# Patient Record
Sex: MALE | NOT HISPANIC OR LATINO | Employment: UNEMPLOYED | URBAN - METROPOLITAN AREA
[De-identification: names, ages, dates, MRNs, and addresses within clinical notes are randomized per-mention and may not be internally consistent; named-entity substitution may affect disease eponyms.]

---

## 2023-06-05 DIAGNOSIS — E11.9 TYPE 2 DIABETES MELLITUS WITHOUT COMPLICATION, WITHOUT LONG-TERM CURRENT USE OF INSULIN (MULTI): Primary | ICD-10-CM

## 2023-11-21 ENCOUNTER — HOSPITAL ENCOUNTER (INPATIENT)
Facility: HOSPITAL | Age: 83
LOS: 37 days | DRG: 025 | End: 2023-12-29
Attending: EMERGENCY MEDICINE | Admitting: NEUROLOGICAL SURGERY
Payer: MEDICARE

## 2023-11-21 DIAGNOSIS — M79.89 LIMB SWELLING: ICD-10-CM

## 2023-11-21 DIAGNOSIS — I10 HYPERTENSION, UNSPECIFIED TYPE: ICD-10-CM

## 2023-11-21 DIAGNOSIS — R20.8 SKIN SORENESS: ICD-10-CM

## 2023-11-21 DIAGNOSIS — S06.5XAA SUBDURAL HEMATOMA (MULTI): Primary | ICD-10-CM

## 2023-11-21 DIAGNOSIS — R60.1 GENERALIZED EDEMA: ICD-10-CM

## 2023-11-21 PROCEDURE — 99285 EMERGENCY DEPT VISIT HI MDM: CPT | Performed by: EMERGENCY MEDICINE

## 2023-11-21 ASSESSMENT — LIFESTYLE VARIABLES
HAVE YOU EVER FELT YOU SHOULD CUT DOWN ON YOUR DRINKING: NO
REASON UNABLE TO ASSESS: NO
HAVE PEOPLE ANNOYED YOU BY CRITICIZING YOUR DRINKING: NO
EVER FELT BAD OR GUILTY ABOUT YOUR DRINKING: NO
EVER HAD A DRINK FIRST THING IN THE MORNING TO STEADY YOUR NERVES TO GET RID OF A HANGOVER: NO

## 2023-11-21 ASSESSMENT — PAIN - FUNCTIONAL ASSESSMENT: PAIN_FUNCTIONAL_ASSESSMENT: 0-10

## 2023-11-21 NOTE — Clinical Note
Procedure complete. See anesthesia note for all meds given and VS. Access via right groin. Closure via 5 fr mynx and manual pressure. Hemostasis achieved. 2x2 and tegaderm dressing applied. Dressing clean, dry, and intact. Small hematoma, Dr. Truong aware. 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 VSS. Pt transferred to [facility], [place], report given to RN.        10-Kenrick-2019

## 2023-11-22 ENCOUNTER — ANESTHESIA EVENT (OUTPATIENT)
Dept: OPERATING ROOM | Facility: HOSPITAL | Age: 83
DRG: 025 | End: 2023-11-22
Payer: MEDICARE

## 2023-11-22 ENCOUNTER — ANESTHESIA (OUTPATIENT)
Dept: OPERATING ROOM | Facility: HOSPITAL | Age: 83
DRG: 025 | End: 2023-11-22
Payer: MEDICARE

## 2023-11-22 ENCOUNTER — APPOINTMENT (OUTPATIENT)
Dept: RADIOLOGY | Facility: HOSPITAL | Age: 83
DRG: 025 | End: 2023-11-22
Payer: MEDICARE

## 2023-11-22 PROBLEM — S06.5XAA SUBDURAL HEMATOMA (MULTI): Status: ACTIVE | Noted: 2023-11-21

## 2023-11-22 LAB
ABO GROUP (TYPE) IN BLOOD: NORMAL
ALBUMIN SERPL BCP-MCNC: 2.8 G/DL (ref 3.4–5)
ALBUMIN SERPL BCP-MCNC: 3.2 G/DL (ref 3.4–5)
ALP SERPL-CCNC: 95 U/L (ref 33–136)
ALT SERPL W P-5'-P-CCNC: 4 U/L (ref 10–52)
ANION GAP SERPL CALC-SCNC: 12 MMOL/L (ref 10–20)
ANION GAP SERPL CALC-SCNC: 13 MMOL/L (ref 10–20)
ANTIBODY SCREEN: NORMAL
AST SERPL W P-5'-P-CCNC: 7 U/L (ref 9–39)
BASOPHILS # BLD AUTO: 0.01 X10*3/UL (ref 0–0.1)
BASOPHILS NFR BLD AUTO: 0.1 %
BILIRUB SERPL-MCNC: 0.3 MG/DL (ref 0–1.2)
BUN SERPL-MCNC: 20 MG/DL (ref 6–23)
BUN SERPL-MCNC: 20 MG/DL (ref 6–23)
CALCIUM SERPL-MCNC: 7.5 MG/DL (ref 8.6–10.6)
CALCIUM SERPL-MCNC: 8.4 MG/DL (ref 8.6–10.6)
CHLORIDE SERPL-SCNC: 106 MMOL/L (ref 98–107)
CHLORIDE SERPL-SCNC: 110 MMOL/L (ref 98–107)
CO2 SERPL-SCNC: 22 MMOL/L (ref 21–32)
CO2 SERPL-SCNC: 25 MMOL/L (ref 21–32)
CREAT SERPL-MCNC: 1.02 MG/DL (ref 0.5–1.3)
CREAT SERPL-MCNC: 1.02 MG/DL (ref 0.5–1.3)
EOSINOPHIL # BLD AUTO: 0 X10*3/UL (ref 0–0.4)
EOSINOPHIL NFR BLD AUTO: 0 %
ERYTHROCYTE [DISTWIDTH] IN BLOOD BY AUTOMATED COUNT: 14.3 % (ref 11.5–14.5)
ERYTHROCYTE [DISTWIDTH] IN BLOOD BY AUTOMATED COUNT: 14.4 % (ref 11.5–14.5)
GFR SERPL CREATININE-BSD FRML MDRD: 72 ML/MIN/1.73M*2
GFR SERPL CREATININE-BSD FRML MDRD: 72 ML/MIN/1.73M*2
GLUCOSE BLD MANUAL STRIP-MCNC: 149 MG/DL (ref 74–99)
GLUCOSE BLD MANUAL STRIP-MCNC: 177 MG/DL (ref 74–99)
GLUCOSE SERPL-MCNC: 139 MG/DL (ref 74–99)
GLUCOSE SERPL-MCNC: 174 MG/DL (ref 74–99)
HCT VFR BLD AUTO: 26.6 % (ref 41–52)
HCT VFR BLD AUTO: 30.7 % (ref 41–52)
HGB BLD-MCNC: 10.3 G/DL (ref 13.5–17.5)
HGB BLD-MCNC: 8.6 G/DL (ref 13.5–17.5)
IMM GRANULOCYTES # BLD AUTO: 0.04 X10*3/UL (ref 0–0.5)
IMM GRANULOCYTES NFR BLD AUTO: 0.4 % (ref 0–0.9)
INR PPP: 1.2 (ref 0.9–1.1)
LYMPHOCYTES # BLD AUTO: 2.1 X10*3/UL (ref 0.8–3)
LYMPHOCYTES NFR BLD AUTO: 23.5 %
MAGNESIUM SERPL-MCNC: 1.48 MG/DL (ref 1.6–2.4)
MCH RBC QN AUTO: 29.6 PG (ref 26–34)
MCH RBC QN AUTO: 30 PG (ref 26–34)
MCHC RBC AUTO-ENTMCNC: 32.3 G/DL (ref 32–36)
MCHC RBC AUTO-ENTMCNC: 33.6 G/DL (ref 32–36)
MCV RBC AUTO: 90 FL (ref 80–100)
MCV RBC AUTO: 91 FL (ref 80–100)
MONOCYTES # BLD AUTO: 0.85 X10*3/UL (ref 0.05–0.8)
MONOCYTES NFR BLD AUTO: 9.5 %
NEUTROPHILS # BLD AUTO: 5.94 X10*3/UL (ref 1.6–5.5)
NEUTROPHILS NFR BLD AUTO: 66.5 %
NRBC BLD-RTO: 0 /100 WBCS (ref 0–0)
NRBC BLD-RTO: 0 /100 WBCS (ref 0–0)
PHOSPHATE SERPL-MCNC: 3.8 MG/DL (ref 2.5–4.9)
PLATELET # BLD AUTO: 142 X10*3/UL (ref 150–450)
PLATELET # BLD AUTO: 173 X10*3/UL (ref 150–450)
POTASSIUM SERPL-SCNC: 3.9 MMOL/L (ref 3.5–5.3)
POTASSIUM SERPL-SCNC: 4.1 MMOL/L (ref 3.5–5.3)
PROT SERPL-MCNC: 6.4 G/DL (ref 6.4–8.2)
PROTHROMBIN TIME: 13 SECONDS (ref 9.8–12.8)
RBC # BLD AUTO: 2.91 X10*6/UL (ref 4.5–5.9)
RBC # BLD AUTO: 3.43 X10*6/UL (ref 4.5–5.9)
RH FACTOR (ANTIGEN D): NORMAL
SODIUM SERPL-SCNC: 139 MMOL/L (ref 136–145)
SODIUM SERPL-SCNC: 141 MMOL/L (ref 136–145)
WBC # BLD AUTO: 6.9 X10*3/UL (ref 4.4–11.3)
WBC # BLD AUTO: 8.9 X10*3/UL (ref 4.4–11.3)

## 2023-11-22 PROCEDURE — 97161 PT EVAL LOW COMPLEX 20 MIN: CPT | Mod: GP

## 2023-11-22 PROCEDURE — 3600000005 HC OR TIME - INITIAL BASE CHARGE - PROCEDURE LEVEL FIVE: Performed by: NEUROLOGICAL SURGERY

## 2023-11-22 PROCEDURE — 2500000004 HC RX 250 GENERAL PHARMACY W/ HCPCS (ALT 636 FOR OP/ED): Performed by: NEUROLOGICAL SURGERY

## 2023-11-22 PROCEDURE — 85610 PROTHROMBIN TIME: CPT | Performed by: STUDENT IN AN ORGANIZED HEALTH CARE EDUCATION/TRAINING PROGRAM

## 2023-11-22 PROCEDURE — 2500000004 HC RX 250 GENERAL PHARMACY W/ HCPCS (ALT 636 FOR OP/ED): Performed by: STUDENT IN AN ORGANIZED HEALTH CARE EDUCATION/TRAINING PROGRAM

## 2023-11-22 PROCEDURE — 94762 N-INVAS EAR/PLS OXIMTRY CONT: CPT

## 2023-11-22 PROCEDURE — C9248 INJ, CLEVIDIPINE BUTYRATE: HCPCS | Mod: JG

## 2023-11-22 PROCEDURE — 85027 COMPLETE CBC AUTOMATED: CPT | Performed by: STUDENT IN AN ORGANIZED HEALTH CARE EDUCATION/TRAINING PROGRAM

## 2023-11-22 PROCEDURE — 92610 EVALUATE SWALLOWING FUNCTION: CPT | Mod: GN | Performed by: SPEECH-LANGUAGE PATHOLOGIST

## 2023-11-22 PROCEDURE — A4217 STERILE WATER/SALINE, 500 ML: HCPCS | Performed by: NEUROLOGICAL SURGERY

## 2023-11-22 PROCEDURE — C1713 ANCHOR/SCREW BN/BN,TIS/BN: HCPCS | Performed by: NEUROLOGICAL SURGERY

## 2023-11-22 PROCEDURE — 2500000005 HC RX 250 GENERAL PHARMACY W/O HCPCS

## 2023-11-22 PROCEDURE — 3700000001 HC GENERAL ANESTHESIA TIME - INITIAL BASE CHARGE: Performed by: NEUROLOGICAL SURGERY

## 2023-11-22 PROCEDURE — 70450 CT HEAD/BRAIN W/O DYE: CPT | Performed by: RADIOLOGY

## 2023-11-22 PROCEDURE — 61312 CRNEC/CRNOT STTL XDRL/SDRL: CPT | Performed by: NEUROLOGICAL SURGERY

## 2023-11-22 PROCEDURE — 80053 COMPREHEN METABOLIC PANEL: CPT | Performed by: STUDENT IN AN ORGANIZED HEALTH CARE EDUCATION/TRAINING PROGRAM

## 2023-11-22 PROCEDURE — 74018 RADEX ABDOMEN 1 VIEW: CPT

## 2023-11-22 PROCEDURE — A61312 PR OPEN SKULL EVAC HEMATOMA, SUPRATENTORIAL, SUB/ EXTRADURAL: Performed by: ANESTHESIOLOGY

## 2023-11-22 PROCEDURE — 82947 ASSAY GLUCOSE BLOOD QUANT: CPT

## 2023-11-22 PROCEDURE — 85025 COMPLETE CBC W/AUTO DIFF WBC: CPT | Performed by: STUDENT IN AN ORGANIZED HEALTH CARE EDUCATION/TRAINING PROGRAM

## 2023-11-22 PROCEDURE — 00C40ZZ EXTIRPATION OF MATTER FROM INTRACRANIAL SUBDURAL SPACE, OPEN APPROACH: ICD-10-PCS | Performed by: NEUROLOGICAL SURGERY

## 2023-11-22 PROCEDURE — 99100 ANES PT EXTEME AGE<1 YR&>70: CPT | Performed by: ANESTHESIOLOGY

## 2023-11-22 PROCEDURE — 37799 UNLISTED PX VASCULAR SURGERY: CPT | Performed by: STUDENT IN AN ORGANIZED HEALTH CARE EDUCATION/TRAINING PROGRAM

## 2023-11-22 PROCEDURE — 99140 ANES COMP EMERGENCY COND: CPT | Performed by: ANESTHESIOLOGY

## 2023-11-22 PROCEDURE — 7100000002 HC RECOVERY ROOM TIME - EACH INCREMENTAL 1 MINUTE: Performed by: NEUROLOGICAL SURGERY

## 2023-11-22 PROCEDURE — 3700000002 HC GENERAL ANESTHESIA TIME - EACH INCREMENTAL 1 MINUTE: Performed by: NEUROLOGICAL SURGERY

## 2023-11-22 PROCEDURE — 7100000001 HC RECOVERY ROOM TIME - INITIAL BASE CHARGE: Performed by: NEUROLOGICAL SURGERY

## 2023-11-22 PROCEDURE — 3600000010 HC OR TIME - EACH INCREMENTAL 1 MINUTE - PROCEDURE LEVEL FIVE: Performed by: NEUROLOGICAL SURGERY

## 2023-11-22 PROCEDURE — 70450 CT HEAD/BRAIN W/O DYE: CPT

## 2023-11-22 PROCEDURE — 97530 THERAPEUTIC ACTIVITIES: CPT | Mod: GP

## 2023-11-22 PROCEDURE — 00U20KZ SUPPLEMENT DURA MATER WITH NONAUTOLOGOUS TISSUE SUBSTITUTE, OPEN APPROACH: ICD-10-PCS | Performed by: NEUROLOGICAL SURGERY

## 2023-11-22 PROCEDURE — 74018 RADEX ABDOMEN 1 VIEW: CPT | Performed by: STUDENT IN AN ORGANIZED HEALTH CARE EDUCATION/TRAINING PROGRAM

## 2023-11-22 PROCEDURE — 2500000002 HC RX 250 W HCPCS SELF ADMINISTERED DRUGS (ALT 637 FOR MEDICARE OP, ALT 636 FOR OP/ED)

## 2023-11-22 PROCEDURE — 2500000004 HC RX 250 GENERAL PHARMACY W/ HCPCS (ALT 636 FOR OP/ED)

## 2023-11-22 PROCEDURE — 83735 ASSAY OF MAGNESIUM: CPT | Performed by: STUDENT IN AN ORGANIZED HEALTH CARE EDUCATION/TRAINING PROGRAM

## 2023-11-22 PROCEDURE — 2500000005 HC RX 250 GENERAL PHARMACY W/O HCPCS: Performed by: NEUROLOGICAL SURGERY

## 2023-11-22 PROCEDURE — C1776 JOINT DEVICE (IMPLANTABLE): HCPCS | Performed by: NEUROLOGICAL SURGERY

## 2023-11-22 PROCEDURE — 70450 CT HEAD/BRAIN W/O DYE: CPT | Performed by: STUDENT IN AN ORGANIZED HEALTH CARE EDUCATION/TRAINING PROGRAM

## 2023-11-22 PROCEDURE — 97530 THERAPEUTIC ACTIVITIES: CPT | Mod: GO

## 2023-11-22 PROCEDURE — 36415 COLL VENOUS BLD VENIPUNCTURE: CPT | Performed by: STUDENT IN AN ORGANIZED HEALTH CARE EDUCATION/TRAINING PROGRAM

## 2023-11-22 PROCEDURE — 86901 BLOOD TYPING SEROLOGIC RH(D): CPT | Performed by: STUDENT IN AN ORGANIZED HEALTH CARE EDUCATION/TRAINING PROGRAM

## 2023-11-22 PROCEDURE — 96372 THER/PROPH/DIAG INJ SC/IM: CPT | Performed by: NEUROLOGICAL SURGERY

## 2023-11-22 PROCEDURE — 80069 RENAL FUNCTION PANEL: CPT | Mod: CCI | Performed by: STUDENT IN AN ORGANIZED HEALTH CARE EDUCATION/TRAINING PROGRAM

## 2023-11-22 PROCEDURE — 2500000001 HC RX 250 WO HCPCS SELF ADMINISTERED DRUGS (ALT 637 FOR MEDICARE OP): Performed by: NEUROLOGICAL SURGERY

## 2023-11-22 PROCEDURE — 97166 OT EVAL MOD COMPLEX 45 MIN: CPT | Mod: GO

## 2023-11-22 PROCEDURE — 2780000003 HC OR 278 NO HCPCS: Performed by: NEUROLOGICAL SURGERY

## 2023-11-22 PROCEDURE — 2060000001 HC INTERMEDIATE ICU ROOM DAILY

## 2023-11-22 PROCEDURE — 36620 INSERTION CATHETER ARTERY: CPT | Performed by: STUDENT IN AN ORGANIZED HEALTH CARE EDUCATION/TRAINING PROGRAM

## 2023-11-22 DEVICE — CROSS PIN, AXS SELF-TAP, 1.5 X 4MM
Type: IMPLANTABLE DEVICE | Site: CRANIAL | Status: FUNCTIONAL
Removed: 2023-11-30

## 2023-11-22 DEVICE — IMPLANTABLE DEVICE: Type: IMPLANTABLE DEVICE | Site: CRANIAL | Status: FUNCTIONAL

## 2023-11-22 DEVICE — GRAFT MATRIX, DURAL, DURAGEN PLUS 2X2 (1/PK): Type: IMPLANTABLE DEVICE | Site: BRAIN | Status: FUNCTIONAL

## 2023-11-22 RX ORDER — ALBUTEROL SULFATE 0.83 MG/ML
2.5 SOLUTION RESPIRATORY (INHALATION) ONCE AS NEEDED
Status: COMPLETED | OUTPATIENT
Start: 2023-11-22 | End: 2023-11-22

## 2023-11-22 RX ORDER — CHOLECALCIFEROL (VITAMIN D3) 50 MCG
50 TABLET ORAL DAILY
COMMUNITY
End: 2023-12-29 | Stop reason: HOSPADM

## 2023-11-22 RX ORDER — OXYCODONE HYDROCHLORIDE 5 MG/1
5 TABLET ORAL EVERY 4 HOURS PRN
Status: DISCONTINUED | OUTPATIENT
Start: 2023-11-22 | End: 2023-11-22 | Stop reason: HOSPADM

## 2023-11-22 RX ORDER — POLYETHYLENE GLYCOL 3350 17 G/17G
17 POWDER, FOR SOLUTION ORAL DAILY
Status: DISCONTINUED | OUTPATIENT
Start: 2023-11-22 | End: 2023-12-29

## 2023-11-22 RX ORDER — LABETALOL HYDROCHLORIDE 5 MG/ML
5 INJECTION, SOLUTION INTRAVENOUS ONCE AS NEEDED
Status: COMPLETED | OUTPATIENT
Start: 2023-11-22 | End: 2023-11-22

## 2023-11-22 RX ORDER — LABETALOL HYDROCHLORIDE 5 MG/ML
10 INJECTION, SOLUTION INTRAVENOUS
Status: DISCONTINUED | OUTPATIENT
Start: 2023-11-22 | End: 2023-12-29

## 2023-11-22 RX ORDER — ACETAMINOPHEN 325 MG/1
650 TABLET ORAL EVERY 4 HOURS PRN
Status: DISCONTINUED | OUTPATIENT
Start: 2023-11-22 | End: 2023-11-22 | Stop reason: HOSPADM

## 2023-11-22 RX ORDER — CHOLECALCIFEROL (VITAMIN D3) 25 MCG
4000 TABLET ORAL DAILY
Status: DISCONTINUED | OUTPATIENT
Start: 2023-11-22 | End: 2023-12-29

## 2023-11-22 RX ORDER — HYDRALAZINE HYDROCHLORIDE 20 MG/ML
5 INJECTION INTRAMUSCULAR; INTRAVENOUS EVERY 30 MIN PRN
Status: DISCONTINUED | OUTPATIENT
Start: 2023-11-22 | End: 2023-11-22

## 2023-11-22 RX ORDER — LIDOCAINE HYDROCHLORIDE 10 MG/ML
0.1 INJECTION INFILTRATION; PERINEURAL ONCE
Status: DISCONTINUED | OUTPATIENT
Start: 2023-11-22 | End: 2023-11-22 | Stop reason: HOSPADM

## 2023-11-22 RX ORDER — PHENYLEPHRINE HCL IN 0.9% NACL 1 MG/10 ML
SYRINGE (ML) INTRAVENOUS AS NEEDED
Status: DISCONTINUED | OUTPATIENT
Start: 2023-11-22 | End: 2023-11-22

## 2023-11-22 RX ORDER — CALCIUM CARB/VITAMIN D3/VIT K1 500-500-40
4000 TABLET,CHEWABLE ORAL DAILY
Status: ON HOLD | COMMUNITY
End: 2023-11-22 | Stop reason: ALTCHOICE

## 2023-11-22 RX ORDER — ONDANSETRON HYDROCHLORIDE 2 MG/ML
4 INJECTION, SOLUTION INTRAVENOUS EVERY 8 HOURS PRN
Status: DISCONTINUED | OUTPATIENT
Start: 2023-11-22 | End: 2023-12-29

## 2023-11-22 RX ORDER — ATORVASTATIN CALCIUM 20 MG/1
20 TABLET, FILM COATED ORAL DAILY
COMMUNITY
End: 2023-12-29 | Stop reason: HOSPADM

## 2023-11-22 RX ORDER — HYDRALAZINE HYDROCHLORIDE 20 MG/ML
10 INJECTION INTRAMUSCULAR; INTRAVENOUS
Status: DISCONTINUED | OUTPATIENT
Start: 2023-11-22 | End: 2023-12-29

## 2023-11-22 RX ORDER — CEFAZOLIN 1 G/1
INJECTION, POWDER, FOR SOLUTION INTRAVENOUS AS NEEDED
Status: DISCONTINUED | OUTPATIENT
Start: 2023-11-22 | End: 2023-11-22

## 2023-11-22 RX ORDER — OXYCODONE HYDROCHLORIDE 5 MG/1
5 TABLET ORAL EVERY 4 HOURS PRN
Status: DISCONTINUED | OUTPATIENT
Start: 2023-11-22 | End: 2023-12-11

## 2023-11-22 RX ORDER — ONDANSETRON 4 MG/1
4 TABLET, FILM COATED ORAL EVERY 8 HOURS PRN
Status: DISCONTINUED | OUTPATIENT
Start: 2023-11-22 | End: 2023-12-29

## 2023-11-22 RX ORDER — OXYCODONE HYDROCHLORIDE 5 MG/1
10 TABLET ORAL EVERY 4 HOURS PRN
Status: DISCONTINUED | OUTPATIENT
Start: 2023-11-22 | End: 2023-12-11

## 2023-11-22 RX ORDER — CLOTRIMAZOLE AND BETAMETHASONE DIPROPIONATE 10; .64 MG/G; MG/G
1 CREAM TOPICAL 2 TIMES DAILY
COMMUNITY
End: 2023-12-29 | Stop reason: HOSPADM

## 2023-11-22 RX ORDER — ONDANSETRON HYDROCHLORIDE 2 MG/ML
INJECTION, SOLUTION INTRAVENOUS AS NEEDED
Status: DISCONTINUED | OUTPATIENT
Start: 2023-11-22 | End: 2023-11-22

## 2023-11-22 RX ORDER — LABETALOL HYDROCHLORIDE 5 MG/ML
INJECTION, SOLUTION INTRAVENOUS AS NEEDED
Status: DISCONTINUED | OUTPATIENT
Start: 2023-11-22 | End: 2023-11-22

## 2023-11-22 RX ORDER — SODIUM CHLORIDE, SODIUM LACTATE, POTASSIUM CHLORIDE, CALCIUM CHLORIDE 600; 310; 30; 20 MG/100ML; MG/100ML; MG/100ML; MG/100ML
100 INJECTION, SOLUTION INTRAVENOUS CONTINUOUS
Status: DISCONTINUED | OUTPATIENT
Start: 2023-11-22 | End: 2023-11-22 | Stop reason: HOSPADM

## 2023-11-22 RX ORDER — LEVETIRACETAM 500 MG/5ML
INJECTION, SOLUTION, CONCENTRATE INTRAVENOUS AS NEEDED
Status: DISCONTINUED | OUTPATIENT
Start: 2023-11-22 | End: 2023-11-22

## 2023-11-22 RX ORDER — PROPOFOL 10 MG/ML
INJECTION, EMULSION INTRAVENOUS AS NEEDED
Status: DISCONTINUED | OUTPATIENT
Start: 2023-11-22 | End: 2023-11-22

## 2023-11-22 RX ORDER — BACITRACIN 500 [USP'U]/G
OINTMENT TOPICAL AS NEEDED
Status: DISCONTINUED | OUTPATIENT
Start: 2023-11-22 | End: 2023-11-22 | Stop reason: HOSPADM

## 2023-11-22 RX ORDER — LIDOCAINE HYDROCHLORIDE 20 MG/ML
INJECTION, SOLUTION INFILTRATION; PERINEURAL AS NEEDED
Status: DISCONTINUED | OUTPATIENT
Start: 2023-11-22 | End: 2023-11-22

## 2023-11-22 RX ORDER — SUCCINYLCHOLINE CHLORIDE 20 MG/ML
INJECTION INTRAMUSCULAR; INTRAVENOUS AS NEEDED
Status: DISCONTINUED | OUTPATIENT
Start: 2023-11-22 | End: 2023-11-22

## 2023-11-22 RX ORDER — LABETALOL HYDROCHLORIDE 5 MG/ML
INJECTION, SOLUTION INTRAVENOUS
Status: COMPLETED
Start: 2023-11-22 | End: 2023-11-22

## 2023-11-22 RX ORDER — ONDANSETRON HYDROCHLORIDE 2 MG/ML
4 INJECTION, SOLUTION INTRAVENOUS ONCE AS NEEDED
Status: DISCONTINUED | OUTPATIENT
Start: 2023-11-22 | End: 2023-11-22 | Stop reason: HOSPADM

## 2023-11-22 RX ORDER — HYDROMORPHONE HYDROCHLORIDE 1 MG/ML
0.2 INJECTION, SOLUTION INTRAMUSCULAR; INTRAVENOUS; SUBCUTANEOUS EVERY 5 MIN PRN
Status: DISCONTINUED | OUTPATIENT
Start: 2023-11-22 | End: 2023-11-22 | Stop reason: HOSPADM

## 2023-11-22 RX ORDER — LABETALOL HYDROCHLORIDE 5 MG/ML
5 INJECTION, SOLUTION INTRAVENOUS ONCE AS NEEDED
Status: DISCONTINUED | OUTPATIENT
Start: 2023-11-22 | End: 2023-11-22

## 2023-11-22 RX ORDER — AMLODIPINE BESYLATE 5 MG/1
5 TABLET ORAL DAILY
COMMUNITY
End: 2023-12-29 | Stop reason: HOSPADM

## 2023-11-22 RX ORDER — SODIUM CHLORIDE 0.9 G/100ML
IRRIGANT IRRIGATION AS NEEDED
Status: DISCONTINUED | OUTPATIENT
Start: 2023-11-22 | End: 2023-11-22 | Stop reason: HOSPADM

## 2023-11-22 RX ORDER — METFORMIN HYDROCHLORIDE 500 MG/1
500 TABLET ORAL
COMMUNITY
End: 2023-12-29 | Stop reason: HOSPADM

## 2023-11-22 RX ORDER — PROPOFOL 10 MG/ML
INJECTION, EMULSION INTRAVENOUS CONTINUOUS PRN
Status: DISCONTINUED | OUTPATIENT
Start: 2023-11-22 | End: 2023-11-22

## 2023-11-22 RX ORDER — DEXAMETHASONE SODIUM PHOSPHATE 4 MG/ML
INJECTION, SOLUTION INTRA-ARTICULAR; INTRALESIONAL; INTRAMUSCULAR; INTRAVENOUS; SOFT TISSUE AS NEEDED
Status: DISCONTINUED | OUTPATIENT
Start: 2023-11-22 | End: 2023-11-22

## 2023-11-22 RX ORDER — ROCURONIUM BROMIDE 10 MG/ML
INJECTION, SOLUTION INTRAVENOUS AS NEEDED
Status: DISCONTINUED | OUTPATIENT
Start: 2023-11-22 | End: 2023-11-22

## 2023-11-22 RX ORDER — LEVETIRACETAM 5 MG/ML
500 INJECTION INTRAVASCULAR EVERY 12 HOURS
Status: DISCONTINUED | OUTPATIENT
Start: 2023-11-22 | End: 2023-12-30 | Stop reason: HOSPADM

## 2023-11-22 RX ORDER — LABETALOL HYDROCHLORIDE 5 MG/ML
INJECTION, SOLUTION INTRAVENOUS
Status: DISPENSED
Start: 2023-11-22 | End: 2023-11-23

## 2023-11-22 RX ORDER — LIDOCAINE HYDROCHLORIDE AND EPINEPHRINE 5; 5 MG/ML; UG/ML
INJECTION, SOLUTION INFILTRATION; PERINEURAL AS NEEDED
Status: DISCONTINUED | OUTPATIENT
Start: 2023-11-22 | End: 2023-11-22 | Stop reason: HOSPADM

## 2023-11-22 RX ORDER — HYDRALAZINE HYDROCHLORIDE 20 MG/ML
5 INJECTION INTRAMUSCULAR; INTRAVENOUS EVERY 30 MIN PRN
Status: COMPLETED | OUTPATIENT
Start: 2023-11-22 | End: 2023-11-22

## 2023-11-22 RX ORDER — ATORVASTATIN CALCIUM 20 MG/1
20 TABLET, FILM COATED ORAL DAILY
Status: DISCONTINUED | OUTPATIENT
Start: 2023-11-22 | End: 2023-12-29

## 2023-11-22 RX ORDER — NORETHINDRONE AND ETHINYL ESTRADIOL 0.5-0.035
KIT ORAL AS NEEDED
Status: DISCONTINUED | OUTPATIENT
Start: 2023-11-22 | End: 2023-11-22

## 2023-11-22 RX ORDER — PHENYLEPHRINE HCL IN 0.9% NACL 0.4MG/10ML
SYRINGE (ML) INTRAVENOUS AS NEEDED
Status: DISCONTINUED | OUTPATIENT
Start: 2023-11-22 | End: 2023-11-22

## 2023-11-22 RX ORDER — OXYCODONE HYDROCHLORIDE 5 MG/1
10 TABLET ORAL EVERY 4 HOURS PRN
Status: DISCONTINUED | OUTPATIENT
Start: 2023-11-22 | End: 2023-11-22 | Stop reason: HOSPADM

## 2023-11-22 RX ORDER — PHENYLEPHRINE 10 MG/250 ML(40 MCG/ML)IN 0.9 % SOD.CHLORIDE INTRAVENOUS
CONTINUOUS PRN
Status: DISCONTINUED | OUTPATIENT
Start: 2023-11-22 | End: 2023-11-22

## 2023-11-22 RX ORDER — LISINOPRIL 10 MG/1
10 TABLET ORAL DAILY
COMMUNITY
End: 2023-12-29 | Stop reason: HOSPADM

## 2023-11-22 RX ORDER — ACYCLOVIR 400 MG/1
400 TABLET ORAL 2 TIMES DAILY
COMMUNITY
End: 2023-12-29 | Stop reason: HOSPADM

## 2023-11-22 RX ORDER — SODIUM CHLORIDE 9 MG/ML
75 INJECTION, SOLUTION INTRAVENOUS CONTINUOUS
Status: DISCONTINUED | OUTPATIENT
Start: 2023-11-22 | End: 2023-12-05

## 2023-11-22 RX ORDER — ACETAMINOPHEN 325 MG/1
650 TABLET ORAL EVERY 6 HOURS PRN
Status: DISCONTINUED | OUTPATIENT
Start: 2023-11-22 | End: 2023-12-07

## 2023-11-22 RX ORDER — NALOXONE HYDROCHLORIDE 0.4 MG/ML
0.2 INJECTION, SOLUTION INTRAMUSCULAR; INTRAVENOUS; SUBCUTANEOUS EVERY 5 MIN PRN
Status: DISCONTINUED | OUTPATIENT
Start: 2023-11-22 | End: 2023-12-29

## 2023-11-22 RX ORDER — FENTANYL CITRATE 50 UG/ML
INJECTION, SOLUTION INTRAMUSCULAR; INTRAVENOUS AS NEEDED
Status: DISCONTINUED | OUTPATIENT
Start: 2023-11-22 | End: 2023-11-22

## 2023-11-22 RX ORDER — HYDROMORPHONE HYDROCHLORIDE 1 MG/ML
0.2 INJECTION, SOLUTION INTRAMUSCULAR; INTRAVENOUS; SUBCUTANEOUS
Status: DISCONTINUED | OUTPATIENT
Start: 2023-11-22 | End: 2023-12-04

## 2023-11-22 RX ORDER — HYDROMORPHONE HYDROCHLORIDE 1 MG/ML
0.5 INJECTION, SOLUTION INTRAMUSCULAR; INTRAVENOUS; SUBCUTANEOUS EVERY 5 MIN PRN
Status: DISCONTINUED | OUTPATIENT
Start: 2023-11-22 | End: 2023-11-22 | Stop reason: HOSPADM

## 2023-11-22 RX ADMIN — SUCCINYLCHOLINE CHLORIDE 120 MG: 20 INJECTION, SOLUTION INTRAMUSCULAR; INTRAVENOUS at 02:04

## 2023-11-22 RX ADMIN — SODIUM CHLORIDE 75 ML/HR: 9 INJECTION, SOLUTION INTRAVENOUS at 06:14

## 2023-11-22 RX ADMIN — SODIUM NITROPRUSSIDE 100 MCG: 25 INJECTION INTRAVENOUS at 03:42

## 2023-11-22 RX ADMIN — LABETALOL HYDROCHLORIDE 5 MG: 5 INJECTION, SOLUTION INTRAVENOUS at 06:04

## 2023-11-22 RX ADMIN — CLEVIPIDINE 0.25 MG: 0.5 EMULSION INTRAVENOUS at 03:53

## 2023-11-22 RX ADMIN — ALBUTEROL SULFATE 2.5 MG: 2.5 SOLUTION RESPIRATORY (INHALATION) at 04:20

## 2023-11-22 RX ADMIN — SODIUM CHLORIDE, SODIUM LACTATE, POTASSIUM CHLORIDE, AND CALCIUM CHLORIDE: 600; 310; 30; 20 INJECTION, SOLUTION INTRAVENOUS at 01:50

## 2023-11-22 RX ADMIN — Medication 160 MCG: at 02:57

## 2023-11-22 RX ADMIN — Medication 0.4 MCG/KG/MIN: at 03:01

## 2023-11-22 RX ADMIN — ONDANSETRON 4 MG: 2 INJECTION, SOLUTION INTRAMUSCULAR; INTRAVENOUS at 02:57

## 2023-11-22 RX ADMIN — LABETALOL HYDROCHLORIDE 5 MG: 5 INJECTION, SOLUTION INTRAVENOUS at 06:15

## 2023-11-22 RX ADMIN — SODIUM CHLORIDE: 0.9 INJECTION, SOLUTION INTRAVENOUS at 02:25

## 2023-11-22 RX ADMIN — LABETALOL HYDROCHLORIDE 10 MG: 5 INJECTION, SOLUTION INTRAVENOUS at 04:08

## 2023-11-22 RX ADMIN — HYDRALAZINE HYDROCHLORIDE 5 MG: 20 INJECTION INTRAMUSCULAR; INTRAVENOUS at 07:47

## 2023-11-22 RX ADMIN — CLEVIPIDINE 0.25 MG: 0.5 EMULSION INTRAVENOUS at 03:42

## 2023-11-22 RX ADMIN — LEVETIRACETAM 1000 MG: 500 INJECTION, SOLUTION, CONCENTRATE INTRAVENOUS at 02:12

## 2023-11-22 RX ADMIN — HYDROMORPHONE HYDROCHLORIDE 0.2 MG: 1 INJECTION, SOLUTION INTRAMUSCULAR; INTRAVENOUS; SUBCUTANEOUS at 09:34

## 2023-11-22 RX ADMIN — SUGAMMADEX 200 MG: 100 INJECTION, SOLUTION INTRAVENOUS at 03:29

## 2023-11-22 RX ADMIN — LABETALOL HYDROCHLORIDE 10 MG: 5 INJECTION INTRAVENOUS at 18:31

## 2023-11-22 RX ADMIN — LEVETIRACETAM 500 MG: 5 INJECTION INTRAVENOUS at 17:29

## 2023-11-22 RX ADMIN — LABETALOL HYDROCHLORIDE 10 MG: 5 INJECTION, SOLUTION INTRAVENOUS at 03:52

## 2023-11-22 RX ADMIN — Medication 120 MCG: at 02:21

## 2023-11-22 RX ADMIN — LIDOCAINE HYDROCHLORIDE 80 MG: 20 INJECTION, SOLUTION INFILTRATION; PERINEURAL at 02:04

## 2023-11-22 RX ADMIN — ONDANSETRON 4 MG: 2 INJECTION INTRAMUSCULAR; INTRAVENOUS at 20:04

## 2023-11-22 RX ADMIN — CEFAZOLIN 2 G: 330 INJECTION, POWDER, FOR SOLUTION INTRAMUSCULAR; INTRAVENOUS at 02:06

## 2023-11-22 RX ADMIN — SUGAMMADEX 200 MG: 100 INJECTION, SOLUTION INTRAVENOUS at 03:48

## 2023-11-22 RX ADMIN — SODIUM NITROPRUSSIDE 100 MCG: 25 INJECTION INTRAVENOUS at 03:37

## 2023-11-22 RX ADMIN — EPHEDRINE SULFATE 5 MG: 50 INJECTION, SOLUTION INTRAVENOUS at 03:05

## 2023-11-22 RX ADMIN — Medication 80 MCG: at 02:10

## 2023-11-22 RX ADMIN — Medication 80 MCG: at 02:45

## 2023-11-22 RX ADMIN — ROCURONIUM BROMIDE 60 MG: 10 INJECTION, SOLUTION INTRAVENOUS at 02:20

## 2023-11-22 RX ADMIN — SODIUM NITROPRUSSIDE 100 MCG: 25 INJECTION INTRAVENOUS at 03:34

## 2023-11-22 RX ADMIN — SODIUM NITROPRUSSIDE 100 MCG: 25 INJECTION INTRAVENOUS at 03:32

## 2023-11-22 RX ADMIN — FENTANYL CITRATE 50 MCG: 50 INJECTION, SOLUTION INTRAMUSCULAR; INTRAVENOUS at 02:04

## 2023-11-22 RX ADMIN — PROPOFOL 75 MCG/KG/MIN: 10 INJECTION, EMULSION INTRAVENOUS at 02:20

## 2023-11-22 RX ADMIN — ONDANSETRON 4 MG: 2 INJECTION INTRAMUSCULAR; INTRAVENOUS at 06:01

## 2023-11-22 RX ADMIN — HYDRALAZINE HYDROCHLORIDE 5 MG: 20 INJECTION INTRAMUSCULAR; INTRAVENOUS at 10:34

## 2023-11-22 RX ADMIN — PROPOFOL 150 MG: 10 INJECTION, EMULSION INTRAVENOUS at 02:04

## 2023-11-22 RX ADMIN — Medication 80 MCG: at 03:01

## 2023-11-22 SDOH — SOCIAL STABILITY: SOCIAL INSECURITY: WERE YOU ABLE TO COMPLETE ALL THE BEHAVIORAL HEALTH SCREENINGS?: NO

## 2023-11-22 SDOH — SOCIAL STABILITY: SOCIAL INSECURITY: ARE THERE ANY APPARENT SIGNS OF INJURIES/BEHAVIORS THAT COULD BE RELATED TO ABUSE/NEGLECT?: UNABLE TO ASSESS

## 2023-11-22 SDOH — SOCIAL STABILITY: SOCIAL INSECURITY: DO YOU FEEL ANYONE HAS EXPLOITED OR TAKEN ADVANTAGE OF YOU FINANCIALLY OR OF YOUR PERSONAL PROPERTY?: UNABLE TO ASSESS

## 2023-11-22 SDOH — SOCIAL STABILITY: SOCIAL INSECURITY: HAS ANYONE EVER THREATENED TO HURT YOUR FAMILY OR YOUR PETS?: UNABLE TO ASSESS

## 2023-11-22 SDOH — SOCIAL STABILITY: SOCIAL INSECURITY: ARE YOU OR HAVE YOU BEEN THREATENED OR ABUSED PHYSICALLY, EMOTIONALLY, OR SEXUALLY BY ANYONE?: UNABLE TO ASSESS

## 2023-11-22 SDOH — SOCIAL STABILITY: SOCIAL INSECURITY: DO YOU FEEL UNSAFE GOING BACK TO THE PLACE WHERE YOU ARE LIVING?: UNABLE TO ASSESS

## 2023-11-22 SDOH — SOCIAL STABILITY: SOCIAL INSECURITY: DOES ANYONE TRY TO KEEP YOU FROM HAVING/CONTACTING OTHER FRIENDS OR DOING THINGS OUTSIDE YOUR HOME?: UNABLE TO ASSESS

## 2023-11-22 SDOH — SOCIAL STABILITY: SOCIAL INSECURITY: ABUSE: ADULT

## 2023-11-22 SDOH — SOCIAL STABILITY: SOCIAL INSECURITY: HAVE YOU HAD THOUGHTS OF HARMING ANYONE ELSE?: UNABLE TO ASSESS

## 2023-11-22 ASSESSMENT — COGNITIVE AND FUNCTIONAL STATUS - GENERAL
TURNING FROM BACK TO SIDE WHILE IN FLAT BAD: A LOT
STANDING UP FROM CHAIR USING ARMS: TOTAL
DAILY ACTIVITIY SCORE: 10
DRESSING REGULAR UPPER BODY CLOTHING: A LOT
WALKING IN HOSPITAL ROOM: TOTAL
TOILETING: A LOT
STANDING UP FROM CHAIR USING ARMS: A LOT
HELP NEEDED FOR BATHING: A LOT
CLIMB 3 TO 5 STEPS WITH RAILING: TOTAL
DAILY ACTIVITIY SCORE: 12
MOVING TO AND FROM BED TO CHAIR: TOTAL
DRESSING REGULAR LOWER BODY CLOTHING: A LOT
MOVING FROM LYING ON BACK TO SITTING ON SIDE OF FLAT BED WITH BEDRAILS: A LOT
WALKING IN HOSPITAL ROOM: A LOT
MOVING TO AND FROM BED TO CHAIR: A LOT
TURNING FROM BACK TO SIDE WHILE IN FLAT BAD: TOTAL
EATING MEALS: A LOT
MOVING FROM LYING ON BACK TO SITTING ON SIDE OF FLAT BED WITH BEDRAILS: A LOT
HELP NEEDED FOR BATHING: A LOT
MOBILITY SCORE: 11
PERSONAL GROOMING: A LOT
PERSONAL GROOMING: A LOT
CLIMB 3 TO 5 STEPS WITH RAILING: TOTAL
MOBILITY SCORE: 7
EATING MEALS: A LOT
DRESSING REGULAR LOWER BODY CLOTHING: TOTAL
DRESSING REGULAR UPPER BODY CLOTHING: A LOT
TOILETING: TOTAL

## 2023-11-22 ASSESSMENT — ACTIVITIES OF DAILY LIVING (ADL)
FEEDING YOURSELF: UNABLE TO ASSESS
HEARING - LEFT EAR: UNABLE TO ASSESS
LACK_OF_TRANSPORTATION: PATIENT DECLINED
JUDGMENT_ADEQUATE_SAFELY_COMPLETE_DAILY_ACTIVITIES: UNABLE TO ASSESS
GROOMING: UNABLE TO ASSESS
DRESSING YOURSELF: UNABLE TO ASSESS
TOILETING: UNABLE TO ASSESS
PATIENT'S MEMORY ADEQUATE TO SAFELY COMPLETE DAILY ACTIVITIES?: UNABLE TO ASSESS
BATHING_ASSISTANCE: MAXIMAL
ADEQUATE_TO_COMPLETE_ADL: UNABLE TO ASSESS
HEARING - RIGHT EAR: UNABLE TO ASSESS
WALKS IN HOME: UNABLE TO ASSESS
BATHING: UNABLE TO ASSESS

## 2023-11-22 ASSESSMENT — PAIN - FUNCTIONAL ASSESSMENT
PAIN_FUNCTIONAL_ASSESSMENT: 0-10
PAIN_FUNCTIONAL_ASSESSMENT: CPOT (CRITICAL CARE PAIN OBSERVATION TOOL)
PAIN_FUNCTIONAL_ASSESSMENT: CPOT (CRITICAL CARE PAIN OBSERVATION TOOL)
PAIN_FUNCTIONAL_ASSESSMENT: 0-10
PAIN_FUNCTIONAL_ASSESSMENT: CPOT (CRITICAL CARE PAIN OBSERVATION TOOL)

## 2023-11-22 ASSESSMENT — LIFESTYLE VARIABLES
HOW MANY STANDARD DRINKS CONTAINING ALCOHOL DO YOU HAVE ON A TYPICAL DAY: PATIENT DECLINED
HOW OFTEN DO YOU HAVE A DRINK CONTAINING ALCOHOL: PATIENT DECLINED
AUDIT-C TOTAL SCORE: -1
AUDIT-C TOTAL SCORE: -1
SKIP TO QUESTIONS 9-10: 0
HOW OFTEN DO YOU HAVE 6 OR MORE DRINKS ON ONE OCCASION: PATIENT DECLINED

## 2023-11-22 NOTE — ANESTHESIA PROCEDURE NOTES
Peripheral IV  Date/Time: 11/22/2023 2:30 AM      Placement  Needle size: 18 G  Laterality: right  Location: hand  Local anesthetic: none  Site prep: alcohol  Technique: anatomical landmarks  Attempts: 1

## 2023-11-22 NOTE — CONSULTS
"Consults    Reason For Consult  L chronic SDH, AMS    History Of Present Illness  Haile Ayers is a 83 y.o. male h/o CLL, anemia, DM, p/w AMS, found to have UTI, CTH w/ L large chronic SDH,.     Patient is seen at ED, found to be nonverbal.  He is fully awake and tracks but does not speak or follow any commands.  He is unable to provide further history    Past Medical History  He has a past medical history of Anemia, Chronic indwelling Mckee catheter, CLL (chronic lymphocytic leukemia) (CMS/HCC), Dementia (CMS/HCC), DM II (diabetes mellitus, type II), controlled (CMS/HCC), HTN (hypertension), Hyperlipidemia, and Leukemia (CMS/HCC).    Surgical History  He has no past surgical history on file.     Social History  He has no history on file for tobacco use, alcohol use, and drug use.    Family History  Family History   Family history unknown: Yes        Allergies  Januvia [sitagliptin]    Review of Systems  Unable to provide further history due to current medical status  Physical Exam  Constitutional:       Appearance: He is normal weight.   HENT:      Head: Normocephalic and atraumatic.      Nose: Nose normal.      Mouth/Throat:      Mouth: Mucous membranes are moist.   Eyes:      Pupils: Pupils are equal, round, and reactive to light.   Cardiovascular:      Rate and Rhythm: Normal rate and regular rhythm.      Pulses: Normal pulses.   Pulmonary:      Effort: Pulmonary effort is normal.   Abdominal:      Palpations: Abdomen is soft.   Musculoskeletal:      Cervical back: Normal range of motion.   Neurological:      Comments: Awake  Non-verbal  Globally aphasic  Nods ys/no (not reliably)  BUE spont AG (L>R)  BLE min wd            Last Recorded Vitals  Blood pressure 134/88, pulse 93, temperature 37.4 °C (99.3 °F), temperature source Temporal, resp. rate 24, height 1.727 m (5' 8\"), weight 70 kg (154 lb 5.2 oz), SpO2 94 %.    Relevant Results  CTH w/ large L sided chronic SDH w/ brain compression and midline shift   "   Assessment/Plan     Halie Ayers is a 83 y.o. male h/o CLL, anemia, DM, p/w AMS, found to have UTI, CTH w/ L large chronic SDH,.     Plan:  Please obtain repeat CTH non-contrast  Labs: CBC, RFP, coags, T/S, UA, CXR  Appreciate any assistance with obtaining family-POA contact information.

## 2023-11-22 NOTE — ANESTHESIA PROCEDURE NOTES
Peripheral IV  Date/Time: 11/22/2023 1:55 AM      Placement  Needle size: 18 G  Laterality: left  Location: hand  Local anesthetic: none  Site prep: alcohol  Technique: anatomical landmarks  Attempts: 1

## 2023-11-22 NOTE — H&P (VIEW-ONLY)
"Consults    Reason For Consult  L chronic SDH, AMS    History Of Present Illness  Haile Ayers is a 83 y.o. male h/o CLL, anemia, DM, p/w AMS, found to have UTI, CTH w/ L large chronic SDH,.     Patient is seen at ED, found to be nonverbal.  He is fully awake and tracks but does not speak or follow any commands.  He is unable to provide further history    Past Medical History  He has a past medical history of Anemia, Chronic indwelling Mckee catheter, CLL (chronic lymphocytic leukemia) (CMS/HCC), Dementia (CMS/HCC), DM II (diabetes mellitus, type II), controlled (CMS/HCC), HTN (hypertension), Hyperlipidemia, and Leukemia (CMS/HCC).    Surgical History  He has no past surgical history on file.     Social History  He has no history on file for tobacco use, alcohol use, and drug use.    Family History  Family History   Family history unknown: Yes        Allergies  Januvia [sitagliptin]    Review of Systems  Unable to provide further history due to current medical status  Physical Exam  Constitutional:       Appearance: He is normal weight.   HENT:      Head: Normocephalic and atraumatic.      Nose: Nose normal.      Mouth/Throat:      Mouth: Mucous membranes are moist.   Eyes:      Pupils: Pupils are equal, round, and reactive to light.   Cardiovascular:      Rate and Rhythm: Normal rate and regular rhythm.      Pulses: Normal pulses.   Pulmonary:      Effort: Pulmonary effort is normal.   Abdominal:      Palpations: Abdomen is soft.   Musculoskeletal:      Cervical back: Normal range of motion.   Neurological:      Comments: Awake  Non-verbal  Globally aphasic  Nods ys/no (not reliably)  BUE spont AG (L>R)  BLE min wd            Last Recorded Vitals  Blood pressure 134/88, pulse 93, temperature 37.4 °C (99.3 °F), temperature source Temporal, resp. rate 24, height 1.727 m (5' 8\"), weight 70 kg (154 lb 5.2 oz), SpO2 94 %.    Relevant Results  CTH w/ large L sided chronic SDH w/ brain compression and midline shift   "   Assessment/Plan     Haile Ayers is a 83 y.o. male h/o CLL, anemia, DM, p/w AMS, found to have UTI, CTH w/ L large chronic SDH,.     Plan:  Please obtain repeat CTH non-contrast  Labs: CBC, RFP, coags, T/S, UA, CXR  Appreciate any assistance with obtaining family-POA contact information.

## 2023-11-22 NOTE — ANESTHESIA PREPROCEDURE EVALUATION
Patient: Haile Ayers    Procedure Information       Date/Time: 11/22/23 0115    Procedure: Left Craniotomy for subdural hematoma evacuation (Left: Head)    Location: [facility] OR 25 / [facility] OR    Surgeons: Og Tran MD            Relevant Problems   No relevant active problems       Clinical information reviewed:    Allergies                NPO Detail:  No data recorded     Physical Exam    Airway  Mallampati: unable to assess  TM distance: <3 FB     Cardiovascular   Rhythm: regular  Rate: normal     Dental    Pulmonary - normal exam     Abdominal      Other findings: Pt had AMS and aphasic           Anesthesia Plan    ASA 4 - emergent     general     Anesthetic plan and risks discussed with spouse.  Use of blood products discussed with spouse who.    Plan discussed with attending.

## 2023-11-22 NOTE — PROGRESS NOTES
Pharmacy Medication History Review    Haile Ayers is a 83 y.o. male admitted for Subdural hematoma (CMS/[facility]). Pharmacy reviewed the patient's hlagg-yy-vijczbmor medications and allergies for accuracy.    The list below reflects the updated PTA list. Comments regarding how patient may be taking medications differently can be found in the Admit Orders Activity  Prior to Admission Medications   Prescriptions Last Dose Informant Patient Reported? Taking?   acyclovir (Zovirax) 400 mg tablet never started- prescribed on 11/10  Yes No; not started yet   Sig: Take 1 tablet (400 mg) by mouth 2 times a day.   amLODIPine (Norvasc) 5 mg tablet last filled 8/16 for 30 day supply  Yes yes   Sig: Take 1 tablet (5 mg) by mouth once daily.   atorvastatin (Lipitor) 20 mg tablet not taking- watches diet; no fill history  Yes No; pt watches diet   Sig: Take 1 tablet (20 mg) by mouth once daily.   cholecalciferol (Vitamin D3) 50 MCG (2000 UT) tablet   Yes yes   Sig: Take 1 tablet (50 mcg) by mouth once daily.   clotrimazole-betamethasone (Lotrisone) cream never started  Yes No; never started   Sig: Apply 1 Application topically 2 times a day.   ibrutinib (Imbruvica) 140 mg capsule   Yes yes   Sig: Take 3 capsules (420 mg total) by mouth once daily.   lisinopril 10 mg tablet   Yes yes   Sig: Take 1 tablet (10 mg) by mouth once daily.   metFORMIN (Glucophage) 500 mg tablet   Yes yes   Sig: Take 1 tablet (500 mg) by mouth 2 times a day with meals.      Facility-Administered Medications: None        The list below reflects the updated allergy list. Please review each documented allergy for additional clarification and justification.  Allergies  Reviewed by Manuel Banks RN on 11/21/2023        Severity Reactions Comments    Januvia [sitagliptin] Not Specified Unknown             Patient declines M2B at discharge. Pharmacy has been updated to [facility].    Sources used: Pharmacy dispense history, OARRs, patient interview (pt unable to participate  in interview- spoke with spouse), and chart review (care everywhere, [facility]) with little to no history    Below are additional concerns with the patient's PTA list.  -per spouse unable to start acyclovir  -prescriptions for flomax (0.8 mg daily) and finasteride (5 mg daily) were written, but spouse states that she believes that is the cause his falls in the past  -per dispense history, last filled amlodipine in August for 30 days supply. Spouse confirms that pt is still taking this medication  -pt was prescribed Jardiance in the past, but co-pay was roughly $800. Spouse states that they could not afford therefore has not been taking. Per spouse, doctor is aware.    Anay Alfaro, PharmD, [facility]  Transitions of Care Pharmacist  [facility]  Please reach out via Secure Chat for questions, or if no response call [phone] or [facility]era [facility]

## 2023-11-22 NOTE — PROGRESS NOTES
Speech-Language Pathology    Inpatient Speech-Language Pathology Clinical Swallow Evaluation    Patient Name: Haile Ayers  MRN: [id number]  Today's Date: 11/22/2023   Time Calculation  Start Time: 1447  Stop Time: 1505  Time Calculation (min): 18 min         History:   The patient is an 83-year-old male with PMH of CLL, DM2, and HTN presenting to the emergency department from OSH for neurosurgery evaluation.  Reportedly appeared to be altered prompting his wife to take him to OSH for evaluation.  Patient was aphasic when he arrived at OSH prompting stroke alert.  CT head was obtained that demonstrated an acute on chronic subdural with approximately 1.2 cm of midline shift.  No thrombolytics given as a result of imaging.  Patient additionally noted to have UTI for which she received Rocephin.  Transferred for evaluation.       Recommendations:  Solid Diet Recommendations : NPO  Liquid Diet Recommendations: NPO    -Frequent, aggressive oral care is strongly recommended to improve infection control as well as reduce dental plaque and bacteria on oropharyngeal surfaces which may increase the risk nosocomial infections, including pneumonia.    -OK for small amounts of ice chips (one at a time, 10x/hour) for pleasure/swallow stimulation, but only after aggressive oral care to avoid colonization of bacteria within oral cavity.      Assessment:  Clinical swallow evaluation completed. Pt non-verbal, not following directions during evaluation. Accepted small amount of ice chips x3, pt masticating ice chips. Non acceptance of straw, clenched teeth. Opened mouth with adequate labial seal to consume small single sips of water from a cup. No overt coughing, however with this limited amount silent aspiration or dysphagia cannot be r/o. Deferred further PO trials as this would place the pt at risk for potential aspiration. Due to surgery just being completed today, SLP to continue to re-evaluate to ascertain readiness for PO vs  need for an instrumental exam. Discussed results and recommendations with RN and MD        Plan:  SLP Plan: Skilled SLP  SLP Frequency: 2x per week  Duration: 3 weeks  SLP Discharge Recommendations: Continue skilled SLP services at the next level of care  Discussed POC: Patient, Nursing, Physician  Patient/Caregiver Agreeable: Other (Comment) (Pt non-verbal)  SLP - OK to Discharge: Yes    Goal:   Pt. will tolerate least restrictive diet without overt s/s of aspiration with 100% accuracy.      Pain:  Pain Assessment:  (Unable to assess)       Oral/Motor Assessment:  Dentition: Some Missing Teeth  Oral Motor:  (Pt unable to follow directions due to apparent receptive langauge deficits.)        Inpatient:  Education Documentation  Extensive education provided to pt re: current swallow function, recommendations/results and dysphagia POC question comprehension.

## 2023-11-22 NOTE — ED TRIAGE NOTES
Pt is a 82 yo male transferred from [facility] with a chronic subdural and a small acute subdural with shift, no immediate history of falls, pt accepted to the ED by NSGY, pt is aaox 2, chronic ignacio in place, pt was given 975 mg tylenol po and 1 gram of rocephin iv at OSH, 18g iv left AC placed pta

## 2023-11-22 NOTE — PROGRESS NOTES
Occupational Therapy    Evaluation/Treatment    Patient Name: Haile Ayers  MRN: [id number]  : 1940  Today's Date: 23  Time Calculation  Start Time: 956  Stop Time:   Time Calculation (min): 36 min       Assessment:  Prognosis: Fair  Evaluation/Treatment Tolerance: Patient tolerated treatment well  Medical Staff Made Aware: Yes  End of Session Communication: Bedside nurse  End of Session Patient Position: Bed, 3 rail up, Alarm on (B restraints)  OT Assessment Results: Decreased ADL status, Decreased upper extremity range of motion, Decreased upper extremity strength, Decreased safe judgment during ADL, Decreased cognition, Decreased endurance, Visual deficit, Decreased fine motor control, Decreased functional mobility, Decreased gross motor control, Decreased IADLs, Decreased trunk control for functional activities  Prognosis: Fair  Evaluation/Treatment Tolerance: Patient tolerated treatment well  Medical Staff Made Aware: Yes  Plan:  Treatment Interventions: ADL retraining, Visual perceptual retraining, Functional transfer training, UE strengthening/ROM, Endurance training, Cognitive reorientation, Patient/family training, Neuromuscular reeducation, Fine motor coordination activities  OT Frequency: 4 times per week  OT Discharge Recommendations: High intensity level of continued care  OT Recommended Transfer Status: Maximum assist  OT - OK to Discharge: Yes (Recommending High Intensity.)  Treatment Interventions: ADL retraining, Visual perceptual retraining, Functional transfer training, UE strengthening/ROM, Endurance training, Cognitive reorientation, Patient/family training, Neuromuscular reeducation, Fine motor coordination activities    Subjective   Current Problem:  1. Subdural hematoma (CMS/HCC)  Case Request Operating Room: Left Craniotomy for subdural hematoma evacuation    Case Request Operating Room: Left Craniotomy for subdural hematoma evacuation        General:   OT Received On:  11/22/23  General  Reason for Referral: Pt p/w AMS found to have UTI as well. CTH with large L chronic SDH with brain compression and midline shift. S/p L craniotomy for SDH evac (11/22).  Past Medical History Relevant to Rehab: CLL, anemia, DM  Prior to Session Communication: Bedside nurse  Patient Position Received: Bed, 3 rail up, Alarm on  Preferred Learning Style: visual, verbal  General Comment: Pt agreeable and cooperative. Pt demo expressive and receptive languge deficits with following ~10-15% commands with demo required. CGA-Min A for EOB. On 4 L via simple mask. Drain on L. B restraints.  Precautions:  Hearing/Visual Limitations: WFL  Medical Precautions: Fall precautions  Vital Signs:  Heart Rate: 82 (End of session 80.)  Heart Rate Source: Monitor  Resp: 26 (End of session 23.)  SpO2: 99 % (End of session 98. On 4 L.)  BP: 152/82 (During session 183/95 and end of session 166/83.)  MAP (mmHg): 102 (During session 118 and end of session 107.)  Pain:  Pain Assessment  Pain Assessment: 0-10  Pain Score:  (Difficult to assess d/t expressive language deficits but pt did not indicate pain for duration.)    Objective   Cognition:  Overall Cognitive Status: Impaired (Pt followed ~10-15% commands with demo and repetition. Pt attempts to mimic tasks after repetition and demo but with difficulty. Pt nodded yes to all questions with no verbalizations during session.)  Orientation Level:  (Attempted but difficult to assess d/t expressive and receptive language deficits. Pt consistently nodded yes to all orientation questions.)  Attention:  (Pt demo poor attention to task d/t poor awareness, poor command following, expressive and receptive language deficits. Pt distracted with verbal cueing to attend to task. )  Problem Solving:  (Pt demo poor problem solving d/t poor attention to task and limited ability to understand task d/t language deficits )  Safety/Judgement:  (Pt demo poor safety awareness d/t poor attention  and command following with verbal cueing for correction.)  Insight: Moderate  Processing Speed: Delayed      Home Living:  Home Living Comments:  (Attempted but unable. Pt unable to report d/t expressive and receptive language deficits.)  Prior Function:  Prior Function Comments: Attempted but unable. Pt unable to report d/t expressive and receptive language deficits.  IADL History:  IADL Comments: Attempted but unable. Pt unable to report d/t expressive and receptive language deficits.  ADL:  Eating Assistance: Moderate  Grooming Assistance: Moderate  Bathing Assistance: Maximal  UE Dressing Assistance: Moderate  LE Dressing Assistance: Total (Attempted to don socks at bed level but unable d/t poor command following, decreased ROM, weakness and R. Max verbal and tactile cueing with demo in attempt to initiate.)  Toileting Assistance with Device: Total  Activities of Daily Living:      Activity Tolerance:  Endurance:  (Pt was unable to perform ADLs. Pt sat EOB ~10 min with CGA-Min A d/t LOB.)  Functional Standing Tolerance:     Bed Mobility/Transfers: Bed Mobility  Bed Mobility: Yes  Bed Mobility 1  Bed Mobility 1: Supine to sitting  Level of Assistance 1: Maximum assistance (HOB elevated with bed rail up and drawsheet used. Instructed pt with demo and [place] assist to initiate reach with R hand, but difficulty. Attempted to assist with LEs but unable. Assist for trunk control and management of LEs.)  Bed Mobility Comments 1: Max verbal and tactile cueing with demo and repetition for task sequence.  Bed Mobility 2  Bed Mobility  2: Sitting to supine  Level of Assistance 2: Maximum assistance (HOB lowered and drawsheet used. Instructed pt with demo and [place] assist to initiate WB on LUE, pt able to initiate. Assist for trunk control and management of LEs.)  Bed Mobility Comments 2: Max verbal and tactile cueing with demo and repetition for task sequence    Transfers  Transfer: No      Ambulation/Gait Training:     Sitting  Balance:  Static Sitting Balance  Static Sitting-Level of Assistance:  (CGA-Min A with posterolateral lean to the R.)  Standing Balance:       Modalities:     IADL's:   Splinting:     Casting:     Therapy/Activity: Therapeutic Activity  Therapeutic Activity Performed: Yes  Therapeutic Activity 1: Pt sat EOB ~10 min with CGA-Min A d/t LOB with posterolateral lean to the R with verbal cueing to correct posture. Pt able to self-correct with max verbal cueing. Verbal and tactile  cueing with repetition and stim to visually track L and R while EOB.  Sensory:     Cognitive Skill Development:     Community/Work Reintegration Training:     Community Mobility:     Vision:    and Vision - Complex Assessment  Ocular Range of Motion:  (Pt able to attend to all visual fields with cueing, poor visual tracking d/t poor attention and command following.)  Sensation:  Light Touch:  (WD to nox stim on L UE/LE(+) and WD to nox stim on R UE/LE(+) with delayed response on R.)  Strength:     Other Activity:     Home Environment:     Perception:  Inattention/Neglect: Cues to attend right visual field  Coordination:      Hand Function:  Hand Function  Gross Grasp: Impaired (3/5 B by observation)  Extremities: RUE AROM (degrees)  RUE AROM Comment: WFL  RUE Strength  R Shoulder Flexion: 3-/5  R Elbow Flexion: 3/5  R Elbow Extension: 3/5, LUE AROM (degrees)  LUE AROM Comment: WFL  LUE Strength  L Shoulder Flexion: 3-/5  L Elbow Flexion: 3/5  L Elbow Extension: 3/5, Strength RLE  RLE Overall Strength:  (By observation at least 3-/5), and Strength LLE  LLE Overall Strength:  (By observation at least 3/5)    Outcome Measures: [facility] Daily Activity  Putting on and taking off regular lower body clothing: Total  Bathing (including washing, rinsing, drying): A lot  Putting on and taking off regular upper body clothing: A lot  Toileting, which includes using toilet, bedpan or urinal: Total  Taking care of personal grooming such as brushing teeth: A  lot  Eating Meals: A lot  Daily Activity - Total Score: 10    Education Documentation  Body Mechanics, taught by VALERIA Lu at 11/22/2023  1:50 PM.  Learner: Patient  Readiness: Nonacceptance  Method: Demonstration, Explanation  Response: Needs Reinforcement    Precautions, taught by VALERIA Lu at 11/22/2023  1:50 PM.  Learner: Patient  Readiness: Nonacceptance  Method: Demonstration, Explanation  Response: Needs Reinforcement    ADL Training, taught by VALERIA Lu at 11/22/2023  1:50 PM.  Learner: Patient  Readiness: Nonacceptance  Method: Demonstration, Explanation  Response: Needs Reinforcement    Education Comments  No comments found.        OP EDUCATION:       Goals:  Encounter Problems       Encounter Problems (Active)       ADLs       Patient will perform UB and LB bathing with CGA       Start:  11/22/23    Expected End:  12/06/23            Patient will complete upper body dressing with contact guard assist level of assistance donning all UE clothes while edge of bed        Start:  11/22/23    Expected End:  12/06/23            Patient will complete lower body dressing with moderate assist level of assistance donning all LE clothes  with LRD while edge of bed and standing       Start:  11/22/23    Expected End:  12/06/23            Patient will complete daily grooming tasks brushing teeth, washing face/hair, and unsupported sitting with SBA and PRN adaptive equipment while edge of bed with min cueing.       Start:  11/22/23    Expected End:  12/06/23            Patient will complete toileting including hygiene clothing management/hygiene with moderate assist level of assistance        Start:  11/22/23    Expected End:  12/06/23               BALANCE       Pt will maintain dynamic sitting balance during ADL task with modified independent level of assistance in order to demonstrate decreased risk of falling and improved postural control.       Start:  11/22/23    Expected End:   12/06/23               COGNITION/SAFETY       Patient will follow >85% simple commands to allow improved ADL performance with min verbal cueing.        Start:  11/22/23    Expected End:  12/06/23            Patient will demonstrate orientation x 4 with min verbal cueing.       Start:  11/22/23    Expected End:  12/06/23       ORIENTATION         Patient will sequence a functional task including 2-3 steps with >85% accuracy with min verbal cueing.        Start:  11/22/23    Expected End:  12/06/23            Patient will attend to functional task with 90% accuracy for >10 min with min verbal cueing.        Start:  11/22/23    Expected End:  12/06/23               TRANSFERS       Patient will perform bed mobility contact guard assist level of assistance and bed rails in order to improve safety and independence with mobility       Start:  11/22/23    Expected End:  12/06/23            Patient will complete functional transfer with least restrictive device with minimal assist  level of assistance.       Start:  11/22/23    Expected End:  12/06/23               VISION       Patient will visually track to all visual fields 100% of the time while performing a functional task with min verbal cueing.        Start:  11/22/23    Expected End:  12/06/23               Argenis Lewis S/OT under direct supervision of OT

## 2023-11-22 NOTE — PROGRESS NOTES
Physical Therapy    Physical Therapy Evaluation & Treatment    Patient Name: Haile Ayers  MRN: [id number]  Today's Date: 11/22/2023   Time Calculation  Start Time: 1212  Stop Time: 1252  Time Calculation (min): 40 min    Assessment/Plan   PT Assessment  PT Assessment Results: Decreased strength, Decreased endurance, Impaired balance, Decreased mobility, Decreased cognition  Rehab Prognosis: Good  Evaluation/Treatment Tolerance: Patient tolerated treatment well  End of Session Communication: Bedside nurse  Assessment Comment: Patient to benefit from ongoing PT services at this time and at discharge to continue addressing the above limitations and to prepare patient for safe and timely return to prior level of function.  End of Session Patient Position: Bed, 3 rail up, Alarm on   IP OR SWING BED PT PLAN  Inpatient or Swing Bed: Inpatient  PT Plan  Treatment/Interventions: Bed mobility, Transfer training, Gait training, Stair training, Balance training, Neuromuscular re-education, Strengthening, Endurance training, Range of motion, Therapeutic exercise, Therapeutic activity, Home exercise program, Orthotic fitting/training, Positioning, Postural re-education  PT Plan: Skilled PT  PT Frequency: 5 times per week  PT Discharge Recommendations: High intensity level of continued care  Equipment Recommended upon Discharge:  (TBD; Anticipate wheeled walker)  PT Recommended Transfer Status: Assist x2  PT - OK to Discharge: Yes (PT evaluation has been completed and discharge recommendations have been made.)      Subjective     General Visit Information:  General  Reason for Referral: Pt p/w AMS, found to have UTI, CTH w/ L large chronic SDH. Now s/p L craniotomy for SDH evacuation.  Past Medical History Relevant to Rehab: CLL, anemia, DM  Co-Treatment:  (Utilized rehab aide during session for line management and increased safety with mobility activities requiring assist of 2.)  Prior to Session Communication: Bedside  nurse  Patient Position Received: Bed, 3 rail up  Home Living:  Home Living  Home Living Comments: Unable to obtain prior level of function and home setup details; Plan to obtain in subsequent PT sessions.  Prior Level of Function:     Precautions:  Precautions  Hearing/Visual Limitations: RN reports pt is hard of hearing. Vision appears WFL, though assessment limited by decreased command following and communication deficits.  Medical Precautions: Fall precautions  Precautions Comment: SBP <160  Vital Signs:  Vital Signs  Heart Rate: 87 (Max , 85 end of session)  SpO2: 97 % (98-98% throughout)  BP: 138/75 (164/85 (108) max; SBP decreases to 150's sitting at edge of bed. 153/75 (98) end of session)  MAP (mmHg): 92  BP Method: Automatic    Objective   Pain:  Pain Assessment  Pain Assessment:  (Nods to indicate yes to pain, but unable to confirm location. RN aware of this.)  Cognition:  Cognition  Overall Cognitive Status: Impaired (Expressive communication deficits appreciated.)  Orientation Level: Unable to assess (Makes eye contact in response to name, but unable to answer questions this date.)  Following Commands: Follows one step commands with repetition (50% accuracy)  Memory:  (Pt alert and cooperative throughout session.)  Processing Speed: Delayed    General Assessments:  Activity Tolerance  Endurance: Tolerates 30 min exercise with multiple rests  Early Mobility/Exercise Safety Screen: Proceed with mobilization - No exclusion criteria met    Sensation  Light Touch:  (WD to noxious stimuli x4.)  Sensation Comment: Difficult to assess formally 2/2 communication limitations.         Perception  Inattention/Neglect:  (Crosses midline bilaterally, extra cues to attend to RUE/RLE compared to L side.)      Coordination  Coordination Comment: Difficult to assess 2/2 limited command following.    Postural Control  Postural Control: Impaired  Head Control: WFL  Trunk Control: Posterior lean, but able to correct  with cues for proper hand placement and posture. See Balance for additional details.  Righting Reactions: Intact  Protective Responses: Intact  Posture Comment: Rounded shoulders and forward head posture.    Static Sitting Balance  Static Sitting-Balance Support: Bilateral upper extremity supported, Feet supported  Static Sitting-Level of Assistance:  (MOD A x1 progressing to contact guard)  Static Sitting-Comment/Number of Minutes: 15 min  Dynamic Sitting Balance  Dynamic Sitting-Balance Support: Bilateral upper extremity supported, Feet supported  Dynamic Sitting-Balance: Lateral lean, Forward lean  Dynamic Sitting-Comments: MIN A x1    Static Standing Balance  Static Standing-Balance Support: Bilateral upper extremity supported  Static Standing-Level of Assistance: Moderate assistance (x2)  Static Standing-Comment/Number of Minutes: ~1 min  Dynamic Standing Balance  Dynamic Standing-Balance Support:  (Did not progress to dynamic out of bed mobility this date 2/2 limited command following.)  Functional Assessments:  Bed Mobility  Bed Mobility: Yes  Bed Mobility 1  Bed Mobility 1: Supine to sitting, Sitting to supine  Level of Assistance 1: Moderate assistance (x2)  Bed Mobility Comments 1: HOB slightly elevated, verbal/tactile cues for proper hand placement and task sequencing.    Transfers  Transfer: Yes  Transfer 1  Technique 1: Sit to stand, Stand to sit  Transfer Device 1:  (B arm in arm assist)  Transfer Level of Assistance 1: Moderate assistance (x2)  Trials/Comments 1: Performs from bed level. Verbal/tactile and hand over hand cues for proper task sequencing and using trunk momentum to facilitate task.  Extremity/Trunk Assessments:  ISI SNOWDEN :  (ROM WFL. Strength >/= 3/5 based on observation. Formal assessment limited by decreased command following.)  KRISS MONTERROSO:  (ROM WFL. Strength >/= 3/5 based on observation. Formal assessment limited by decreased command following.)  RLE   RLE :  (PROM WFL, limited AROM  observed, formal assessment limited by decreased command following. >/= 3-/5 based on mobility.)  LLE   LLE :  (PROM WFL, limited AROM observed, formal assessment limited by decreased command following. >/= 3-/5 based on mobility.)  Treatments:  Therapeutic Activity  Therapeutic Activity Performed: Yes  Therapeutic Activity 1: Skilled vitals monitoring with progressive chair position 10 min prior to sitting at edge of bed. Positioned pt with pillows/wedges/offloading boots at end of session to prevent decubitus ulcers/contracture and to promote optimal circulation.    Balance/Neuromuscular Re-Education  Balance/Neuromuscular Re-Education Activity Performed: Yes  Balance/Neuromuscular Re-Education Activity 1: 5x L and R + 5x ant/post leaning at bedside with MIN A x1 laterally and MOD A x1 ant/posteriorly. Verbal/tactile cues and demonstration for proper form.  Outcome Measures:  [facility] Basic Mobility  Turning from your back to your side while in a flat bed without using bedrails: A lot  Moving from lying on your back to sitting on the side of a flat bed without using bedrails: Total  Moving to and from bed to chair (including a wheelchair): Total  Standing up from a chair using your arms (e.g. wheelchair or bedside chair): Total  To walk in hospital room: Total  Climbing 3-5 steps with railing: Total  Basic Mobility - Total Score: 7    FSS-ICU  Ambulation: Unable to attempt due to weakness  Rolling: Maximal assistance (performs 25% - 49% of task)  Sitting: Moderate assistance (performs 50 - 74% of task)  Transfer Sit-to-Stand: Total assistance (performs 25% or requires another person)  Transfer Supine-to-Sit: Total assistance (performs 25% or requires another person)  Total Score: 7      ICU Mobility Screen  Early Mobility/Exercise Safety Screen: Proceed with mobilization - No exclusion criteria met  E = Exercise and Early Mobility  Early Mobility/Exercise Safety Screen: Proceed with mobilization - No exclusion  criteria met  Current Activity: Standing    Encounter Problems       Encounter Problems (Active)       Balance       Patient will stand with UE support of LRD and </= MOD A x1 at least 3 min to improve balance required for self-care tasks.        Start:  11/22/23    Expected End:  12/06/23               Mobility       Patient will ambulate at least 20 ft. with </= MOD A x1 and LRD to improve tolerance of household distances.        Start:  11/22/23    Expected End:  12/06/23            Patient will perform bed mobility with </= MOD A x1 to reduce risk of developing decubitus ulcers.        Start:  11/22/23    Expected End:  12/06/23            Patient will perform home exercise program as prescribed with cues as needed.         Start:  11/22/23    Expected End:  12/06/23               Pain - Adult          Transfers       Patient will perform sit to stand and stand to sit transfers with </= MAX A x1 and LRD to increase functional strength.        Start:  11/22/23    Expected End:  12/06/23                   Education Documentation  Precautions, taught by Lamar Alvarez, PT at 11/22/2023  3:23 PM.  Learner: Patient  Readiness: Nonacceptance  Method: Explanation  Response: Needs Reinforcement    Mobility Training, taught by Lamar Alvarez, PT at 11/22/2023  3:23 PM.  Learner: Patient  Readiness: Nonacceptance  Method: Explanation  Response: Needs Reinforcement    Education Comments  No comments found.

## 2023-11-22 NOTE — OP NOTE
Left Craniotomy for subdural hematoma evacuation (L) Operative Note     Date: 2023  OR Location: [facility] OR    Name: Haile Ayers, : 1940, Age: 83 y.o., MRN: [id number], Sex: male    Diagnosis    PREOPERATIVE DIAGNOSIS:   LEFT Sided Chronic Subdural Hematoma     POSTOPERATIVE DIAGNOSIS:   SAME    OPERATION/PROCEDURE:    LEFT  Sided Craniotomy for Evacuation of Subdural Hematoma     SURGEON:    Og Tran MD    RESIDENT:  Ignacio Huber MD    MEDICATIONS:    Antibiotic prophylaxis given within one hour prior to skin incision.    PROPHYLAXIS:    Venous thromboembolism prophylaxis was ordered at the time of surgery in the form of mechanical prophylaxis using sequential compression devices.    INDICATION:  Mr. Ayers is a 83 y.o. male who presented with headache/altered sensorium with right sided weakness and imaging showed presence of a LEFT Sided Chronic Subdural Hematoma . Risk versus benefits of the surgery were discussed and a decision to proceed with the procedure was made only as a life-saving measure.     DESCRIPTION OF PROCEDURE:    The patient was brought into the operating room on a cart and was transferred over to the operative table, was handed to Anesthesia for endotracheal intubation.Hairs were clipped appropriately as necessary for the surgery. A LEFT-sided incision was then marked out.   The patient was then prepped and draped in a sterile fashion, and a 10 blade was used to incise the skin down to bone.  Combination of Deborah clips and monopolar and bipolar electrocautery was utilized to achieve hemostasis.  [place] drill bit was used to drill victoriano holes into the skull and a high-speed B1 drill bit was used do the rest of the craniotomy.  Combination of Penfield elevators was used to elevate the bone flap.  At that point, the dura was noted to be tense.  A 15 blade was used to incise the dura after bipolar cauterization of the dural vessels.  The subdural hematoma was under high  pressure.  The rest of the dura was cut using jeweler forceps and Metzenbaum scissors.   Gentle suction, irrigation, and forceps were used to remove as much as the blood clot as safely possible.  At that point, after hemostasis was achieved, a subdural drain was left in place.   DuraGen was placed on top of the dural leaflets.  The bone was then replaced and held in place with titanium bur holes.   The incision was then closed in a layered fashion with inverted 2-0 and staples for the skin.  The patient was left intubated due to his neurological condition prior to coming to the operating room. There were no complications. The EBL was about 50 ml    Complications:  None; patient tolerated the procedure well.    Disposition: PACU - hemodynamically stable.  Condition: stable         Attending Attestation: I was present for the entire procedure.    Og Tran  Phone Number: [phone]

## 2023-11-22 NOTE — CARE PLAN
The patient's goals for the shift include  Stable neuro status    The clinical goals for the shift include  Stable neuro status. SBP w/in ordered parameters.    Admitted to NSU - RITCHIE status, post op crani for SDH. Aphasic, no follows / +mimics. Post op POC initiated.

## 2023-11-22 NOTE — ANESTHESIA PROCEDURE NOTES
Airway  Date/Time: 11/22/2023 2:05 AM  Urgency: elective    Airway not difficult    Staffing  Performed: resident   Authorized by: Valente Blanchard MD    Performed by: Jose Guadalupe Nicole DO  Patient location during procedure: OR    Indications and Patient Condition  Indications for airway management: anesthesia  Spontaneous Ventilation: absent  Sedation level: deep  Preoxygenated: yes  Patient position: sniffing  Mask difficulty assessment: 0 - not attempted (RSI)  Planned trial extubation    Final Airway Details  Final airway type: endotracheal airway      Successful airway: ETT  Cuffed: yes   Successful intubation technique: video laryngoscopy  Facilitating devices/methods: intubating stylet and cricoid pressure  Endotracheal tube insertion site: oral  Blade size: #3  ETT size (mm): 7.5  Cormack-Lehane Classification: grade I - full view of glottis  Placement verified by: chest auscultation and capnometry   Measured from: lips  ETT to lips (cm): 22  Number of attempts at approach: 1

## 2023-11-22 NOTE — ANESTHESIA PROCEDURE NOTES
Arterial Line:    Date/Time: 11/22/2023 2:20 AM    Staffing  Performed: resident   Authorized by: Valente Blanchard MD    Performed by: Astrid Palacio MD    An arterial line was placed. Procedure performed using surface landmarks.in the OR for the following indication(s): continuous blood pressure monitoring.    A 20 gauge (size), 1 and 3/4 inch (length), Angiocath (type) catheter was placed into the Right radial artery, secured by Tegaderm,   Seldinger technique not used.  Events:  patient tolerated procedure well with no complications.

## 2023-11-22 NOTE — ANESTHESIA POSTPROCEDURE EVALUATION
Patient: Haile Ayers    Procedure Summary       Date: 11/22/23 Room / Location: [facility] OR 25 / [facility] OR    Anesthesia Start: 0150 Anesthesia Stop: 0407    Procedure: Left Craniotomy for subdural hematoma evacuation (Left: Head) Diagnosis:       Subdural hematoma (CMS/HCC)      (Subdural hematoma (CMS/HCC) [S06.5XAA])    Surgeons: Og Tran MD Responsible Provider: Valente Blanchard MD    Anesthesia Type: general ASA Status: 4 - Emergent            Anesthesia Type: general    Vitals Value Taken Time   /59 11/22/23 0407   Temp 35.5 11/22/23 0407   Pulse 74 11/22/23 0405   Resp 18 11/22/23 0405   SpO2 97 % 11/22/23 0405   Vitals shown include unvalidated device data.    Anesthesia Post Evaluation    Patient location during evaluation: PACU  Patient participation: complete - patient cannot participate  Level of consciousness: confused  Pain management: adequate  Airway patency: patent  Cardiovascular status: acceptable  Respiratory status: acceptable and airway suctioned  Hydration status: acceptable  Postoperative Nausea and Vomiting: none        No notable events documented.

## 2023-11-22 NOTE — SIGNIFICANT EVENT
Emergent OR:    Pt with significant left sided SDH with significant brain compression and midline shift. Was attempted to reach family multiple time but was not successful. Given imaging findings and physical exam he is a candidate for emergent SDH evacuation to relieve brain compression. Plan was discussed with ED resident/attending.    Pt will be going to OR emergently with approval of neurosurgery attending Dr. Tran and ED attending Dr. Barraza      11/22/23 at 1:16 AM - Ignacio Huber MD

## 2023-11-23 ENCOUNTER — APPOINTMENT (OUTPATIENT)
Dept: RADIOLOGY | Facility: HOSPITAL | Age: 83
DRG: 025 | End: 2023-11-23
Payer: MEDICARE

## 2023-11-23 LAB
APPEARANCE UR: ABNORMAL
BILIRUB UR STRIP.AUTO-MCNC: NEGATIVE MG/DL
COLOR UR: YELLOW
GLUCOSE UR STRIP.AUTO-MCNC: NEGATIVE MG/DL
GRAN CASTS #/AREA UR COMP ASSIST: ABNORMAL /LPF
KETONES UR STRIP.AUTO-MCNC: ABNORMAL MG/DL
LEUKOCYTE ESTERASE UR QL STRIP.AUTO: ABNORMAL
MIXED CELL CASTS #/AREA UR COMP ASSIST: ABNORMAL /LPF
MUCOUS THREADS #/AREA URNS AUTO: ABNORMAL /LPF
NITRITE UR QL STRIP.AUTO: NEGATIVE
PH UR STRIP.AUTO: 5 [PH]
PROT UR STRIP.AUTO-MCNC: ABNORMAL MG/DL
RBC # UR STRIP.AUTO: ABNORMAL /UL
RBC #/AREA URNS AUTO: >20 /HPF
SP GR UR STRIP.AUTO: 1.02
UROBILINOGEN UR STRIP.AUTO-MCNC: <2 MG/DL
WBC #/AREA URNS AUTO: >50 /HPF
WBC CLUMPS #/AREA URNS AUTO: ABNORMAL /HPF

## 2023-11-23 PROCEDURE — 2500000004 HC RX 250 GENERAL PHARMACY W/ HCPCS (ALT 636 FOR OP/ED): Performed by: STUDENT IN AN ORGANIZED HEALTH CARE EDUCATION/TRAINING PROGRAM

## 2023-11-23 PROCEDURE — 95716 VEEG EA 12-26HR CONT MNTR: CPT

## 2023-11-23 PROCEDURE — 71045 X-RAY EXAM CHEST 1 VIEW: CPT | Performed by: RADIOLOGY

## 2023-11-23 PROCEDURE — 2060000001 HC INTERMEDIATE ICU ROOM DAILY

## 2023-11-23 PROCEDURE — 2500000004 HC RX 250 GENERAL PHARMACY W/ HCPCS (ALT 636 FOR OP/ED)

## 2023-11-23 PROCEDURE — 81001 URINALYSIS AUTO W/SCOPE: CPT

## 2023-11-23 PROCEDURE — 95700 EEG CONT REC W/VID EEG TECH: CPT

## 2023-11-23 PROCEDURE — 2500000001 HC RX 250 WO HCPCS SELF ADMINISTERED DRUGS (ALT 637 FOR MEDICARE OP)

## 2023-11-23 PROCEDURE — 71045 X-RAY EXAM CHEST 1 VIEW: CPT

## 2023-11-23 PROCEDURE — 2500000001 HC RX 250 WO HCPCS SELF ADMINISTERED DRUGS (ALT 637 FOR MEDICARE OP): Performed by: STUDENT IN AN ORGANIZED HEALTH CARE EDUCATION/TRAINING PROGRAM

## 2023-11-23 PROCEDURE — 95720 EEG PHY/QHP EA INCR W/VEEG: CPT | Performed by: STUDENT IN AN ORGANIZED HEALTH CARE EDUCATION/TRAINING PROGRAM

## 2023-11-23 RX ORDER — SULFAMETHOXAZOLE AND TRIMETHOPRIM 800; 160 MG/1; MG/1
160 TABLET ORAL EVERY 12 HOURS
Status: DISCONTINUED | OUTPATIENT
Start: 2023-11-23 | End: 2023-11-24

## 2023-11-23 RX ORDER — ACETAMINOPHEN 650 MG/1
650 SUPPOSITORY RECTAL EVERY 6 HOURS PRN
Status: DISCONTINUED | OUTPATIENT
Start: 2023-11-23 | End: 2023-12-04

## 2023-11-23 RX ORDER — HEPARIN SODIUM 5000 [USP'U]/ML
5000 INJECTION, SOLUTION INTRAVENOUS; SUBCUTANEOUS EVERY 8 HOURS SCHEDULED
Status: DISCONTINUED | OUTPATIENT
Start: 2023-11-24 | End: 2023-12-02

## 2023-11-23 RX ADMIN — LABETALOL HYDROCHLORIDE 10 MG: 5 INJECTION INTRAVENOUS at 04:13

## 2023-11-23 RX ADMIN — ATORVASTATIN CALCIUM 20 MG: 20 TABLET, FILM COATED ORAL at 09:29

## 2023-11-23 RX ADMIN — HYDRALAZINE HYDROCHLORIDE 10 MG: 20 INJECTION INTRAMUSCULAR; INTRAVENOUS at 05:26

## 2023-11-23 RX ADMIN — HYDRALAZINE HYDROCHLORIDE 10 MG: 20 INJECTION INTRAMUSCULAR; INTRAVENOUS at 13:02

## 2023-11-23 RX ADMIN — LABETALOL HYDROCHLORIDE 10 MG: 5 INJECTION INTRAVENOUS at 06:16

## 2023-11-23 RX ADMIN — LABETALOL HYDROCHLORIDE 10 MG: 5 INJECTION INTRAVENOUS at 13:16

## 2023-11-23 RX ADMIN — HYDRALAZINE HYDROCHLORIDE 10 MG: 20 INJECTION INTRAMUSCULAR; INTRAVENOUS at 15:03

## 2023-11-23 RX ADMIN — ACETAMINOPHEN 650 MG: 650 SUPPOSITORY RECTAL at 19:51

## 2023-11-23 RX ADMIN — LABETALOL HYDROCHLORIDE 10 MG: 5 INJECTION INTRAVENOUS at 11:47

## 2023-11-23 RX ADMIN — LEVETIRACETAM 500 MG: 5 INJECTION INTRAVENOUS at 04:12

## 2023-11-23 RX ADMIN — HYDRALAZINE HYDROCHLORIDE 10 MG: 20 INJECTION INTRAMUSCULAR; INTRAVENOUS at 08:07

## 2023-11-23 RX ADMIN — SODIUM CHLORIDE 75 ML/HR: 9 INJECTION, SOLUTION INTRAVENOUS at 06:42

## 2023-11-23 RX ADMIN — ONDANSETRON 4 MG: 2 INJECTION INTRAMUSCULAR; INTRAVENOUS at 05:36

## 2023-11-23 RX ADMIN — SULFAMETHOXAZOLE AND TRIMETHOPRIM 160 MG: 800; 160 TABLET ORAL at 23:18

## 2023-11-23 RX ADMIN — LEVETIRACETAM 500 MG: 5 INJECTION INTRAVENOUS at 17:47

## 2023-11-23 ASSESSMENT — COGNITIVE AND FUNCTIONAL STATUS - GENERAL
STANDING UP FROM CHAIR USING ARMS: TOTAL
DAILY ACTIVITIY SCORE: 12
PERSONAL GROOMING: A LOT
DRESSING REGULAR LOWER BODY CLOTHING: A LOT
TURNING FROM BACK TO SIDE WHILE IN FLAT BAD: A LOT
MOVING TO AND FROM BED TO CHAIR: A LOT
CLIMB 3 TO 5 STEPS WITH RAILING: TOTAL
DRESSING REGULAR UPPER BODY CLOTHING: A LOT
CLIMB 3 TO 5 STEPS WITH RAILING: A LOT
TOILETING: A LOT
MOVING FROM LYING ON BACK TO SITTING ON SIDE OF FLAT BED WITH BEDRAILS: A LOT
WALKING IN HOSPITAL ROOM: A LOT
MOVING FROM LYING ON BACK TO SITTING ON SIDE OF FLAT BED WITH BEDRAILS: A LOT
DRESSING REGULAR UPPER BODY CLOTHING: A LOT
WALKING IN HOSPITAL ROOM: TOTAL
HELP NEEDED FOR BATHING: A LOT
HELP NEEDED FOR BATHING: A LOT
DAILY ACTIVITIY SCORE: 11
MOVING TO AND FROM BED TO CHAIR: TOTAL
MOBILITY SCORE: 12
TURNING FROM BACK TO SIDE WHILE IN FLAT BAD: A LOT
DRESSING REGULAR LOWER BODY CLOTHING: A LOT
PERSONAL GROOMING: A LOT
MOBILITY SCORE: 8
STANDING UP FROM CHAIR USING ARMS: A LOT
TOILETING: A LOT
EATING MEALS: TOTAL
EATING MEALS: A LOT

## 2023-11-23 ASSESSMENT — PAIN SCALES - GENERAL: PAINLEVEL_OUTOF10: 0 - NO PAIN

## 2023-11-23 ASSESSMENT — PAIN - FUNCTIONAL ASSESSMENT: PAIN_FUNCTIONAL_ASSESSMENT: CPOT (CRITICAL CARE PAIN OBSERVATION TOOL)

## 2023-11-23 NOTE — SIGNIFICANT EVENT
"Preliminary EEG Report of baseline (first 25 minutes of EEG)    This vEEG is indicative of severe diffuse encephalopathy and a left hemispheric structural lesion. No epileptiform discharges or seizures seen.     This EEG was read up until 1057 on 11/23/23.  This is only a preliminary read of the first 25 minutes (baseline) of the EEG.   Please see the full report in the EEG database and \"Media\" tab tomorrow.    When ready, to discontinue the vEEG, please place an order to \"discontinue continuous video EEG.\"     Ursula Bravo, DO  Epilepsy Fellow    "

## 2023-11-23 NOTE — NURSING NOTE
1230: notified MD about concern for possible UTI from outside hospital. MD not concerned. No new orders.  Abbie Duncan RN

## 2023-11-23 NOTE — PROGRESS NOTES
"Haile Ayers is a 83 y.o. male on day 1 of admission presenting with Subdural hematoma (CMS/HCC).    Subjective   Patient with wife at bedside    Objective     Physical Exam  Eyes opened spontaneous  aphasic  Spontaneous x4 > antigravity  Subdural drain in place    Last Recorded Vitals  Blood pressure 163/74, pulse 89, temperature 37.3 °C (99.1 °F), temperature source Temporal, resp. rate (!) 31, height 1.727 m (5' 8\"), weight 70 kg (154 lb 5.2 oz), SpO2 96 %.  Intake/Output last 3 Shifts:  I/O last 3 completed shifts:  In: 5515.8 (78.8 mL/kg) [I.V.:3315.8 (47.4 mL/kg); IV Piggyback:2200]  Out: 2005 (28.6 mL/kg) [Urine:1790 (0.7 mL/kg/hr); Drains:165; Blood:50]  Weight: 70 kg     Relevant Results  Results for orders placed or performed during the hospital encounter of 11/21/23 (from the past 24 hour(s))   POCT GLUCOSE   Result Value Ref Range    POCT Glucose 177 (H) 74 - 99 mg/dL         Assessment/Plan   Principal Problem:    Subdural hematoma (CMS/HCC)    83 y.o. male h/o CLL, anemia, DM, p/w AMS, found to have UTI, CTH w/ L large chronic SDH, 11/22 s/p L crani for SDH evac, CTH POC    Patient with persistently poor exam, will need to rule out seizures.    PLAN    RITCHIE  Q2 neurochecks  EEG  Continue subdural drain  SCDs  PTOT      Tracy Phillips MD      "

## 2023-11-23 NOTE — HOSPITAL COURSE
83 y.o. male with a history of CLL, anemia, and DM presented 11/22/23 with altered mental status.  CTH showed a large chronic SDH.  Patient was taken for a Left craniotomy for SDH evacuation.     Post-operative course as follows:  11/24: Surgical drain discontinued  11/26: EEG remained negative and discontinued  11/28: Midline placed for access  11/29: Patient became more lethargic, CTH showed increased Left SDH with 8 mm MLS.  Patient loaded with Keppra.  Repeat CTH stable.    11/30: Patient returned to OR for a right cranitomy for SDH evacuation due to worsening exam.    12/01: Patient extubated  12/03: CTH stable  12/04: rCTH stable.   12/05: Patient began vomiting, exam worsened.  CTH stable.  CT PE showed diffuse sclerotic lesions concerning for mets; bilateral pleural effusions; Negative for PE; U/S x 4 negative for DVT.   Patient diuresed with improvement.  12/06: SD Drain discontinued.  EEG negative and discontinued.  TTE with EF 65-70%;  12/07: Sputum culture: NG; Blood cultures: NG; UA negative.  12/08: Covid Positive.  Surgical endoscopy consulted for PEG and planning for 12/18/23 due to Covid status.  12/13: Patient Hgb 6.8 and was transfused 1 unit PRBC.    12/14: Hgb 6.1 and patient transfused 2 units PRBC.  Hematology consulted.  Repeat CBC 11.2.  B12, LDH, haptoglobin and reticulocytes all unremarkable.  Hgb 6.1 thought to likely be artifact.  12/15 sutures dc'd  12/17 patient transferred to NSU due to respiratory failure, stabilized on BIPAP, CTH improved Left SDH.   12/19 made DNR arrest, Do not intubate  12/20 worsened neuro exam, CTH stable  12/21 Medicine consulted for LIN and decreased pulmonary function.  LIN likely secondary to volume depletion, UTI and intrinsic renal injury  12/23 creatinine continues to increase. Goals of Care discussion started  12/24 tachypneic, suctioned with improvement, CXR stable  12/26 s/p 40 IV lasix, tube feeds changed to Renal, Nepho formula  12/27 Lasix 40mg IV  x1 per Medicine recs  12/29 After Goals of Care Discussion with spouse and daughter was decided on DNR-Comfort Care.  Hospice referral made.

## 2023-11-24 ENCOUNTER — APPOINTMENT (OUTPATIENT)
Dept: RADIOLOGY | Facility: HOSPITAL | Age: 83
DRG: 025 | End: 2023-11-24
Payer: MEDICARE

## 2023-11-24 ENCOUNTER — HOSPITAL ENCOUNTER (INPATIENT)
Dept: NEUROLOGY | Facility: HOSPITAL | Age: 83
Discharge: HOME | DRG: 025 | End: 2023-11-24
Payer: MEDICARE

## 2023-11-24 LAB
ALBUMIN SERPL BCP-MCNC: 3 G/DL (ref 3.4–5)
ANION GAP SERPL CALC-SCNC: 12 MMOL/L (ref 10–20)
BUN SERPL-MCNC: 21 MG/DL (ref 6–23)
CALCIUM SERPL-MCNC: 8.4 MG/DL (ref 8.6–10.6)
CHLORIDE SERPL-SCNC: 111 MMOL/L (ref 98–107)
CO2 SERPL-SCNC: 22 MMOL/L (ref 21–32)
CREAT SERPL-MCNC: 1.03 MG/DL (ref 0.5–1.3)
ERYTHROCYTE [DISTWIDTH] IN BLOOD BY AUTOMATED COUNT: 14.3 % (ref 11.5–14.5)
GFR SERPL CREATININE-BSD FRML MDRD: 72 ML/MIN/1.73M*2
GLUCOSE SERPL-MCNC: 180 MG/DL (ref 74–99)
HCT VFR BLD AUTO: 27.6 % (ref 41–52)
HGB BLD-MCNC: 9 G/DL (ref 13.5–17.5)
MCH RBC QN AUTO: 30.2 PG (ref 26–34)
MCHC RBC AUTO-ENTMCNC: 32.6 G/DL (ref 32–36)
MCV RBC AUTO: 93 FL (ref 80–100)
NRBC BLD-RTO: 0 /100 WBCS (ref 0–0)
PHOSPHATE SERPL-MCNC: 2.8 MG/DL (ref 2.5–4.9)
PLATELET # BLD AUTO: 163 X10*3/UL (ref 150–450)
POTASSIUM SERPL-SCNC: 3.4 MMOL/L (ref 3.5–5.3)
RBC # BLD AUTO: 2.98 X10*6/UL (ref 4.5–5.9)
SODIUM SERPL-SCNC: 142 MMOL/L (ref 136–145)
WBC # BLD AUTO: 7.3 X10*3/UL (ref 4.4–11.3)

## 2023-11-24 PROCEDURE — 2500000001 HC RX 250 WO HCPCS SELF ADMINISTERED DRUGS (ALT 637 FOR MEDICARE OP)

## 2023-11-24 PROCEDURE — 2500000004 HC RX 250 GENERAL PHARMACY W/ HCPCS (ALT 636 FOR OP/ED)

## 2023-11-24 PROCEDURE — 95720 EEG PHY/QHP EA INCR W/VEEG: CPT | Performed by: STUDENT IN AN ORGANIZED HEALTH CARE EDUCATION/TRAINING PROGRAM

## 2023-11-24 PROCEDURE — 3E0G76Z INTRODUCTION OF NUTRITIONAL SUBSTANCE INTO UPPER GI, VIA NATURAL OR ARTIFICIAL OPENING: ICD-10-PCS

## 2023-11-24 PROCEDURE — 87086 URINE CULTURE/COLONY COUNT: CPT | Performed by: STUDENT IN AN ORGANIZED HEALTH CARE EDUCATION/TRAINING PROGRAM

## 2023-11-24 PROCEDURE — 2500000004 HC RX 250 GENERAL PHARMACY W/ HCPCS (ALT 636 FOR OP/ED): Performed by: STUDENT IN AN ORGANIZED HEALTH CARE EDUCATION/TRAINING PROGRAM

## 2023-11-24 PROCEDURE — 92610 EVALUATE SWALLOWING FUNCTION: CPT | Mod: GN | Performed by: SPEECH-LANGUAGE PATHOLOGIST

## 2023-11-24 PROCEDURE — 96372 THER/PROPH/DIAG INJ SC/IM: CPT

## 2023-11-24 PROCEDURE — 85027 COMPLETE CBC AUTOMATED: CPT

## 2023-11-24 PROCEDURE — 95700 EEG CONT REC W/VID EEG TECH: CPT

## 2023-11-24 PROCEDURE — 80069 RENAL FUNCTION PANEL: CPT

## 2023-11-24 PROCEDURE — 36415 COLL VENOUS BLD VENIPUNCTURE: CPT

## 2023-11-24 PROCEDURE — 71045 X-RAY EXAM CHEST 1 VIEW: CPT

## 2023-11-24 PROCEDURE — 92523 SPEECH SOUND LANG COMPREHEN: CPT | Mod: GN | Performed by: SPEECH-LANGUAGE PATHOLOGIST

## 2023-11-24 PROCEDURE — 97530 THERAPEUTIC ACTIVITIES: CPT | Mod: GP,CQ

## 2023-11-24 PROCEDURE — 95819 EEG AWAKE AND ASLEEP: CPT

## 2023-11-24 PROCEDURE — 1100000001 HC PRIVATE ROOM DAILY

## 2023-11-24 PROCEDURE — A4217 STERILE WATER/SALINE, 500 ML: HCPCS

## 2023-11-24 PROCEDURE — 87040 BLOOD CULTURE FOR BACTERIA: CPT

## 2023-11-24 PROCEDURE — 95715 VEEG EA 12-26HR INTMT MNTR: CPT

## 2023-11-24 PROCEDURE — 2500000001 HC RX 250 WO HCPCS SELF ADMINISTERED DRUGS (ALT 637 FOR MEDICARE OP): Performed by: STUDENT IN AN ORGANIZED HEALTH CARE EDUCATION/TRAINING PROGRAM

## 2023-11-24 RX ORDER — VANCOMYCIN HYDROCHLORIDE 1 G/200ML
15 INJECTION, SOLUTION INTRAVENOUS EVERY 12 HOURS
Status: DISCONTINUED | OUTPATIENT
Start: 2023-11-24 | End: 2023-11-24

## 2023-11-24 RX ORDER — CEFEPIME 1 G/50ML
2 INJECTION, SOLUTION INTRAVENOUS EVERY 12 HOURS
Status: DISCONTINUED | OUTPATIENT
Start: 2023-11-24 | End: 2023-12-04

## 2023-11-24 RX ORDER — POTASSIUM CHLORIDE 14.9 MG/ML
20 INJECTION INTRAVENOUS
Status: COMPLETED | OUTPATIENT
Start: 2023-11-24 | End: 2023-11-24

## 2023-11-24 RX ADMIN — HEPARIN SODIUM 5000 UNITS: 5000 INJECTION INTRAVENOUS; SUBCUTANEOUS at 21:18

## 2023-11-24 RX ADMIN — LEVETIRACETAM 500 MG: 5 INJECTION INTRAVENOUS at 17:19

## 2023-11-24 RX ADMIN — Medication 4000 UNITS: at 08:49

## 2023-11-24 RX ADMIN — HYDRALAZINE HYDROCHLORIDE 10 MG: 20 INJECTION INTRAMUSCULAR; INTRAVENOUS at 11:36

## 2023-11-24 RX ADMIN — HYDRALAZINE HYDROCHLORIDE 10 MG: 20 INJECTION INTRAMUSCULAR; INTRAVENOUS at 14:05

## 2023-11-24 RX ADMIN — SODIUM CHLORIDE 75 ML/HR: 9 INJECTION, SOLUTION INTRAVENOUS at 19:30

## 2023-11-24 RX ADMIN — POLYETHYLENE GLYCOL 3350 17 G: 17 POWDER, FOR SOLUTION ORAL at 08:49

## 2023-11-24 RX ADMIN — LABETALOL HYDROCHLORIDE 10 MG: 5 INJECTION INTRAVENOUS at 04:03

## 2023-11-24 RX ADMIN — SODIUM CHLORIDE 75 ML/HR: 9 INJECTION, SOLUTION INTRAVENOUS at 05:21

## 2023-11-24 RX ADMIN — HYDRALAZINE HYDROCHLORIDE 10 MG: 20 INJECTION INTRAMUSCULAR; INTRAVENOUS at 08:29

## 2023-11-24 RX ADMIN — LABETALOL HYDROCHLORIDE 10 MG: 5 INJECTION INTRAVENOUS at 16:10

## 2023-11-24 RX ADMIN — ACETAMINOPHEN 650 MG: 650 SUPPOSITORY RECTAL at 12:57

## 2023-11-24 RX ADMIN — VANCOMYCIN HYDROCHLORIDE 1250 MG: 5 INJECTION, POWDER, LYOPHILIZED, FOR SOLUTION INTRAVENOUS at 18:18

## 2023-11-24 RX ADMIN — CEFEPIME 2 G: 1 INJECTION, SOLUTION INTRAVENOUS at 16:24

## 2023-11-24 RX ADMIN — ACETAMINOPHEN 650 MG: 325 TABLET ORAL at 04:32

## 2023-11-24 RX ADMIN — LABETALOL HYDROCHLORIDE 10 MG: 5 INJECTION INTRAVENOUS at 02:05

## 2023-11-24 RX ADMIN — POTASSIUM CHLORIDE 20 MEQ: 14.9 INJECTION, SOLUTION INTRAVENOUS at 03:05

## 2023-11-24 RX ADMIN — LEVETIRACETAM 500 MG: 5 INJECTION INTRAVENOUS at 05:20

## 2023-11-24 RX ADMIN — ACETAMINOPHEN 650 MG: 650 SUPPOSITORY RECTAL at 19:38

## 2023-11-24 RX ADMIN — Medication: at 13:00

## 2023-11-24 RX ADMIN — HEPARIN SODIUM 5000 UNITS: 5000 INJECTION INTRAVENOUS; SUBCUTANEOUS at 05:21

## 2023-11-24 RX ADMIN — ATORVASTATIN CALCIUM 20 MG: 20 TABLET, FILM COATED ORAL at 08:49

## 2023-11-24 RX ADMIN — POTASSIUM CHLORIDE 20 MEQ: 14.9 INJECTION, SOLUTION INTRAVENOUS at 01:15

## 2023-11-24 RX ADMIN — HEPARIN SODIUM 5000 UNITS: 5000 INJECTION INTRAVENOUS; SUBCUTANEOUS at 14:05

## 2023-11-24 RX ADMIN — LABETALOL HYDROCHLORIDE 10 MG: 5 INJECTION INTRAVENOUS at 10:29

## 2023-11-24 SDOH — ECONOMIC STABILITY: HOUSING INSECURITY
IN THE LAST 12 MONTHS, WAS THERE A TIME WHEN YOU DID NOT HAVE A STEADY PLACE TO SLEEP OR SLEPT IN A SHELTER (INCLUDING NOW)?: NO

## 2023-11-24 SDOH — SOCIAL STABILITY: SOCIAL INSECURITY: WITHIN THE LAST YEAR, HAVE YOU BEEN HUMILIATED OR EMOTIONALLY ABUSED IN OTHER WAYS BY YOUR PARTNER OR EX-PARTNER?: NO

## 2023-11-24 SDOH — ECONOMIC STABILITY: TRANSPORTATION INSECURITY
IN THE PAST 12 MONTHS, HAS LACK OF TRANSPORTATION KEPT YOU FROM MEETINGS, WORK, OR FROM GETTING THINGS NEEDED FOR DAILY LIVING?: NO

## 2023-11-24 SDOH — ECONOMIC STABILITY: FOOD INSECURITY: WITHIN THE PAST 12 MONTHS, THE FOOD YOU BOUGHT JUST DIDN'T LAST AND YOU DIDN'T HAVE MONEY TO GET MORE.: NEVER TRUE

## 2023-11-24 SDOH — SOCIAL STABILITY: SOCIAL INSECURITY
WITHIN THE LAST YEAR, HAVE TO BEEN RAPED OR FORCED TO HAVE ANY KIND OF SEXUAL ACTIVITY BY YOUR PARTNER OR EX-PARTNER?: NO

## 2023-11-24 SDOH — SOCIAL STABILITY: SOCIAL INSECURITY
WITHIN THE LAST YEAR, HAVE YOU BEEN KICKED, HIT, SLAPPED, OR OTHERWISE PHYSICALLY HURT BY YOUR PARTNER OR EX-PARTNER?: NO

## 2023-11-24 SDOH — ECONOMIC STABILITY: FOOD INSECURITY: WITHIN THE PAST 12 MONTHS, YOU WORRIED THAT YOUR FOOD WOULD RUN OUT BEFORE YOU GOT MONEY TO BUY MORE.: NEVER TRUE

## 2023-11-24 SDOH — ECONOMIC STABILITY: TRANSPORTATION INSECURITY
IN THE PAST 12 MONTHS, HAS THE LACK OF TRANSPORTATION KEPT YOU FROM MEDICAL APPOINTMENTS OR FROM GETTING MEDICATIONS?: NO

## 2023-11-24 SDOH — ECONOMIC STABILITY: INCOME INSECURITY: IN THE LAST 12 MONTHS, WAS THERE A TIME WHEN YOU WERE NOT ABLE TO PAY THE MORTGAGE OR RENT ON TIME?: NO

## 2023-11-24 SDOH — SOCIAL STABILITY: SOCIAL INSECURITY: WITHIN THE LAST YEAR, HAVE YOU BEEN AFRAID OF YOUR PARTNER OR EX-PARTNER?: NO

## 2023-11-24 SDOH — ECONOMIC STABILITY: INCOME INSECURITY: IN THE PAST 12 MONTHS, HAS THE ELECTRIC, GAS, OIL, OR WATER COMPANY THREATENED TO SHUT OFF SERVICE IN YOUR HOME?: NO

## 2023-11-24 SDOH — ECONOMIC STABILITY: HOUSING INSECURITY: IN THE LAST 12 MONTHS, HOW MANY PLACES HAVE YOU LIVED?: 1

## 2023-11-24 ASSESSMENT — COGNITIVE AND FUNCTIONAL STATUS - GENERAL
STANDING UP FROM CHAIR USING ARMS: TOTAL
DRESSING REGULAR LOWER BODY CLOTHING: A LITTLE
WALKING IN HOSPITAL ROOM: A LITTLE
STANDING UP FROM CHAIR USING ARMS: TOTAL
MOVING FROM LYING ON BACK TO SITTING ON SIDE OF FLAT BED WITH BEDRAILS: A LOT
MOVING FROM LYING ON BACK TO SITTING ON SIDE OF FLAT BED WITH BEDRAILS: A LITTLE
HELP NEEDED FOR BATHING: A LOT
WALKING IN HOSPITAL ROOM: TOTAL
CLIMB 3 TO 5 STEPS WITH RAILING: TOTAL
MOBILITY SCORE: 18
DAILY ACTIVITIY SCORE: 11
PERSONAL GROOMING: A LITTLE
DAILY ACTIVITIY SCORE: 18
MOBILITY SCORE: 7
TOILETING: A LITTLE
MOVING TO AND FROM BED TO CHAIR: TOTAL
WALKING IN HOSPITAL ROOM: TOTAL
TURNING FROM BACK TO SIDE WHILE IN FLAT BAD: TOTAL
PERSONAL GROOMING: A LOT
DRESSING REGULAR UPPER BODY CLOTHING: A LOT
HELP NEEDED FOR BATHING: A LITTLE
MOBILITY SCORE: 7
MOVING TO AND FROM BED TO CHAIR: A LITTLE
TOILETING: A LOT
MOVING FROM LYING ON BACK TO SITTING ON SIDE OF FLAT BED WITH BEDRAILS: A LOT
DRESSING REGULAR UPPER BODY CLOTHING: A LITTLE
TURNING FROM BACK TO SIDE WHILE IN FLAT BAD: TOTAL
MOVING TO AND FROM BED TO CHAIR: TOTAL
STANDING UP FROM CHAIR USING ARMS: A LITTLE
TURNING FROM BACK TO SIDE WHILE IN FLAT BAD: A LITTLE
DRESSING REGULAR LOWER BODY CLOTHING: A LOT
CLIMB 3 TO 5 STEPS WITH RAILING: TOTAL
EATING MEALS: TOTAL
CLIMB 3 TO 5 STEPS WITH RAILING: A LITTLE
EATING MEALS: A LITTLE

## 2023-11-24 ASSESSMENT — PAIN - FUNCTIONAL ASSESSMENT
PAIN_FUNCTIONAL_ASSESSMENT: UNABLE TO SELF-REPORT
PAIN_FUNCTIONAL_ASSESSMENT: CPOT (CRITICAL CARE PAIN OBSERVATION TOOL)
PAIN_FUNCTIONAL_ASSESSMENT: CPOT (CRITICAL CARE PAIN OBSERVATION TOOL)

## 2023-11-24 NOTE — PROGRESS NOTES
Patient is discussed during interdisciplinary round today.   Team members Present : Interdisciplinary team   Plan per medical team : Pt is not medically ready to discharge currently.   Planned disposition : TBD  Discharge destination : TBD  Payor : [employer]  Status : inpatient  Estimated discharge date : TBD  SW met with pt and pt's wife, Kathy at bedside to complete initial assessment for offering support and obtaining information for pt's discharge plan.  Pt is sleepy and not able to hear, per wife. Wife participated in and completed assessment. Pt lives in a 2-story house with his wife, and their main bedroom and bathroom are set up on first floor, and 2nd floor is used only when children visit them. Wife stated that one of their daughter, Caroline who lives in [place] would be able to help and assist pt as needed. Pt is fully independent including driving and enjoys outdoor work until a month ago. Pt is diabetic and dependent on medication. SW made pt aware that SW is available for issues or questions on social work.  Pt denied any further issues or questions on social work at the moment of assessment.  SW and PCN will continue to follow and assist with discharge plan.      Arianna Richard LCSW

## 2023-11-24 NOTE — PROGRESS NOTES
Speech-Language Pathology    Inpatient Speech-Language Pathology Clinical Swallow Evaluation    Patient Name: Haile Ayers  MRN: [id number]  Today's Date: 11/24/2023   Time Calculation  Start Time: 1010  Stop Time: 1037  Time Calculation (min): 27 min         History:   Patient is a 83 y.o. male  presented with AMS,  found to have UTI, CTH w/ L large chronic SDH, 11/22 s/p L crani for SDH evac, CTH POC . Per MD, pt with persistently poor exam and will need to rule out seizures.     Recommendations:  Solid Diet Recommendations : NPO  Liquid Diet Recommendations: NPO    - Frequent, aggressive oral care is strongly recommended to improve infection control as well as reduce dental plaque and bacteria on oropharyngeal surfaces which may increase the risk nosocomial infections, including pneumonia.    - OK for small amounts of room temp water from tsp (one at a time, 4-6x/hour) for pleasure/swallow stimulation, but only after aggressive oral care to avoid colonization of bacteria within oral cavity.      Assessment:  Clinical swallow evaluation completed. Pt alert, wife at bedside speaking [place] to pt as this is their native language. Wife reported pt does comprehend and speak English.  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 Wife gave pt single sips of room temperature water due to they don't drink cold water. Pt. Accepted single tsp, then small sips of water from a cup since he clenched his teeth with a straw. Post prandial cough noted after 5 single sips of water which symptoms suggestive of pharyngeal dysphagia. Deferred further PO trials as this may place the pt at a higher risk for aspiration.  SLP will continue to follow to ascertain readiness for PO vs need for a MBSS.     Plan:  SLP Plan: Skilled SLP  SLP Frequency: 2x per week  Duration: 3 weeks  SLP Discharge Recommendations: Continue skilled SLP services at the next level of care  Discussed POC: Patient, Caregiver/family,  Nursing, Physician  Patient/Caregiver Agreeable: Yes  SLP - OK to Discharge: Yes      Subjective   Wife at bedside during evaluation.     Pain:  Pain Assessment: Unable to self-report  Unable to Self-Report Pain Reason: Nonverbal       Oral/Motor Assessment:  Dentition: Some Missing Teeth  Oral Motor: Unable to Complete          Inpatient:  Education Documentation  Extensive education provided to pt and wife re: current swallow function, recommendations/results and dysphagia POC.

## 2023-11-24 NOTE — CARE PLAN
Problem: Pain  Goal: My pain/discomfort is manageable  Outcome: Progressing     Problem: Safety  Goal: Patient will be injury free during hospitalization  Outcome: Progressing  Goal: I will remain free of falls  Outcome: Progressing     Problem: Daily Care  Goal: Daily care needs are met  Outcome: Progressing     Problem: Psychosocial Needs  Goal: Demonstrates ability to cope with hospitalization/illness  Outcome: Progressing  Goal: Collaborate with me, my family, and caregiver to identify my specific goals  Outcome: Progressing     Problem: Discharge Barriers  Goal: My discharge needs are met  Outcome: Progressing     Problem: General Stroke  Goal: Demonstrate improvement in neurological exam throughout the shift  Outcome: Progressing  Goal: Maintain BP within ordered limits throughout shift  Outcome: Progressing  Goal: Participate in treatment (ie., meds, therapy) throughout shift  Outcome: Progressing  Goal: No symptoms of aspiration throughout shift  Outcome: Progressing  Goal: No symptoms of hemorrhage throughout shift  Outcome: Progressing  Goal: Tolerate enteral feeding throughout shift  Outcome: Progressing  Goal: Decreased nausea/vomiting throughout shift  Outcome: Progressing  Goal: Controlled blood glucose throughout shift  Outcome: Progressing  Goal: Out of bed three times today  Outcome: Progressing     Problem: ICU Stroke  Goal: Maintain ICP within ordered limits throughout shift  Outcome: Progressing  Goal: Tolerate EVD clamping trial throughout shift  Outcome: Progressing  Goal: Tolerate ventilator weaning trial during shift  Outcome: Progressing  Goal: Maintain patent airway throughout shift  Outcome: Progressing  Goal: Achieve/maintain targeted sodium level throughout shift  Outcome: Progressing     Problem: Skin  Goal: Decreased wound size/increased tissue granulation at next dressing change  Outcome: Progressing  Goal: Participates in plan/prevention/treatment measures  Outcome:  Progressing  Goal: Prevent/manage excess moisture  Outcome: Progressing  Goal: Prevent/minimize sheer/friction injuries  Outcome: Progressing  Goal: Promote/optimize nutrition  Outcome: Progressing  Goal: Promote skin healing  Outcome: Progressing     Problem: Safety - Medical Restraint  Goal: Remains free of injury from restraints (Restraint for Interference with Medical Device)  Outcome: Progressing  Goal: Free from restraint(s) (Restraint for Interference with Medical Device)  Outcome: Progressing     Problem: Nutrition  Goal: Nutrition support goals are met within 48 hrs  Outcome: Progressing  Goal: Lab values WNL  Outcome: Progressing  Goal: Electrolytes WNL  Outcome: Progressing  Goal: Promote healing  Outcome: Progressing  Goal: Maintain stable weight  Outcome: Progressing  Goal: Reduce weight from edema/fluid  Outcome: Progressing     Problem: Pain - Adult  Goal: Verbalizes/displays adequate comfort level or baseline comfort level  Outcome: Progressing     Problem: Safety - Adult  Goal: Free from fall injury  Outcome: Progressing     Problem: Discharge Planning  Goal: Discharge to home or other facility with appropriate resources  Outcome: Progressing     Problem: Chronic Conditions and Co-morbidities  Goal: Patient's chronic conditions and co-morbidity symptoms are monitored and maintained or improved  Outcome: Progressing     Problem: Fall/Injury  Goal: Not fall by end of shift  Outcome: Progressing  Goal: Be free from injury by end of the shift  Outcome: Progressing  Goal: Verbalize understanding of personal risk factors for fall in the hospital  Outcome: Progressing     Problem: Diabetes  Goal: Increase stability of blood glucose readings by end of shift  Outcome: Progressing  Goal: Maintain electrolyte levels within acceptable range throughout shift  Outcome: Progressing  Goal: Maintain glucose levels >70mg/dl to <250mg/dl throughout shift  Outcome: Progressing  Goal: No changes in neurological exam by  end of shift  Outcome: Progressing

## 2023-11-24 NOTE — CONSULTS
"Nutrition Initial Assessment:   Nutrition Assessment    Reason for Assessment: Provider consult order, Tube feeding recommendations    Patient is a 83 y.o. male presenting on day 2 of admission.  Pt presented with AMS,  found to have UTI, CTH w/ L large chronic SDH, 11/22 s/p L crani for SDH evac, CTH POC . Per MD, pt with persistently poor exam and will need to rule out seizures.     PMH: anemia, CLL, T2DM,  HTN, HLD, Leukemia     Nutrition History:  Nutrition history obtain from pt's wife who was at bedside.    Pt is diabetic so she does not give him sugar. He used to only drink wine howevver he stopped drinking alcohol about 5 years ago. He eats a welll balanced diet and \"doesn't like fat.\" He was eating \"eveything\" prior to admission and had a very good appetite.     Food Allergies/Intolerances:  None  GI Symptoms: None  Vitamin/Herbal Supplement Use: Vitamin D3 and iron supplement    He has not had a diet order in place since admission (x 3 days). He was seen by SLP on 11/23 who recommended NPO for solids + liquids to pt's mental status and risk for aspiration. Nurse was at bedside this morning giving meds with applesauce. Pt's wife then insisted she feed it to him.     Anthropometrics:  Height: 172.7 cm (5' 8\")   Weight: 69.8 kg (153 lb 14.1 oz)   BMI (Calculated): 23.4  IBW/kg (Dietitian Calculated): 70 kg  Percent of IBW: 100 %     Weight History:   Wt Readings from Last 10 Encounters:   11/24/23 69.8 kg (153 lb 14.1 oz)   11/21/23 70.1 kg (154 lb 8.7 oz)     Weight         11/21/2023  2338 11/24/2023  0000 11/24/2023  0405       Weight: 70 kg (154 lb 5.2 oz) -- 69.8 kg (153 lb 14.1 oz)           Weight Change %:  Weight History / % Weight Change: wife reports he has not had any weight loss recently    Nutrition Focused Physical Exam Findings:  defer: Nurse at bedside administering meds + pt on continuous video EEG    Edema: +1 trace  Edema Location: RLE; LLE  Physical Findings:  Skin: Negative (incision to " left upper head)    Nutrition Significant Labs:  BMP Trend:   Results from last 7 days   Lab Units 11/24/23  0000 11/22/23  0506 11/22/23  0022   GLUCOSE mg/dL 180* 174* 139*   CALCIUM mg/dL 8.4* 7.5* 8.4*   SODIUM mmol/L 142 141 139   POTASSIUM mmol/L 3.4* 3.9 4.1   CO2 mmol/L 22 22 25   CHLORIDE mmol/L 111* 110* 106   BUN mg/dL 21 20 20   CREATININE mg/dL 1.03 1.02 1.02    , BG POCT trend:   Results from last 7 days   Lab Units 11/22/23  1146 11/22/23  0409   POCT GLUCOSE mg/dL 177* 149*        Nutrition Specific Medications:  Scheduled medications  atorvastatin, 20 mg, oral, Daily  cholecalciferol, 4,000 Units, oral, Daily  heparin (porcine), 5,000 Units, subcutaneous, q8h ENEIDA  levETIRAcetam, 500 mg, intravenous, q12h  polyethylene glycol, 17 g, oral, Daily    Continuous medications  sodium chloride 0.9%, 75 mL/hr, Last Rate: 75 mL/hr (11/24/23 0700)    PRN medications  PRN medications: acetaminophen, acetaminophen, hydrALAZINE, HYDROmorphone, labetaloL, naloxone, ondansetron **OR** ondansetron, oxyCODONE, oxyCODONE    I/O:   Last BM Date: 11/23/23; Stool Appearance: Formed (11/23/23 2000)  I/O last 3 completed shifts:  In: 3950 (56.6 mL/kg) [I.V.:3450 (49.4 mL/kg); IV Piggyback:500]  Out: 2075 (29.7 mL/kg) [Urine:2070 (0.8 mL/kg/hr); Drains:5]  Weight: 69.8 kg   I/O this shift:  In: 225 [I.V.:225]  Out: 85 [Urine:85]    Estimated Needs:   Total Energy Estimated Needs (kCal): 1745 kCal  Method for Estimating Needs: 25kcal/kg  Total Protein Estimated Needs (g): 84 g  Method for Estimating Needs: 1.2g/kg  Total Fluid Estimated Needs (mL):  (per MD)         Nutrition Diagnosis   Nutrition Diagnosis:  Malnutrition Diagnosis  Patient has Malnutrition Diagnosis: No    Nutrition Diagnosis  Patient has Nutrition Diagnosis: Yes  Diagnosis Status (1): New  Nutrition Diagnosis 1: Inadequate oral intake  Related to (1): current mental status  As Evidenced by (1): SLP recommendatin of NPO       Nutrition  Interventions/Recommendations   Nutrition Interventions and Recommendations:        Nutrition Prescription:  If pt continues to remain inappropriate for PO diet, recommend starting TF when medically feasible.   - Vital 1.5 @ 50ml/hr goal rate   - Start Vital 1.5  @10ml/hr and increase by 10ml/hr Q6H until goal rate of 50ml/hr is met   2. FWF per MD recommendations     Vital 1.5 @ 50ml/hr provides 1800kcal, 81g protein, and 917ml free water.        Nutrition Interventions:   Food and/or Nutrient Delivery Interventions  Interventions: Enteral intake  Enteral Intake: Insert enteral feeding tube  Goal: Start TF when medically feasible/ goal rate as tolerated    Nutrition Education:   N/A    Nutrition Monitoring and Evaluation   Monitoring/Evaluation:   Food/Nutrient Related History Monitoring  Monitoring and Evaluation Plan: Energy intake  Energy Intake: Estimated energy intake  Criteria: meet > 75% estiamted energy needs    Body Composition/Growth/Weight History  Monitoring and Evaluation Plan: Weight  Weight: Weight change  Criteria: maintain weight    Biochemical Data, Medical Tests and Procedures  Monitoring and Evaluation Plan: Electrolyte/renal panel, Glucose/endocrine profile  Electrolyte and Renal Panel: Magnesium, Phosphorus, Potassium  Criteria: WNL  Glucose/Endocrine Profile: Glucose, casual  Criteria: WNL (ICU gluc 140-180)    Time Spent/Follow-up Reminder:   Follow Up  Time Spent (min): 60 minutes  Last Date of Nutrition Visit: 11/24/23  Nutrition Follow-Up Needed?: Dietitian to reassess per policy  Follow up Comment: SLP rec NPO - TF recs made

## 2023-11-24 NOTE — PROGRESS NOTES
11/24/23 1506   Discharge Planning   Living Arrangements Spouse/significant other   Support Systems Spouse/significant other;Children   Assistance Needed independent prior to admission   Type of Residence Private residence   Number of Stairs to Enter Residence 2   Do you have animals or pets at home? Yes   Type of Animals or Pets a dog   Who is requesting discharge planning? Provider   Home or Post Acute Services Other (Comment)  (TBD)   Patient expects to be discharged to: Home   Does the patient need discharge transport arranged? Yes   RoundTrip coordination needed? Yes

## 2023-11-24 NOTE — PROGRESS NOTES
"Vancomycin Dosing by Pharmacy- INITIAL    Haile Ayers is a 83 y.o. year old male who Pharmacy has been consulted for vancomycin dosing for other urinary tract infection . Based on the patient's indication and renal status this patient will be dosed based on a goal AUC of 400-600.     Renal function is currently stable.    Visit Vitals  /87   Pulse 96   Temp 38.5 °C (101.3 °F)   Resp (!) 28        Lab Results   Component Value Date    CREATININE 1.03 11/24/2023    CREATININE 1.02 11/22/2023    CREATININE 1.02 11/22/2023        Patient weight is No results found for: \"PTWEIGHT\"    No results found for: \"CULTURE\"     I/O last 3 completed shifts:  In: 3950 (56.6 mL/kg) [I.V.:3450 (49.4 mL/kg); IV Piggyback:500]  Out: 2075 (29.7 mL/kg) [Urine:2070 (0.8 mL/kg/hr); Drains:5]  Weight: 69.8 kg   [unfilled]    No results found for: \"PATIENTTEMP\"       Assessment/Plan     Patient will not be given a loading dose.  Will initiate vancomycin maintenance,  1250 mg every 24 hours.    This dosing regimen is predicted by [employer] to result in the following pharmacokinetic parameters:  Loading dose: N/A  Regimen: 1250 mg IV every 24 hours.  Start time: 16:17 on 11/24/2023  Exposure target: AUC24 (range)400-600 mg/L.hr   AUC24,ss: 506 mg/L.hr  Probability of AUC24 > 400: 76 %  Ctrough,ss: 15.1 mg/L  Probability of Ctrough,ss > 20: 24 %  Probability of nephrotoxicity (Lodise FILIBERTO 2009): 10 %    Follow-up level will be ordered on 11/25 at 0500 unless clinically indicated sooner.  Will continue to monitor renal function daily while on vancomycin and order serum creatinine at least every 48 hours if not already ordered.  Follow for continued vancomycin needs, clinical response, and signs/symptoms of toxicity.       Hood Shanks , PharmD      "

## 2023-11-24 NOTE — NURSING NOTE
1430: Mds notified of pt increase in temperature despite rectal tylenol. Blood cultures ordered along with IV antibiotics and chest xray. MD spoke with wife about clinical indication for NG tube. Wife not receptive, further eduction may be needed.  Abbie Duncan RN

## 2023-11-24 NOTE — CARE PLAN
The patient's goals for the shift include to remain safe and free from injury with pain and comfort managed and controlled     The clinical goals for the shift include improve neurological status    Over the shift, the patient did not make progress toward the following goals. Barriers to progression include impaired cognition, impaired mobility, and aphasia. Recommendations to address these barriers include   Problem: Pain  Goal: My pain/discomfort is manageable  Outcome: Progressing     Problem: Safety  Goal: Patient will be injury free during hospitalization  Outcome: Progressing  Goal: I will remain free of falls  Outcome: Progressing     Problem: Daily Care  Goal: Daily care needs are met  Outcome: Progressing     Problem: Psychosocial Needs  Goal: Demonstrates ability to cope with hospitalization/illness  Outcome: Progressing  Goal: Collaborate with me, my family, and caregiver to identify my specific goals  Outcome: Progressing     Problem: Discharge Barriers  Goal: My discharge needs are met  Outcome: Progressing     Problem: General Stroke  Goal: Demonstrate improvement in neurological exam throughout the shift  Outcome: Progressing  Goal: Maintain BP within ordered limits throughout shift  Outcome: Progressing  Goal: Participate in treatment (ie., meds, therapy) throughout shift  Outcome: Progressing  Goal: No symptoms of aspiration throughout shift  Outcome: Progressing  Goal: No symptoms of hemorrhage throughout shift  Outcome: Progressing  Goal: Tolerate enteral feeding throughout shift  Outcome: Progressing  Goal: Decreased nausea/vomiting throughout shift  Outcome: Progressing  Goal: Controlled blood glucose throughout shift  Outcome: Progressing  Goal: Out of bed three times today  Outcome: Progressing     Problem: ICU Stroke  Goal: Maintain ICP within ordered limits throughout shift  Outcome: Progressing  Goal: Tolerate EVD clamping trial throughout shift  Outcome: Progressing  Goal: Tolerate  ventilator weaning trial during shift  Outcome: Progressing  Goal: Maintain patent airway throughout shift  Outcome: Progressing  Goal: Achieve/maintain targeted sodium level throughout shift  Outcome: Progressing     Problem: Skin  Goal: Decreased wound size/increased tissue granulation at next dressing change  Outcome: Progressing  Goal: Participates in plan/prevention/treatment measures  Outcome: Progressing  Goal: Prevent/manage excess moisture  Outcome: Progressing  Goal: Prevent/minimize sheer/friction injuries  Outcome: Progressing  Goal: Promote/optimize nutrition  Outcome: Progressing  Goal: Promote skin healing  Outcome: Progressing     Problem: Safety - Medical Restraint  Goal: Remains free of injury from restraints (Restraint for Interference with Medical Device)  Outcome: Progressing  Goal: Free from restraint(s) (Restraint for Interference with Medical Device)  Outcome: Progressing     Problem: Nutrition  Goal: Nutrition support goals are met within 48 hrs  Outcome: Progressing  Goal: Lab values WNL  Outcome: Progressing  Goal: Electrolytes WNL  Outcome: Progressing  Goal: Promote healing  Outcome: Progressing  Goal: Maintain stable weight  Outcome: Progressing  Goal: Reduce weight from edema/fluid  Outcome: Progressing     Problem: Pain - Adult  Goal: Verbalizes/displays adequate comfort level or baseline comfort level  Outcome: Progressing     Problem: Safety - Adult  Goal: Free from fall injury  Outcome: Progressing     Problem: Discharge Planning  Goal: Discharge to home or other facility with appropriate resources  Outcome: Progressing     Problem: Chronic Conditions and Co-morbidities  Goal: Patient's chronic conditions and co-morbidity symptoms are monitored and maintained or improved  Outcome: Progressing     Problem: Fall/Injury  Goal: Not fall by end of shift  Outcome: Progressing  Goal: Be free from injury by end of the shift  Outcome: Progressing  Goal: Verbalize understanding of personal  risk factors for fall in the hospital  Outcome: Progressing     Problem: Diabetes  Goal: Increase stability of blood glucose readings by end of shift  Outcome: Progressing  Goal: Maintain electrolyte levels within acceptable range throughout shift  Outcome: Progressing  Goal: Maintain glucose levels >70mg/dl to <250mg/dl throughout shift  Outcome: Progressing  Goal: No changes in neurological exam by end of shift  Outcome: Progressing   .

## 2023-11-24 NOTE — PROGRESS NOTES
Physical Therapy    Physical Therapy Treatment    Patient Name: Haile Ayers  MRN: [id number]  Today's Date: 11/24/2023  Time Calculation  Start Time: 1446  Stop Time: 1456  Time Calculation (min): 10 min       Assessment/Plan   PT Assessment  PT Assessment Results: Decreased strength, Impaired balance, Decreased endurance, Decreased mobility, Decreased coordination, Decreased cognition  Rehab Prognosis: Fair  End of Session Communication: Bedside nurse  End of Session Patient Position: Bed, 3 rail up, Alarm on     PT Plan  Treatment/Interventions: Bed mobility, Transfer training, Gait training, Stair training, Balance training, Neuromuscular re-education, Strengthening, Endurance training, Range of motion, Therapeutic exercise, Therapeutic activity, Home exercise program, Orthotic fitting/training, Positioning, Postural re-education  PT Plan: Skilled PT  PT Frequency: 5 times per week  PT Discharge Recommendations: High intensity level of continued care  Equipment Recommended upon Discharge:  (TBD; Anticipate wheeled walker)  PT Recommended Transfer Status: Assist x2  PT - OK to Discharge: Yes (PT evaluation has been completed and discharge recommendations have been made.)      General Visit Information:   PT  Visit  PT Received On: 11/24/23  Response to Previous Treatment: Other (Comment) (Pt's RN reports that pt has a fever, is minimally responsive to any commands. Ok to attempt therapy)  General  Family/Caregiver Present: Yes  Caregiver Feedback: Pt's wife present, concerned about pt's fever, assisting with hygiene needs  Prior to Session Communication: Bedside nurse  Patient Position Received: Bed, 4 rail up  General Comment: Pt turns head in response to auditory stim ~50% of time, responds to noxious stim. Does not follow commands. After initiating session, pt noted to have been incontinent of bowel, requires maxA from this therapist and RN to address    Subjective     Objective     Treatments:     Bed  Mobility  Bed Mobility: Yes  Bed Mobility 1  Bed Mobility 1: Rolling right, Rolling left  Level of Assistance 1: Maximum assistance (x2)  Bed Mobility 2  Bed Mobility  2: Scooting  Level of Assistance 2: Dependent (x2)    Outcome Measures:     [facility] Basic Mobility  Turning from your back to your side while in a flat bed without using bedrails: A lot  Moving from lying on your back to sitting on the side of a flat bed without using bedrails: Total  Moving to and from bed to chair (including a wheelchair): Total  Standing up from a chair using your arms (e.g. wheelchair or bedside chair): Total  To walk in hospital room: Total  Climbing 3-5 steps with railing: Total  Basic Mobility - Total Score: 7    FSS-ICU  Ambulation: Unable to attempt due to weakness  Rolling: Maximal assistance (performs 25% - 49% of task)  Sitting: Maximal assistance (performs 25% - 49% of task)  Transfer Sit-to-Stand: Total assistance (performs 25% or requires another person)  Transfer Supine-to-Sit: Total assistance (performs 25% or requires another person)  Total Score: 6    Education Documentation  Precautions, taught by Akosua Torres PTA at 11/24/2023  3:12 PM.  Learner: Patient  Readiness: Nonacceptance  Method: Explanation  Response: Needs Reinforcement    Mobility Training, taught by Akosua Torres PTA at 11/24/2023  3:12 PM.  Learner: Patient  Readiness: Nonacceptance  Method: Explanation  Response: Needs Reinforcement    Education Comments  No comments found.        OP EDUCATION:       Encounter Problems       Encounter Problems (Active)       Balance       Patient will stand with UE support of LRD and </= MOD A x1 at least 3 min to improve balance required for self-care tasks.  (Not Progressing)       Start:  11/22/23    Expected End:  12/06/23               Mobility       Patient will ambulate at least 20 ft. with </= MOD A x1 and LRD to improve tolerance of household distances.  (Not Progressing)       Start:   11/22/23    Expected End:  12/06/23            Patient will perform bed mobility with </= MOD A x1 to reduce risk of developing decubitus ulcers.  (Not Progressing)       Start:  11/22/23    Expected End:  12/06/23            Patient will perform home exercise program as prescribed with cues as needed.   (Not Progressing)       Start:  11/22/23    Expected End:  12/06/23               Pain - Adult          Transfers       Patient will perform sit to stand and stand to sit transfers with </= MAX A x1 and LRD to increase functional strength.  (Not Progressing)       Start:  11/22/23    Expected End:  12/06/23                 PRADEEP Amaya

## 2023-11-24 NOTE — PROGRESS NOTES
"Haile Ayers is a 83 y.o. male on day 2 of admission presenting with Subdural hematoma (CMS/HCC).    Subjective   Patient with wife at bedside    Objective     Physical Exam  Eyes opened spontaneous  aphasic  Spontaneous x4 > antigravity  Subdural drain in place    Last Recorded Vitals  Blood pressure 156/78, pulse 86, temperature 37.4 °C (99.3 °F), temperature source Temporal, resp. rate 23, height 1.727 m (5' 8\"), weight 70 kg (154 lb 5.2 oz), SpO2 97 %.  Intake/Output last 3 Shifts:  I/O last 3 completed shifts:  In: 2982.5 (42.6 mL/kg) [I.V.:2682.5 (38.3 mL/kg); IV Piggyback:300]  Out: 2470 (35.3 mL/kg) [Urine:2430 (1 mL/kg/hr); Drains:40]  Weight: 70 kg     Relevant Results  Results for orders placed or performed during the hospital encounter of 11/21/23 (from the past 24 hour(s))   Urinalysis with Reflex Microscopic   Result Value Ref Range    Color, Urine Yellow Straw, Yellow    Appearance, Urine Hazy (N) Clear    Specific Gravity, Urine 1.016 1.005 - 1.035    pH, Urine 5.0 5.0, 5.5, 6.0, 6.5, 7.0, 7.5, 8.0    Protein, Urine 100 (2+) (N) NEGATIVE mg/dL    Glucose, Urine NEGATIVE NEGATIVE mg/dL    Blood, Urine MODERATE (2+) (A) NEGATIVE    Ketones, Urine 20 (1+) (A) NEGATIVE mg/dL    Bilirubin, Urine NEGATIVE NEGATIVE    Urobilinogen, Urine <2.0 <2.0 mg/dL    Nitrite, Urine NEGATIVE NEGATIVE    Leukocyte Esterase, Urine LARGE (3+) (A) NEGATIVE   Microscopic Only, Urine   Result Value Ref Range    WBC, Urine >50 (A) 1-5, NONE /HPF    WBC Clumps, Urine MANY Reference range not established. /HPF    RBC, Urine >20 (A) NONE, 1-2, 3-5 /HPF    Mucus, Urine 1+ Reference range not established. /LPF    Fine Granular Casts, Urine 3+ (A) NONE /LPF    Mixed Cell Casts, Urine 1+ (A) NONE /LPF   CBC   Result Value Ref Range    WBC 7.3 4.4 - 11.3 x10*3/uL    nRBC 0.0 0.0 - 0.0 /100 WBCs    RBC 2.98 (L) 4.50 - 5.90 x10*6/uL    Hemoglobin 9.0 (L) 13.5 - 17.5 g/dL    Hematocrit 27.6 (L) 41.0 - 52.0 %    MCV 93 80 - 100 fL    " MCH 30.2 26.0 - 34.0 pg    MCHC 32.6 32.0 - 36.0 g/dL    RDW 14.3 11.5 - 14.5 %    Platelets 163 150 - 450 x10*3/uL   Renal function panel   Result Value Ref Range    Glucose 180 (H) 74 - 99 mg/dL    Sodium 142 136 - 145 mmol/L    Potassium 3.4 (L) 3.5 - 5.3 mmol/L    Chloride 111 (H) 98 - 107 mmol/L    Bicarbonate 22 21 - 32 mmol/L    Anion Gap 12 10 - 20 mmol/L    Urea Nitrogen 21 6 - 23 mg/dL    Creatinine 1.03 0.50 - 1.30 mg/dL    eGFR 72 >60 mL/min/1.73m*2    Calcium 8.4 (L) 8.6 - 10.6 mg/dL    Phosphorus 2.8 2.5 - 4.9 mg/dL    Albumin 3.0 (L) 3.4 - 5.0 g/dL         Assessment/Plan   Principal Problem:    Subdural hematoma (CMS/HCC)    83 y.o. male h/o CLL, anemia, DM, p/w AMS, found to have UTI, CTH w/ L large chronic SDH, 11/22 s/p L crani for SDH evac, CTH POC    Patient with persistently poor exam, will need to rule out seizures.    PLAN    RITCHIE  Q2 neurochecks  EEG  Fu Ucx  Chronic ignacio  Continue subdural drain  SCDs  PTOT-rehab      Fidel Smith MD

## 2023-11-24 NOTE — PROGRESS NOTES
Speech-Language Pathology    SLP ADULT Inpatient Speech-Language Cognition    Patient Name: Haile Ayers  MRN: [id number]  Today's Date: 11/24/2023   Time Calculation  Start Time: 1037  Stop Time: 1051  Time Calculation (min): 17 min         History:    Patient is a 83 y.o. male  presented with AMS,  found to have UTI, CTH w/ L large chronic SDH, 11/22 s/p L crani for SDH evac, CTH POC . Per MD, pt with persistently poor exam and will need to rule out seizures.        SLP Assessment:  Speech language evaluation completed. Pt demonstrated significant receptive and expressive language deficits. Pt exhibited difficulty following 1 step command, answering Y/N questions or ID of common objects. Expressively, pt non-verbal with no attempts at communication.        SLP Plan:  Plan  SLP Frequency: 2x per week  Duration: 3 weeks  SLP Discharge Recommendations: Continue skilled SLP services at the next level of care  Discussed POC: Patient, Caregiver/family, Nursing, Physician  Patient/Caregiver Agreeable: Yes  SLP - OK to Discharge: Yes    Goals:   Pt will answer Y/N questions to self and environment with 65% accuracy.  2.   Pt will follow 1 step commands for 70% accuracy with mod verbal cueing.   3.   Pt will verbal name common objects 3/5 trials with max verbal cueing      Subjective   Pt's wife present during session.     Pain:  Pain Assessment  Pain Assessment: Unable to self-report  Unable to Self-Report Pain Reason: Nonverbal      Inpatient:  Education Documentation  Reviewed language evaluation result and goals.

## 2023-11-25 LAB
ALBUMIN SERPL BCP-MCNC: 2.7 G/DL (ref 3.4–5)
ANION GAP SERPL CALC-SCNC: 13 MMOL/L (ref 10–20)
BACTERIA UR CULT: NORMAL
BUN SERPL-MCNC: 24 MG/DL (ref 6–23)
CALCIUM SERPL-MCNC: 8 MG/DL (ref 8.6–10.6)
CHLORIDE SERPL-SCNC: 113 MMOL/L (ref 98–107)
CO2 SERPL-SCNC: 21 MMOL/L (ref 21–32)
CREAT SERPL-MCNC: 0.9 MG/DL (ref 0.5–1.3)
ERYTHROCYTE [DISTWIDTH] IN BLOOD BY AUTOMATED COUNT: 14 % (ref 11.5–14.5)
GFR SERPL CREATININE-BSD FRML MDRD: 84 ML/MIN/1.73M*2
GLUCOSE BLD MANUAL STRIP-MCNC: 152 MG/DL (ref 74–99)
GLUCOSE SERPL-MCNC: 163 MG/DL (ref 74–99)
HCT VFR BLD AUTO: 26.4 % (ref 41–52)
HGB BLD-MCNC: 8.9 G/DL (ref 13.5–17.5)
MCH RBC QN AUTO: 30 PG (ref 26–34)
MCHC RBC AUTO-ENTMCNC: 33.7 G/DL (ref 32–36)
MCV RBC AUTO: 89 FL (ref 80–100)
NRBC BLD-RTO: 0 /100 WBCS (ref 0–0)
PHOSPHATE SERPL-MCNC: 2.4 MG/DL (ref 2.5–4.9)
PLATELET # BLD AUTO: 175 X10*3/UL (ref 150–450)
POTASSIUM SERPL-SCNC: 4 MMOL/L (ref 3.5–5.3)
RBC # BLD AUTO: 2.97 X10*6/UL (ref 4.5–5.9)
SODIUM SERPL-SCNC: 143 MMOL/L (ref 136–145)
VANCOMYCIN TROUGH SERPL-MCNC: 8.2 UG/ML (ref 5–20)
WBC # BLD AUTO: 8 X10*3/UL (ref 4.4–11.3)

## 2023-11-25 PROCEDURE — 80069 RENAL FUNCTION PANEL: CPT | Performed by: NURSE PRACTITIONER

## 2023-11-25 PROCEDURE — 80202 ASSAY OF VANCOMYCIN: CPT

## 2023-11-25 PROCEDURE — 82947 ASSAY GLUCOSE BLOOD QUANT: CPT

## 2023-11-25 PROCEDURE — 85027 COMPLETE CBC AUTOMATED: CPT | Performed by: NURSE PRACTITIONER

## 2023-11-25 PROCEDURE — 36415 COLL VENOUS BLD VENIPUNCTURE: CPT | Performed by: NURSE PRACTITIONER

## 2023-11-25 PROCEDURE — 95715 VEEG EA 12-26HR INTMT MNTR: CPT

## 2023-11-25 PROCEDURE — 2500000004 HC RX 250 GENERAL PHARMACY W/ HCPCS (ALT 636 FOR OP/ED)

## 2023-11-25 PROCEDURE — 2500000004 HC RX 250 GENERAL PHARMACY W/ HCPCS (ALT 636 FOR OP/ED): Performed by: STUDENT IN AN ORGANIZED HEALTH CARE EDUCATION/TRAINING PROGRAM

## 2023-11-25 PROCEDURE — 96372 THER/PROPH/DIAG INJ SC/IM: CPT

## 2023-11-25 PROCEDURE — 95720 EEG PHY/QHP EA INCR W/VEEG: CPT | Performed by: STUDENT IN AN ORGANIZED HEALTH CARE EDUCATION/TRAINING PROGRAM

## 2023-11-25 PROCEDURE — A4217 STERILE WATER/SALINE, 500 ML: HCPCS

## 2023-11-25 PROCEDURE — 1100000001 HC PRIVATE ROOM DAILY

## 2023-11-25 RX ADMIN — LEVETIRACETAM 500 MG: 5 INJECTION INTRAVENOUS at 06:20

## 2023-11-25 RX ADMIN — HEPARIN SODIUM 5000 UNITS: 5000 INJECTION INTRAVENOUS; SUBCUTANEOUS at 10:01

## 2023-11-25 RX ADMIN — CEFEPIME 2 G: 1 INJECTION, SOLUTION INTRAVENOUS at 14:59

## 2023-11-25 RX ADMIN — CEFEPIME 2 G: 1 INJECTION, SOLUTION INTRAVENOUS at 04:46

## 2023-11-25 RX ADMIN — HEPARIN SODIUM 5000 UNITS: 5000 INJECTION INTRAVENOUS; SUBCUTANEOUS at 18:19

## 2023-11-25 RX ADMIN — VANCOMYCIN HYDROCHLORIDE 1250 MG: 5 INJECTION, POWDER, LYOPHILIZED, FOR SOLUTION INTRAVENOUS at 16:20

## 2023-11-25 RX ADMIN — HYDRALAZINE HYDROCHLORIDE 10 MG: 20 INJECTION INTRAMUSCULAR; INTRAVENOUS at 10:01

## 2023-11-25 RX ADMIN — LEVETIRACETAM 500 MG: 5 INJECTION INTRAVENOUS at 19:00

## 2023-11-25 RX ADMIN — LABETALOL HYDROCHLORIDE 10 MG: 5 INJECTION INTRAVENOUS at 00:45

## 2023-11-25 RX ADMIN — SODIUM CHLORIDE 75 ML/HR: 9 INJECTION, SOLUTION INTRAVENOUS at 10:25

## 2023-11-25 ASSESSMENT — COGNITIVE AND FUNCTIONAL STATUS - GENERAL
DRESSING REGULAR LOWER BODY CLOTHING: A LOT
MOBILITY SCORE: 8
WALKING IN HOSPITAL ROOM: TOTAL
DAILY ACTIVITIY SCORE: 8
EATING MEALS: TOTAL
PERSONAL GROOMING: TOTAL
MOVING TO AND FROM BED TO CHAIR: TOTAL
HELP NEEDED FOR BATHING: TOTAL
DRESSING REGULAR UPPER BODY CLOTHING: A LOT
MOVING FROM LYING ON BACK TO SITTING ON SIDE OF FLAT BED WITH BEDRAILS: A LOT
TOILETING: TOTAL
STANDING UP FROM CHAIR USING ARMS: TOTAL
TURNING FROM BACK TO SIDE WHILE IN FLAT BAD: A LOT
CLIMB 3 TO 5 STEPS WITH RAILING: TOTAL

## 2023-11-25 ASSESSMENT — PAIN - FUNCTIONAL ASSESSMENT: PAIN_FUNCTIONAL_ASSESSMENT: 0-10

## 2023-11-25 ASSESSMENT — PAIN SCALES - GENERAL: PAINLEVEL_OUTOF10: 0 - NO PAIN

## 2023-11-25 NOTE — PROGRESS NOTES
"Haile Ayers is a 83 y.o. male on day 3 of admission presenting with Subdural hematoma (CMS/HCC).    Subjective   Patient febrile x 3 and has been afebrile since. Smiling this morning    Objective     Physical Exam  Eyes opened spontaneous  Aphasic  Follows commands x 4 > antigravity    Last Recorded Vitals  Blood pressure 169/77, pulse 84, temperature 37.9 °C (100.2 °F), temperature source Temporal, resp. rate 20, height 1.727 m (5' 8\"), weight 69.8 kg (153 lb 14.1 oz), SpO2 98 %.  Intake/Output last 3 Shifts:  I/O last 3 completed shifts:  In: 3588.8 (51.4 mL/kg) [I.V.:2738.8 (39.2 mL/kg); IV Piggyback:850]  Out: 2310 (33.1 mL/kg) [Urine:2305 (0.9 mL/kg/hr); Drains:5]  Weight: 69.8 kg     Relevant Results  Results for orders placed or performed during the hospital encounter of 11/21/23 (from the past 24 hour(s))   Blood Culture    Specimen: Peripheral Venipuncture; Blood culture   Result Value Ref Range    Blood Culture Loaded on Instrument - Culture in progress    Blood Culture    Specimen: Peripheral Venipuncture; Blood culture   Result Value Ref Range    Blood Culture Loaded on Instrument - Culture in progress          Assessment/Plan   Principal Problem:    Subdural hematoma (CMS/HCC)    83 y.o. male h/o CLL, anemia, DM, p/w AMS, found to have UTI, CTH w/ L large chronic SDH, 11/22 s/p L crani for SDH evac, CTH POC, 11/24 drain dc'd,     PLAN    Floor  Q4 neurochecks  EEG - follow up final read  SBP <160  Fu Ucx  Chronic ignacio  SCDs, SQH  SLP- NPO will re-eval Mon   PTOT-rehab    Please page Neurosurgery pager [phone]] with any questions or concerns during the day.  Progress note authored by [facility] resident. Epic messages will have delayed responses.    Mikey Barragan MD      "

## 2023-11-25 NOTE — CARE PLAN
The patient's goals for the shift include  Pt will remain safe by not removing EEG leads or IV's and having needs met by staff.  In addition pt will remain calm to aide in keeping bp within parameters.    The clinical goals for the shift include remain safe and improve neuro status  Pt's neuro exam will remain the same or improve by end of shift.  Pt's bp will be kept within parameters using PRN meds thru IV access as pt remains NPO.    Patient has shown improvement in exam, pt has moved all extremeties earlier in shift, but seemed a bit more confused & less effort/ ability at end of shift.  Pt seems to be understanding some things, English is second language which may be affecting outcome of exam.  Pt's continued inability to swallow also affecting ability to give meds to control bp.

## 2023-11-25 NOTE — PROGRESS NOTES
"Vancomycin Dosing by Pharmacy- FOLLOW UP    Haile Ayers is a 83 y.o. year old male who Pharmacy has been consulted for vancomycin dosing for other UTI . Based on the patient's indication and renal status this patient is being dosed based on a goal AUC of 400-600.     Renal function is currently stable.    Current vancomycin dose: 1250 mg given every 24 hours    Estimated vancomycin AUC on current dose: 452 mg/L.hr     Visit Vitals  /75 (BP Location: Right arm, Patient Position: Lying)   Pulse 83   Temp 37.1 °C (98.8 °F) (Temporal)   Resp 18        Lab Results   Component Value Date    CREATININE 0.90 11/25/2023    CREATININE 1.03 11/24/2023    CREATININE 1.02 11/22/2023    CREATININE 1.02 11/22/2023        Patient weight is No results found for: \"PTWEIGHT\"    No results found for: \"CULTURE\"     I/O last 3 completed shifts:  In: 2775 (39.8 mL/kg) [I.V.:2025 (29 mL/kg); IV Piggyback:750]  Out: 1495 (21.4 mL/kg) [Urine:1490 (0.6 mL/kg/hr); Drains:5]  Weight: 69.8 kg   [unfilled]    No results found for: \"PATIENTTEMP\"     Assessment/Plan    Within goal AUC range. Continue current vancomycin regimen.    This dosing regimen is predicted by [employer] to result in the following pharmacokinetic parameters:    Loading dose: N/A  Regimen: 1250 mg IV every 24 hours.  Start time: 11:28 on 11/25/2023  Exposure target: AUC24 (range)400-600 mg/L.hr   AUC24,ss: 452 mg/L.hr  Probability of AUC24 > 400: 72 %  Ctrough,ss: 13.2 mg/L  Probability of Ctrough,ss > 20: 7 %  Probability of nephrotoxicity (Lodise FILIBERTO 2009): 8 %    The next level will be obtained on 11/27/23 at 0500 with 1st AM labs. May be obtained sooner if clinically indicated.   Will continue to monitor renal function daily while on vancomycin and order serum creatinine at least every 48 hours if not already ordered.  Follow for continued vancomycin needs, clinical response, and signs/symptoms of toxicity.       Kylie Lozano, PharmD           "

## 2023-11-26 ENCOUNTER — APPOINTMENT (OUTPATIENT)
Dept: RADIOLOGY | Facility: HOSPITAL | Age: 83
DRG: 025 | End: 2023-11-26
Payer: MEDICARE

## 2023-11-26 LAB
ALBUMIN SERPL BCP-MCNC: 2.6 G/DL (ref 3.4–5)
ANION GAP SERPL CALC-SCNC: 17 MMOL/L (ref 10–20)
BUN SERPL-MCNC: 23 MG/DL (ref 6–23)
CALCIUM SERPL-MCNC: 8 MG/DL (ref 8.6–10.6)
CHLORIDE SERPL-SCNC: 112 MMOL/L (ref 98–107)
CO2 SERPL-SCNC: 17 MMOL/L (ref 21–32)
CREAT SERPL-MCNC: 0.79 MG/DL (ref 0.5–1.3)
ERYTHROCYTE [DISTWIDTH] IN BLOOD BY AUTOMATED COUNT: 13.8 % (ref 11.5–14.5)
GFR SERPL CREATININE-BSD FRML MDRD: 88 ML/MIN/1.73M*2
GLUCOSE BLD MANUAL STRIP-MCNC: 120 MG/DL (ref 74–99)
GLUCOSE BLD MANUAL STRIP-MCNC: 123 MG/DL (ref 74–99)
GLUCOSE BLD MANUAL STRIP-MCNC: 126 MG/DL (ref 74–99)
GLUCOSE SERPL-MCNC: 127 MG/DL (ref 74–99)
HCT VFR BLD AUTO: 30.1 % (ref 41–52)
HGB BLD-MCNC: 9.4 G/DL (ref 13.5–17.5)
MCH RBC QN AUTO: 29.4 PG (ref 26–34)
MCHC RBC AUTO-ENTMCNC: 31.2 G/DL (ref 32–36)
MCV RBC AUTO: 94 FL (ref 80–100)
NRBC BLD-RTO: 0 /100 WBCS (ref 0–0)
PHOSPHATE SERPL-MCNC: 2.7 MG/DL (ref 2.5–4.9)
PLATELET # BLD AUTO: 193 X10*3/UL (ref 150–450)
POTASSIUM SERPL-SCNC: 3.8 MMOL/L (ref 3.5–5.3)
RBC # BLD AUTO: 3.2 X10*6/UL (ref 4.5–5.9)
SODIUM SERPL-SCNC: 142 MMOL/L (ref 136–145)
WBC # BLD AUTO: 8 X10*3/UL (ref 4.4–11.3)

## 2023-11-26 PROCEDURE — 71045 X-RAY EXAM CHEST 1 VIEW: CPT

## 2023-11-26 PROCEDURE — 71045 X-RAY EXAM CHEST 1 VIEW: CPT | Performed by: RADIOLOGY

## 2023-11-26 PROCEDURE — 96372 THER/PROPH/DIAG INJ SC/IM: CPT

## 2023-11-26 PROCEDURE — 2500000001 HC RX 250 WO HCPCS SELF ADMINISTERED DRUGS (ALT 637 FOR MEDICARE OP)

## 2023-11-26 PROCEDURE — 87040 BLOOD CULTURE FOR BACTERIA: CPT

## 2023-11-26 PROCEDURE — 2500000004 HC RX 250 GENERAL PHARMACY W/ HCPCS (ALT 636 FOR OP/ED): Performed by: STUDENT IN AN ORGANIZED HEALTH CARE EDUCATION/TRAINING PROGRAM

## 2023-11-26 PROCEDURE — 82947 ASSAY GLUCOSE BLOOD QUANT: CPT

## 2023-11-26 PROCEDURE — 1100000001 HC PRIVATE ROOM DAILY

## 2023-11-26 PROCEDURE — 99223 1ST HOSP IP/OBS HIGH 75: CPT | Performed by: STUDENT IN AN ORGANIZED HEALTH CARE EDUCATION/TRAINING PROGRAM

## 2023-11-26 PROCEDURE — 99222 1ST HOSP IP/OBS MODERATE 55: CPT | Performed by: INTERNAL MEDICINE

## 2023-11-26 PROCEDURE — 36415 COLL VENOUS BLD VENIPUNCTURE: CPT | Performed by: NURSE PRACTITIONER

## 2023-11-26 PROCEDURE — A4217 STERILE WATER/SALINE, 500 ML: HCPCS

## 2023-11-26 PROCEDURE — 2500000004 HC RX 250 GENERAL PHARMACY W/ HCPCS (ALT 636 FOR OP/ED)

## 2023-11-26 PROCEDURE — 85027 COMPLETE CBC AUTOMATED: CPT | Performed by: NURSE PRACTITIONER

## 2023-11-26 PROCEDURE — 80069 RENAL FUNCTION PANEL: CPT | Performed by: NURSE PRACTITIONER

## 2023-11-26 PROCEDURE — 36415 COLL VENOUS BLD VENIPUNCTURE: CPT

## 2023-11-26 RX ADMIN — HEPARIN SODIUM 5000 UNITS: 5000 INJECTION INTRAVENOUS; SUBCUTANEOUS at 09:22

## 2023-11-26 RX ADMIN — HEPARIN SODIUM 5000 UNITS: 5000 INJECTION INTRAVENOUS; SUBCUTANEOUS at 01:49

## 2023-11-26 RX ADMIN — ACETAMINOPHEN 650 MG: 650 SUPPOSITORY RECTAL at 03:26

## 2023-11-26 RX ADMIN — Medication: at 03:26

## 2023-11-26 RX ADMIN — SODIUM CHLORIDE 75 ML/HR: 9 INJECTION, SOLUTION INTRAVENOUS at 01:51

## 2023-11-26 RX ADMIN — SODIUM CHLORIDE 75 ML/HR: 9 INJECTION, SOLUTION INTRAVENOUS at 16:06

## 2023-11-26 RX ADMIN — LEVETIRACETAM 500 MG: 5 INJECTION INTRAVENOUS at 04:21

## 2023-11-26 RX ADMIN — HYDRALAZINE HYDROCHLORIDE 10 MG: 20 INJECTION INTRAMUSCULAR; INTRAVENOUS at 01:16

## 2023-11-26 RX ADMIN — CEFEPIME 2 G: 1 INJECTION, SOLUTION INTRAVENOUS at 14:45

## 2023-11-26 RX ADMIN — LEVETIRACETAM 500 MG: 5 INJECTION INTRAVENOUS at 18:03

## 2023-11-26 RX ADMIN — HEPARIN SODIUM 5000 UNITS: 5000 INJECTION INTRAVENOUS; SUBCUTANEOUS at 18:04

## 2023-11-26 RX ADMIN — HYDRALAZINE HYDROCHLORIDE 10 MG: 20 INJECTION INTRAMUSCULAR; INTRAVENOUS at 10:04

## 2023-11-26 RX ADMIN — CEFEPIME 2 G: 1 INJECTION, SOLUTION INTRAVENOUS at 02:19

## 2023-11-26 RX ADMIN — VANCOMYCIN HYDROCHLORIDE 1250 MG: 5 INJECTION, POWDER, LYOPHILIZED, FOR SOLUTION INTRAVENOUS at 16:06

## 2023-11-26 RX ADMIN — LABETALOL HYDROCHLORIDE 10 MG: 5 INJECTION INTRAVENOUS at 03:03

## 2023-11-26 ASSESSMENT — COGNITIVE AND FUNCTIONAL STATUS - GENERAL
WALKING IN HOSPITAL ROOM: TOTAL
MOVING TO AND FROM BED TO CHAIR: TOTAL
EATING MEALS: TOTAL
CLIMB 3 TO 5 STEPS WITH RAILING: TOTAL
DRESSING REGULAR UPPER BODY CLOTHING: TOTAL
TURNING FROM BACK TO SIDE WHILE IN FLAT BAD: A LOT
PERSONAL GROOMING: TOTAL
MOBILITY SCORE: 8
HELP NEEDED FOR BATHING: TOTAL
TOILETING: TOTAL
DAILY ACTIVITIY SCORE: 6
MOVING FROM LYING ON BACK TO SITTING ON SIDE OF FLAT BED WITH BEDRAILS: A LOT
STANDING UP FROM CHAIR USING ARMS: TOTAL
DRESSING REGULAR LOWER BODY CLOTHING: TOTAL

## 2023-11-26 ASSESSMENT — PAIN - FUNCTIONAL ASSESSMENT: PAIN_FUNCTIONAL_ASSESSMENT: 0-10

## 2023-11-26 ASSESSMENT — PAIN SCALES - GENERAL: PAINLEVEL_OUTOF10: 0 - NO PAIN

## 2023-11-26 ASSESSMENT — ACTIVITIES OF DAILY LIVING (ADL): LACK_OF_TRANSPORTATION: NO

## 2023-11-26 ASSESSMENT — PAIN SCALES - WONG BAKER: WONGBAKER_NUMERICALRESPONSE: NO HURT

## 2023-11-26 NOTE — CONSULTS
Reason For Consult  Hx of CLL, recommendations for ibrutinib    History Of Present Illness  Haile Ayers is a 83 y.o. male presenting with PMH of CLL, DM2, and HTN presenting to the ED 11/21 from [facility] for neurosurgery evaluation.     Per notes, pt became altered, on initial presentation was aphasic prompting stroke alert.  CT head was obtained that demonstrated an large (2.6 cm) acute on chronic subdural with approximately 1.2 cm of midline shift. No thrombolytics given as a result of imaging. Patient additionally noted to have UTI for which she received Rocephin. Transferred to  for NSGY evaluation. Given significant brain compression and midline shift 2/2 L SDH patient taken to OR 11/22 for emergent SDH evacuation. He is now POD4 s/p L craniotomy for evacuation of SDH. On 11/24 drain was removed he spiked fever on 11/23 ( 101.7) and 11/24 ( 103.1).  Blood culture from 11/24 showed 2/2 Staphylococcus hominis and cefepime /vancomycin were started. He is on Vancomycin and Cefepime. ID consulted today and possibly contaminant but c/f aspiration PNA as primary source, continuing current atbx. Patient has been off ibrutinib since admission and per NSGY plan to hold for 1 month post-op for optimization of wound healing. Hematology is consulted for hx of CLL and recommendations for ibrutinib.      Past Medical History  He has a past medical history of Anemia, Chronic indwelling Mckee catheter, CLL (chronic lymphocytic leukemia) (CMS/HCC), Dementia (CMS/HCC), DM II (diabetes mellitus, type II), controlled (CMS/HCC), HTN (hypertension), Hyperlipidemia, and Leukemia (CMS/HCC).    Surgical History  He has no past surgical history on file.     Social History  He reports that he has never smoked. He has never used smokeless tobacco. No history on file for alcohol use and drug use.    Family History  Family History   Family history unknown: Yes        Allergies  Januvia [sitagliptin]    Review of Systems  Unable to review due  "pt AMS     Physical Exam  GEN: awake, not communicating or following commands  RESP CTAB anteriorly  CV RRR  AB soft , non tender    Mckee catheter in  SKIN L scalp surgical wound c/d/i    Last Recorded Vitals  Blood pressure 163/80, pulse 87, temperature 36.5 °C (97.7 °F), temperature source Temporal, resp. rate 18, height 1.727 m (5' 8\"), weight 69.8 kg (153 lb 14.1 oz), SpO2 96 %.    Relevant Results    Lab Results   Component Value Date    WBC 8.0 11/26/2023    HGB 9.4 (L) 11/26/2023    HCT 30.1 (L) 11/26/2023    MCV 94 11/26/2023     11/26/2023     Lab Results   Component Value Date    CREATININE 0.90 11/25/2023    BUN 24 (H) 11/25/2023     11/25/2023    K 4.0 11/25/2023     (H) 11/25/2023    CO2 21 11/25/2023       CT head 11/21    1. Large predominantly hypodense left convexity subdural hematoma with multiple septations measuring up to 2.6 cm at its greatest width with significant associated mass effect, sulcal effacement, 10-11 mm of rightward midline shift, and effacement of the left lateral ventricle.  2. No evidence of additional hemorrhage or acute cortical infarct.    CT head 11/22   1. Postsurgical changes consistent with interval left frontoparietal craniotomy for left subdural hematoma evacuation and subdural drain placement with interval reduction in size of the mixed density fluid  collection and improvement in mass effect, midline shift, and sulcal effacement when compared to prior, same day CT of the head as described in detail above.  2. No evidence of new hemorrhage or acute cortical infarction.    Assessment/Plan     82 yo male presenting with PMH of CLL, DM2, and HTN presenting to for large L subdural hematoma with  midline shift now POD4 s/p L craniotomy for evacuation of SDH. Hospital course c/b post-op Staph hominis bacteremia. Hematology is consulted for hx of CLL and recommendations for ibrutinib.    Previous records about CLL history unavailable, pt unable to " provide history 2/2 AMS and we were not able to reach family. In general, ibrutinib held sarita-operatively ~7 days prior to and after procedure given bleeding/infection risk. Would continue to hold for now and attempt to communicate with primary hematologist for further guidance specifically given nature of procedure and higher risk for complications.     Recommendations:   -Please attempt to obtain records/contact information from primary hematologist  -Continue holding ibrutinib for now since risks outweighs benefits  -Daily CBC with diff    Discussed with Dr Saunders.     Sanjuana Milan MD  Hematology Oncology fellow, PGY-5  Pager [phone]

## 2023-11-26 NOTE — CARE PLAN
The patient's goals for the shift include      The clinical goals for the shift include Pt's neuro exam will imporve or remain the same & pt will be free from injury    Over the shift, the patient remained neurologically stable and was free from fall/injury.

## 2023-11-26 NOTE — PROGRESS NOTES
Physical Therapy                 Therapy Communication Note    Patient Name: Haile Ayers  MRN: [id number]  Today's Date: 11/26/2023     Discipline: Physical Therapy    Missed Visit Reason: Missed Visit Reason: Patient sleeping (Pt opens eyes to tactile stim, but does not follow any commands or mirror any movements, unable to maintain open eyes.)    Missed Time: Attempt 1146    Comment:

## 2023-11-26 NOTE — PROGRESS NOTES
"Haile Ayers is a 83 y.o. male on day 4 of admission presenting with Subdural hematoma (CMS/HCC).    Subjective   Patient afebrile, no events overnight    Objective     Physical Exam  Eyes opened spontaneous  Aphasic  Spontaneous x 4 > antigravity, squeezes hands    Last Recorded Vitals  Blood pressure 155/74, pulse 87, temperature 37.3 °C (99.1 °F), temperature source Temporal, resp. rate 24, height 1.727 m (5' 8\"), weight 69.8 kg (153 lb 14.1 oz), SpO2 96 %.  Intake/Output last 3 Shifts:  I/O last 3 completed shifts:  In: 1575 (22.6 mL/kg) [I.V.:1125 (16.1 mL/kg); IV Piggyback:450]  Out: 2170 (31.1 mL/kg) [Urine:2170 (0.9 mL/kg/hr)]  Weight: 69.8 kg     Relevant Results  Results for orders placed or performed during the hospital encounter of 11/21/23 (from the past 24 hour(s))   Vancomycin, Trough   Result Value Ref Range    Vancomycin, Trough 8.2 5.0 - 20.0 ug/mL   Renal Function Panel   Result Value Ref Range    Glucose 163 (H) 74 - 99 mg/dL    Sodium 143 136 - 145 mmol/L    Potassium 4.0 3.5 - 5.3 mmol/L    Chloride 113 (H) 98 - 107 mmol/L    Bicarbonate 21 21 - 32 mmol/L    Anion Gap 13 10 - 20 mmol/L    Urea Nitrogen 24 (H) 6 - 23 mg/dL    Creatinine 0.90 0.50 - 1.30 mg/dL    eGFR 84 >60 mL/min/1.73m*2    Calcium 8.0 (L) 8.6 - 10.6 mg/dL    Phosphorus 2.4 (L) 2.5 - 4.9 mg/dL    Albumin 2.7 (L) 3.4 - 5.0 g/dL   CBC   Result Value Ref Range    WBC 8.0 4.4 - 11.3 x10*3/uL    nRBC 0.0 0.0 - 0.0 /100 WBCs    RBC 2.97 (L) 4.50 - 5.90 x10*6/uL    Hemoglobin 8.9 (L) 13.5 - 17.5 g/dL    Hematocrit 26.4 (L) 41.0 - 52.0 %    MCV 89 80 - 100 fL    MCH 30.0 26.0 - 34.0 pg    MCHC 33.7 32.0 - 36.0 g/dL    RDW 14.0 11.5 - 14.5 %    Platelets 175 150 - 450 x10*3/uL   POCT GLUCOSE   Result Value Ref Range    POCT Glucose 152 (H) 74 - 99 mg/dL         Assessment/Plan   Principal Problem:    Subdural hematoma (CMS/HCC)    83 y.o. male h/o CLL, anemia, DM, p/w AMS, found to have UTI, CTH w/ L large chronic SDH, 11/22 s/p L " crani for SDH evac, CTH POC, 11/24 drain dc'd, Bcx +GPC, 11/25 febrile, remaining vitals stable, exam unchanged    PLAN    Floor  Dc EEG as last 3 have been negative  SBP <160  Fu Ucx  Chronic ignacio  SCDs, SQH  Continue vanc and cefepime  Follow up repeat blood cultures  Please exchange PIVs  ID consult for bacteremia given + bcx  Heme consult for restart ibrutinib  SLP- NPO will re-eval Mon   PTOT-rehab  SCD, SQH    Please page Neurosurgery pager [phone]] with any questions or concerns during the day.  Progress note authored by [facility] resident. Epic messages will have delayed responses.    Mikey Barragan MD

## 2023-11-26 NOTE — CONSULTS
Inpatient consult to Infectious Diseases  Consult performed by: Kartik Maloney MD  Consult ordered by: Og Tran MD        Referred by Michelle Chong/Og Tran    Primary MD: No primary care provider on file.    Reason For Consult  PostOp fever     History Of Present Illness  Haile Ayers is a 83 y.o. male presenting with altered mental status  noticed by his wife   has hx of CLL ( on ibrutinib ) , on chronic TMP-SMX prophylaxis ( not sure whether this was because of CLL or prevention of UTI)  , DM, dementia , anemia, chronic indwelling Mckee cathter with BPH. , And hypertension.      He was afebrile with normal white blood cell count.  CT showed large ( 2.6cm)  left subdural hematoma with multiple septations with mass effect without acute infarct.   He had urgent craniotomy on 11/22 with evacuation of subdural hematoma.    He spiked fever on 11/23 ( 101.7) and 11/24 ( 103.1) .  Blood culture from 11/24 showed Staphylococcus hominis and cefepime /vancomycin were started and ID was called.     Reviewing his chart ( he has separate chart under a different medical record of  [id number] )  he was hospitalized at [facility] on 5/31/23  and was treated for urosepsis / proteus bacteremia and was treated with 14 days amoxicillin-clavulanic acid.  He also had multiple urinalysis which all showed pyuria and urine culture on 9/11 showed Achromobacter xylosoxidans > 100k .  His previous CT scan showed bilateral hydronephrosis and constipation and moderate to large ascites           Past Medical History  He has a past medical history of Anemia, Chronic indwelling Mckee catheter, CLL (chronic lymphocytic leukemia) (CMS/HCC), Dementia (CMS/HCC), DM II (diabetes mellitus, type II), controlled (CMS/[facility]), HTN (hypertension), Hyperlipidemia, and Leukemia (CMS/[facility]).    Surgical History  He has no past surgical history on file.     Social History     Occupational History    Not on file   Tobacco Use    Smoking status: Never     Smokeless tobacco: Never   Substance and Sexual Activity    Alcohol use: Not on file    Drug use: Not on file    Sexual activity: Not on file     Travel History   Travel since 10/26/23    No documented travel since 10/26/23            Pets: unknown  Hobbies: unknown    Family History  Family History   Family history unknown: Yes     Allergies  Januvia [sitagliptin]       There is no immunization history on file for this patient.  Medications  Home medications:  Medications Prior to Admission   Medication Sig Dispense Refill Last Dose    acyclovir (Zovirax) 400 mg tablet Take 1 tablet (400 mg) by mouth 2 times a day.   never started- prescribed on 11/10    amLODIPine (Norvasc) 5 mg tablet Take 1 tablet (5 mg) by mouth once daily.   last filled 8/16 for 30 day supply    atorvastatin (Lipitor) 20 mg tablet Take 1 tablet (20 mg) by mouth once daily.   not taking- watches diet; no fill history    cholecalciferol (Vitamin D3) 50 MCG (2000 UT) tablet Take 1 tablet (50 mcg) by mouth once daily.       clotrimazole-betamethasone (Lotrisone) cream Apply 1 Application topically 2 times a day.   never started    ibrutinib (Imbruvica) 140 mg capsule Take 3 capsules (420 mg total) by mouth once daily.   11/21/2023    lisinopril 10 mg tablet Take 1 tablet (10 mg) by mouth once daily.       metFORMIN (Glucophage) 500 mg tablet Take 1 tablet (500 mg) by mouth 2 times a day with meals.        Current medications:  Scheduled medications  atorvastatin, 20 mg, oral, Daily  cefepime, 2 g, intravenous, q12h  cholecalciferol, 4,000 Units, oral, Daily  heparin (porcine), 5,000 Units, subcutaneous, q8h ENEIDA  levETIRAcetam, 500 mg, intravenous, q12h  polyethylene glycol, 17 g, oral, Daily  vancomycin, 1,250 mg, intravenous, q24h      Continuous medications  sodium chloride 0.9%, 75 mL/hr, Last Rate: 75 mL/hr (11/26/23 0151)      PRN medications  PRN medications: acetaminophen, acetaminophen, hydrALAZINE, HYDROmorphone, labetaloL, naloxone,  "ondansetron **OR** ondansetron, oxyCODONE, oxyCODONE    Review of Systems   Cannot obtain much information,  pt is not able to be awaken,    Objective  Range of Vitals (last 24 hours)  Heart Rate:  [77-99]   Temp:  [36.5 °C (97.7 °F)-37.5 °C (99.5 °F)]   Resp:  [16-24]   BP: (148-173)/(67-82)   SpO2:  [96 %-99 %]   Daily Weight  11/24/23 : 69.8 kg (153 lb 14.1 oz)    Body mass index is 23.4 kg/m².     Physical Exam    Either sedated or deep sleep  regular heart sound without murmur , normal heart rate.   clear bilaterally and limited exam due to patient's position but no grossly abnormal lung sound  soft , non tender   Mckee catheter in  Clean surgical wound on left scalp    Relevant Results  Outside Hospital Results  No  Labs  Results from last 72 hours   Lab Units 11/26/23 0828 11/25/23 0641 11/24/23  0000   WBC AUTO x10*3/uL 8.0 8.0 7.3   HEMOGLOBIN g/dL 9.4* 8.9* 9.0*   HEMATOCRIT % 30.1* 26.4* 27.6*   PLATELETS AUTO x10*3/uL 193 175 163     Results from last 72 hours   Lab Units 11/26/23 0828 11/25/23 0641 11/24/23  0000   SODIUM mmol/L 142 143 142   POTASSIUM mmol/L 3.8 4.0 3.4*   CHLORIDE mmol/L 112* 113* 111*   CO2 mmol/L 17* 21 22   BUN mg/dL 23 24* 21   CREATININE mg/dL 0.79 0.90 1.03   GLUCOSE mg/dL 127* 163* 180*   CALCIUM mg/dL 8.0* 8.0* 8.4*   ANION GAP mmol/L 17 13 12   EGFR mL/min/1.73m*2 88 84 72   PHOSPHORUS mg/dL 2.7 2.4* 2.8     Results from last 72 hours   Lab Units 11/26/23 0828 11/25/23  0641 11/24/23  0000   ALBUMIN g/dL 2.6* 2.7* 3.0*     Estimated Creatinine Clearance: 67.3 mL/min (by C-G formula based on SCr of 0.79 mg/dL).  No results found for: \"CRP\", \"SEDRATE\"  No results found for: \"HIV1X2\", \"HIVCONF\", \"CCLBLL7ZQ\"  No results found for: \"HEPCABINIT\", \"HEPCAB\", \"HCVPCRQUANT\"  Microbiology  Susceptibility data from last 90 days.  Collected Specimen Info Organism   11/24/23 Blood culture from Peripheral Venipuncture Staphylococcus hominis   11/24/23 Blood culture from Peripheral " Venipuncture Staphylococcus hominis        5/30/2023 8/11/2023 9/7/2023   Color, Urine YELLOW  SAL  STRAW    Appearance, Urine HAZY  HAZY  CLEAR    Specific Gravity, Urine 1.009  1.029  1.005    pH, Urine 8.0  9.0 (H)  7.0    Protein, Urine 100 (2+) !  >=500 (3+) !  NEGATIVE    Glucose, Urine >=500 (3+) !  NEGATIVE  NEGATIVE    Blood, Urine MODERATE (2+) !  SMALL (1+) !  NEGATIVE    Ketones, Urine NEGATIVE  5 (TRACE) !  NEGATIVE    Bilirubin, Urine NEGATIVE  NEGATIVE  NEGATIVE    Urobilinogen, Urine <2.0  <2.0  <2.0    Nitrite, Urine NEGATIVE  POSITIVE !  NEGATIVE    Leukocyte Esterase, Urine LARGE (3+) !  LARGE (3+) !  MODERATE (2+) !       9/11/2023 9/12/2023   Color, Urine YELLOW  CANCELED    Appearance, Urine HAZY  CANCELED    Specific Gravity, Urine 1.009  CANCELED    pH, Urine 7.0  CANCELED    Protein, Urine 30 (1+) !  CANCELED    Glucose, Urine NEGATIVE  CANCELED    Blood, Urine SMALL (1+) !  CANCELED    Ketones, Urine NEGATIVE  CANCELED    Bilirubin, Urine NEGATIVE  CANCELED    Urobilinogen, Urine <2.0  CANCELED    Nitrite, Urine POSITIVE !  CANCELED    Leukocyte Esterase, Urine LARGE (3+) !  CANCELED      Collected 5/30/2023 22:23       Status: Final result       Visible to patient: No (inaccessible in [facility])    Specimen Information: Peripheral; Blood   Specimen Comment: PERIPHERAL   0 Result Notes      Component    Blood Culture Proteus mirabilis Abnormal     Comment: AEROBIC VIAL POSITIVE   Resulting Agency [facility]        Susceptibility       Proteus mirabilis     GRAM NEG AMBER SUSC GRAM NEG KB SUSC ENTERICS    $$ Ampicillin Susceptible Susceptible    $ Cefazolin  Intermediate    $$ Cefepime  Susceptible     Cefotaxime  Susceptible    $$ Ceftazidime  Susceptible    $ Ciprofloxacin Susceptible Susceptible    $ Gentamicin Susceptible Susceptible    $$$ Levofloxacin Susceptible     $$ Piperacillin/Tazobactam Susceptible Susceptible    $ Trimethoprim/Sulfamethoxazole Susceptible                     Collected 8/11/2023 19:40       Status: Final result       Visible to patient: No (inaccessible in  MyChart)    Specimen Information: Urine   0 Result Notes      Component    Urine Culture Proteus mirabilis Abnormal     Comment: >100,000 CFU/ML   Resulting Agency [facility]        Susceptibility     Proteus mirabilis     GRAM NEG URINE AMBER SUSC    $$ Ampicillin Susceptible    $ Cefazolin Susceptible     Ceftriaxone Susceptible    $ Ciprofloxacin Susceptible    $ Gentamicin Susceptible    $$$ Levofloxacin Susceptible    $ Nitrofurantoin Resistant    $$ Piperacillin/Tazobactam Susceptible    $ Trimethoprim/Sulfamethoxazole Susceptible                       Collected 9/11/2023 12:09       Status: Final result       [facility] patient: No [facility]    Specimen Information: Urine   Specimen Comment: Clean Catch/Voided   0 Result Notes      Component    Urine Culture Achromobacter xylosoxidans Abnormal     Comment: >100,000 CFU/ML   Resulting Agency [facility]        Susceptibility       Achromobacter xylosoxidans/ruhlandii     GRAM NEG URINE AMBER SUSC    $$ Amikacin Resistant    $$ Cefepime Intermediate    $$ Ceftazidime Susceptible    $ Ciprofloxacin Intermediate    $ Gentamicin Resistant    $$$ Levofloxacin Susceptible    $$ Piperacillin/Tazobactam Susceptible    $$ Tetracycline Resistant    $ Tobramycin Resistant                 9/7/23  CT abdomen   LOWER CHEST: Bibasilar scarring. Trace bilateral pleural effusions.  Cardiomegaly. Small hiatal hernia.  BONES: Innumerable sclerotic densities, measuring less than 1 cm, not  significantly changed. Chronic right rib fractures.  ABDOMINAL WALL: Bilateral fat containing inguinal hernias.     ABDOMEN:  Lack of intravenous contrast limits evaluation of vessels and solid  organs.  LIVER: Stable cyst in the left lobe.  BILE DUCTS: No biliary dilatation.  GALLBLADDER: No calcified gallstones. No pericholecystic inflammatory  changes.  PANCREAS: No peripancreatic  inflammatory stranding or duct dilatation.  SPLEEN: Within normal limits.  ADRENALS: Within normal limits.     KIDNEYS, URETERS, URINARY BLADDER: Mild bilateral hydronephrosis,  decreased from 08/11/2023. 2 mm nonobstructing right renal calculus.  No calculus along the course of the ureters. Diffuse urinary bladder  wall thickening, similar to prior.     VESSELS: No aortic aneurysm.  RETROPERITONEUM: No pathologically enlarged lymph nodes.     PELVIS:     REPRODUCTIVE ORGANS: Stable enlarged prostate with coarse  calcifications.     BOWEL: No dilated bowel. Large colonic stool burden. No transition  point to suggest obstruction. The appendix is not seen with  certainty. No pericecal inflammatory changes to suggest acute  appendicitis.  PERITONEUM: Stable moderate to large volume ascites. Stable  reticulated appearance of the omentum which may be secondary to  edema. Soft tissue infiltration not excluded. No organized fluid  collection. No pneumoperitoneum.     IMPRESSION:  Large colonic stool burden. No visualized source of obstruction.     Moderate to large volume ascites, not significantly changed.     Mild bilateral hydronephrosis, decreased from 08/11/2023.    CT head 11/21  Large predominantly hypodense left convexity subdural hematoma  with multiple septations measuring up to 2.6 cm at its greatest width  with significant associated mass effect, sulcal effacement, 10-11 mm  of rightward midline shift, and effacement of the left lateral  ventricle.  2. No evidence of additional hemorrhage or acute cortical infarct.    Estimated Creatinine Clearance: 67.3 mL/min (by C-G formula based on SCr of 0.79 mg/dL).      Chest x-ray 11/23  Bibasilar atelectasis with or without trace bilateral pleural  effusions, left-greater-than-right. Superimposed  infectious/inflammatory infiltrate is not excluded, for example in  the setting of aspiration pneumonitis.      Antibiotic hx   TMP-SMX  - 11/23  Vancomycin 11/24 -    Cefepime 11/24 -     Estimated Creatinine Clearance: 67.3 mL/min (by C-G formula based on SCr of 0.79 mg/dL).    INFECTIOUS SYNOPSIS AND ASSESSMENT  82yo male with hx of chronic urinary retention on Ignacio ,  CLL presented with altered mental status with subdural hematoma s/p evacution on 11/22 . Now has fever that started postop day #1.    Based on clinical scenario and chest x-ray and urine study , most likely source of fever would be aspiration pneumonia vs UTI.      Postop fever         - DDX atelectasis vs aspiration pneumonia vs UTI         - unlikely from surgical wound given the fever spiked right the next day after the surgery    Subdural hematoma ( unclear onset ) .  S/p evacuation  Bacteremia with CoagNeg staphylococcus - probable contamination  Hx of chronic indwelling ignacio   Hx of  bacteremia with  proteus         RECOMMENDATION   CONTINUE vancomycin  with target  - 600  CONTINUE cefepime 2 gram q 12 hrs   Follow up blood culture repeated today         ID will follow in the morning.  The case was discussed with my attending , Dr. Tawanda Phillips who agreed with my assessment and plan.    RYAN SCOTT.  SALOMOND /  ID consult TEAM B  Infectious disease fellow PGY-4  Reach me through [employer] [facility] instead of paging if possible ( especially during noon conference )  Or page me through Team B  [phone]

## 2023-11-26 NOTE — PROGRESS NOTES
11/26/23        Transitional Care Coordination Progress Note:   Patient discussed during interdisciplinary rounds.   Team members present: RN TCC MD   Plan per Medical/Surgical team: SBP <160,Fu Ucx,Chronic ignacio  SCDs, SQH  Continue vanc and cefepime  Follow up repeat blood cultures  Please exchange PIVs  ID consult for bacteremia given + bcx  Heme consult for restart ibrutinib  SLP- NPO will re-eval Mon   PTOT-rehb  Discharge disposition: rehab   Status-Inpatient   Payer- Payor: AET MEDICARE / Plan: [employer] MEDICARE VALUE PLAN / Product Type: *No Product type* /    Potential Barriers: none   ADOD: 11/28/2023   Carlos Last RN [facility] [address] [phone]

## 2023-11-27 ENCOUNTER — APPOINTMENT (OUTPATIENT)
Dept: RADIOLOGY | Facility: HOSPITAL | Age: 83
DRG: 025 | End: 2023-11-27
Payer: MEDICARE

## 2023-11-27 LAB
ALBUMIN SERPL BCP-MCNC: 2.6 G/DL (ref 3.4–5)
ANION GAP SERPL CALC-SCNC: 12 MMOL/L (ref 10–20)
BUN SERPL-MCNC: 21 MG/DL (ref 6–23)
CALCIUM SERPL-MCNC: 7.9 MG/DL (ref 8.6–10.6)
CHLORIDE SERPL-SCNC: 113 MMOL/L (ref 98–107)
CO2 SERPL-SCNC: 20 MMOL/L (ref 21–32)
CREAT SERPL-MCNC: 0.72 MG/DL (ref 0.5–1.3)
ERYTHROCYTE [DISTWIDTH] IN BLOOD BY AUTOMATED COUNT: 14 % (ref 11.5–14.5)
GFR SERPL CREATININE-BSD FRML MDRD: 90 ML/MIN/1.73M*2
GLUCOSE BLD MANUAL STRIP-MCNC: 116 MG/DL (ref 74–99)
GLUCOSE BLD MANUAL STRIP-MCNC: 131 MG/DL (ref 74–99)
GLUCOSE SERPL-MCNC: 112 MG/DL (ref 74–99)
HCT VFR BLD AUTO: 30.5 % (ref 41–52)
HGB BLD-MCNC: 9.8 G/DL (ref 13.5–17.5)
MCH RBC QN AUTO: 29.4 PG (ref 26–34)
MCHC RBC AUTO-ENTMCNC: 32.1 G/DL (ref 32–36)
MCV RBC AUTO: 92 FL (ref 80–100)
NRBC BLD-RTO: 0.3 /100 WBCS (ref 0–0)
PHOSPHATE SERPL-MCNC: 2.9 MG/DL (ref 2.5–4.9)
PLATELET # BLD AUTO: 203 X10*3/UL (ref 150–450)
POTASSIUM SERPL-SCNC: 3.4 MMOL/L (ref 3.5–5.3)
RBC # BLD AUTO: 3.33 X10*6/UL (ref 4.5–5.9)
SODIUM SERPL-SCNC: 142 MMOL/L (ref 136–145)
VANCOMYCIN SERPL-MCNC: 11.7 UG/ML (ref 5–20)
WBC # BLD AUTO: 6.3 X10*3/UL (ref 4.4–11.3)

## 2023-11-27 PROCEDURE — 99232 SBSQ HOSP IP/OBS MODERATE 35: CPT | Performed by: INTERNAL MEDICINE

## 2023-11-27 PROCEDURE — 36415 COLL VENOUS BLD VENIPUNCTURE: CPT | Performed by: NURSE PRACTITIONER

## 2023-11-27 PROCEDURE — 2500000004 HC RX 250 GENERAL PHARMACY W/ HCPCS (ALT 636 FOR OP/ED)

## 2023-11-27 PROCEDURE — 85027 COMPLETE CBC AUTOMATED: CPT | Performed by: NURSE PRACTITIONER

## 2023-11-27 PROCEDURE — 74230 X-RAY XM SWLNG FUNCJ C+: CPT

## 2023-11-27 PROCEDURE — 96372 THER/PROPH/DIAG INJ SC/IM: CPT

## 2023-11-27 PROCEDURE — 80202 ASSAY OF VANCOMYCIN: CPT

## 2023-11-27 PROCEDURE — 2500000004 HC RX 250 GENERAL PHARMACY W/ HCPCS (ALT 636 FOR OP/ED): Performed by: STUDENT IN AN ORGANIZED HEALTH CARE EDUCATION/TRAINING PROGRAM

## 2023-11-27 PROCEDURE — 1100000001 HC PRIVATE ROOM DAILY

## 2023-11-27 PROCEDURE — 92526 ORAL FUNCTION THERAPY: CPT | Mod: GN | Performed by: SPEECH-LANGUAGE PATHOLOGIST

## 2023-11-27 PROCEDURE — 92611 MOTION FLUOROSCOPY/SWALLOW: CPT | Mod: GN | Performed by: SPEECH-LANGUAGE PATHOLOGIST

## 2023-11-27 PROCEDURE — 80069 RENAL FUNCTION PANEL: CPT | Performed by: NURSE PRACTITIONER

## 2023-11-27 PROCEDURE — 97530 THERAPEUTIC ACTIVITIES: CPT | Mod: GP,CQ

## 2023-11-27 PROCEDURE — 94760 N-INVAS EAR/PLS OXIMETRY 1: CPT

## 2023-11-27 PROCEDURE — 74230 X-RAY XM SWLNG FUNCJ C+: CPT | Performed by: STUDENT IN AN ORGANIZED HEALTH CARE EDUCATION/TRAINING PROGRAM

## 2023-11-27 PROCEDURE — 2500000004 HC RX 250 GENERAL PHARMACY W/ HCPCS (ALT 636 FOR OP/ED): Performed by: NURSE PRACTITIONER

## 2023-11-27 PROCEDURE — 99233 SBSQ HOSP IP/OBS HIGH 50: CPT | Performed by: STUDENT IN AN ORGANIZED HEALTH CARE EDUCATION/TRAINING PROGRAM

## 2023-11-27 PROCEDURE — 82947 ASSAY GLUCOSE BLOOD QUANT: CPT

## 2023-11-27 RX ORDER — POTASSIUM CHLORIDE 14.9 MG/ML
20 INJECTION INTRAVENOUS ONCE
Status: COMPLETED | OUTPATIENT
Start: 2023-11-27 | End: 2023-11-27

## 2023-11-27 RX ORDER — LIDOCAINE HYDROCHLORIDE 10 MG/ML
5 INJECTION INFILTRATION; PERINEURAL ONCE
Status: DISCONTINUED | OUTPATIENT
Start: 2023-11-27 | End: 2023-11-28

## 2023-11-27 RX ADMIN — CEFEPIME 2 G: 1 INJECTION, SOLUTION INTRAVENOUS at 03:02

## 2023-11-27 RX ADMIN — POTASSIUM CHLORIDE 20 MEQ: 14.9 INJECTION, SOLUTION INTRAVENOUS at 16:38

## 2023-11-27 RX ADMIN — LABETALOL HYDROCHLORIDE 10 MG: 5 INJECTION INTRAVENOUS at 06:02

## 2023-11-27 RX ADMIN — HEPARIN SODIUM 5000 UNITS: 5000 INJECTION INTRAVENOUS; SUBCUTANEOUS at 01:30

## 2023-11-27 RX ADMIN — VANCOMYCIN HYDROCHLORIDE 1500 MG: 1.5 INJECTION, POWDER, LYOPHILIZED, FOR SOLUTION INTRAVENOUS at 15:33

## 2023-11-27 RX ADMIN — LEVETIRACETAM 500 MG: 5 INJECTION INTRAVENOUS at 16:36

## 2023-11-27 RX ADMIN — HYDRALAZINE HYDROCHLORIDE 10 MG: 20 INJECTION INTRAMUSCULAR; INTRAVENOUS at 05:33

## 2023-11-27 RX ADMIN — HEPARIN SODIUM 5000 UNITS: 5000 INJECTION INTRAVENOUS; SUBCUTANEOUS at 16:36

## 2023-11-27 RX ADMIN — CEFEPIME 2 G: 1 INJECTION, SOLUTION INTRAVENOUS at 14:39

## 2023-11-27 RX ADMIN — HEPARIN SODIUM 5000 UNITS: 5000 INJECTION INTRAVENOUS; SUBCUTANEOUS at 08:14

## 2023-11-27 RX ADMIN — LEVETIRACETAM 500 MG: 5 INJECTION INTRAVENOUS at 04:29

## 2023-11-27 ASSESSMENT — COGNITIVE AND FUNCTIONAL STATUS - GENERAL
MOBILITY SCORE: 11
WALKING IN HOSPITAL ROOM: A LOT
MOVING FROM LYING ON BACK TO SITTING ON SIDE OF FLAT BED WITH BEDRAILS: A LOT
STANDING UP FROM CHAIR USING ARMS: A LOT
CLIMB 3 TO 5 STEPS WITH RAILING: TOTAL
TURNING FROM BACK TO SIDE WHILE IN FLAT BAD: A LOT
MOVING TO AND FROM BED TO CHAIR: A LOT

## 2023-11-27 ASSESSMENT — PAIN SCALES - GENERAL
PAINLEVEL_OUTOF10: 0 - NO PAIN

## 2023-11-27 ASSESSMENT — PAIN - FUNCTIONAL ASSESSMENT
PAIN_FUNCTIONAL_ASSESSMENT: 0-10

## 2023-11-27 NOTE — PROGRESS NOTES
"Vancomycin Dosing by Pharmacy- FOLLOW UP    Haile Ayers is a 83 y.o. year old male who Pharmacy has been consulted for vancomycin dosing for other UTI . Based on the patient's indication and renal status this patient is being dosed based on a goal AUC of 400-600.     Renal function is currently stable.      Current vancomycin dose: 1250 mg given every 24 hours    Estimated vancomycin AUC on current dose: 364 mg/L.hr     Visit Vitals  /71 (BP Location: Left arm, Patient Position: Lying)   Pulse 79   Temp 36.1 °C (97 °F) (Temporal)   Resp 18        Lab Results   Component Value Date    CREATININE 0.72 11/27/2023    CREATININE 0.79 11/26/2023    CREATININE 0.90 11/25/2023    CREATININE 1.03 11/24/2023        Patient weight is No results found for: \"PTWEIGHT\"    Lab Results   Component Value Date    VANCORANDOM 11.7 11/27/2023        I/O last 3 completed shifts:  In: - (0 mL/kg)   Out: 1900 (27.2 mL/kg) [Urine:1900 (0.8 mL/kg/hr)]  Weight: 69.8 kg       No results found for: \"PATIENTTEMP\"     Urine Culture   Date/Time Value Ref Range Status   11/24/2023 05:31 AM No significant growth  Final     Blood Culture   Date/Time Value Ref Range Status   11/26/2023 08:28 AM No growth at 1 day  Preliminary   11/26/2023 08:28 AM No growth at 1 day  Preliminary     Gram Stain   Date/Time Value Ref Range Status   11/24/2023 03:36 PM Gram positive cocci, clusters (AA)  Preliminary     Comment:     Aerobic Bottle Positive   11/24/2023 03:36 PM Gram positive cocci, clusters (AA)  Preliminary     Comment:     Anaerobic Bottle Positive          Assessment/Plan    Below goal AUC. Orders placed for new vancomcyin regimen of 1500 every 24 hours to begin at 16:30 today.     This dosing regimen is predicted by [employer] to result in the following pharmacokinetic parameters:    Loading dose: N/A  Regimen: 1500 mg IV every 24 hours.  Start time: 16:06 on 11/27/2023  Exposure target: AUC24 (range)400-600 mg/L.hr   AUC24,ss: 433 " mg/L.hr  Probability of AUC24 > 400: 67 %  Ctrough,ss: 11.1 mg/L  Probability of Ctrough,ss > 20: 3 %  Probability of nephrotoxicity (Lodise FILIBERTO 2009): 7 %      The next level will be obtained on 11/29 at 1st AM labs. May be obtained sooner if clinically indicated.   Will continue to monitor renal function daily while on vancomycin and order serum creatinine at least every 48 hours if not already ordered.  Follow for continued vancomycin needs, clinical response, and signs/symptoms of toxicity.       Viral Briseno, PharmD, TORSTEN

## 2023-11-27 NOTE — PROGRESS NOTES
"Haile Ayers is a 83 y.o. male on day 5 of admission presenting with Subdural hematoma (CMS/HCC).    Subjective   Patient afebrile, no events overnight    Objective     Physical Exam  Eyes opened spontaneous  Aphasic  Spontaneous x 4 > antigravity, squeezes hands, wiggles toes    Last Recorded Vitals  Blood pressure 149/72, pulse 80, temperature 37 °C (98.6 °F), resp. rate 18, height 1.727 m (5' 8\"), weight 69.8 kg (153 lb 14.1 oz), SpO2 97 %.  Intake/Output last 3 Shifts:  I/O last 3 completed shifts:  In: - (0 mL/kg)   Out: 2325 (33.3 mL/kg) [Urine:2325 (0.9 mL/kg/hr)]  Weight: 69.8 kg     Relevant Results  Results for orders placed or performed during the hospital encounter of 11/21/23 (from the past 24 hour(s))   Renal Function Panel   Result Value Ref Range    Glucose 127 (H) 74 - 99 mg/dL    Sodium 142 136 - 145 mmol/L    Potassium 3.8 3.5 - 5.3 mmol/L    Chloride 112 (H) 98 - 107 mmol/L    Bicarbonate 17 (L) 21 - 32 mmol/L    Anion Gap 17 10 - 20 mmol/L    Urea Nitrogen 23 6 - 23 mg/dL    Creatinine 0.79 0.50 - 1.30 mg/dL    eGFR 88 >60 mL/min/1.73m*2    Calcium 8.0 (L) 8.6 - 10.6 mg/dL    Phosphorus 2.7 2.5 - 4.9 mg/dL    Albumin 2.6 (L) 3.4 - 5.0 g/dL   CBC   Result Value Ref Range    WBC 8.0 4.4 - 11.3 x10*3/uL    nRBC 0.0 0.0 - 0.0 /100 WBCs    RBC 3.20 (L) 4.50 - 5.90 x10*6/uL    Hemoglobin 9.4 (L) 13.5 - 17.5 g/dL    Hematocrit 30.1 (L) 41.0 - 52.0 %    MCV 94 80 - 100 fL    MCH 29.4 26.0 - 34.0 pg    MCHC 31.2 (L) 32.0 - 36.0 g/dL    RDW 13.8 11.5 - 14.5 %    Platelets 193 150 - 450 x10*3/uL   Blood Culture    Specimen: Peripheral Venipuncture; Blood culture   Result Value Ref Range    Blood Culture Loaded on Instrument - Culture in progress    Blood Culture    Specimen: Peripheral Venipuncture; Blood culture   Result Value Ref Range    Blood Culture Loaded on Instrument - Culture in progress    POCT GLUCOSE   Result Value Ref Range    POCT Glucose 123 (H) 74 - 99 mg/dL   POCT GLUCOSE   Result Value " Ref Range    POCT Glucose 126 (H) 74 - 99 mg/dL   POCT GLUCOSE   Result Value Ref Range    POCT Glucose 120 (H) 74 - 99 mg/dL         Assessment/Plan   Principal Problem:    Subdural hematoma (CMS/HCC)    83 y.o. male h/o CLL, anemia, DM, p/w AMS, found to have UTI, CTH w/ L large chronic SDH, 11/22 s/p L crani for SDH evac, CTH POC, 11/24 drain dc'd, Bcx +GPC, 11/25 febrile, remaining vitals stable, exam unchanged    PLAN    Floor  SBP <160  Fu Ucx  Chronic ignacio  SCDs, SQH  Continue vanc and cefepime  BCx  ID recs-cont vanc, cefe  Heme recs-cont to hold ibrutinib until POD7  SLP- NPO, re-eval this AM  PTOT-rehab  SCD, SQH    Please page Neurosurgery pager [[phone]] with any questions or concerns during the day.  Progress note authored by nightfloat resident. Epic messages will have delayed responses.    Lupillo Vazquez MD

## 2023-11-27 NOTE — PROGRESS NOTES
Speech-Language Pathology    Inpatient  Speech-Language Pathology Treatment     Patient Name: Haile Ayers  MRN: [id number]  Today's Date: 11/27/2023  Time Calculation  Start Time: 0905  Stop Time: 0928  Time Calculation (min): 23 min         Current Problem:   1. Subdural hematoma (CMS/HCC)  Case Request Operating Room: Left Craniotomy for subdural hematoma evacuation    Case Request Operating Room: Left Craniotomy for subdural hematoma evacuation    EEG            SLP Assessment:  Re-evaluation of swallow function. Pt alert and following 1 step commands intermittently with help of wife translating. Manipulation of ice chips evident. Tolerates single sips of room temperature water. Strong post prandial  with multiple sips of water. This is suggestive of pharyngeal dysphagia therefore an instrumental is recommended to fully evaluation swallow function prior to any PO recommendations. Discussed results and recommendations with RN, medical team and pt's wife.       Plan:  SLP Frequency: 2x per week  Duration: 3 weeks  SLP Discharge Recommendations: Continue skilled SLP services at the next level of care  Discussed POC: Patient, Caregiver/family, Nursing, Physician  Patient/Caregiver Agreeable: Yes  SLP - OK to Discharge: Yes    Goal:   Pt. will tolerate least restrictive diet without overt s/s of aspiration with 100% accuracy.      Inpatient:  Education Documentation  Extensive education provided to pt and wife re: current swallow function, recommendations/results and dysphagia POC.

## 2023-11-27 NOTE — PROGRESS NOTES
Haile Ayers is a 83 y.o. male on day 5 of admission presenting with Subdural hematoma (CMS/HCC).    Subjective   Interval History: remains afebrile   Follow up blood culture 11/26  no growth so far   Chest x-ray yesterday possible RML RLL infiltrate suggestive of aspiration pneumonia.   Chest x-ray done on 11/25 looks more like CHF.   Pt doesn't have significant cardiac hx . No previous echo data or BNP available.       His wife was at bedside this morning filling up my gap of history    He was found unresponsive below table when she came back from outside on the day of admission,  She saw some vomitus when he was lying down.  He doesn't have cardiac hx other than high blood pressure .   Has chronic ignacio over last 4 - 5 months   changing it every 2 weeks.   Had UTI before.  Also taking tamsulosin which has been causing dizziness without falls.   She was visiting him at hospital yesterday and he was recognizing her and asking her some questions.       Review of Systems  Unable to obtain,     Objective   Range of Vitals (last 24 hours)  Heart Rate:  [75-87]   Temp:  [36.1 °C (97 °F)-37 °C (98.6 °F)]   Resp:  [16-22]   BP: (135-177)/(65-94)   SpO2:  [93 %-99 %]   Daily Weight  11/24/23 : 69.8 kg (153 lb 14.1 oz)    Body mass index is 23.4 kg/m².    Physical Exam  Not able to be waken up  regular heart sound without murmur , normal heart rate.   limited exam due to patient's position but no grossly abnormal lung sound  soft , non tender   Ignacio catheter in and clear looking surgical wound on left scalp      Relevant Results  Labs  Results from last 72 hours   Lab Units 11/26/23  0828 11/25/23  0641   WBC AUTO x10*3/uL 8.0 8.0   HEMOGLOBIN g/dL 9.4* 8.9*   HEMATOCRIT % 30.1* 26.4*   PLATELETS AUTO x10*3/uL 193 175     Results from last 72 hours   Lab Units 11/26/23  0828 11/25/23  0641   SODIUM mmol/L 142 143   POTASSIUM mmol/L 3.8 4.0   CHLORIDE mmol/L 112* 113*   CO2 mmol/L 17* 21   BUN mg/dL 23 24*   CREATININE  "mg/dL 0.79 0.90   GLUCOSE mg/dL 127* 163*   CALCIUM mg/dL 8.0* 8.0*   ANION GAP mmol/L 17 13   EGFR mL/min/1.73m*2 88 84   PHOSPHORUS mg/dL 2.7 2.4*     Results from last 72 hours   Lab Units 11/26/23  0828 11/25/23  0641   ALBUMIN g/dL 2.6* 2.7*     Estimated Creatinine Clearance: 67.3 mL/min (by C-G formula based on SCr of 0.79 mg/dL).  No results found for: \"CRP\"  Microbiology  Susceptibility data from last 14 days.  Collected Specimen Info Organism   11/24/23 Blood culture from Peripheral Venipuncture Staphylococcus hominis   11/24/23 Blood culture from Peripheral Venipuncture Staphylococcus hominis       ollected Specimen Info Organism   11/24/23 Blood culture from Peripheral Venipuncture Staphylococcus hominis   11/24/23 Blood culture from Peripheral Venipuncture Staphylococcus hominis           5/30/2023 8/11/2023 9/7/2023   Color, Urine YELLOW  SAL  STRAW    Appearance, Urine HAZY  HAZY  CLEAR    Specific Gravity, Urine 1.009  1.029  1.005    pH, Urine 8.0  9.0 (H)  7.0    Protein, Urine 100 (2+) !  >=500 (3+) !  NEGATIVE    Glucose, Urine >=500 (3+) !  NEGATIVE  NEGATIVE    Blood, Urine MODERATE (2+) !  SMALL (1+) !  NEGATIVE    Ketones, Urine NEGATIVE  5 (TRACE) !  NEGATIVE    Bilirubin, Urine NEGATIVE  NEGATIVE  NEGATIVE    Urobilinogen, Urine <2.0  <2.0  <2.0    Nitrite, Urine NEGATIVE  POSITIVE !  NEGATIVE    Leukocyte Esterase, Urine LARGE (3+) !  LARGE (3+) !  MODERATE (2+) !         9/11/2023 9/12/2023   Color, Urine YELLOW  CANCELED    Appearance, Urine HAZY  CANCELED    Specific Gravity, Urine 1.009  CANCELED    pH, Urine 7.0  CANCELED    Protein, Urine 30 (1+) !  CANCELED    Glucose, Urine NEGATIVE  CANCELED    Blood, Urine SMALL (1+) !  CANCELED    Ketones, Urine NEGATIVE  CANCELED    Bilirubin, Urine NEGATIVE  CANCELED    Urobilinogen, Urine <2.0  CANCELED    Nitrite, Urine POSITIVE !  CANCELED    Leukocyte Esterase, Urine LARGE (3+) !  CANCELED       Collected 5/30/2023 22:23       Status: " Final result       Visible to patient: No (inaccessible in [facility])    Specimen Information: Peripheral; Blood   Specimen Comment: PERIPHERAL   0 Result Notes        Component     Blood Culture Proteus mirabilis Abnormal      Comment: AEROBIC VIAL POSITIVE   Resulting Agency [facility]           Susceptibility                  Proteus mirabilis       GRAM NEG AMBER [facility] GRAM NEG KB [facility] ENTERICS     $$ Ampicillin Susceptible Susceptible     $ Cefazolin   Intermediate     $$ Cefepime   Susceptible       Cefotaxime   Susceptible     $$ Ceftazidime   Susceptible     $ Ciprofloxacin Susceptible Susceptible     $ Gentamicin Susceptible Susceptible     $$$ Levofloxacin Susceptible       $$ Piperacillin/Tazobactam Susceptible Susceptible     $ Trimethoprim/Sulfamethoxazole Susceptible                           Collected 8/11/2023 19:40       Status: Final result       Visible to patient: No (inaccessible in [facility])    Specimen Information: Urine   0 Result Notes        Component     Urine Culture Proteus mirabilis Abnormal      Comment: >100,000 CFU/ML   Resulting Agency [facility]         Susceptibility              Proteus mirabilis       GRAM NEG URINE AMBER [facility]     $$ Ampicillin Susceptible     $ Cefazolin Susceptible       Ceftriaxone Susceptible     $ Ciprofloxacin Susceptible     $ Gentamicin Susceptible     $$$ Levofloxacin Susceptible     $ Nitrofurantoin Resistant     $$ Piperacillin/Tazobactam Susceptible     $ Trimethoprim/Sulfamethoxazole Susceptible                              Collected 9/11/2023 12:09       Status: Final result       Visible to patient: No (inaccessible in [facility])    Specimen Information: Urine   Specimen Comment: Clean Catch/Voided   0 Result Notes        Component     Urine Culture Achromobacter xylosoxidans Abnormal      Comment: >100,000 CFU/ML   Resulting Agency [facility]         Susceptibility                Achromobacter xylosoxidans/ruhlandii       GRAM NEG URINE AMBER [facility]     $$ Amikacin  Resistant     $$ Cefepime Intermediate     $$ Ceftazidime Susceptible     $ Ciprofloxacin Intermediate     $ Gentamicin Resistant     $$$ Levofloxacin Susceptible     $$ Piperacillin/Tazobactam Susceptible     $$ Tetracycline Resistant     $ Tobramycin Resistant                    9/7/23  CT abdomen   LOWER CHEST: Bibasilar scarring. Trace bilateral pleural effusions.  Cardiomegaly. Small hiatal hernia.  BONES: Innumerable sclerotic densities, measuring less than 1 cm, not  significantly changed. Chronic right rib fractures.  ABDOMINAL WALL: Bilateral fat containing inguinal hernias.     ABDOMEN:  Lack of intravenous contrast limits evaluation of vessels and solid  organs.  LIVER: Stable cyst in the left lobe.  BILE DUCTS: No biliary dilatation.  GALLBLADDER: No calcified gallstones. No pericholecystic inflammatory  changes.  PANCREAS: No peripancreatic inflammatory stranding or duct dilatation.  SPLEEN: Within normal limits.  ADRENALS: Within normal limits.     KIDNEYS, URETERS, URINARY BLADDER: Mild bilateral hydronephrosis,  decreased from 08/11/2023. 2 mm nonobstructing right renal calculus.  No calculus along the course of the ureters. Diffuse urinary bladder  wall thickening, similar to prior.     VESSELS: No aortic aneurysm.  RETROPERITONEUM: No pathologically enlarged lymph nodes.     PELVIS:     REPRODUCTIVE ORGANS: Stable enlarged prostate with coarse  calcifications.     BOWEL: No dilated bowel. Large colonic stool burden. No transition  point to suggest obstruction. The appendix is not seen with  certainty. No pericecal inflammatory changes to suggest acute  appendicitis.  PERITONEUM: Stable moderate to large volume ascites. Stable  reticulated appearance of the omentum which may be secondary to  edema. Soft tissue infiltration not excluded. No organized fluid  collection. No pneumoperitoneum.     IMPRESSION:  Large colonic stool burden. No visualized source of obstruction.     Moderate to large volume  ascites, not significantly changed.     Mild bilateral hydronephrosis, decreased from 08/11/2023.     CT head 11/21  Large predominantly hypodense left convexity subdural hematoma  with multiple septations measuring up to 2.6 cm at its greatest width  with significant associated mass effect, sulcal effacement, 10-11 mm  of rightward midline shift, and effacement of the left lateral  ventricle.  2. No evidence of additional hemorrhage or acute cortical infarct.     Estimated Creatinine Clearance: 67.3 mL/min (by C-G formula based on SCr of 0.79 mg/dL).        Chest x-ray 11/23  Bibasilar atelectasis with or without trace bilateral pleural  effusions, left-greater-than-right. Superimposed  infectious/inflammatory infiltrate is not excluded, for example in  the setting of aspiration pneumonitis.      chest x-ray 11/26  LUNGS:  Again noted are prominent pulmonary vascular interstitial markings  bilaterally. There are areas of patchy airspace opacities most  prominently in the right mid to lower lung. There is blunting of the  bilateral costophrenic angles. No evidence of pneumothorax bilaterally      ABDOMEN:  No remarkable upper abdominal findings.      BONES:  There is diffuse osteopenia. Degenerative changes of the bilateral  glenohumeral and acromioclavicular joints.      IMPRESSION:  1. Redemonstration of findings suggestive of pulmonary edema with  areas of patchy airspace opacities throughout the right lung which  are nonspecific and may represent multifocal pneumonia. Blunting of  the bilateral costophrenic angles may represent small pleural  effusions versus atelectasis.        Micro  11/24 urine culture no growth  11/24  blood culture   staphylococcus hominis  2/4  11/26  blood culture no growth         Antibiotic hx   TMP-SMX  - 11/23  Vancomycin 11/24 -   Cefepime 11/24 -      Estimated Creatinine Clearance: 67.3 mL/min (by C-G formula based on SCr of 0.79 mg/dL).     INFECTIOUS SYNOPSIS AND ASSESSMENT  84yo  male with hx of chronic urinary retention on chronic Ignacio ,  CLL ( well controlled with ibarutinib)  presented with altered mental status with subdural hematoma s/p evacution on 11/22 . Now has fever that started postop day #1.    Based on clinical scenario and chest x-ray and urine study , most likely source of fever would be aspiration pneumonia       Postop fever         - probable source of fever : aspiration pneumonia although UTI is still possible           Subdural hematoma ( unclear onset ) .  S/p evacuation  Bacteremia with CoagNeg staphylococcus - possible  contamination: but we will still treat with vancomycin   Hx of chronic indwelling ignacio   Hx of  bacteremia with  proteus        RECOMMENDATION  CONTINUE vancomycin with AUC target 400 - 600  CONTINUE cefepime  2 gram q 12 hours  Probable duration of vancomycin and cefepime : for 7 more days ( tentative end date : 12/4/23 )       This case was discussed with my attending, Dr. Tawanda Phillips, who agreed with my assessment and plan.     RYAN SCOTT.  M.D /  ID consult TEAM B  Infectious disease fellow PGY-4  Reach me through [employer] instead of paging if possible ( especially during noon conference )  Or page me through Team B  [phone]

## 2023-11-27 NOTE — PROGRESS NOTES
Physical Therapy    Physical Therapy Treatment    Patient Name: Haile Ayers  MRN: [id number]  Today's Date: 11/27/2023  Time Calculation  Start Time: 1007  Stop Time: 1030  Time Calculation (min): 23 min       Assessment/Plan   PT Assessment  PT Assessment Results: Decreased strength, Decreased endurance, Impaired balance, Decreased mobility  Rehab Prognosis: Good  Evaluation/Treatment Tolerance: Patient tolerated treatment well  Medical Staff Made Aware: Yes  End of Session Communication: Bedside nurse  End of Session Patient Position: Bed, 4 rail up, Alarm on (NV soft wrist restraints)     PT Plan  Treatment/Interventions: Bed mobility, Transfer training, Gait training, Stair training, Balance training, Neuromuscular re-education, Strengthening, Endurance training, Range of motion, Therapeutic exercise, Therapeutic activity, Home exercise program, Orthotic fitting/training, Positioning, Postural re-education  PT Plan: Skilled PT  PT Frequency: 5 times per week  PT Discharge Recommendations: High intensity level of continued care  Equipment Recommended upon Discharge:  (TBD; Anticipate wheeled walker)  PT Recommended Transfer Status: Assist x2  PT - OK to Discharge: Yes (PT evaluation has been completed and discharge recommendations have been made.)      General Visit Information:   PT  Visit  PT Received On: 11/27/23  Response to Previous Treatment: Patient unable to report, no changes reported from family or staff  General  Family/Caregiver Present: Yes  Caregiver Feedback: Pt's wife and daughter present, very pleased with pt participation this date.  Prior to Session Communication: Bedside nurse  Patient Position Received: Bed, 4 rail up (NV soft wrist restraints)  General Comment: Pt is more alert, able to engage with therapist. Reports no pain, wants to eat.    Subjective   Precautions:     Vital Signs:       Objective   Pain:  Pain Assessment  Pain Assessment: 0-10  Pain Score: 0 - No pain    Treatments:        Therapeutic Activity  Therapeutic Activity Performed: Yes  Therapeutic Activity 1: Sitting EOB with B feet on floor, pt able to engage in alt LE LAQs 3x/LE, then frequently repeating LAQs throughout session.    Balance/Neuromuscular Re-Education  Balance/Neuromuscular Re-Education Activity Performed: Yes  Balance/Neuromuscular Re-Education Activity 1: Sitting EOB ~10min with min/modA progressing to CGA and cues for COG over HOMA, pt leaning and reaching to tap therapist's hands 5x/UE with max verbal, visual and tactile cues. Sitting EOB without UE support while donning clean gown with CGA and cues.    Bed Mobility  Bed Mobility: Yes  Bed Mobility 1  Bed Mobility 1: Supine to sitting  Level of Assistance 1: Moderate assistance, Maximum verbal cues, Maximum tactile cues (max visual cues)  Bed Mobility Comments 1: HOB ~30*  Bed Mobility 2  Bed Mobility  2: Sitting to supine  Level of Assistance 2: Minimum assistance, Moderate verbal cues, Moderate tactile cues  Bed Mobility 3  Bed Mobility 3: Scooting  Level of Assistance 3: Dependent (x2)    Ambulation/Gait Training  Ambulation/Gait Training Performed: Yes  Ambulation/Gait Training 1  Surface 1: Level tile  Device 1:  (B HHA)  Assistance 1: Moderate assistance (x2)  Comments/Distance (ft) 1: 2 small side steps with max verbal, visual and tactile cues, modAx2 for balance  Transfers  Transfer: Yes  Transfer 1  Transfer From 1: Sit to, Stand to  Transfer to 1: Sit  Technique 1: Sit to stand, Stand to sit  Transfer Level of Assistance 1:  (min/modAx2)  Trials/Comments 1: from EOB, does not need use of momentum or rocking    Outcome Measures:       [facility] Basic Mobility  Turning from your back to your side while in a flat bed without using bedrails: A lot  Moving from lying on your back to sitting on the side of a flat bed without using bedrails: A lot  Moving to and from bed to chair (including a wheelchair): A lot  Standing up from a chair using your arms (e.g.  wheelchair or bedside chair): A lot  To walk in hospital room: A lot  Climbing 3-5 steps with railing: Total  Basic Mobility - Total Score: 11       FSS-ICU  Ambulation: Walks <50 feet with any assistance x1 or walks any distance with assistance x2 people  Rolling: Maximal assistance (performs 25% - 49% of task)  Sitting: Moderate assistance (performs 50 - 74% of task)  Transfer Sit-to-Stand: Total assistance (performs 25% or requires another person)  Transfer Supine-to-Sit: Moderate assistance (performs 50 - 74% of task)  Total Score: 10      Education Documentation  Precautions, taught by Akosua Torres PTA at 11/27/2023 11:18 AM.  Learner: Family  Readiness: Acceptance  Method: Explanation, Demonstration  Response: Needs Reinforcement    Mobility Training, taught by Akosua Torres PTA at 11/27/2023 11:18 AM.  Learner: Family  Readiness: Acceptance  Method: Explanation, Demonstration  Response: Needs Reinforcement    Education Comments  No comments found.        OP EDUCATION:       Encounter Problems       Encounter Problems (Active)       Balance       Patient will stand with UE support of LRD and </= MOD A x1 at least 3 min to improve balance required for self-care tasks.  (Progressing)       Start:  11/22/23    Expected End:  12/06/23               Mobility       Patient will ambulate at least 20 ft. with </= MOD A x1 and LRD to improve tolerance of household distances.  (Progressing)       Start:  11/22/23    Expected End:  12/06/23            Patient will perform bed mobility with </= MOD A x1 to reduce risk of developing decubitus ulcers.  (Progressing)       Start:  11/22/23    Expected End:  12/06/23            Patient will perform home exercise program as prescribed with cues as needed.   (Progressing)       Start:  11/22/23    Expected End:  12/06/23               Pain - Adult          Transfers       Patient will perform sit to stand and stand to sit transfers with </= MAX A x1 and LRD to  increase functional strength.  (Progressing)       Start:  11/22/23    Expected End:  12/06/23                 PRADEEP Amaya

## 2023-11-27 NOTE — PROCEDURES
"Speech-Language Pathology    SLP Inpatient MBSS Evaluation    Patient Name: Haile Ayers  MRN: [id number]  Today's Date: 11/27/2023   Time Calculation  Start Time: 1150  Stop Time: 1210  Time Calculation (min): 20 min         Current Problem:   1. Subdural hematoma (CMS/HCC)  Case Request Operating Room: Left Craniotomy for subdural hematoma evacuation    Case Request Operating Room: Left Craniotomy for subdural hematoma evacuation    EEG            Recommendations/Treatment:  Level 6 with mildly thick liquids     -Single sips from cup, NO straws     - Alternate liquids and solids     - Remain upright at 90 degrees during meals and 30-45 min after meals.     - Crush meds in puree, then sip of mildly thick liquids.         Assessment/Impression:   MBSS completed. Pt given thin, mildly/moderately thick liquids, puree and solid textures. Mild oral phase dysphagia marked by increase time to masticate solids and lingual pumping with puree and solids to maneuver each bolus posteriorly. Mild to moderate dysphagia marked by decrease base of tongue retraction, posterior pharyngeal wall stripping, reduced laryngeal elevation/anterior shift and decrease epiglottic inversion. This resulted in post swallow residue within the valleculae, increased with heavier viscosities. Min post swallow residue within the piriforms, mostly with liquid consistencies. Mistiming of laryngeal vestibule led to min penetration of mildly thick liquids within the upper laryngeal vestibule. Aspiration to the inferior aspect of the TVF with thin liquids. Alternating mildly thick liquids and solids aids in clearance of approximately 90-95% of valleculae residue. Pt ready for a modified diet utilizing safe swallow strategies.     Full detailed SLP/Radiologist Modified Barium Swallow study report can be found under Chart Review tab, Imaging tab and  titled \"FL Modified Barium Swallow Study\"       Prognosis:   Good      Plan:  SLP Plan: Skilled SLP  SLP " Frequency: 2x per week  Duration: 3 weeks  SLP Discharge Recommendations: Continue skilled SLP services at the next level of care  Discussed POC: Patient, Caregiver/family, Nursing, Physician  Patient/Caregiver Agreeable: Yes  SLP - OK to Discharge: Yes    Goal:   Pt. will tolerate least restrictive diet without overt s/s of aspiration with 100% accuracy.  Pt will utilized safe swallow strategies with min verbal cueing during meals.     Pain:  Pain scale 1-10  Pain level : 0      Inpatient:  Education Documentation  Extensive education provided to pt and wife re: current swallow function, recommendations/results and dysphagia POC.

## 2023-11-27 NOTE — PROGRESS NOTES
"Haile Ayers is a 83 y.o. male on day 5 of admission presenting with Subdural hematoma (CMS/HCC).    Subjective   No acute events, daughter at bedside and reports pt having delusions and hallucinations this AM. She is unaware of his hx of CLL, Hematologist and hospital that he received care.     Objective     GEN: awake, not communicating or following commands  RESP CTAB anteriorly  CV RRR  AB soft , non tender    Mckee catheter in  SKIN L scalp surgical wound c/d/I    Last Recorded Vitals  Blood pressure 154/76, pulse 83, temperature 36 °C (96.8 °F), temperature source Temporal, resp. rate 18, height 1.727 m (5' 8\"), weight 69.8 kg (153 lb 14.1 oz), SpO2 95 %.  Intake/Output last 3 Shifts:  I/O last 3 completed shifts:  In: - (0 mL/kg)   Out: 1900 (27.2 mL/kg) [Urine:1900 (0.8 mL/kg/hr)]  Weight: 69.8 kg     Relevant Results  Lab Results   Component Value Date    WBC 6.3 11/27/2023    HGB 9.8 (L) 11/27/2023    HCT 30.5 (L) 11/27/2023    MCV 92 11/27/2023     11/27/2023     CT head 11/21     1. Large predominantly hypodense left convexity subdural hematoma with multiple septations measuring up to 2.6 cm at its greatest width with significant associated mass effect, sulcal effacement, 10-11 mm of rightward midline shift, and effacement of the left lateral ventricle.  2. No evidence of additional hemorrhage or acute cortical infarct.     CT head 11/22   1. Postsurgical changes consistent with interval left frontoparietal craniotomy for left subdural hematoma evacuation and subdural drain placement with interval reduction in size of the mixed density fluid  collection and improvement in mass effect, midline shift, and sulcal effacement when compared to prior, same day CT of the head as described in detail above.  2. No evidence of new hemorrhage or acute cortical infarction.    Assessment/Plan   Principal Problem:    Subdural hematoma (CMS/HCC)    84 yo male presenting with PMH of CLL, DM2, and HTN presenting " to for large L subdural hematoma with  midline shift now POD4 s/p L craniotomy for evacuation of SDH. Hospital course c/b post-op Staph hominis bacteremia. Hematology is consulted for hx of CLL and recommendations for ibrutinib.     Previous records about CLL history unavailable, pt unable to provide history 2/2 AMS and we were not able to reach family. In general, ibrutinib held sarita-operatively ~7 days prior to and after procedure given bleeding/infection risk. Would continue to hold for now and attempt to communicate with primary hematologist for further guidance given nature of procedure and higher risk for complications. Patient also on atbx for bacteremia, tentative stop date 12/4, so likely would continue holding until infx treated.      Recommendations:   -Please attempt to obtain records/contact information from primary hematologist. We attempted several times to speak to pt's wife with no success   -Continue holding ibrutinib for now since risks outweighs benefits. Will prob hold for total 14 days post op. If a longer period needed from NSGY standpoint, please let us know  -Daily CBC with diff     Discussed and seen with Dr Saunders.      Sanjuana Milan MD  Hematology Oncology fellow, PGY-5  Pager [phone]

## 2023-11-28 LAB
ALBUMIN SERPL BCP-MCNC: 2.7 G/DL (ref 3.4–5)
ANION GAP SERPL CALC-SCNC: 11 MMOL/L (ref 10–20)
BUN SERPL-MCNC: 20 MG/DL (ref 6–23)
CALCIUM SERPL-MCNC: 7.9 MG/DL (ref 8.6–10.6)
CHLORIDE SERPL-SCNC: 113 MMOL/L (ref 98–107)
CO2 SERPL-SCNC: 21 MMOL/L (ref 21–32)
CREAT SERPL-MCNC: 0.78 MG/DL (ref 0.5–1.3)
ERYTHROCYTE [DISTWIDTH] IN BLOOD BY AUTOMATED COUNT: 13.7 % (ref 11.5–14.5)
GFR SERPL CREATININE-BSD FRML MDRD: 88 ML/MIN/1.73M*2
GLUCOSE BLD MANUAL STRIP-MCNC: 243 MG/DL (ref 74–99)
GLUCOSE SERPL-MCNC: 167 MG/DL (ref 74–99)
HCT VFR BLD AUTO: 29.6 % (ref 41–52)
HGB BLD-MCNC: 9.7 G/DL (ref 13.5–17.5)
MCH RBC QN AUTO: 28.8 PG (ref 26–34)
MCHC RBC AUTO-ENTMCNC: 32.8 G/DL (ref 32–36)
MCV RBC AUTO: 88 FL (ref 80–100)
NRBC BLD-RTO: 0 /100 WBCS (ref 0–0)
PHOSPHATE SERPL-MCNC: 2.2 MG/DL (ref 2.5–4.9)
PLATELET # BLD AUTO: 208 X10*3/UL (ref 150–450)
POTASSIUM SERPL-SCNC: 3.3 MMOL/L (ref 3.5–5.3)
RBC # BLD AUTO: 3.37 X10*6/UL (ref 4.5–5.9)
SODIUM SERPL-SCNC: 142 MMOL/L (ref 136–145)
WBC # BLD AUTO: 6.6 X10*3/UL (ref 4.4–11.3)

## 2023-11-28 PROCEDURE — 2500000004 HC RX 250 GENERAL PHARMACY W/ HCPCS (ALT 636 FOR OP/ED): Performed by: STUDENT IN AN ORGANIZED HEALTH CARE EDUCATION/TRAINING PROGRAM

## 2023-11-28 PROCEDURE — 96372 THER/PROPH/DIAG INJ SC/IM: CPT

## 2023-11-28 PROCEDURE — 2500000004 HC RX 250 GENERAL PHARMACY W/ HCPCS (ALT 636 FOR OP/ED)

## 2023-11-28 PROCEDURE — 97530 THERAPEUTIC ACTIVITIES: CPT | Mod: GP,CQ

## 2023-11-28 PROCEDURE — 82947 ASSAY GLUCOSE BLOOD QUANT: CPT

## 2023-11-28 PROCEDURE — 36415 COLL VENOUS BLD VENIPUNCTURE: CPT | Performed by: NURSE PRACTITIONER

## 2023-11-28 PROCEDURE — 2780000003 HC OR 278 NO HCPCS

## 2023-11-28 PROCEDURE — 36415 COLL VENOUS BLD VENIPUNCTURE: CPT

## 2023-11-28 PROCEDURE — 70371 SPEECH EVALUATION COMPLEX: CPT | Performed by: SPEECH-LANGUAGE PATHOLOGIST

## 2023-11-28 PROCEDURE — 1100000001 HC PRIVATE ROOM DAILY

## 2023-11-28 PROCEDURE — C1751 CATH, INF, PER/CENT/MIDLINE: HCPCS

## 2023-11-28 PROCEDURE — 92526 ORAL FUNCTION THERAPY: CPT | Mod: GN | Performed by: SPEECH-LANGUAGE PATHOLOGIST

## 2023-11-28 PROCEDURE — 80069 RENAL FUNCTION PANEL: CPT | Performed by: NURSE PRACTITIONER

## 2023-11-28 PROCEDURE — 2500000001 HC RX 250 WO HCPCS SELF ADMINISTERED DRUGS (ALT 637 FOR MEDICARE OP): Performed by: STUDENT IN AN ORGANIZED HEALTH CARE EDUCATION/TRAINING PROGRAM

## 2023-11-28 PROCEDURE — 85027 COMPLETE CBC AUTOMATED: CPT

## 2023-11-28 PROCEDURE — 99232 SBSQ HOSP IP/OBS MODERATE 35: CPT | Performed by: INTERNAL MEDICINE

## 2023-11-28 RX ORDER — LIDOCAINE HYDROCHLORIDE 10 MG/ML
5 INJECTION INFILTRATION; PERINEURAL ONCE
Status: DISCONTINUED | OUTPATIENT
Start: 2023-11-28 | End: 2023-12-04

## 2023-11-28 RX ORDER — POTASSIUM CHLORIDE 14.9 MG/ML
20 INJECTION INTRAVENOUS
Status: COMPLETED | OUTPATIENT
Start: 2023-11-28 | End: 2023-11-28

## 2023-11-28 RX ADMIN — Medication 4000 UNITS: at 08:26

## 2023-11-28 RX ADMIN — POLYETHYLENE GLYCOL 3350 17 G: 17 POWDER, FOR SOLUTION ORAL at 08:26

## 2023-11-28 RX ADMIN — CEFEPIME 2 G: 1 INJECTION, SOLUTION INTRAVENOUS at 13:35

## 2023-11-28 RX ADMIN — ATORVASTATIN CALCIUM 20 MG: 20 TABLET, FILM COATED ORAL at 08:26

## 2023-11-28 RX ADMIN — CEFEPIME 2 G: 1 INJECTION, SOLUTION INTRAVENOUS at 02:22

## 2023-11-28 RX ADMIN — VANCOMYCIN HYDROCHLORIDE 1500 MG: 1.5 INJECTION, POWDER, LYOPHILIZED, FOR SOLUTION INTRAVENOUS at 20:27

## 2023-11-28 RX ADMIN — HYDRALAZINE HYDROCHLORIDE 10 MG: 20 INJECTION INTRAMUSCULAR; INTRAVENOUS at 16:40

## 2023-11-28 RX ADMIN — POTASSIUM CHLORIDE 20 MEQ: 14.9 INJECTION, SOLUTION INTRAVENOUS at 11:57

## 2023-11-28 RX ADMIN — HEPARIN SODIUM 5000 UNITS: 5000 INJECTION INTRAVENOUS; SUBCUTANEOUS at 08:26

## 2023-11-28 RX ADMIN — HEPARIN SODIUM 5000 UNITS: 5000 INJECTION INTRAVENOUS; SUBCUTANEOUS at 15:00

## 2023-11-28 RX ADMIN — HEPARIN SODIUM 5000 UNITS: 5000 INJECTION INTRAVENOUS; SUBCUTANEOUS at 01:33

## 2023-11-28 RX ADMIN — HYDRALAZINE HYDROCHLORIDE 10 MG: 20 INJECTION INTRAMUSCULAR; INTRAVENOUS at 01:37

## 2023-11-28 RX ADMIN — LEVETIRACETAM 500 MG: 5 INJECTION INTRAVENOUS at 04:21

## 2023-11-28 RX ADMIN — POTASSIUM CHLORIDE 20 MEQ: 14.9 INJECTION, SOLUTION INTRAVENOUS at 14:51

## 2023-11-28 RX ADMIN — LABETALOL HYDROCHLORIDE 10 MG: 5 INJECTION INTRAVENOUS at 04:31

## 2023-11-28 RX ADMIN — LEVETIRACETAM 500 MG: 5 INJECTION INTRAVENOUS at 15:00

## 2023-11-28 ASSESSMENT — COGNITIVE AND FUNCTIONAL STATUS - GENERAL
TURNING FROM BACK TO SIDE WHILE IN FLAT BAD: A LOT
MOVING FROM LYING ON BACK TO SITTING ON SIDE OF FLAT BED WITH BEDRAILS: A LOT
MOVING TO AND FROM BED TO CHAIR: A LOT
MOBILITY SCORE: 11
MOVING TO AND FROM BED TO CHAIR: A LOT
STANDING UP FROM CHAIR USING ARMS: A LOT
MOVING FROM LYING ON BACK TO SITTING ON SIDE OF FLAT BED WITH BEDRAILS: A LOT
MOBILITY SCORE: 11
CLIMB 3 TO 5 STEPS WITH RAILING: TOTAL
STANDING UP FROM CHAIR USING ARMS: A LOT
TURNING FROM BACK TO SIDE WHILE IN FLAT BAD: A LOT
WALKING IN HOSPITAL ROOM: A LOT
CLIMB 3 TO 5 STEPS WITH RAILING: TOTAL
WALKING IN HOSPITAL ROOM: A LOT

## 2023-11-28 ASSESSMENT — PAIN SCALES - GENERAL
PAINLEVEL_OUTOF10: 0 - NO PAIN

## 2023-11-28 ASSESSMENT — PAIN - FUNCTIONAL ASSESSMENT
PAIN_FUNCTIONAL_ASSESSMENT: 0-10

## 2023-11-28 NOTE — PROGRESS NOTES
"Kartik Maloney MD    Consults    SUBJECTIVE   no significant change over last 24 hours and More awake . Follows command and speak to his wife and moves extremities           ROS  No fever or chill or cough      PHYSICAL EXAM  /81 (BP Location: Left arm, Patient Position: Lying)   Pulse 77   Temp 36.1 °C (97 °F) (Temporal)   Resp 18   Ht 1.727 m (5' 8\")   Wt 69.8 kg (153 lb 14.1 oz)   SpO2 96%   BMI 23.40 kg/m²   not in acute distress, alert , well appearing   regular heart sound without murmur , normal heart rate.   clear bilaterally  soft , non tender   Mckee catheter in       Range of Vitals (last 24 hours)  Heart Rate:  [73-83]   Temp:  [36.1 °C (97 °F)-37.2 °C (99 °F)]   Resp:  [18-22]   BP: (150-182)/(70-86)   SpO2:  [95 %-96 %]     Temp Readings from Last 10 Encounters:   11/28/23 36.1 °C (97 °F) (Temporal)   11/21/23 37.7 °C (99.9 °F)     Lab Results   Component Value Date    WBC 6.6 11/28/2023    HGB 9.7 (L) 11/28/2023    HCT 29.6 (L) 11/28/2023    MCV 88 11/28/2023     11/28/2023       Relevant Results  Results from last 72 hours   Lab Units 11/28/23  0848   WBC AUTO x10*3/uL 6.6   HEMOGLOBIN g/dL 9.7*   HEMATOCRIT % 29.6*   PLATELETS AUTO x10*3/uL 208     Results from last 72 hours   Lab Units 11/28/23  0848   SODIUM mmol/L 142   POTASSIUM mmol/L 3.3*   CHLORIDE mmol/L 113*   CO2 mmol/L 21   BUN mg/dL 20   CREATININE mg/dL 0.78   GLUCOSE mg/dL 167*   CALCIUM mg/dL 7.9*   ANION GAP mmol/L 11   EGFR mL/min/1.73m*2 88     Results from last 72 hours   Lab Units 11/28/23  0848   ALBUMIN g/dL 2.7*     No results found for: \"CRP\", \"SEDRATE\"    Cultures  Susceptibility data from last 90 days.  Collected Specimen Info Organism Clindamycin Erythromycin Oxacillin Tetracycline Trimethoprim/Sulfamethoxazole Vancomycin   11/24/23 Blood culture from Peripheral Venipuncture Staphylococcus hominis R R R S S S   11/24/23 Blood culture from Peripheral Venipuncture Staphylococcus hominis              Estimated " Creatinine Clearance: 68.2 mL/min (by C-G formula based on SCr of 0.78 mg/dL).    Temp Readings from Last 10 Encounters:   11/28/23 36.1 °C (97 °F) (Temporal)   11/21/23 37.7 °C (99.9 °F)   9/7/23  CT abdomen   LOWER CHEST: Bibasilar scarring. Trace bilateral pleural effusions.  Cardiomegaly. Small hiatal hernia.  BONES: Innumerable sclerotic densities, measuring less than 1 cm, not  significantly changed. Chronic right rib fractures.  ABDOMINAL WALL: Bilateral fat containing inguinal hernias.     ABDOMEN:  Lack of intravenous contrast limits evaluation of vessels and solid  organs.  LIVER: Stable cyst in the left lobe.  BILE DUCTS: No biliary dilatation.  GALLBLADDER: No calcified gallstones. No pericholecystic inflammatory  changes.  PANCREAS: No peripancreatic inflammatory stranding or duct dilatation.  SPLEEN: Within normal limits.  ADRENALS: Within normal limits.     KIDNEYS, URETERS, URINARY BLADDER: Mild bilateral hydronephrosis,  decreased from 08/11/2023. 2 mm nonobstructing right renal calculus.  No calculus along the course of the ureters. Diffuse urinary bladder  wall thickening, similar to prior.     VESSELS: No aortic aneurysm.  RETROPERITONEUM: No pathologically enlarged lymph nodes.     PELVIS:     REPRODUCTIVE ORGANS: Stable enlarged prostate with coarse  calcifications.     BOWEL: No dilated bowel. Large colonic stool burden. No transition  point to suggest obstruction. The appendix is not seen with  certainty. No pericecal inflammatory changes to suggest acute  appendicitis.  PERITONEUM: Stable moderate to large volume ascites. Stable  reticulated appearance of the omentum which may be secondary to  edema. Soft tissue infiltration not excluded. No organized fluid  collection. No pneumoperitoneum.     IMPRESSION:  Large colonic stool burden. No visualized source of obstruction.     Moderate to large volume ascites, not significantly changed.     Mild bilateral hydronephrosis, decreased from  08/11/2023.     CT head 11/21  Large predominantly hypodense left convexity subdural hematoma  with multiple septations measuring up to 2.6 cm at its greatest width  with significant associated mass effect, sulcal effacement, 10-11 mm  of rightward midline shift, and effacement of the left lateral  ventricle.  2. No evidence of additional hemorrhage or acute cortical infarct.     Estimated Creatinine Clearance: 67.3 mL/min (by C-G formula based on SCr of 0.79 mg/dL).        Chest x-ray 11/23  Bibasilar atelectasis with or without trace bilateral pleural  effusions, left-greater-than-right. Superimposed  infectious/inflammatory infiltrate is not excluded, for example in  the setting of aspiration pneumonitis.      chest x-ray 11/26  LUNGS:  Again noted are prominent pulmonary vascular interstitial markings  bilaterally. There are areas of patchy airspace opacities most  prominently in the right mid to lower lung. There is blunting of the  bilateral costophrenic angles. No evidence of pneumothorax bilaterally      ABDOMEN:  No remarkable upper abdominal findings.      BONES:  There is diffuse osteopenia. Degenerative changes of the bilateral  glenohumeral and acromioclavicular joints.        IMPRESSION:  1. Redemonstration of findings suggestive of pulmonary edema with  areas of patchy airspace opacities throughout the right lung which  are nonspecific and may represent multifocal pneumonia. Blunting of  the bilateral costophrenic angles may represent small pleural  effusions versus atelectasis.          Micro:   11/24 urine culture no growth  11/24  blood culture   staphylococcus hominis  2/4 11/26  blood culture no growth     Antibiotic history:    TMP-SMX  - 11/23  Vancomycin 11/24 -   Cefepime 11/24 -        INFECTIOUS SYNOPSIS AND ASSESSMENT  84yo male with hx of chronic urinary retention on chronic Mckee ,  CLL ( well controlled with ibarutinib)  presented with altered mental status with subdural hematoma s/p  evacution on 11/22 . Now has fever that started postop day #1.    Based on clinical scenario and chest x-ray and urine study , most likely source of fever would be aspiration pneumonia       Postop fever         - probable source of fever : aspiration pneumonia although UTI is still possible           Subdural hematoma ( unclear onset ) .  S/p evacuation  Bacteremia with CoagNeg staphylococcus - possible  contamination: but we will still treat with vancomycin   Hx of chronic indwelling ignacio         RECOMMENDATION  CONTINUE vancomycin with AUC target 400 - 600  CONTINUE cefepime  2 gram q 12 hours  Probable duration of vancomycin and cefepime : for 7 more days ( end date : 12/4/23 )     - he can either finish the course while in the hospital or SNF.  Or can get midline as needed  if he gets discharged home        ID will sign off.   Please contact me if you have any questions  The case was discussed with my attending , Dr. Tawanda Phillips who agreed with my assessment and plan.    RYAN SCOTT.  MTiffanieD /  ID consult TEAM B  Infectious disease fellow PGY-4  Reach me through [employer] instead of paging if possible ( especially during noon conference )  Or page me through Team B  [phone]

## 2023-11-28 NOTE — PROGRESS NOTES
"Haile Ayers is a 83 y.o. male on day 6 of admission presenting with Subdural hematoma (CMS/HCC).    Subjective   Patient doing well no events overnight    Objective     Physical Exam  Eyes opened spontaneous  Alert, Ox2 (self and year)  Spontaneous x 4 > antigravity, squeezes hands, wiggles toes    Last Recorded Vitals  Blood pressure 155/76, pulse 73, temperature 36.4 °C (97.5 °F), temperature source Temporal, resp. rate 22, height 1.727 m (5' 8\"), weight 69.8 kg (153 lb 14.1 oz), SpO2 95 %.  Intake/Output last 3 Shifts:  I/O last 3 completed shifts:  In: - (0 mL/kg)   Out: 2150 (30.8 mL/kg) [Urine:2150 (0.9 mL/kg/hr)]  Weight: 69.8 kg     Relevant Results  Results for orders placed or performed during the hospital encounter of 11/21/23 (from the past 24 hour(s))   POCT GLUCOSE   Result Value Ref Range    POCT Glucose 116 (H) 74 - 99 mg/dL   Renal Function Panel   Result Value Ref Range    Glucose 112 (H) 74 - 99 mg/dL    Sodium 142 136 - 145 mmol/L    Potassium 3.4 (L) 3.5 - 5.3 mmol/L    Chloride 113 (H) 98 - 107 mmol/L    Bicarbonate 20 (L) 21 - 32 mmol/L    Anion Gap 12 10 - 20 mmol/L    Urea Nitrogen 21 6 - 23 mg/dL    Creatinine 0.72 0.50 - 1.30 mg/dL    eGFR 90 >60 mL/min/1.73m*2    Calcium 7.9 (L) 8.6 - 10.6 mg/dL    Phosphorus 2.9 2.5 - 4.9 mg/dL    Albumin 2.6 (L) 3.4 - 5.0 g/dL   CBC   Result Value Ref Range    WBC 6.3 4.4 - 11.3 x10*3/uL    nRBC 0.3 (H) 0.0 - 0.0 /100 WBCs    RBC 3.33 (L) 4.50 - 5.90 x10*6/uL    Hemoglobin 9.8 (L) 13.5 - 17.5 g/dL    Hematocrit 30.5 (L) 41.0 - 52.0 %    MCV 92 80 - 100 fL    MCH 29.4 26.0 - 34.0 pg    MCHC 32.1 32.0 - 36.0 g/dL    RDW 14.0 11.5 - 14.5 %    Platelets 203 150 - 450 x10*3/uL   Vancomycin   Result Value Ref Range    Vancomycin 11.7 5.0 - 20.0 ug/mL   POCT GLUCOSE   Result Value Ref Range    POCT Glucose 131 (H) 74 - 99 mg/dL         Assessment/Plan   Principal Problem:    Subdural hematoma (CMS/HCC)    83 y.o. male h/o CLL, anemia, DM, p/w AMS, found to " have UTI, CTH w/ L large chronic SDH, 11/22 s/p L crani for SDH evac, CTH POC, 11/24 drain dc'd, Bcx +GPC, 11/25 febrile, remaining vitals stable, exam unchanged, repeat bcx negative    PLAN  Floor  SBP <160  Midline ordered  Chronic ignacio  ID recs-cont vanc, cefe tentative end date 12/4/23   Heme recs-cont to hold ibrutinib until POD14, daily CBC  SLP- diet 6   PTOT-rehab  SCD, SQH    Mikey Barragan MD

## 2023-11-28 NOTE — PROCEDURES
Vascular Access Team Procedure Note     Visit Date: 11/28/2023      Patient Name: Haile Ayers         MRN: [id number]             Procedure:Midline    Pre-Procedure Checklist:  Emergent Line Insertion: No  Type of Line to be Placed: Midline  Consent Obtained: verbal from wife  Emergency Medication Necessary: No  Patient Identified with 2 Independent Identifiers: Yes  Review of Allergies, Anticoagulation, Relevant Labs, ECG/Telemetry: Yes  Risks/Benefits/Alternatives Discussed with Patient/POA/Legal Representative: Yes  Stop Sign on Door: Yes  Time Out Performed: Yes  Catheter Exchange: No    Positioning Checklist:  All People, Including Patient, in the Room with Cap and Mask: Yes  Fluoroscopy Used to Identify Vessel and Guide Insertion: No   Sterile Cover Used: Yes  Full Barrier Precautions Followed (Mask, Cap, Gown, Gloves): Yes  Hands Washed: Yes  Monitors Attached with Sound Alarms On: No  Full Body Sterile Drape (Head-to-Toe) Used to Cover Patient: Yes  Trendelenburg Position (For IJ and Subclavian): No  CHG Skin Prep Used and Allowed to Air Dry to Skin Procedure: Yes    Procedure Checklist:  Blood Aspirated From All Lumens, All Ports Subsequently Flushed: Yes  Catheter Caps Placed on All Lumens; Lumens Clamped: Yes  Maintain Guidewire Control Throughout, Ensuring Guidewire Removal: Yes  Maintain Sterile Field Throughout Insertion: Yes  Catheter Secured: Yes  Confirmatory Test of Venous Placement: Non-Pulsatile Blood    Post Procedure Checklist:  Date and Time Written on Dressing: Yes  Sharp and Wire Count and Safe Disposal of all Sharps/Wires: Yes  Sterile Dressing Applied Per Protocol: Yes  X-ray Ordered or ECG Image: n/a    PICC Insertion Details:  Size (Fr): 3  Lumen Type:sl  Catheter to Vein Ratio Less Than 50%: Yes  Total Length (cm): 10  External Length (cm): 0  Orientation: right  Location: brachial  Site Prep: Chlorohexidine; Usual sterile procedure followed  Local Anesthetic:  Injectable/Subcutaneous  Indication:access antibiotics  Insertion Team Members in the Room: Nurse, LPN  Initial Extremity Circumference (cm): 26  Insertion Attempts:1  Patient Tolerance: Tolerated Well, Age Appropriate  Comfort Measures: Subcutaneous anesthetic; Verbal  Procedure Location: Bedside  Safety Measures: Patient specific safety measures addressed with RN  Estimated Blood Loss (mL): 0  Vessel Fully Compressible Proximally and Distally to Insertion Site: Yes  Brisk Blood Return Obtained and Line Draws Easily: Yes  Tip Location:right axilla  Line Confirmation: peripheral  Lot #:[id number]  : Bard  PICC Line Exp Date:10/31/2024  Securement: Stat Lock  Post Procedure Checklist: Handoff with RN; Obtain all new IV tubing prior to use; Bed at lowest level and wheels locked; Line discharge information at bedside.  Additional Details: Line was inserted using Modified Seldinger's Technique.   Placed by: Kristie Castro RN-[facility]           Midline 11/28/23 Right (Active)   11/28/23 1515    Earliest Known Present:    Placed by External Staff?:    Hand Hygiene Completed:    Catheter Time Out Checklist Completed:    Size (Fr):    Description (optional):    Lumen Type:    Total Length (cm):    Orientation: Right   Site Prep:    Site Prep Agent has Completely Dried Before Insertion:    All 5 Sterile Barriers Used (Gloves, Gown, Cap, Mask, Large Sterile Drape):    Local Anesthetic:    Initial Extremity Circumference (cm):    External Length (cm):    Catheter Tip Location:    Securement Method:    Placed by:    Insertion attempts:    Patient Tolerance:    Placement Verification:    Earliest Known Removed:    Removal Reason :    Removal Catheter Length (cm):    Tip Intact:    Catheter Tip Cultured:    Securement Method:    Number of Sutures Placed:    Site Assessment Clean;Dry;Intact 11/28/23 1500   Dressing Type Transparent 11/28/23 1500   Dressing Status Clean;Dry 11/28/23 1500                Kristie Castro  RN  11/28/2023  3:22 PM

## 2023-11-28 NOTE — TREATMENT PLAN
Hematology follow up note:     84 yo male presenting with PMH of CLL, DM2, and HTN presenting to for large L subdural hematoma with  midline shift now POD4 s/p L craniotomy for evacuation of SDH. Hospital course c/b post-op Staph hominis bacteremia. Hematology is consulted for hx of CLL and recommendations for ibrutinib.     Previous records about CLL history unavailable, pt unable to provide history 2/2 AMS and we were not able to reach family. In general, ibrutinib held sarita-operatively ~7 days prior to and after procedure given bleeding/infection risk. Would continue to hold for now and attempt to communicate with primary hematologist for further guidance given nature of procedure and higher risk for complications. Patient also on atbx for bacteremia, tentative stop date 12/4, so likely would continue holding until infx treated.      Recommendations:   -Continue holding ibrutinib since risks outweighs benefits. Would probably hold for total 14 days post op. If a longer period needed from NSGY standpoint, please let us know  -Please communicate with primary hematologist about medication on hold (we were unable to find hematologist information) and ensure close follow up after discharge  -Monitor CBC w diff while admitted    Hematology will sign off. Discussed with Dr Saunders.      Sanjuana Milan MD  Hematology Oncology fellow, PGY-5  Pager [phone]

## 2023-11-28 NOTE — PROGRESS NOTES
Physical Therapy    Physical Therapy Treatment    Patient Name: Haile Ayers  MRN: [id number]  Today's Date: 11/28/2023  Time Calculation  Start Time: 1004  Stop Time: 1147  Time Calculation (min): 103 min- 40min billable, 63min non-billable time with pt up in chair with wife present.       Assessment/Plan   PT Assessment  PT Assessment Results: Decreased strength  Rehab Prognosis: Good  Evaluation/Treatment Tolerance: Patient tolerated treatment well  Medical Staff Made Aware: Yes  End of Session Communication: Bedside nurse  End of Session Patient Position: Bed, 3 rail up, Alarm on (wrist restraints off, wife present, RN reports ok to remain unrestrained with family present.)     PT Plan  Treatment/Interventions: Bed mobility, Transfer training, Gait training, Stair training, Balance training, Neuromuscular re-education, Strengthening, Endurance training, Range of motion, Therapeutic exercise, Therapeutic activity, Home exercise program, Orthotic fitting/training, Positioning, Postural re-education  PT Plan: Skilled PT  PT Frequency: 5 times per week  PT Discharge Recommendations: High intensity level of continued care  Equipment Recommended upon Discharge:  (TBD; Anticipate wheeled walker)  PT Recommended Transfer Status: Assist x2  PT - OK to Discharge: Yes (PT evaluation has been completed and discharge recommendations have been made.)      General Visit Information:   PT  Visit  PT Received On: 11/28/23  Response to Previous Treatment: Patient unable to report, no changes reported from family or staff  General  Family/Caregiver Present: Yes  Caregiver Feedback: Pt's wife present  Co-Treatment:  (Rehab aide utilized [facility] 10: for transfer and gait training)  Prior to Session Communication: Bedside nurse  Patient Position Received: Bed, 4 rail up, Alarm on (soft wrist restraints)  General Comment: Pt is alert and agreeable, improvng in response to therapy.    Subjective   Precautions:     Vital Signs:  Vital  Signs  BP: 159/78 (immediately after sitting in chair)    Objective   Pain:  Pain Assessment  Pain Assessment: 0-10  Pain Score: 0 - No pain    Treatments:     Therapeutic Activity  Therapeutic Activity Performed: Yes  Therapeutic Activity 1: Pt sitting up in chair for 1 hour with wife present, then therapist returned to assist pt back to bed. Pt tired but tolerating sitting up OOB well.    Balance/Neuromuscular Re-Education  Balance/Neuromuscular Re-Education Activity Performed: Yes  Balance/Neuromuscular Re-Education Activity 1: Pt initially demos R sided lean upon sittng EOB, instructed in L side lean to L elbow prop and return x5, completing with CGA then demos improved sitting balance with COG over HOMA  Balance/Neuromuscular Re-Education Activity 2: Sitting EOB with pt reaching R vs L UE to tap therapist's hands, reaching across midline.    Bed Mobility  Bed Mobility: Yes  Bed Mobility 1  Bed Mobility 1: Supine to sitting  Level of Assistance 1: Moderate assistance  Bed Mobility Comments 1: HOB ~30*  Bed Mobility 2  Bed Mobility  2: Sitting to supine  Level of Assistance 2: Maximum assistance  Bed Mobility Comments 2: Pt requires increased assist due to limited following commands to return to supine.  Bed Mobility 3  Bed Mobility 3: Scooting  Level of Assistance 3: Dependent (x2)    Ambulation/Gait Training  Ambulation/Gait Training Performed: Yes  Ambulation/Gait Training 1  Surface 1: Level tile  Device 1: Rolling walker  Assistance 1: Moderate assistance (with CGA of second, for safety)  Comments/Distance (ft) 1: 4'x2  Transfers  Transfer: Yes  Transfer 1  Transfer From 1: Sit to  Transfer to 1: Stand  Technique 1: Sit to stand  Transfer Device 1:  (B HHA)  Transfer Level of Assistance 1:  (modAx2)  Trials/Comments 1: from EOB  Transfers 2  Transfer From 2: Sit to  Transfer to 2: Stand  Technique 2: Sit to stand  Transfer Device 2: Walker  Transfer Level of Assistance 2: Minimum assistance  Trials/Comments  2: pt pulling up on stabilized AD  Transfers 3  Transfer From 3: Stand to  Transfer to 3: Sit  Technique 3: Stand to sit  Transfer Device 3: Walker  Transfer Level of Assistance 3: Minimum assistance, Moderate tactile cues       Outcome Measures:     [facility] Basic Mobility  Turning from your back to your side while in a flat bed without using bedrails: A lot  Moving from lying on your back to sitting on the side of a flat bed without using bedrails: A lot  Moving to and from bed to chair (including a wheelchair): A lot  Standing up from a chair using your arms (e.g. wheelchair or bedside chair): A lot  To walk in hospital room: A lot  Climbing 3-5 steps with railing: Total  Basic Mobility - Total Score: 11       Education Documentation  Precautions, taught by Akosua Torres PTA at 11/28/2023 11:59 AM.  Learner: Family, Patient  Readiness: Acceptance  Method: Explanation, Demonstration  Response: Needs Reinforcement    Mobility Training, taught by Akosua Torres PTA at 11/28/2023 11:59 AM.  Learner: Family, Patient  Readiness: Acceptance  Method: Explanation, Demonstration  Response: Needs Reinforcement    Education Comments  No comments found.        OP EDUCATION:       Encounter Problems       Encounter Problems (Active)       Balance       Patient will stand with UE support of LRD and </= MOD A x1 at least 3 min to improve balance required for self-care tasks.  (Progressing)       Start:  11/22/23    Expected End:  12/06/23               Mobility       Patient will ambulate at least 20 ft. with </= MOD A x1 and LRD to improve tolerance of household distances.  (Progressing)       Start:  11/22/23    Expected End:  12/06/23            Patient will perform bed mobility with </= MOD A x1 to reduce risk of developing decubitus ulcers.  (Progressing)       Start:  11/22/23    Expected End:  12/06/23            Patient will perform home exercise program as prescribed with cues as needed.   (Progressing)        Start:  11/22/23    Expected End:  12/06/23               Pain - Adult          Transfers       Patient will perform sit to stand and stand to sit transfers with </= MAX A x1 and LRD to increase functional strength.  (Progressing)       Start:  11/22/23    Expected End:  12/06/23                 PRADEEP Amaya

## 2023-11-28 NOTE — PROGRESS NOTES
Inpatient SLP Speech-Language Cognition Evaluation     Patient Name: Haile Ayers  MRN: [id number]  Today's Date: 11/28/2023   Time Calculation  Start Time: 0936  Stop Time: 1006  Time Calculation (min): 30 min       Recommendations:  Continue with SLP services to stimulate speech and language function.      Level 6 with mildly thick liquids     -Single sips from cup, NO straws     - Alternate liquids and solids     - Remain upright at 90 degrees during meals and 30-45 min after meals.     - Crush meds in puree, then sip of mildly thick liquids.       SLP Assessment:   Speech language evaluation completed. Wife present to assist with language barrier, pt and wife speak Martiniquais and English. Pt with limited verbalization during session, however increase use of facial expression noted. Y/N question related to self at 60%, to environment 40%. Follows 1 step directions with max verbal and visual cueing. Object ID 30% in field of 2. Expressive language significantly impaired. Difficulty naming objects, or completing high probability phrases with max verbal cueing. Occasionally, spontaneous utterance are produced, however difficulty to have pt repeat. Pt will require continued intensive SLP services to improve communication skills.       SLP Plan:  Plan  SLP Frequency: 2x per week  Duration: 3 weeks  SLP Discharge Recommendations: Continue skilled SLP services at the next level of care  Discussed POC: Patient, Caregiver/family, Nursing, Physician  Patient/Caregiver Agreeable: Yes  SLP - OK to Discharge: Yes      Pain:  Pain Assessment  Pain Assessment: Unable to self-report  Unable to Self-Report Pain Reason: Nonverbal    Dysphagia Therapy:   Reviewed results of MBSS with pt and wife. Instructed pt's wife to follow safe swallow strategies to utilized during meals including alternating liquids and solids, single sips without straw. Further instruction with wife how to thicken liquids to mildly thick consistencies, pt's wife  re-demonstrate process and strategies without further instruction.     Inpatient Education:  Pt and wife educated regarding results, recommendations and POC.     Goals:    Pt. will tolerate least restrictive diet without overt s/s of aspiration with 100% accuracy.  Pt will utilized safe swallow strategies with min verbal cueing during meals.      Speech/language goals:   Pt will follow 1 step directions with 80% accuracy with min verbal cueing.   Pt will verbally label common objects with 70% accuracy with moderate verbal cueing.

## 2023-11-29 ENCOUNTER — APPOINTMENT (OUTPATIENT)
Dept: RADIOLOGY | Facility: HOSPITAL | Age: 83
DRG: 025 | End: 2023-11-29
Payer: MEDICARE

## 2023-11-29 ENCOUNTER — APPOINTMENT (OUTPATIENT)
Dept: NEUROLOGY | Facility: HOSPITAL | Age: 83
DRG: 025 | End: 2023-11-29
Payer: MEDICARE

## 2023-11-29 LAB
ABO GROUP (TYPE) IN BLOOD: NORMAL
ALBUMIN SERPL BCP-MCNC: 2.4 G/DL (ref 3.4–5)
ALBUMIN SERPL BCP-MCNC: 2.7 G/DL (ref 3.4–5)
ANION GAP SERPL CALC-SCNC: 12 MMOL/L (ref 10–20)
ANION GAP SERPL CALC-SCNC: 9 MMOL/L (ref 10–20)
ANTIBODY SCREEN: NORMAL
APTT PPP: 24 SECONDS (ref 27–38)
BACTERIA BLD AEROBE CULT: ABNORMAL
BACTERIA BLD AEROBE CULT: ABNORMAL
BACTERIA BLD CULT: ABNORMAL
BACTERIA BLD CULT: ABNORMAL
BUN SERPL-MCNC: 16 MG/DL (ref 6–23)
BUN SERPL-MCNC: 17 MG/DL (ref 6–23)
CALCIUM SERPL-MCNC: 7.8 MG/DL (ref 8.6–10.6)
CALCIUM SERPL-MCNC: 7.9 MG/DL (ref 8.6–10.6)
CHLORIDE SERPL-SCNC: 110 MMOL/L (ref 98–107)
CHLORIDE SERPL-SCNC: 112 MMOL/L (ref 98–107)
CO2 SERPL-SCNC: 21 MMOL/L (ref 21–32)
CO2 SERPL-SCNC: 22 MMOL/L (ref 21–32)
CREAT SERPL-MCNC: 0.81 MG/DL (ref 0.5–1.3)
CREAT SERPL-MCNC: 0.86 MG/DL (ref 0.5–1.3)
ERYTHROCYTE [DISTWIDTH] IN BLOOD BY AUTOMATED COUNT: 13.8 % (ref 11.5–14.5)
ERYTHROCYTE [DISTWIDTH] IN BLOOD BY AUTOMATED COUNT: 14.1 % (ref 11.5–14.5)
GFR SERPL CREATININE-BSD FRML MDRD: 85 ML/MIN/1.73M*2
GFR SERPL CREATININE-BSD FRML MDRD: 87 ML/MIN/1.73M*2
GLUCOSE SERPL-MCNC: 149 MG/DL (ref 74–99)
GLUCOSE SERPL-MCNC: 173 MG/DL (ref 74–99)
GRAM STN SPEC: ABNORMAL
GRAM STN SPEC: ABNORMAL
HCT VFR BLD AUTO: 26.8 % (ref 41–52)
HCT VFR BLD AUTO: 28.5 % (ref 41–52)
HGB BLD-MCNC: 8.8 G/DL (ref 13.5–17.5)
HGB BLD-MCNC: 9.5 G/DL (ref 13.5–17.5)
INR PPP: 1.1 (ref 0.9–1.1)
MCH RBC QN AUTO: 29.2 PG (ref 26–34)
MCH RBC QN AUTO: 30.1 PG (ref 26–34)
MCHC RBC AUTO-ENTMCNC: 32.8 G/DL (ref 32–36)
MCHC RBC AUTO-ENTMCNC: 33.3 G/DL (ref 32–36)
MCV RBC AUTO: 89 FL (ref 80–100)
MCV RBC AUTO: 90 FL (ref 80–100)
NRBC BLD-RTO: 0 /100 WBCS (ref 0–0)
NRBC BLD-RTO: 0 /100 WBCS (ref 0–0)
PHOSPHATE SERPL-MCNC: 2.3 MG/DL (ref 2.5–4.9)
PHOSPHATE SERPL-MCNC: 2.4 MG/DL (ref 2.5–4.9)
PLATELET # BLD AUTO: 198 X10*3/UL (ref 150–450)
PLATELET # BLD AUTO: 210 X10*3/UL (ref 150–450)
POTASSIUM SERPL-SCNC: 3.4 MMOL/L (ref 3.5–5.3)
POTASSIUM SERPL-SCNC: 3.6 MMOL/L (ref 3.5–5.3)
PROTHROMBIN TIME: 12.2 SECONDS (ref 9.8–12.8)
RBC # BLD AUTO: 3.01 X10*6/UL (ref 4.5–5.9)
RBC # BLD AUTO: 3.16 X10*6/UL (ref 4.5–5.9)
RH FACTOR (ANTIGEN D): NORMAL
SODIUM SERPL-SCNC: 138 MMOL/L (ref 136–145)
SODIUM SERPL-SCNC: 141 MMOL/L (ref 136–145)
VANCOMYCIN SERPL-MCNC: 20.6 UG/ML (ref 5–20)
WBC # BLD AUTO: 7.2 X10*3/UL (ref 4.4–11.3)
WBC # BLD AUTO: 7.8 X10*3/UL (ref 4.4–11.3)

## 2023-11-29 PROCEDURE — 2500000004 HC RX 250 GENERAL PHARMACY W/ HCPCS (ALT 636 FOR OP/ED): Performed by: STUDENT IN AN ORGANIZED HEALTH CARE EDUCATION/TRAINING PROGRAM

## 2023-11-29 PROCEDURE — 70450 CT HEAD/BRAIN W/O DYE: CPT | Performed by: STUDENT IN AN ORGANIZED HEALTH CARE EDUCATION/TRAINING PROGRAM

## 2023-11-29 PROCEDURE — 95700 EEG CONT REC W/VID EEG TECH: CPT

## 2023-11-29 PROCEDURE — 2500000004 HC RX 250 GENERAL PHARMACY W/ HCPCS (ALT 636 FOR OP/ED)

## 2023-11-29 PROCEDURE — 95715 VEEG EA 12-26HR INTMT MNTR: CPT

## 2023-11-29 PROCEDURE — 85027 COMPLETE CBC AUTOMATED: CPT

## 2023-11-29 PROCEDURE — 70450 CT HEAD/BRAIN W/O DYE: CPT | Performed by: RADIOLOGY

## 2023-11-29 PROCEDURE — 70450 CT HEAD/BRAIN W/O DYE: CPT

## 2023-11-29 PROCEDURE — 94760 N-INVAS EAR/PLS OXIMETRY 1: CPT

## 2023-11-29 PROCEDURE — 2060000001 HC INTERMEDIATE ICU ROOM DAILY

## 2023-11-29 PROCEDURE — 95720 EEG PHY/QHP EA INCR W/VEEG: CPT | Performed by: PSYCHIATRY & NEUROLOGY

## 2023-11-29 PROCEDURE — 86901 BLOOD TYPING SEROLOGIC RH(D): CPT | Performed by: STUDENT IN AN ORGANIZED HEALTH CARE EDUCATION/TRAINING PROGRAM

## 2023-11-29 PROCEDURE — 80069 RENAL FUNCTION PANEL: CPT

## 2023-11-29 PROCEDURE — 85027 COMPLETE CBC AUTOMATED: CPT | Performed by: STUDENT IN AN ORGANIZED HEALTH CARE EDUCATION/TRAINING PROGRAM

## 2023-11-29 PROCEDURE — 85610 PROTHROMBIN TIME: CPT | Performed by: STUDENT IN AN ORGANIZED HEALTH CARE EDUCATION/TRAINING PROGRAM

## 2023-11-29 PROCEDURE — 71045 X-RAY EXAM CHEST 1 VIEW: CPT | Performed by: RADIOLOGY

## 2023-11-29 PROCEDURE — 80069 RENAL FUNCTION PANEL: CPT | Performed by: STUDENT IN AN ORGANIZED HEALTH CARE EDUCATION/TRAINING PROGRAM

## 2023-11-29 PROCEDURE — 80202 ASSAY OF VANCOMYCIN: CPT

## 2023-11-29 PROCEDURE — 71045 X-RAY EXAM CHEST 1 VIEW: CPT

## 2023-11-29 RX ORDER — LEVETIRACETAM 15 MG/ML
1500 INJECTION INTRAVASCULAR ONCE
Status: COMPLETED | OUTPATIENT
Start: 2023-11-29 | End: 2023-11-29

## 2023-11-29 RX ORDER — LEVETIRACETAM 10 MG/ML
1000 INJECTION INTRAVASCULAR ONCE
Status: COMPLETED | OUTPATIENT
Start: 2023-11-29 | End: 2023-11-29

## 2023-11-29 RX ADMIN — CEFEPIME 2 G: 1 INJECTION, SOLUTION INTRAVENOUS at 02:17

## 2023-11-29 RX ADMIN — CEFEPIME 2 G: 1 INJECTION, SOLUTION INTRAVENOUS at 15:42

## 2023-11-29 RX ADMIN — HYDRALAZINE HYDROCHLORIDE 10 MG: 20 INJECTION INTRAMUSCULAR; INTRAVENOUS at 00:30

## 2023-11-29 RX ADMIN — VANCOMYCIN HYDROCHLORIDE 1500 MG: 1.5 INJECTION, POWDER, LYOPHILIZED, FOR SOLUTION INTRAVENOUS at 16:57

## 2023-11-29 RX ADMIN — LEVETIRACETAM 500 MG: 5 INJECTION INTRAVENOUS at 05:04

## 2023-11-29 RX ADMIN — LEVETIRACETAM 500 MG: 5 INJECTION INTRAVENOUS at 19:14

## 2023-11-29 RX ADMIN — LABETALOL HYDROCHLORIDE 10 MG: 5 INJECTION INTRAVENOUS at 01:43

## 2023-11-29 RX ADMIN — HYDRALAZINE HYDROCHLORIDE 10 MG: 20 INJECTION INTRAMUSCULAR; INTRAVENOUS at 20:11

## 2023-11-29 RX ADMIN — LEVETIRACETAM 1000 MG: 10 INJECTION INTRAVENOUS at 01:48

## 2023-11-29 RX ADMIN — LEVETIRACETAM 1500 MG: 15 INJECTION INTRAVENOUS at 20:10

## 2023-11-29 ASSESSMENT — PAIN SCALES - PAIN ASSESSMENT IN ADVANCED DEMENTIA (PAINAD)
CONSOLABILITY: NO NEED TO CONSOLE
TOTALSCORE: 0
CONSOLABILITY: NO NEED TO CONSOLE
CONSOLABILITY: NO NEED TO CONSOLE
BREATHING: NORMAL
TOTALSCORE: 0
BODYLANGUAGE: RELAXED
BODYLANGUAGE: RELAXED
FACIALEXPRESSION: SMILING OR INEXPRESSIVE
BODYLANGUAGE: RELAXED
TOTALSCORE: 0
BREATHING: NORMAL
BREATHING: NORMAL
FACIALEXPRESSION: SMILING OR INEXPRESSIVE
FACIALEXPRESSION: SMILING OR INEXPRESSIVE

## 2023-11-29 NOTE — PROGRESS NOTES
Physical Therapy                 Therapy Communication Note    Patient Name: Haile Ayers  MRN: [id number]  Today's Date: 11/29/2023     Discipline: Physical Therapy    Missed Visit Reason: Missed Visit Reason: Other (Comment) (Pt currently asleep, RN reports had a rough morning and recently returned from CT- SDH may be increasing. RN reports pt not following commands this am, requesting to allow pt to rest at this time.)    Missed Time: Attempt 1037    Comment:

## 2023-11-29 NOTE — PROGRESS NOTES
"Haile Ayers is a 83 y.o. male on day 7 of admission presenting with Subdural hematoma (CMS/HCC).    Subjective   Lethargic, not FC, s/p CTH w/ increased L SDH w/ 8mm MLS s/p 1g Keppra load, CXR stable opacities         Objective     Physical Exam  Eyes opened spontaneous  Alert, Ox2 (self and year)  Spontaneous x 4 > antigravity, squeezes hands, wiggles toes    Last Recorded Vitals  Blood pressure 137/84, pulse 79, temperature 36.3 °C (97.3 °F), resp. rate (!) 32, height 1.727 m (5' 8\"), weight 69.8 kg (153 lb 14.1 oz), SpO2 95 %.  Intake/Output last 3 Shifts:  I/O last 3 completed shifts:  In: - (0 mL/kg)   Out: 1850 (26.5 mL/kg) [Urine:1850 (0.7 mL/kg/hr)]  Weight: 69.8 kg     Relevant Results                This patient has a urinary catheter   Reason for the urinary catheter remaining today? Urine catheter unnecessary, will be removed today               Assessment/Plan   Principal Problem:    Subdural hematoma (CMS/HCC)    83 y.o. male h/o CLL, anemia, DM, p/w AMS, found to have UTI, CTH w/ L large chronic SDH, 11/22 s/p L crani for SDH evac, CTH POC, 11/24 drain dc'd, Bcx +GPC, 11/25 febrile, remaining vitals stable, exam unchanged, repeat bcx negative    11/28 s/p midline   11/29 Lethargic, not FC, s/p CTH w/ increased L SDH w/ 8mm MLS s/p 1g Keppra load, CXR stable opacities    PLAN:  Floor  EEG  SBP <160  Chronic ignacio  ID recs-cont vanc, cefe tentative end date 12/4/23   Heme recs-cont to hold ibrutinib until POD14, daily CBC  SLP- diet 6   PTOT-rehab  SCD, SQH    Dispo pending mental status improvement, EEG           Ignacio Huber MD      "

## 2023-11-29 NOTE — CARE PLAN
The patient's goals for the shift include  BEAN    The clinical goals for the shift include Patient will have no decline in neurologic exam overnight.    Over the shift, the patient hada decline in his exam. CTH showed increased bleed. Video EEG started and patient given loading dose of Keppra.

## 2023-11-29 NOTE — PROGRESS NOTES
"Vancomycin Dosing by Pharmacy- FOLLOW UP    Haile Ayers is a 83 y.o. year old male who Pharmacy has been consulted for vancomycin dosing for other UTI . Based on the patient's indication and renal status this patient is being dosed based on a goal AUC of 400-600.     Renal function is currently stable.    Current vancomycin dose: 1500 mg given every 24 hours    Estimated vancomycin AUC on current dose: 478 mg/L.hr     Visit Vitals  /80   Pulse 83   Temp 36.8 °C (98.2 °F)   Resp (!) 30        Lab Results   Component Value Date    CREATININE 0.81 11/29/2023    CREATININE 0.78 11/28/2023    CREATININE 0.72 11/27/2023    CREATININE 0.79 11/26/2023        Patient weight is No results found for: \"PTWEIGHT\"    No results found for: \"CULTURE\"     I/O last 3 completed shifts:  In: 1130 (16.2 mL/kg) [I.V.:300 (4.3 mL/kg); IV Piggyback:830]  Out: 2125 (30.4 mL/kg) [Urine:2125 (0.8 mL/kg/hr)]  Weight: 69.8 kg   [unfilled]    No results found for: \"PATIENTTEMP\"     Assessment/Plan    Within goal AUC range. Continue current vancomycin regimen.    This dosing regimen is predicted by [employer] to result in the following pharmacokinetic parameters:  Loading dose: N/A  Regimen: 1500 mg IV every 24 hours.  Start time: 20:27 on 11/29/2023  Exposure target: AUC24 (range)400-600 mg/L.hr   AUC24,ss: 478 mg/L.hr  Probability of AUC24 > 400: 89 %  Ctrough,ss: 12.8 mg/L  Probability of Ctrough,ss > 20: 4 %  Probability of nephrotoxicity (Lodise FILIBERTO 2009): 8 %      The next level will be obtained on 12/2 at AM draw. May be obtained sooner if clinically indicated.   Will continue to monitor renal function daily while on vancomycin and order serum creatinine at least every 48 hours if not already ordered.  Follow for continued vancomycin needs, clinical response, and signs/symptoms of toxicity.       Isaiah Do, PharmD           "

## 2023-11-29 NOTE — PROGRESS NOTES
Physical Therapy                 Therapy Communication Note    Patient Name: Haile Ayers  MRN: [id number]  Today's Date: 11/29/2023     Discipline: Physical Therapy    Missed Visit Reason: Missed Visit Reason:  (PT reattempt. Pt able to open eyes, but not engage with this therapist or respond to any commands/stim. Will reattempt as able.)    Missed Time: Attempt 1608    Comment:

## 2023-11-30 ENCOUNTER — APPOINTMENT (OUTPATIENT)
Dept: CARDIOLOGY | Facility: HOSPITAL | Age: 83
DRG: 025 | End: 2023-11-30
Payer: MEDICARE

## 2023-11-30 ENCOUNTER — ANESTHESIA EVENT (OUTPATIENT)
Dept: OPERATING ROOM | Facility: HOSPITAL | Age: 83
DRG: 025 | End: 2023-11-30
Payer: MEDICARE

## 2023-11-30 ENCOUNTER — ANESTHESIA (OUTPATIENT)
Dept: OPERATING ROOM | Facility: HOSPITAL | Age: 83
DRG: 025 | End: 2023-11-30
Payer: MEDICARE

## 2023-11-30 LAB
BACTERIA BLD CULT: NORMAL
BACTERIA BLD CULT: NORMAL
ERYTHROCYTE [DISTWIDTH] IN BLOOD BY AUTOMATED COUNT: 13.9 % (ref 11.5–14.5)
GLUCOSE BLD MANUAL STRIP-MCNC: 138 MG/DL (ref 74–99)
GLUCOSE BLD MANUAL STRIP-MCNC: 158 MG/DL (ref 74–99)
GLUCOSE BLD MANUAL STRIP-MCNC: 167 MG/DL (ref 74–99)
HCT VFR BLD AUTO: 29.4 % (ref 41–52)
HGB BLD-MCNC: 10 G/DL (ref 13.5–17.5)
MCH RBC QN AUTO: 29.8 PG (ref 26–34)
MCHC RBC AUTO-ENTMCNC: 34 G/DL (ref 32–36)
MCV RBC AUTO: 88 FL (ref 80–100)
NRBC BLD-RTO: 0 /100 WBCS (ref 0–0)
PLATELET # BLD AUTO: 228 X10*3/UL (ref 150–450)
RBC # BLD AUTO: 3.36 X10*6/UL (ref 4.5–5.9)
WBC # BLD AUTO: 7.8 X10*3/UL (ref 4.4–11.3)

## 2023-11-30 PROCEDURE — 2500000004 HC RX 250 GENERAL PHARMACY W/ HCPCS (ALT 636 FOR OP/ED)

## 2023-11-30 PROCEDURE — 3700000001 HC GENERAL ANESTHESIA TIME - INITIAL BASE CHARGE: Performed by: NEUROLOGICAL SURGERY

## 2023-11-30 PROCEDURE — 3600000005 HC OR TIME - INITIAL BASE CHARGE - PROCEDURE LEVEL FIVE: Performed by: NEUROLOGICAL SURGERY

## 2023-11-30 PROCEDURE — 3600000010 HC OR TIME - EACH INCREMENTAL 1 MINUTE - PROCEDURE LEVEL FIVE: Performed by: NEUROLOGICAL SURGERY

## 2023-11-30 PROCEDURE — 93010 ELECTROCARDIOGRAM REPORT: CPT | Performed by: INTERNAL MEDICINE

## 2023-11-30 PROCEDURE — 2500000005 HC RX 250 GENERAL PHARMACY W/O HCPCS: Performed by: NEUROLOGICAL SURGERY

## 2023-11-30 PROCEDURE — 93005 ELECTROCARDIOGRAM TRACING: CPT

## 2023-11-30 PROCEDURE — 2780000003 HC OR 278 NO HCPCS: Performed by: NEUROLOGICAL SURGERY

## 2023-11-30 PROCEDURE — 2500000001 HC RX 250 WO HCPCS SELF ADMINISTERED DRUGS (ALT 637 FOR MEDICARE OP): Performed by: NEUROLOGICAL SURGERY

## 2023-11-30 PROCEDURE — 00C40ZZ EXTIRPATION OF MATTER FROM INTRACRANIAL SUBDURAL SPACE, OPEN APPROACH: ICD-10-PCS | Performed by: NEUROLOGICAL SURGERY

## 2023-11-30 PROCEDURE — A61312 PR OPEN SKULL EVAC HEMATOMA, SUPRATENTORIAL, SUB/ EXTRADURAL: Performed by: ANESTHESIOLOGY

## 2023-11-30 PROCEDURE — 00U20KZ SUPPLEMENT DURA MATER WITH NONAUTOLOGOUS TISSUE SUBSTITUTE, OPEN APPROACH: ICD-10-PCS | Performed by: NEUROLOGICAL SURGERY

## 2023-11-30 PROCEDURE — 94002 VENT MGMT INPAT INIT DAY: CPT

## 2023-11-30 PROCEDURE — 2500000004 HC RX 250 GENERAL PHARMACY W/ HCPCS (ALT 636 FOR OP/ED): Performed by: STUDENT IN AN ORGANIZED HEALTH CARE EDUCATION/TRAINING PROGRAM

## 2023-11-30 PROCEDURE — 3700000002 HC GENERAL ANESTHESIA TIME - EACH INCREMENTAL 1 MINUTE: Performed by: NEUROLOGICAL SURGERY

## 2023-11-30 PROCEDURE — 94760 N-INVAS EAR/PLS OXIMETRY 1: CPT

## 2023-11-30 PROCEDURE — 7100000002 HC RECOVERY ROOM TIME - EACH INCREMENTAL 1 MINUTE: Performed by: NEUROLOGICAL SURGERY

## 2023-11-30 PROCEDURE — 2020000001 HC ICU ROOM DAILY

## 2023-11-30 PROCEDURE — 85027 COMPLETE CBC AUTOMATED: CPT

## 2023-11-30 PROCEDURE — 99100 ANES PT EXTEME AGE<1 YR&>70: CPT | Performed by: ANESTHESIOLOGY

## 2023-11-30 PROCEDURE — 7100000001 HC RECOVERY ROOM TIME - INITIAL BASE CHARGE: Performed by: NEUROLOGICAL SURGERY

## 2023-11-30 PROCEDURE — 2500000005 HC RX 250 GENERAL PHARMACY W/O HCPCS: Performed by: STUDENT IN AN ORGANIZED HEALTH CARE EDUCATION/TRAINING PROGRAM

## 2023-11-30 PROCEDURE — 2500000002 HC RX 250 W HCPCS SELF ADMINISTERED DRUGS (ALT 637 FOR MEDICARE OP, ALT 636 FOR OP/ED)

## 2023-11-30 PROCEDURE — 61312 CRNEC/CRNOT STTL XDRL/SDRL: CPT | Performed by: NEUROLOGICAL SURGERY

## 2023-11-30 PROCEDURE — 36620 INSERTION CATHETER ARTERY: CPT | Performed by: STUDENT IN AN ORGANIZED HEALTH CARE EDUCATION/TRAINING PROGRAM

## 2023-11-30 PROCEDURE — 82947 ASSAY GLUCOSE BLOOD QUANT: CPT

## 2023-11-30 DEVICE — DURAGEN® PLUS DURAL REGENERATION MATRIX, 3 IN X 3 IN (7.5 CM X 7.5 CM)
Type: IMPLANTABLE DEVICE | Site: BRAIN | Status: FUNCTIONAL
Brand: DURAGEN® PLUS

## 2023-11-30 RX ORDER — PHENYLEPHRINE HCL IN 0.9% NACL 0.4MG/10ML
SYRINGE (ML) INTRAVENOUS AS NEEDED
Status: DISCONTINUED | OUTPATIENT
Start: 2023-11-30 | End: 2023-11-30

## 2023-11-30 RX ORDER — FENTANYL CITRATE 50 UG/ML
INJECTION, SOLUTION INTRAMUSCULAR; INTRAVENOUS AS NEEDED
Status: DISCONTINUED | OUTPATIENT
Start: 2023-11-30 | End: 2023-11-30

## 2023-11-30 RX ORDER — NORETHINDRONE AND ETHINYL ESTRADIOL 0.5-0.035
KIT ORAL AS NEEDED
Status: DISCONTINUED | OUTPATIENT
Start: 2023-11-30 | End: 2023-11-30

## 2023-11-30 RX ORDER — ROCURONIUM BROMIDE 10 MG/ML
INJECTION, SOLUTION INTRAVENOUS AS NEEDED
Status: DISCONTINUED | OUTPATIENT
Start: 2023-11-30 | End: 2023-11-30

## 2023-11-30 RX ORDER — PROPOFOL 10 MG/ML
INJECTION, EMULSION INTRAVENOUS AS NEEDED
Status: DISCONTINUED | OUTPATIENT
Start: 2023-11-30 | End: 2023-11-30

## 2023-11-30 RX ORDER — PROPOFOL 10 MG/ML
INJECTION, EMULSION INTRAVENOUS CONTINUOUS PRN
Status: DISCONTINUED | OUTPATIENT
Start: 2023-11-30 | End: 2023-11-30

## 2023-11-30 RX ORDER — CEFAZOLIN 1 G/1
INJECTION, POWDER, FOR SOLUTION INTRAVENOUS AS NEEDED
Status: DISCONTINUED | OUTPATIENT
Start: 2023-11-30 | End: 2023-11-30

## 2023-11-30 RX ORDER — ALBUTEROL SULFATE 0.83 MG/ML
2.5 SOLUTION RESPIRATORY (INHALATION) EVERY 4 HOURS PRN
Status: DISCONTINUED | OUTPATIENT
Start: 2023-11-30 | End: 2023-12-29

## 2023-11-30 RX ORDER — VANCOMYCIN HYDROCHLORIDE 1 G/20ML
INJECTION, POWDER, LYOPHILIZED, FOR SOLUTION INTRAVENOUS AS NEEDED
Status: DISCONTINUED | OUTPATIENT
Start: 2023-11-30 | End: 2023-11-30

## 2023-11-30 RX ORDER — PROPOFOL 10 MG/ML
5-20 INJECTION, EMULSION INTRAVENOUS CONTINUOUS
Status: CANCELLED | OUTPATIENT
Start: 2023-11-30

## 2023-11-30 RX ORDER — LIDOCAINE HYDROCHLORIDE 20 MG/ML
INJECTION, SOLUTION INFILTRATION; PERINEURAL AS NEEDED
Status: DISCONTINUED | OUTPATIENT
Start: 2023-11-30 | End: 2023-11-30

## 2023-11-30 RX ORDER — NEOSTIGMINE METHYLSULFATE 1 MG/ML
INJECTION, SOLUTION INTRAVENOUS AS NEEDED
Status: DISCONTINUED | OUTPATIENT
Start: 2023-11-30 | End: 2023-11-30

## 2023-11-30 RX ORDER — ALBUTEROL SULFATE 0.83 MG/ML
SOLUTION RESPIRATORY (INHALATION)
Status: COMPLETED
Start: 2023-11-30 | End: 2023-11-30

## 2023-11-30 RX ORDER — BACITRACIN 500 [USP'U]/G
OINTMENT TOPICAL AS NEEDED
Status: DISCONTINUED | OUTPATIENT
Start: 2023-11-30 | End: 2023-11-30 | Stop reason: HOSPADM

## 2023-11-30 RX ORDER — GLYCOPYRROLATE 0.2 MG/ML
INJECTION INTRAMUSCULAR; INTRAVENOUS AS NEEDED
Status: DISCONTINUED | OUTPATIENT
Start: 2023-11-30 | End: 2023-11-30

## 2023-11-30 RX ADMIN — ROCURONIUM BROMIDE 50 MG: 10 INJECTION, SOLUTION INTRAVENOUS at 16:04

## 2023-11-30 RX ADMIN — GLYCOPYRROLATE 0.6 MG: 0.2 INJECTION, SOLUTION INTRAMUSCULAR; INTRAVENOUS at 18:03

## 2023-11-30 RX ADMIN — CEFAZOLIN 2 G: 330 INJECTION, POWDER, FOR SOLUTION INTRAMUSCULAR; INTRAVENOUS at 16:11

## 2023-11-30 RX ADMIN — EPHEDRINE SULFATE 10 MG: 50 INJECTION, SOLUTION INTRAVENOUS at 16:58

## 2023-11-30 RX ADMIN — HYDRALAZINE HYDROCHLORIDE 10 MG: 20 INJECTION INTRAMUSCULAR; INTRAVENOUS at 10:15

## 2023-11-30 RX ADMIN — ALBUTEROL SULFATE 2.5 MG: 2.5 SOLUTION RESPIRATORY (INHALATION) at 10:50

## 2023-11-30 RX ADMIN — HYDRALAZINE HYDROCHLORIDE 10 MG: 20 INJECTION INTRAMUSCULAR; INTRAVENOUS at 06:50

## 2023-11-30 RX ADMIN — ROCURONIUM BROMIDE 20 MG: 10 INJECTION, SOLUTION INTRAVENOUS at 16:48

## 2023-11-30 RX ADMIN — PROPOFOL 15 MCG/KG/MIN: 10 INJECTION, EMULSION INTRAVENOUS at 18:02

## 2023-11-30 RX ADMIN — SODIUM CHLORIDE, SODIUM LACTATE, POTASSIUM CHLORIDE, AND CALCIUM CHLORIDE: 600; 310; 30; 20 INJECTION, SOLUTION INTRAVENOUS at 16:05

## 2023-11-30 RX ADMIN — LABETALOL HYDROCHLORIDE 10 MG: 5 INJECTION INTRAVENOUS at 14:01

## 2023-11-30 RX ADMIN — Medication 200 MCG: at 16:12

## 2023-11-30 RX ADMIN — HYDRALAZINE HYDROCHLORIDE 10 MG: 20 INJECTION INTRAMUSCULAR; INTRAVENOUS at 13:08

## 2023-11-30 RX ADMIN — EPHEDRINE SULFATE 10 MG: 50 INJECTION, SOLUTION INTRAVENOUS at 16:35

## 2023-11-30 RX ADMIN — EPHEDRINE SULFATE 10 MG: 50 INJECTION, SOLUTION INTRAVENOUS at 16:27

## 2023-11-30 RX ADMIN — EPHEDRINE SULFATE 10 MG: 50 INJECTION, SOLUTION INTRAVENOUS at 16:21

## 2023-11-30 RX ADMIN — HYDRALAZINE HYDROCHLORIDE 10 MG: 20 INJECTION INTRAMUSCULAR; INTRAVENOUS at 14:43

## 2023-11-30 RX ADMIN — NEOSTIGMINE METHYLSULFATE 3 MG: 1 INJECTION INTRAVENOUS at 18:03

## 2023-11-30 RX ADMIN — FENTANYL CITRATE 50 MCG: 50 INJECTION, SOLUTION INTRAMUSCULAR; INTRAVENOUS at 16:04

## 2023-11-30 RX ADMIN — Medication 120 MCG: at 16:07

## 2023-11-30 RX ADMIN — LABETALOL HYDROCHLORIDE 10 MG: 5 INJECTION INTRAVENOUS at 10:33

## 2023-11-30 RX ADMIN — Medication 200 MCG: at 16:18

## 2023-11-30 RX ADMIN — ALBUTEROL SULFATE 2.5 MG: 0.83 SOLUTION RESPIRATORY (INHALATION) at 10:50

## 2023-11-30 RX ADMIN — CEFEPIME 2 G: 1 INJECTION, SOLUTION INTRAVENOUS at 03:02

## 2023-11-30 RX ADMIN — EPHEDRINE SULFATE 10 MG: 50 INJECTION, SOLUTION INTRAVENOUS at 16:38

## 2023-11-30 RX ADMIN — Medication 120 MCG: at 17:08

## 2023-11-30 RX ADMIN — LABETALOL HYDROCHLORIDE 10 MG: 5 INJECTION INTRAVENOUS at 00:38

## 2023-11-30 RX ADMIN — LEVETIRACETAM 500 MG: 5 INJECTION INTRAVENOUS at 10:35

## 2023-11-30 RX ADMIN — SODIUM CHLORIDE 75 ML/HR: 9 INJECTION, SOLUTION INTRAVENOUS at 23:28

## 2023-11-30 RX ADMIN — PROPOFOL 110 MG: 10 INJECTION, EMULSION INTRAVENOUS at 16:04

## 2023-11-30 RX ADMIN — VANCOMYCIN HYDROCHLORIDE 1500 MG: 1 INJECTION, POWDER, LYOPHILIZED, FOR SOLUTION INTRAVENOUS at 16:11

## 2023-11-30 RX ADMIN — CEFEPIME 2 G: 1 INJECTION, SOLUTION INTRAVENOUS at 14:43

## 2023-11-30 RX ADMIN — LIDOCAINE HYDROCHLORIDE 100 MG: 20 INJECTION, SOLUTION INFILTRATION; PERINEURAL at 16:04

## 2023-11-30 RX ADMIN — LABETALOL HYDROCHLORIDE 10 MG: 5 INJECTION INTRAVENOUS at 04:08

## 2023-11-30 ASSESSMENT — PAIN - FUNCTIONAL ASSESSMENT: PAIN_FUNCTIONAL_ASSESSMENT: CPOT (CRITICAL CARE PAIN OBSERVATION TOOL)

## 2023-11-30 ASSESSMENT — PAIN SCALES - GENERAL: PAIN_LEVEL: 0

## 2023-11-30 NOTE — BRIEF OP NOTE
Date: 2023 - 2023  OR Location: [facility] OR    Name: Haile Ayers, : 1940, Age: 83 y.o., MRN: [id number], Sex: male    Diagnosis  Pre-op Diagnosis     * Subdural hematoma (CMS/HCC) [S06.5XAA] Post-op Diagnosis     * Subdural hematoma (CMS/HCC) [S06.5XAA]     Procedures  L crani for re-evacuation of SDH  59796 - OK SACHIN HOLE W/EVAC&/DRG HEMATOMA XDRL/SDRL      Surgeons      * Og Tran - Primary    Resident/Fellow/Other Assistant:  Surgeon(s) and Role:     * Ignacio Huber MD - Fellow    Procedure Summary  Anesthesia: General  ASA: IV  Anesthesia Staff: Anesthesiologist: Cassius Koroma MD PhD; Rehana aSlas MD  Anesthesia Resident: Bharati Davis MD  Estimated Blood Loss: 100 mL  Intra-op Medications:   Medication Name Total Dose   gelatin absorbable (Gelfoam) 100 sponge 1 each   thrombin (recombinant) (Recothrom) topical solution 5,000 Units   bacitracin ointment 1 Application   microfibrllar collagen (Hemostat) pad 1 each              Anesthesia Record               Intraprocedure I/O Totals          Intake    Propofol Drip 0.00 mL    The total shown is the total volume documented since Anesthesia Start was filed.    Total Intake 0 mL       Output    Urine 300 mL    Est. Blood Loss 100 mL    Total Output 400 mL       Net    Net Volume -400 mL          Specimen: No specimens collected     Staff:   Circulator: Lacey Chong RN  Relief Scrub: Megha Toro  Scrub Person: Jessika Hilliard          Findings: acute subdural hematoma with significant chronic membranes; well evacuated    Complications:  None; patient tolerated the procedure well.     Disposition: ICU - intubated and hemodynamically stable.  Condition: stable  Specimens Collected: No specimens collected    23 at 6:21 PM - Ignacio Huber MD     Attending Attestation:     Og Tran  Phone Number: [phone]

## 2023-11-30 NOTE — ANESTHESIA PREPROCEDURE EVALUATION
Patient: Haile Ayers    Procedure Information       Date/Time: 11/30/23 1600    Procedure: L crani for re-evacuation of SDH (Left: Head)    Location: [facility] OR 26 / [facility]    Surgeons: Og Tran MD            Relevant Problems   No relevant active problems       Clinical information reviewed:    Allergies  Meds               NPO Detail:  No data recorded     PHYSICAL EXAM    Anesthesia Plan    ASA 4     general       Attending Anesthesiologist Note  Above information reviewed including relevant HPI, PMHx, PSHx, anesthesia history, labs, and imaging.    Summary: 83 y.o. male h/o CLL, anemia, DM, p/w AMS, found to have UTI, CTH w/ L large chronic SDH, 11/22 s/p L crani for SDH evac, CTH POC, 11/24 drain dc'd, Bcx +GPC, 11/25 febrile, remaining vitals stable, exam unchanged, repeat bcx negative     11/29 Lethargic, not FC, s/p CTH w/ increased L SDH w/ 8mm MLS s/p 1g Keppra load, CXR stable opacities, rCTH stable, s/p R side wd, s/p keppra load, rrCTH stable L SDH.    Now scheduled for SDH evauation.    Recent vitals:  /81 (11/30/23 1400)    Temp 37.1 °C (98.8 °F) (11/30/23 1403)    Pulse 104 (11/30/23 1400)   Resp      SpO2        Recent Labs:    Chemistry    Lab Results   Component Value Date/Time     11/29/2023 0836    K 3.6 11/29/2023 0836     (H) 11/29/2023 0836    CO2 21 11/29/2023 0836    BUN 16 11/29/2023 0836    CREATININE 0.86 11/29/2023 0836    Lab Results   Component Value Date/Time    CALCIUM 7.8 (L) 11/29/2023 0836    ALKPHOS 95 11/22/2023 0022    AST 7 (L) 11/22/2023 0022    ALT 4 (L) 11/22/2023 0022    BILITOT 0.3 11/22/2023 0022          Lab Results   Component Value Date/Time    WBC 7.8 11/30/2023 0745    HGB 10.0 (L) 11/30/2023 0745    HCT 29.4 (L) 11/30/2023 0745     11/30/2023 0745     Lab Results   Component Value Date/Time    PROTIME 12.2 11/29/2023 0351    INR 1.1 11/29/2023 0351     Encounter Date: 11/21/23   Electrocardiogram, 12-lead  PRN ACS symptoms   Result Value    Ventricular Rate 100    Atrial Rate 100    GA Interval 96    QRS Duration 92    QT Interval 320    QTC Calculation(Bazett) 412    P Axis 70    R Axis 80    T Axis 65    QRS Count 16    Q Onset 207    P Onset 92    P Offset 184    T Offset 367    QTC Fredericia 379    Narrative    Sinus rhythm with short GA  Otherwise normal ECG  No previous ECGs available

## 2023-11-30 NOTE — PROGRESS NOTES
Occupational Therapy                 Therapy Communication Note    Patient Name: Haile Ayers  MRN: [id number]  Today's Date: 11/30/2023     Discipline: Occupational Therapy    Missed Visit Reason: Missed Visit Reason:  (plan for OR today for crani, will reattempt OT tx as schedule permits.)    Missed Time: Attempt

## 2023-11-30 NOTE — PROGRESS NOTES
Physical Therapy                 Therapy Communication Note    Patient Name: Haile Ayers  MRN: [id number]  Today's Date: 11/30/2023     Discipline: Physical Therapy    Missed Visit Reason: Missed Visit Reason:  (Pt preparng to leave unit for crani)    Missed Time: Attempt 1411    Comment:

## 2023-11-30 NOTE — ANESTHESIA PROCEDURE NOTES
Arterial Line:    Date/Time: 11/30/2023 4:25 PM    Staffing  Performed: attending   Authorized by: Cassius Koroma MD PhD    Performed by: Bharati Davis MD    An arterial line was placed. Procedure performed using ultrasound guidance.in the OB for the following indication(s): continuous blood pressure monitoring.    A 20 gauge (size) (length), Angiocath (type) catheter was placed into the Right brachial artery, secured by Tegaderm   Seldinger technique used.  Events:  greater than 3 attempts.      Additional notes:  First attempt radial by resident. Decision made to target brachial artery under ultrasound guidance.

## 2023-11-30 NOTE — ANESTHESIA PROCEDURE NOTES
Airway  Date/Time: 11/30/2023 4:05 PM  Urgency: elective      Staffing  Performed: resident   Authorized by: Cassius Koroma MD PhD    Performed by: Bharati Davis MD  Patient location during procedure: OR    Indications and Patient Condition  Indications for airway management: anesthesia  Spontaneous ventilation: present  Sedation level: deep  Preoxygenated: yes  Patient position: sniffing  Mask difficulty assessment: 2 - vent by mask + OA or adjuvant +/- NMBA    Final Airway Details  Final airway type: endotracheal airway      Successful airway: ETT  Cuffed: yes   Successful intubation technique: direct laryngoscopy  Facilitating devices/methods: intubating stylet  Endotracheal tube insertion site: oral  Blade: Starr  Blade size: #4  ETT size (mm): 7.5  Cormack-Lehane Classification: grade I - full view of glottis  Placement verified by: chest auscultation and capnometry   Measured from: teeth  ETT to teeth (cm): 22  Number of attempts at approach: 1

## 2023-11-30 NOTE — PRE-PROCEDURE NOTE
Pre-Procedure H&P     Provider Assessment:  Diagnosis/Reason for Procedure: subdural hematoma  Procedure: angiography with middle meningeal artery embolization  Medications Reviewed:   yes   Prophylatic Antibiotics Needed:   no    Neuro status: alert, aphasia, R wd, L spontaneous  Mouth Opening OK: yes   Neck Flexibility OK: yes   Sedation Plan: moderate sedation   COVID-19 Risk Consent:  Surgeon has reviewed key risks related to the risk of garry COVID-19 and if they contract COVID-19 what the risks are.

## 2023-11-30 NOTE — PROGRESS NOTES
Speech-Language Pathology                 Therapy Communication Note    Patient Name: Haile Ayers  MRN: [id number]  Today's Date: 11/30/2023     Discipline: Speech Language Pathology    Missed Visit Reason:  Pt NPO for surgery today. SLP will continue to follow and re-evaluate swallow function once pt is medically stable. Please do not have nursing conduct a nursing screen due to pt with documented dysphagia as pt had a MBSS on 11/27/23 and placed on a modified diet level 6 with mildly thick liquids.    Missed Time: Attempt 1040

## 2023-11-30 NOTE — PROGRESS NOTES
"Haile Ayers is a 83 y.o. male on day 8 of admission presenting with Subdural hematoma (CMS/HCC).    Subjective   No acute events.          Objective     Physical Exam  Eyes opened spontaneous  Expressive aphasia  RUE minimally spontaneous  LUE FC  RLE flaccid  LLE spontaneous    Last Recorded Vitals  Blood pressure (!) 154/91, pulse 79, temperature 36.4 °C (97.5 °F), temperature source Temporal, resp. rate (!) 30, height 1.727 m (5' 8\"), weight 69.8 kg (153 lb 14.1 oz), SpO2 96 %.  Intake/Output last 3 Shifts:  I/O last 3 completed shifts:  In: 1130 (16.2 mL/kg) [I.V.:300 (4.3 mL/kg); IV Piggyback:830]  Out: 1825 (26.1 mL/kg) [Urine:1825 (0.7 mL/kg/hr)]  Weight: 69.8 kg     Relevant Results                This patient has a urinary catheter   Reason for the urinary catheter remaining today? Urine catheter unnecessary, will be removed today               Assessment/Plan   Principal Problem:    Subdural hematoma (CMS/HCC)    83 y.o. male h/o CLL, anemia, DM, p/w AMS, found to have UTI, CTH w/ L large chronic SDH, 11/22 s/p L crani for SDH evac, CTH POC, 11/24 drain dc'd, Bcx +GPC, 11/25 febrile, remaining vitals stable, exam unchanged, repeat bcx negative    11/28 s/p midline   11/29 Lethargic, not FC, s/p CTH w/ increased L SDH w/ 8mm MLS s/p 1g Keppra load, CXR stable opacities, rCTH stable, s/p R side wd, s/p keppra load, rrCTH stable L SDH    PLAN:  Floor  MMA planning  DC EEG  SBP <160  Chronic ignacio  ID recs-cont vanc, cefe tentative end date 12/4/23   Heme recs-cont to hold ibrutinib until POD14, daily CBC  SLP- diet 6   PTOT-rehab  SCD, SQH    Dispo pending mental status improvement, EEG           Cira Syed MD      "

## 2023-12-01 ENCOUNTER — TELEPHONE (OUTPATIENT)
Dept: RADIOLOGY | Facility: HOSPITAL | Age: 83
End: 2023-12-01

## 2023-12-01 ENCOUNTER — APPOINTMENT (OUTPATIENT)
Dept: RADIOLOGY | Facility: HOSPITAL | Age: 83
DRG: 025 | End: 2023-12-01
Payer: MEDICARE

## 2023-12-01 LAB
ALBUMIN SERPL BCP-MCNC: 2.5 G/DL (ref 3.4–5)
ANION GAP SERPL CALC-SCNC: 12 MMOL/L (ref 10–20)
APTT PPP: 25 SECONDS (ref 27–38)
ATRIAL RATE: 100 BPM
BUN SERPL-MCNC: 23 MG/DL (ref 6–23)
CALCIUM SERPL-MCNC: 7.5 MG/DL (ref 8.6–10.6)
CHLORIDE SERPL-SCNC: 110 MMOL/L (ref 98–107)
CO2 SERPL-SCNC: 19 MMOL/L (ref 21–32)
CREAT SERPL-MCNC: 1.06 MG/DL (ref 0.5–1.3)
ERYTHROCYTE [DISTWIDTH] IN BLOOD BY AUTOMATED COUNT: 14.5 % (ref 11.5–14.5)
GFR SERPL CREATININE-BSD FRML MDRD: 69 ML/MIN/1.73M*2
GLUCOSE SERPL-MCNC: 153 MG/DL (ref 74–99)
HCT VFR BLD AUTO: 29.2 % (ref 41–52)
HGB BLD-MCNC: 9.7 G/DL (ref 13.5–17.5)
INR PPP: 1.1 (ref 0.9–1.1)
MCH RBC QN AUTO: 29.2 PG (ref 26–34)
MCHC RBC AUTO-ENTMCNC: 33.2 G/DL (ref 32–36)
MCV RBC AUTO: 88 FL (ref 80–100)
NRBC BLD-RTO: 0 /100 WBCS (ref 0–0)
P AXIS: 70 DEGREES
P OFFSET: 184 MS
P ONSET: 92 MS
PHOSPHATE SERPL-MCNC: 3 MG/DL (ref 2.5–4.9)
PLATELET # BLD AUTO: 236 X10*3/UL (ref 150–450)
POTASSIUM SERPL-SCNC: 3.3 MMOL/L (ref 3.5–5.3)
PR INTERVAL: 96 MS
PROTHROMBIN TIME: 12.5 SECONDS (ref 9.8–12.8)
Q ONSET: 207 MS
QRS COUNT: 16 BEATS
QRS DURATION: 92 MS
QT INTERVAL: 320 MS
QTC CALCULATION(BAZETT): 412 MS
QTC FREDERICIA: 379 MS
R AXIS: 80 DEGREES
RBC # BLD AUTO: 3.32 X10*6/UL (ref 4.5–5.9)
SODIUM SERPL-SCNC: 138 MMOL/L (ref 136–145)
T AXIS: 65 DEGREES
T OFFSET: 367 MS
VENTRICULAR RATE: 100 BPM
WBC # BLD AUTO: 9.8 X10*3/UL (ref 4.4–11.3)

## 2023-12-01 PROCEDURE — 70450 CT HEAD/BRAIN W/O DYE: CPT

## 2023-12-01 PROCEDURE — 85027 COMPLETE CBC AUTOMATED: CPT | Performed by: STUDENT IN AN ORGANIZED HEALTH CARE EDUCATION/TRAINING PROGRAM

## 2023-12-01 PROCEDURE — 70450 CT HEAD/BRAIN W/O DYE: CPT | Performed by: RADIOLOGY

## 2023-12-01 PROCEDURE — 2500000004 HC RX 250 GENERAL PHARMACY W/ HCPCS (ALT 636 FOR OP/ED): Performed by: STUDENT IN AN ORGANIZED HEALTH CARE EDUCATION/TRAINING PROGRAM

## 2023-12-01 PROCEDURE — 94003 VENT MGMT INPAT SUBQ DAY: CPT

## 2023-12-01 PROCEDURE — 2020000001 HC ICU ROOM DAILY

## 2023-12-01 PROCEDURE — 85610 PROTHROMBIN TIME: CPT | Performed by: STUDENT IN AN ORGANIZED HEALTH CARE EDUCATION/TRAINING PROGRAM

## 2023-12-01 PROCEDURE — 80069 RENAL FUNCTION PANEL: CPT | Performed by: STUDENT IN AN ORGANIZED HEALTH CARE EDUCATION/TRAINING PROGRAM

## 2023-12-01 PROCEDURE — 37799 UNLISTED PX VASCULAR SURGERY: CPT | Performed by: STUDENT IN AN ORGANIZED HEALTH CARE EDUCATION/TRAINING PROGRAM

## 2023-12-01 PROCEDURE — 99291 CRITICAL CARE FIRST HOUR: CPT | Performed by: PSYCHIATRY & NEUROLOGY

## 2023-12-01 RX ORDER — POTASSIUM CHLORIDE 14.9 MG/ML
20 INJECTION INTRAVENOUS
Status: COMPLETED | OUTPATIENT
Start: 2023-12-01 | End: 2023-12-01

## 2023-12-01 RX ADMIN — SODIUM CHLORIDE 1000 ML: 9 INJECTION, SOLUTION INTRAVENOUS at 02:30

## 2023-12-01 RX ADMIN — LEVETIRACETAM 500 MG: 5 INJECTION INTRAVENOUS at 16:20

## 2023-12-01 RX ADMIN — POTASSIUM CHLORIDE 20 MEQ: 14.9 INJECTION, SOLUTION INTRAVENOUS at 19:15

## 2023-12-01 RX ADMIN — LEVETIRACETAM 500 MG: 5 INJECTION INTRAVENOUS at 04:20

## 2023-12-01 RX ADMIN — CEFEPIME 2 G: 1 INJECTION, SOLUTION INTRAVENOUS at 02:31

## 2023-12-01 RX ADMIN — POTASSIUM CHLORIDE 20 MEQ: 14.9 INJECTION, SOLUTION INTRAVENOUS at 17:00

## 2023-12-01 RX ADMIN — LABETALOL HYDROCHLORIDE 10 MG: 5 INJECTION INTRAVENOUS at 22:19

## 2023-12-01 RX ADMIN — CEFEPIME 2 G: 1 INJECTION, SOLUTION INTRAVENOUS at 15:00

## 2023-12-01 RX ADMIN — VANCOMYCIN HYDROCHLORIDE 1500 MG: 1.5 INJECTION, POWDER, LYOPHILIZED, FOR SOLUTION INTRAVENOUS at 16:20

## 2023-12-01 ASSESSMENT — COGNITIVE AND FUNCTIONAL STATUS - GENERAL
TURNING FROM BACK TO SIDE WHILE IN FLAT BAD: A LOT
WALKING IN HOSPITAL ROOM: TOTAL
STANDING UP FROM CHAIR USING ARMS: TOTAL
MOBILITY SCORE: 8
MOVING TO AND FROM BED TO CHAIR: TOTAL
MOVING FROM LYING ON BACK TO SITTING ON SIDE OF FLAT BED WITH BEDRAILS: A LOT
CLIMB 3 TO 5 STEPS WITH RAILING: TOTAL

## 2023-12-01 ASSESSMENT — PAIN SCALES - GENERAL: PAINLEVEL_OUTOF10: 0 - NO PAIN

## 2023-12-01 NOTE — PROGRESS NOTES
Communication Note    Patient Name: Haile Ayers  MRN: [id number]  Today's Date: 12/1/2023     Discipline: Occupational Therapy      Missed Visit: Yes  Missed Visit Reason:  (Per RN, pt with orders to remain flat. Intubated with potential plans to extubate. Will defer OT at this time. Will reattempt another date as appropriate.)      12/01/23 at 8:56 AM   Nkechi Patel OT   Rehab Office: [phone]    ,ca

## 2023-12-01 NOTE — SIGNIFICANT EVENT
Pt had tachypnea, was breathing in 30's, had pt prior & was not breathing like this.  Notified NP Nevaeh SALDANA, she came to see patient, noted pt has wheezing & ordered Albuterol neb treatment & for Resp to see.

## 2023-12-01 NOTE — PROGRESS NOTES
Physical Therapy                 Therapy Communication Note    Patient Name: Haile Ayers  MRN: [id number]  Today's Date: 12/1/2023     Discipline: Physical Therapy    Missed Visit Reason: Missed Visit Reason:  (Pt with HOB flat orders. Plan to hold PT at this time.)    Missed Time: Attempt

## 2023-12-01 NOTE — PROGRESS NOTES
"Haile Ayers is a 83 y.o. male on day 9 of admission presenting with Subdural hematoma (CMS/HCC).    Subjective   S/p L crani for redo SDH evac       Objective     Physical Exam  Eyes opened spontaneous  Expressive aphasia  RUE minimally spontaneous  LUE FC  RLE flaccid  LLE spontaneous    Last Recorded Vitals  Blood pressure 125/71, pulse 92, temperature 36.3 °C (97.3 °F), resp. rate 22, height 1.727 m (5' 7.99\"), weight 69.8 kg (153 lb 14.1 oz), SpO2 100 %.  Intake/Output last 3 Shifts:  I/O last 3 completed shifts:  In: 3190 (45.7 mL/kg) [I.V.:490 (7 mL/kg); IV Piggyback:2700]  Out: 3625 (51.9 mL/kg) [Urine:3325 (1.3 mL/kg/hr); Drains:200; Blood:100]  Weight: 69.8 kg     Relevant Results           This patient currently has cardiac telemetry ordered; if you would like to modify or discontinue the telemetry order, click here to go to the orders activity to modify/discontinue the order.    This patient has a urinary catheter   Reason for the urinary catheter remaining today? Urine catheter unnecessary, will be removed today    This patient is intubated   Reason for patient to remain intubated today? Intubation unnecessary, will extubate today             Assessment/Plan   Principal Problem:    Subdural hematoma (CMS/HCC)    83 y.o. male h/o CLL, anemia, DM, p/w AMS, found to have UTI, CTH w/ L large chronic SDH, 11/22 s/p L crani for SDH evac, CTH POC, 11/24 drain dc'd, Bcx +GPC, 11/25 febrile, remaining vitals stable, exam unchanged, repeat bcx negative     11/28 s/p midline   11/29 Lethargic, not FC, s/p CTH w/ increased L SDH w/ 8mm MLS s/p 1g Keppra load, CXR stable opacities, rCTH stable, s/p R side wd, s/p keppra load, rrCTH stable L SDH,  11/30 S/p L crani for redo SDH evac, CTH POC with improved MLS     PLAN:  NSU  SBP <160  Chronic ignacio  ID recs-cont vanc, cefe tentative end date 12/4/23   Wean to extibated  Heme recs-cont to hold ibrutinib until POD14, daily CBC  SLP needs re-eval  PTOT-rehab; needs " marquis-esthela  SCD              Ignacio Huber MD

## 2023-12-01 NOTE — ANESTHESIA POSTPROCEDURE EVALUATION
Patient: Haile Ayers    Procedure Summary       Date: 11/30/23 Room / Location: [facility] OR 26 / [facility] OR    Anesthesia Start: 1556 Anesthesia Stop: 1904    Procedure: L crani for re-evacuation of SDH (Left: Head) Diagnosis:       Subdural hematoma (CMS/HCC)      (Subdural hematoma (CMS/HCC) [S06.5XAA])    Surgeons: Og Tran MD Responsible Provider: Rehana Salas MD    Anesthesia Type: general ASA Status: 4            Anesthesia Type: general    Vitals Value Taken Time   /79 11/30/23 1540   Temp 37.2 °C (99 °F) 11/30/23 1540   Pulse 96 11/30/23 1540   Resp 30 11/30/23 1540   SpO2 95 % 11/30/23 1540       Anesthesia Post Evaluation    Patient location during evaluation: ICU  Patient participation: complete - patient cannot participate  Level of consciousness: sedated  Pain score: 0  Pain management: adequate  Airway patency: patent (Intubated)  Cardiovascular status: acceptable  Respiratory status: acceptable  Hydration status: acceptable  Postoperative Nausea and Vomiting: none        No notable events documented.

## 2023-12-01 NOTE — OP NOTE
L crani for re-evacuation of SDH (L) Operative Note     Date: 2023  OR Location: [facility] OR    Name: Haile Ayers, : 1940, Age: 83 y.o., MRN: [id number], Sex: male    PREOPERATIVE DIAGNOSIS:   Recurrent left-sided  Subdural Hematoma     POSTOPERATIVE DIAGNOSIS:   SAME    OPERATION/PROCEDURE:    Redo LEFT  Sided Craniotomy for Evacuation of Subdural Hematoma     SURGEON:    Og Tran MD    RESIDENT:  Ignacio Huber MD    MEDICATIONS:    Antibiotic prophylaxis given within one hour prior to skin incision.    PROPHYLAXIS:    Venous thromboembolism prophylaxis was ordered at the time of surgery in the form of mechanical prophylaxis using sequential compression devices.    INDICATION:  Mr. Ayers is a 83 y.o. male who who underwent a left-sided craniotomy for evacuation of subdural hematoma with me on 2023.  He did very well following the surgery but then more recently developed weakness involving the right side along with worsening of his neurological status with a CT scan showing reaccumulation of subdural hematoma on the left side as compared to the immediate postoperative CT scan that showed well decompressed brain with no evidence of any significant subdural hematoma.  Risk versus benefits of surgery versus continued nonoperative treatment were again discussed with the patient's wife and a decision to proceed with surgery was made sonly as a life-saving measure and so was also to improve his weakness on the right side.    DESCRIPTION OF PROCEDURE:    The patient was brought into the operating room on a cart and was transferred over to the operative table, was handed to Anesthesia for endotracheal intubation. The previous staples were removed .   The patient was then prepped and draped in a sterile fashion, and a 10 blade was used to incise the skin down to bone.  At this point the bone flap was removed by removing the Chelsi plating system.  At this point the dura was then  reopened and a significant amount of her subdural hematoma was visualized that was removed using gentle suction, irrigation, and forceps were used to remove as much as the blood clot as safely possible.  At that point, after hemostasis was achieved, a subdural drain was left in place.   DuraGen was placed on top of the dural leaflets.  The bone was then replaced and held in place using Xormis cranial fixation system.   The incision was then closed in a layered fashion with inverted 2-0 and 3-0 nylon  for the skin.  The patient was left intubated due to his neurological condition prior to coming to the operating room. There were no complications. The EBL was about 100 ml  Complications:  None; patient tolerated the procedure well.    Disposition: ICU - intubated and hemodynamically stable.  Condition: stable     Attending Attestation: I was present and scrubbed for the key portions of the procedure.    Og Tran  Phone Number: [phone]

## 2023-12-01 NOTE — PROGRESS NOTES
Subjective   83 y.o. man with CLL, anemia, DM, dementia, chronic indwelling ignacio 2/2 BPH, HTN who presented on 11/22/23 for concerns of altered mental status. Found to have an acute large left SDH with midline shift. S/p evacuation on 11/22.  Loaded with Keppra and continued on maintenance, though no active seizure. He was admitted to the floor under NSGY. He was seen by ID for post op fever. Likely source was aspiration PNA for which he was started on cefepime. Also started on Vancomycin for coagneg staph bacteremia that was thought to be secondary to contamination, but recommended abx tx. Was planned for MMA embolization on 11/30 but aborted. Post op CTH with recurrence of of his SDH s/p re-resection on 11/30 and admission to the ICU.     Interval Events:   Recovered in the NSU on the evening of 11/30. Remained intubated as anesthesia was concerned about his preop GCS.      Objective     Vitals 24 hour ranges:  Heart Rate:  []   Temp:  [35.8 °C (96.4 °F)-37.2 °C (99 °F)]   Resp:  [16-32]   BP: ()/(63-91)   SpO2:  [94 %-100 %]      Intake/Output for last 24 hours:    Intake/Output Summary (Last 24 hours) at 12/1/2023 0726  Last data filed at 12/1/2023 0600  Gross per 24 hour   Intake 3190 ml   Output 1825 ml   Net 1365 ml      Vent settings:  Vent Mode: Volume control/assist control  FiO2 (%):  [40 %] 40 %  S RR:  [16] 16  S VT:  [480 mL] 480 mL  PEEP/CPAP (cm H2O):  [5 cm H20] 5 cm H20  MAP (cm H2O):  [9] 9    Physical Exam:  NEURO:  Alert, not following commands or nodding to orientation questions  EOS, Pupils 5-3mm and brisk  BANGURA antigravity and spontaneous but not to command  CV:  RRR on telemetry, NSR  RESP:  ET tube in place  CPAP  Breathing comfortably  :  Chronic ignacio with clear yellow urine  GI:  Abdomen NT/ND, soft  SKIN:  Left frontal incision c/d/I      Medications    Scheduled:  atorvastatin, 20 mg, oral, Daily  barium sulfate, 25 mL, oral, Once in imaging  barium sulfate, 20 mL, oral,  Once in imaging  barium sulfate, 25 mL, oral, Once in imaging  cefepime, 2 g, intravenous, q12h  cholecalciferol, 4,000 Units, oral, Daily  [Held by provider] heparin (porcine), 5,000 Units, subcutaneous, q8h ENEIDA  levETIRAcetam, 500 mg, intravenous, q12h  lidocaine, 5 mL, infiltration, Once  polyethylene glycol, 17 g, oral, Daily  vancomycin, 1,500 mg, intravenous, q24h    PRN:  PRN medications: acetaminophen, acetaminophen, albuterol, hydrALAZINE, HYDROmorphone, labetaloL, naloxone, ondansetron **OR** ondansetron, oxyCODONE, oxyCODONE     Continuous:  oxygen,   sodium chloride 0.9%, 75 mL/hr, Last Rate: 75 mL/hr (12/01/23 0238)      Lab Results  Results from last 72 hours   Lab Units 11/30/23  0745 11/29/23  0836   WBC AUTO x10*3/uL 7.8 7.2   NRBC AUTO /100 WBCs 0.0 0.0   RBC AUTO x10*6/uL 3.36* 3.16*   HEMOGLOBIN g/dL 10.0* 9.5*   HEMATOCRIT % 29.4* 28.5*   MCV fL 88 90   MCH pg 29.8 30.1   MCHC g/dL 34.0 33.3   RDW % 13.9 14.1   PLATELETS AUTO x10*3/uL 228 210      Results from last 72 hours   Lab Units 11/29/23  0836 11/29/23  0351   GLUCOSE mg/dL 149* 173*   SODIUM mmol/L 141 138   POTASSIUM mmol/L 3.6 3.4*   CHLORIDE mmol/L 112* 110*   CO2 mmol/L 21 22   ANION GAP mmol/L 12 9*   BUN mg/dL 16 17   CREATININE mg/dL 0.86 0.81   EGFR mL/min/1.73m*2 85 87   CALCIUM mg/dL 7.8* 7.9*   PHOSPHORUS mg/dL 2.3* 2.4*   ALBUMIN g/dL 2.4* 2.7*          Imaging Results  CT head wo IV contrast         CT head wo IV contrast   Final Result   1. Redemonstration of postoperative changes of left-sided craniotomy   with extra-axial mixed attenuation fluid and air collection measuring   approximately 9 mm in maximal thickness, previously measuring 7 mm.   There is superimposed mixed hyperdense and hypodense attenuation left   hemispheric subdural hematoma, grossly unchanged when compared to the   prior exam. There is effacement of the adjacent sulci and partial   effacement of the left lateral ventricle without significant changes    when compared to the prior exam. There is a proximally 8 mm   left-to-right midline shift, similar to prior.        I personally reviewed the images/study and I agree with the findings   as stated by resident physician Dr. Regino Quiñones . This study   was interpreted at [facility],   [place], [place].        MACRO:   None        Signed by: Sneha Pérez 11/30/2023 7:02 AM   Dictation workstation:   [id number]      CT head wo IV contrast   Final Result   Postoperative changes are again identified compatible with a recent   left sided craniotomy.        There is again evidence of scattered pneumocephalus.        Immediately deep to the craniotomy site, there is again evidence of   an extra-axial likely epidural mixed attenuation hemorrhagic fluid   and air collection measuring approximately 7 mm in thickness on both   the current and prior study.        There is again evidence of a superimposed mixed hyperdense and   hypodense attenuation left hemispheric subdural hematoma again   measuring approximately 21 mm in greatest thickness as seen on the   coronal reconstructed images adjacent to the left parietal lobe.   There is again evidence of mass effect upon the adjacent left   cerebral hemisphere with asymmetric effacement/partial effacement of   sulci of the left cerebral hemisphere as well as partial effacement   of the left lateral ventricle and 3rd ventricle. There is again   evidence of approximately 8 mm of bowing of the midline structures   from left to right.        The above findings are again noted be superimposed upon moderate   brain parenchymal volume loss.        Patchy nonspecific white matter changes are again noted within   cerebral hemispheres bilaterally which while nonspecific, given the   patient's age, likely represent sequelae of small-vessel ischemic   change.        MACRO:   None.        Signed by: Cassius Richardson 11/29/2023 8:51 AM   Dictation  workstation:   [id number]      CT head wo IV contrast   Final Result   Evolving postoperative changes with increased size of the residual   left subdural hematoma and increased volume of high attenuation blood   products, attention on follow-up is recommended. Rightward midline   shift is similar to previous.        I personally reviewed the images/study and I agree with the findings   as stated above by resident physician, Dr. Kedar Mitchell. The   study was interpreted at [facility] in [facility].        Signed by: Fredrick Hemphill 11/29/2023 9:10 AM   Dictation workstation:   [id number]      XR chest 1 view   Final Result   Redemonstration patchy airspace opacities that are noted bilaterally,   favored to represent pulmonary alveolar edema. However, multifocal   pneumonia can have similar appearance. Small left layering effusion   with associated adjacent atelectasis.        I personally reviewed the images/study and I agree with the findings   as stated above by resident physician, Dr. Kedar Mitchell. The   study was interpreted at [facility] in [facility].        Signed by: Tushar Hartman 11/29/2023 8:36 AM   Dictation workstation:   [id number]      XR chest 1 view   Final Result   1. Redemonstration of findings suggestive of pulmonary edema with   areas of patchy airspace opacities throughout the right lung which   are nonspecific and may represent multifocal pneumonia. Blunting of   the bilateral costophrenic angles may represent small pleural   effusions versus atelectasis.        I personally reviewed the images/study and I agree with the findings   as stated by resident physician Dr. Regino Quiñones . This study   was interpreted at [facility],   [place], [place].        MACRO:   None        Signed by: Melissa Castro 11/26/2023 8:59 AM   Dictation workstation:   [id number]       XR chest 1 view   Final Result   1. Slight interval worsening of bilateral lung aeration with findings   suggestive of pulmonary edema, however multifocal infection is not   excluded in the appropriate clinical setting. Delete   2. Small bilateral pleural effusions.        I personally reviewed the images/study and I agree with the findings   as stated above by resident physician, Jerome Singh MD. This study   was interpreted at [place], [place].             MACRO:   None.        Signed by: Brandon Yuan 11/25/2023 9:38 AM   Dictation workstation:   [id number]      XR chest 1 view   Final Result   1. Bibasilar atelectasis with or without trace bilateral pleural   effusions, left-greater-than-right. Superimposed   infectious/inflammatory infiltrate is not excluded, for example in   the setting of aspiration pneumonitis.        I personally reviewed the images/study and I agree with the resident   findings as stated by Yokasta Rubin MD. This study was interpreted at   [place], [place].        MACRO:   None        Signed by: Tushar Hartman 11/24/2023 8:28 AM   Dictation workstation:   [id number]      XR abdomen 1 view   Final Result   1. No enteric tube is seen within field of view and correlate   clinically.   2. Nonobstructive bowel gas pattern.   3. Visualized basilar lungs demonstrate coarse airspace opacities   bilaterally with small pleural effusions not excluded. Correlate with   concern for underlying infectious process.        Signed by: Kendall Calix 11/23/2023 6:52 AM   Dictation workstation:   [id number]      CT head wo IV contrast   Final Result   1. Postsurgical changes consistent with interval left frontoparietal   craniotomy for left subdural hematoma evacuation and subdural drain   placement with interval reduction in size of the mixed density fluid   collection and improvement in mass effect, midline  shift, and sulcal   effacement when compared to prior, same day CT of the head as   described in detail above.   2. No evidence of new hemorrhage or acute cortical infarction.        I personally reviewed the images/study and I agree with the findings   as stated above by resident physician, Dr. Kedar Mitchell. The   study was interpreted at [facility] in [facility].        Signed by: Sneha Pérez 11/22/2023 6:55 AM   Dictation workstation:   [id number]      CT head wo IV contrast   Final Result   1. Large predominantly hypodense left convexity subdural hematoma   with multiple septations measuring up to 2.6 cm at its greatest width   with significant associated mass effect, sulcal effacement, 10-11 mm   of rightward midline shift, and effacement of the left lateral   ventricle.   2. No evidence of additional hemorrhage or acute cortical infarct.        I personally reviewed the images/study and I agree with the findings   as stated above by resident physician, Dr. Kedar Mitchell. The   study was interpreted at [facility] in [facility].        Signed by: Shiv Magallanes 11/22/2023 12:51 AM   Dictation workstation:   [id number]      FL modified barium swallow study    (Results Pending)   Bedside Midline Imaging    (Results Pending)         Assessment/Plan   Mr. Ayers is an 83 y.o. man with CLL, anemia, DM, dementia, chronic indwelling ignacio 2/2 BPH, HTN who presented on 11/22/23 for concerns of altered mental status. Found to have an acute large left SDH with midline shift. S/p evacuation on 11/22.  Loaded with Keppra and continued on maintenance, though no active seizure. He was admitted to the floor under NSGY. He was seen by ID for post op fever. Likely source was aspiration PNA for which he was started on cefepime. Also started on Vancomycin for coagneg staph bacteremia that was thought to be secondary to contamination, but  recommended abx tx. On the floor, he became more lethargic for which [facility] demonstrated recurrence of his SDH s/p resection on 11/30 and admission to the ICU.     NEURO:  #SDH s/p evacuation x2  # MMA embolization on 11/30  NSU  Q1H neuro checks  Pain: acetaminophen, oxycodone, hydromorphone PRN  Sedation: None  EEG with left hemispheric structural lesion and moderate diffuse encephalopathy but no seizures  Keppra 500mg BID  PT/OT/SLP    CARDIOVASCULAR:  # HTN  # HLD  Continue to monitor on telemetry  Goal SBP < 160  Continue atorvastatin  Hydralazine and Labetalol PRN    RESPIRATORY:  #Intubated for airway protection during OR  Continuous pulse oximetry   WTE, on CPAP  O2 PRN to maintain SpO2 > 94%, wean as tolerated  Ventilator bundle while intubated     RENAL/:  #BPH  #No active issues  Results from last 72 hours   Lab Units 11/29/23  0836 11/29/23  0351   BUN mg/dL 16 17   CREATININE mg/dL 0.86 0.81     Monitor with daily RFP  Chronic ignacio for BPH    FEN/GI:  Last BM Date: 11/27/23  Monitor and replace electrolytes per protocol  IVF: 75 ml NS  Diet: Passed MBS pre op  Bowel Regimen: Miralax PRN    ENDOCRINE:  # DM  Results from last 7 days   Lab Units 11/30/23  1959 11/30/23  1141 11/30/23  0812 11/29/23  0836 11/29/23  0351 11/28/23  2134 11/28/23  0848 11/27/23  1233 11/27/23  0833 11/27/23  0759   POCT GLUCOSE mg/dL 167* 158* 138*  --   --  243*  --  131*  --  116*   GLUCOSE mg/dL  --   --   --  149* 173*  --    < >  --    < >  --     < > = values in this interval not displayed.      Accuchecks & ISS Q6H    HEMATOLOGY:  #CLL  Results from last 7 days   Lab Units 11/30/23  0745 11/29/23  0836   HEMOGLOBIN g/dL 10.0* 9.5*   HEMATOCRIT % 29.4* 28.5*   PLATELETS AUTO x10*3/uL 228 210     Continue to monitor with daily CBC and Coag panel  Heme consulted, recs appreciated  Hold Ibrutinib until POD14    INFECTIOUS DISEASE:  #Aspiration PNA  Results from last 7 days   Lab Units 11/30/23  0745 11/29/23  0836   WBC  AUTO x10*3/uL 7.8 7.2     Temp (24hrs), Av.7 °C (98 °F), Min:35.8 °C (96.4 °F), Max:37.2 °C (99 °F)   Vanc Cefepime until   Appreciate ID recs  Blood cx  with staph hominis  Plan:  Continue to monitor for s/sx of infection  Pan culture for temperature > 38.4 C    MUSCULOSKELETAL:  No acute issues    SKIN:  No acute issues  Turns and skin care per NSU protocol    ACCESS:  PIV    PROPHYLAXIS:  DVT/VTE: SCDs, SQ heparin POD2    RESTRAINTS:  I agree with nursing assessment of the patient's need for restraints to protect the patient from injury and facilitate healing. The patient is unable to cooperate with the plan of care and at risk for disrupting critical therapy (i.e., removing medical devices, lines, tubes and/or dressings).  Please see order for specifics. Restraints can be removed when the patient is able to cooperate with plan of care and allow healing to occur, or the medical devices at risk are discontinued by the medical team.     Eugenio Barajas DO  Neuroscience Intensive Care       Attending Attestation:   I saw and evaluated the patient. I personally obtained the key and critical portions of the history and physical exam or was physically present for key and critical portions performed by the resident/fellow. I reviewed the resident/fellow's documentation and discussed the patient with the resident/fellow. I agree with the resident/fellow's medical decision making as documented in the note with the exception/addition of the following:    L SDH, s/p evacuation x 2, 2nd procedure     Remained intubated overnight for acute respiratory insufficiency post op, now extubated.    ID: On vanc/cefepime for Staph in blood, suspected pna.    Statement of Acuity: I have reviewed and evaluated all relevant data of the last 24 hours, personally examined the patient and formulated the plan of care.  This patient was critically ill and required continued critical care treatment.  Total critical care  time: 35min.    Santos Prado M.D.

## 2023-12-01 NOTE — CONSULTS
"Nutrition Follow Up Assessment:   Nutrition Assessment       Patient is a 83-year-old male presenting with SDH. Patient previously seen for Nutrition Therapy- Assessment (11/24/2023):  - 11/27/2023:  MBSS recommending Level 6 Diet with Mildly Thick Liquids  - 11/30/2023: s/p L-crani for re-evacuation of SDH    RDN met with patient for Nutrition Therapy- Follow Up. Patient currently intubated on CPAP with 75 mL/hour IVF running. Per RN, plan for extubation today + MBSS to assess most appropriate diet for patient.     Nutrition History:  Food and Nutrient History: unknown- no intake documented in Flowsheets + patient intubated.  Energy Intake:  (unknown.)    Anthropometrics:  Height: 172.7 cm (5' 7.99\")   Weight: 69.8 kg (153 lb 14.1 oz)   BMI (Calculated): 23.4  IBW/kg (Dietitian Calculated): 70 kg  Percent of IBW: 100 %     Weight History:   Wt Readings from Last 5 Encounters:   12/01/23 69.8 kg (153 lb 14.1 oz)   11/21/23 70.1 kg (154 lb 8.7 oz)     Weight Change %:     Significant Weight Loss: No        Nutrition Significant Labs:  CBC Trend:   Results from last 7 days   Lab Units 12/01/23  1349 11/30/23  0745 11/29/23  0836 11/29/23  0351   WBC AUTO x10*3/uL 9.8 7.8 7.2 7.8   RBC AUTO x10*6/uL 3.32* 3.36* 3.16* 3.01*   HEMOGLOBIN g/dL 9.7* 10.0* 9.5* 8.8*   HEMATOCRIT % 29.2* 29.4* 28.5* 26.8*   MCV fL 88 88 90 89   PLATELETS AUTO x10*3/uL 236 228 210 198    , BMP Trend:   Results from last 7 days   Lab Units 12/01/23  1349 11/29/23  0836 11/29/23  0351 11/28/23  0848   GLUCOSE mg/dL 153* 149* 173* 167*   CALCIUM mg/dL 7.5* 7.8* 7.9* 7.9*   SODIUM mmol/L 138 141 138 142   POTASSIUM mmol/L 3.3* 3.6 3.4* 3.3*   CO2 mmol/L 19* 21 22 21   CHLORIDE mmol/L 110* 112* 110* 113*   BUN mg/dL 23 16 17 20   CREATININE mg/dL 1.06 0.86 0.81 0.78   BG POCT trend:   Results from last 7 days   Lab Units 11/30/23  1959 11/30/23  1141 11/30/23  0812 11/28/23  2134 11/27/23  1233   POCT GLUCOSE mg/dL 167* 158* 138* 243* 131*    , " Renal Lab Trend:   Results from last 7 days   Lab Units 12/01/23  1349 11/29/23  0836 11/29/23  0351 11/28/23  0848   POTASSIUM mmol/L 3.3* 3.6 3.4* 3.3*   PHOSPHORUS mg/dL 3.0 2.3* 2.4* 2.2*   SODIUM mmol/L 138 141 138 142   EGFR mL/min/1.73m*2 69 85 87 88   BUN mg/dL 23 16 17 20   CREATININE mg/dL 1.06 0.86 0.81 0.78     Nutrition Specific Medications:  Scheduled Medications  atorvastatin, 20 mg, oral, Daily  barium sulfate, 25 mL, oral, Once in imaging  barium sulfate, 20 mL, oral, Once in imaging  barium sulfate, 25 mL, oral, Once in imaging  cefepime, 2 g, intravenous, q12h  cholecalciferol, 4,000 Units, oral, Daily  [Held by provider] heparin (porcine), 5,000 Units, subcutaneous, q8h ENEIDA  levETIRAcetam, 500 mg, intravenous, q12h  lidocaine, 5 mL, infiltration, Once  polyethylene glycol, 17 g, oral, Daily  vancomycin, 1,500 mg, intravenous, q24h    Continuous Medications  oxygen,   sodium chloride 0.9%, 75 mL/hr, Last Rate: 75 mL/hr (12/01/23 1130)    I/O:   Last BM Date: 11/27/23; Stool Appearance: Watery (11/26/23 2240)    Dietary Orders (From admission, onward)       Start     Ordered    12/04/23 0001  NPO Diet; Effective midnight  Diet effective midnight         11/30/23 2012 11/30/23 0905  NPO Diet; Effective now  Diet effective now         11/30/23 0904                  Estimated Needs:   Total Energy Estimated Needs (calories): 1234-4188 calories/day  Method for Estimating Needs: 25-30 calories/kg actual body weight    Total Protein Estimated Needs (g): 105-140 g/day  Method for Estimating Needs: 1.5-2.0 g/kg actual body weight    Total Fluid Estimated Needs (mL): 1 mL/calorie or per team     Nutrition Diagnosis   Nutrition Diagnosis:  Malnutrition Diagnosis  Patient has Malnutrition Diagnosis: No    Nutrition Diagnosis  Patient has Nutrition Diagnosis: Yes  Diagnosis Status (1): New  Nutrition Diagnosis 1: Increased nutrient needs  Related to (1): increased metabolic demand  As Evidenced by (1): s/p  re-do L-crani  Additional Assessment Information (1): patient will benefit from high-protein formula s/p crani     Nutrition Interventions/Recommendations   Nutrition Interventions and Recommendations:        Nutrition Prescription:  Individualized Nutrition Prescription Provided for : Patient pending extubation + MBSS- see note for PO vs. TF recommendations.  If patient cleared for PO Diet, recommend least restrictive diet tolerated by patient to liberalize options.   If patient requiring enteral feeds, recommend the following:  Initiate Pivot 1.5 @ 10 mL/hour increasing by 10 mL/hour q8-12h until goal rate of 55 mL/hour is reached.   FWF to team, TF @ goal provides 990 mL H2O  Monitor Phos, Potassium + Mg for drops. If drops occur, keep TF @ rate + replete. Continue increasing to goal once WNL.   This regimen provides 1320 mL, 1980 calories and 123 g protein        Nutrition Interventions:   Food and/or Nutrient Delivery Interventions  Interventions: Enteral intake, Meals and snacks  Goal: > 75% of needs  Goal: TF tolerance    Coordination of Nutrition Care by a Nutrition Professional  Collaboration and Referral of Nutrition Care: Collaboration by nutrition professional with other nutrition professionals, Collaboration by nutrition professional with other providers    Nutrition Education:    not applicable.        Nutrition Monitoring and Evaluation   Monitoring/Evaluation:   Food/Nutrient Related History Monitoring  Monitoring and Evaluation Plan: Energy intake, Enteral and parenteral nutrition intake    Body Composition/Growth/Weight History  Criteria: maintain stable weight    Biochemical Data, Medical Tests and Procedures  Electrolyte and Renal Panel: Phosphorus, Magnesium, Potassium  Glucose/Endocrine Profile: Glucose, casual  Criteria: ICU BG Control 140-180 mg/dL    Nutrition Focused Physical Findings  Monitoring and Evaluation Plan: Skin  Criteria: promote healing          Time Spent/Follow-up Reminder:    Follow Up  Time Spent (min): 60 minutes  Last Date of Nutrition Visit: 12/01/23  Nutrition Follow-Up Needed?: Dietitian to reassess per policy  Follow up Comment: high-protein formula s/p crani

## 2023-12-01 NOTE — DOCUMENTATION CLARIFICATION NOTE
"    PATIENT:               SULEMAN DELEON  ACCT #:                  [id number]  MRN:                       [id number]  :                       1940  ADMIT DATE:       2023 11:26 PM  DISCH DATE:  RESPONDING PROVIDER #:        [id number]          PROVIDER RESPONSE TEXT:    Traumatic SDH    CDI QUERY TEXT:    [facility]_SDH Traumatic Vs NonTraumatic    Instruction:    Based on your assessment of the patient and the clinical information, please provide the requested documentation by clicking on the appropriate radio button and enter any additional information if prompted.    Question: Is there a diagnosis indicative of the clinical information    When answering this query, please exercise your independent professional judgment. The fact that a question is being asked, does not imply that any particular answer is desired or expected.    The patient's clinical indicators include:  Clinical Information: 83 y.o. male h/o CLL, anemia, DM, p/w AMS, found to have UTI, CTH w/ L large chronic SDH    Clinical Indicators:  HP by Dr. Vazquez  \"CTH w/ L large chronic SDH\" \"Patient is seen at ED, found to be nonverbal.  He is fully awake and tracks but does not speak or follow any commands.  He is unable to provide further history\"     ED note by Dr.. Chaudhry \" Reportedly appeared to be altered prompting his wife to take him to OSH for evaluation.  Patient was aphasic when he arrived at OSH prompting stroke alert.  CT head was obtained that demonstrated an acute on chronic subdural with approximately 1.2 cm of midline shift.\"      CTH \"Large predominantly hypodense left convexity subdural hematoma  with multiple septations measuring up to 2.6 cm at its greatest width with significant associated mass effect, sulcal effacement, 10-11 mm of rightward midline shift, and effacement of the left lateral ventricle.\"    Treatment: CTH,  S/P SDH evacuation, Q 4 hour neuro checks    Risk Factors: SDH  Options provided:  -- " Traumatic SDH  -- Non-traumatic SDH  -- Unable to Determine  -- Other - I will add my own diagnosis  -- Refer to Clinical Documentation Reviewer    Query created by: Renetta Begum on 12/1/2023 11:06 AM      Electronically signed by:  ROBERT AHUJA MD 12/1/2023 1:34 PM

## 2023-12-01 NOTE — CARE PLAN
Problem: Pain  Goal: My pain/discomfort is manageable  Outcome: Progressing     Problem: Safety  Goal: Patient will be injury free during hospitalization  Outcome: Progressing  Goal: I will remain free of falls  Outcome: Progressing     Problem: Daily Care  Goal: Daily care needs are met  Outcome: Progressing     Problem: Psychosocial Needs  Goal: Demonstrates ability to cope with hospitalization/illness  Outcome: Progressing  Goal: Collaborate with me, my family, and caregiver to identify my specific goals  Outcome: Progressing     Problem: Discharge Barriers  Goal: My discharge needs are met  Outcome: Progressing     Problem: General Stroke  Goal: Demonstrate improvement in neurological exam throughout the shift  Outcome: Progressing  Goal: Maintain BP within ordered limits throughout shift  Outcome: Progressing  Goal: Participate in treatment (ie., meds, therapy) throughout shift  Outcome: Progressing  Goal: No symptoms of aspiration throughout shift  Outcome: Progressing  Goal: No symptoms of hemorrhage throughout shift  Outcome: Progressing  Goal: Tolerate enteral feeding throughout shift  Outcome: Progressing  Goal: Decreased nausea/vomiting throughout shift  Outcome: Progressing  Goal: Controlled blood glucose throughout shift  Outcome: Progressing  Goal: Out of bed three times today  Outcome: Progressing     Problem: ICU Stroke  Goal: Maintain ICP within ordered limits throughout shift  Outcome: Progressing  Goal: Tolerate EVD clamping trial throughout shift  Outcome: Progressing  Goal: Tolerate ventilator weaning trial during shift  Outcome: Progressing  Goal: Maintain patent airway throughout shift  Outcome: Progressing  Goal: Achieve/maintain targeted sodium level throughout shift  Outcome: Progressing     Problem: Skin  Goal: Decreased wound size/increased tissue granulation at next dressing change  Outcome: Progressing  Goal: Participates in plan/prevention/treatment measures  Outcome:  Progressing  Goal: Prevent/manage excess moisture  Outcome: Progressing  Goal: Prevent/minimize sheer/friction injuries  Outcome: Progressing  Goal: Promote/optimize nutrition  Outcome: Progressing  Goal: Promote skin healing  Outcome: Progressing     Problem: Safety - Medical Restraint  Goal: Remains free of injury from restraints (Restraint for Interference with Medical Device)  Outcome: Progressing  Goal: Free from restraint(s) (Restraint for Interference with Medical Device)  Outcome: Progressing     Problem: Nutrition  Goal: Nutrition support goals are met within 48 hrs  Outcome: Progressing  Goal: Lab values WNL  Outcome: Progressing  Goal: Electrolytes WNL  Outcome: Progressing  Goal: Promote healing  Outcome: Progressing  Goal: Maintain stable weight  Outcome: Progressing  Goal: Reduce weight from edema/fluid  Outcome: Progressing     Problem: Pain - Adult  Goal: Verbalizes/displays adequate comfort level or baseline comfort level  Outcome: Progressing     Problem: Safety - Adult  Goal: Free from fall injury  Outcome: Progressing     Problem: Discharge Planning  Goal: Discharge to home or other facility with appropriate resources  Outcome: Progressing     Problem: Chronic Conditions and Co-morbidities  Goal: Patient's chronic conditions and co-morbidity symptoms are monitored and maintained or improved  Outcome: Progressing     Problem: Fall/Injury  Goal: Not fall by end of shift  Outcome: Progressing  Goal: Be free from injury by end of the shift  Outcome: Progressing  Goal: Verbalize understanding of personal risk factors for fall in the hospital  Outcome: Progressing     Problem: Diabetes  Goal: Increase stability of blood glucose readings by end of shift  Outcome: Progressing  Goal: Maintain electrolyte levels within acceptable range throughout shift  Outcome: Progressing  Goal: Maintain glucose levels >70mg/dl to <250mg/dl throughout shift  Outcome: Progressing  Goal: No changes in neurological exam by  end of shift  Outcome: Progressing   The patient's goals for the shift include      The clinical goals for the shift include pt will have stable or improved neurological status throughout shift.

## 2023-12-02 LAB
ALBUMIN SERPL BCP-MCNC: 2.4 G/DL (ref 3.4–5)
ANION GAP SERPL CALC-SCNC: 8 MMOL/L (ref 10–20)
APTT PPP: 20 SECONDS (ref 27–38)
BUN SERPL-MCNC: 21 MG/DL (ref 6–23)
CALCIUM SERPL-MCNC: 7.4 MG/DL (ref 8.6–10.6)
CHLORIDE SERPL-SCNC: 112 MMOL/L (ref 98–107)
CO2 SERPL-SCNC: 21 MMOL/L (ref 21–32)
CREAT SERPL-MCNC: 1.09 MG/DL (ref 0.5–1.3)
ERYTHROCYTE [DISTWIDTH] IN BLOOD BY AUTOMATED COUNT: 14.1 % (ref 11.5–14.5)
GFR SERPL CREATININE-BSD FRML MDRD: 67 ML/MIN/1.73M*2
GLUCOSE SERPL-MCNC: 173 MG/DL (ref 74–99)
HCT VFR BLD AUTO: 26.8 % (ref 41–52)
HGB BLD-MCNC: 9.4 G/DL (ref 13.5–17.5)
INR PPP: 1.1 (ref 0.9–1.1)
MCH RBC QN AUTO: 29.1 PG (ref 26–34)
MCHC RBC AUTO-ENTMCNC: 35.1 G/DL (ref 32–36)
MCV RBC AUTO: 83 FL (ref 80–100)
NRBC BLD-RTO: 0 /100 WBCS (ref 0–0)
PHOSPHATE SERPL-MCNC: 2.3 MG/DL (ref 2.5–4.9)
PLATELET # BLD AUTO: 234 X10*3/UL (ref 150–450)
POTASSIUM SERPL-SCNC: 3.4 MMOL/L (ref 3.5–5.3)
PROTHROMBIN TIME: 12 SECONDS (ref 9.8–12.8)
RBC # BLD AUTO: 3.23 X10*6/UL (ref 4.5–5.9)
SODIUM SERPL-SCNC: 138 MMOL/L (ref 136–145)
VANCOMYCIN SERPL-MCNC: 27.4 UG/ML (ref 5–20)
WBC # BLD AUTO: 9.1 X10*3/UL (ref 4.4–11.3)

## 2023-12-02 PROCEDURE — 96372 THER/PROPH/DIAG INJ SC/IM: CPT | Performed by: STUDENT IN AN ORGANIZED HEALTH CARE EDUCATION/TRAINING PROGRAM

## 2023-12-02 PROCEDURE — 85027 COMPLETE CBC AUTOMATED: CPT | Performed by: STUDENT IN AN ORGANIZED HEALTH CARE EDUCATION/TRAINING PROGRAM

## 2023-12-02 PROCEDURE — 1200000002 HC GENERAL ROOM WITH TELEMETRY DAILY

## 2023-12-02 PROCEDURE — 2500000004 HC RX 250 GENERAL PHARMACY W/ HCPCS (ALT 636 FOR OP/ED): Performed by: STUDENT IN AN ORGANIZED HEALTH CARE EDUCATION/TRAINING PROGRAM

## 2023-12-02 PROCEDURE — 37799 UNLISTED PX VASCULAR SURGERY: CPT | Performed by: STUDENT IN AN ORGANIZED HEALTH CARE EDUCATION/TRAINING PROGRAM

## 2023-12-02 PROCEDURE — 85610 PROTHROMBIN TIME: CPT | Performed by: STUDENT IN AN ORGANIZED HEALTH CARE EDUCATION/TRAINING PROGRAM

## 2023-12-02 PROCEDURE — A4217 STERILE WATER/SALINE, 500 ML: HCPCS

## 2023-12-02 PROCEDURE — 80202 ASSAY OF VANCOMYCIN: CPT | Performed by: STUDENT IN AN ORGANIZED HEALTH CARE EDUCATION/TRAINING PROGRAM

## 2023-12-02 PROCEDURE — 2500000004 HC RX 250 GENERAL PHARMACY W/ HCPCS (ALT 636 FOR OP/ED)

## 2023-12-02 RX ORDER — HEPARIN SODIUM 5000 [USP'U]/ML
5000 INJECTION, SOLUTION INTRAVENOUS; SUBCUTANEOUS EVERY 8 HOURS
Status: DISCONTINUED | OUTPATIENT
Start: 2023-12-02 | End: 2023-12-29

## 2023-12-02 RX ORDER — POTASSIUM CHLORIDE 14.9 MG/ML
20 INJECTION INTRAVENOUS
Status: COMPLETED | OUTPATIENT
Start: 2023-12-02 | End: 2023-12-02

## 2023-12-02 RX ADMIN — CEFEPIME 2 G: 1 INJECTION, SOLUTION INTRAVENOUS at 02:00

## 2023-12-02 RX ADMIN — LABETALOL HYDROCHLORIDE 10 MG: 5 INJECTION INTRAVENOUS at 16:05

## 2023-12-02 RX ADMIN — LABETALOL HYDROCHLORIDE 10 MG: 5 INJECTION INTRAVENOUS at 20:10

## 2023-12-02 RX ADMIN — LABETALOL HYDROCHLORIDE 10 MG: 5 INJECTION INTRAVENOUS at 05:03

## 2023-12-02 RX ADMIN — HYDRALAZINE HYDROCHLORIDE 10 MG: 20 INJECTION INTRAMUSCULAR; INTRAVENOUS at 18:45

## 2023-12-02 RX ADMIN — CEFEPIME 2 G: 1 INJECTION, SOLUTION INTRAVENOUS at 15:40

## 2023-12-02 RX ADMIN — HEPARIN SODIUM 5000 UNITS: 5000 INJECTION INTRAVENOUS; SUBCUTANEOUS at 01:56

## 2023-12-02 RX ADMIN — VANCOMYCIN HYDROCHLORIDE 1250 MG: 5 INJECTION, POWDER, LYOPHILIZED, FOR SOLUTION INTRAVENOUS at 18:05

## 2023-12-02 RX ADMIN — POTASSIUM CHLORIDE 20 MEQ: 200 INJECTION, SOLUTION INTRAVENOUS at 04:31

## 2023-12-02 RX ADMIN — LABETALOL HYDROCHLORIDE 10 MG: 5 INJECTION INTRAVENOUS at 12:13

## 2023-12-02 RX ADMIN — LABETALOL HYDROCHLORIDE 10 MG: 5 INJECTION INTRAVENOUS at 08:04

## 2023-12-02 RX ADMIN — POTASSIUM CHLORIDE 20 MEQ: 200 INJECTION, SOLUTION INTRAVENOUS at 02:14

## 2023-12-02 RX ADMIN — LEVETIRACETAM 500 MG: 5 INJECTION INTRAVENOUS at 18:05

## 2023-12-02 RX ADMIN — LABETALOL HYDROCHLORIDE 10 MG: 5 INJECTION INTRAVENOUS at 14:08

## 2023-12-02 RX ADMIN — HEPARIN SODIUM 5000 UNITS: 5000 INJECTION INTRAVENOUS; SUBCUTANEOUS at 18:05

## 2023-12-02 RX ADMIN — HEPARIN SODIUM 5000 UNITS: 5000 INJECTION INTRAVENOUS; SUBCUTANEOUS at 09:49

## 2023-12-02 RX ADMIN — LEVETIRACETAM 500 MG: 5 INJECTION INTRAVENOUS at 04:41

## 2023-12-02 RX ADMIN — HYDRALAZINE HYDROCHLORIDE 10 MG: 20 INJECTION INTRAMUSCULAR; INTRAVENOUS at 22:05

## 2023-12-02 ASSESSMENT — COGNITIVE AND FUNCTIONAL STATUS - GENERAL
MOVING TO AND FROM BED TO CHAIR: TOTAL
CLIMB 3 TO 5 STEPS WITH RAILING: TOTAL
STANDING UP FROM CHAIR USING ARMS: TOTAL
WALKING IN HOSPITAL ROOM: TOTAL
TURNING FROM BACK TO SIDE WHILE IN FLAT BAD: TOTAL
MOVING FROM LYING ON BACK TO SITTING ON SIDE OF FLAT BED WITH BEDRAILS: TOTAL
MOBILITY SCORE: 6

## 2023-12-02 ASSESSMENT — PAIN - FUNCTIONAL ASSESSMENT: PAIN_FUNCTIONAL_ASSESSMENT: FLACC (FACE, LEGS, ACTIVITY, CRY, CONSOLABILITY)

## 2023-12-02 ASSESSMENT — PAIN SCALES - GENERAL: PAINLEVEL_OUTOF10: 0 - NO PAIN

## 2023-12-02 NOTE — PROGRESS NOTES
Pharmacy to Dose Vancomycin:  Haile Ayers is a 83 y.o. male ordered pharmacy to dose vancomycin for UTI. Today is day 9 of therapy.  Goal: -600mg/L*hr  Results from last 7 days   Lab Units 12/02/23  0438 12/01/23  2336 12/01/23  1349 11/30/23  0745 11/29/23  0836   BUN mg/dL  --  21 23  --  16   CREATININE mg/dL  --  1.09 1.06  --  0.86   WBC AUTO x10*3/uL 9.1  --  9.8 7.8 7.2   VANCOMYCIN RM ug/mL 27.4*  --   --   --   --       Urine Culture   Date/Time Value Ref Range Status   11/24/2023 05:31 AM No significant growth  Final     Blood Culture   Date/Time Value Ref Range Status   11/26/2023 08:28 AM No growth at 4 days -  FINAL REPORT  Final   11/26/2023 08:28 AM No growth at 4 days -  FINAL REPORT  Final     Gram Stain   Date/Time Value Ref Range Status   11/24/2023 03:36 PM Gram positive cocci, clusters (AA)  Final     Comment:     Aerobic Bottle Positive   11/24/2023 03:36 PM Gram positive cocci, clusters (AA)  Final     Comment:     Anaerobic Bottle Positive      Renal function remains stable.  Patient's current regimen is predicted to achieve AUC24,ss of 688.    Plan:  Reduce vanco to [id number].  The above dosing regimen is predicted by [employer] to result in the following pharmacokinetic parameters:    Follow-up level ordered for 12/3 w/ AM labs (d/t dose change) unless clinically indicated sooner.  Pharmacy will continue daily vancomycin monitoring. Please contact the pharmacy at extension [phone] with any questions.    Thank you,  Randy Moore [facility]

## 2023-12-02 NOTE — PROGRESS NOTES
"Haile Ayers is a 83 y.o. male on day 10 of admission presenting with Subdural hematoma (CMS/HCC).    Subjective   S/p L crani for redo SDH evac       Objective     Physical Exam  Eyes opened spontaneous  Expressive aphasia  RUE minimally spontaneous  LUE FC  RLE flaccid  LLE spontaneous    Last Recorded Vitals  Blood pressure 157/84, pulse 72, temperature 36.3 °C (97.4 °F), temperature source Temporal, resp. rate 19, height 1.727 m (5' 7.99\"), weight 69.8 kg (153 lb 14.1 oz), SpO2 96 %.  Intake/Output last 3 Shifts:  I/O last 3 completed shifts:  In: 6220 (89.1 mL/kg) [I.V.:2290 (32.8 mL/kg); IV Piggyback:3930]  Out: 1520 (21.8 mL/kg) [Urine:1280 (0.5 mL/kg/hr); Drains:240]  Weight: 69.8 kg     Relevant Results           This patient currently has cardiac telemetry ordered; if you would like to modify or discontinue the telemetry order, click here to go to the orders activity to modify/discontinue the order.    This patient has a urinary catheter   Reason for the urinary catheter remaining today? Urine catheter unnecessary, will be removed today    This patient is intubated   Reason for patient to remain intubated today? Intubation unnecessary, will extubate today             Assessment/Plan   Principal Problem:    Subdural hematoma (CMS/HCC)    83 y.o. male h/o CLL, anemia, DM, p/w AMS, found to have UTI, CTH w/ L large chronic SDH, 11/22 s/p L crani for SDH evac, CTH POC, 11/24 drain dc'd, Bcx +GPC, 11/25 febrile, remaining vitals stable, exam unchanged, repeat bcx negative     11/28 s/p midline   11/29 Lethargic, not FC, s/p CTH w/ increased L SDH w/ 8mm MLS s/p 1g Keppra load, CXR stable opacities, rCTH stable, s/p R side wd, s/p keppra load, rrCTH stable L SDH,  11/30 S/p L crani for redo SDH evac, CTH POC with improved MLS  12/1 s/p extubation     PLAN:  RITCHIE  SDD  CTH 12/3 AM  SBP <160  Chronic ignacio  ID recs-cont vanc, cefe tentative end date 12/4/23   Heme recs-cont to hold ibrutinib until POD14, daily " CBC  SLP eval (failed bedside)  PTOT-rehab; needs re-eval  SCD              Fidle Smith MD

## 2023-12-03 ENCOUNTER — APPOINTMENT (OUTPATIENT)
Dept: RADIOLOGY | Facility: HOSPITAL | Age: 83
DRG: 025 | End: 2023-12-03
Payer: MEDICARE

## 2023-12-03 LAB
ALBUMIN SERPL BCP-MCNC: 2.7 G/DL (ref 3.4–5)
ANION GAP SERPL CALC-SCNC: 13 MMOL/L (ref 10–20)
APTT PPP: 25 SECONDS (ref 27–38)
BUN SERPL-MCNC: 21 MG/DL (ref 6–23)
CA-I BLD-SCNC: 1.14 MMOL/L (ref 1.1–1.33)
CALCIUM SERPL-MCNC: 7.9 MG/DL (ref 8.6–10.6)
CHLORIDE SERPL-SCNC: 114 MMOL/L (ref 98–107)
CO2 SERPL-SCNC: 17 MMOL/L (ref 21–32)
CREAT SERPL-MCNC: 1.07 MG/DL (ref 0.5–1.3)
ERYTHROCYTE [DISTWIDTH] IN BLOOD BY AUTOMATED COUNT: 14.5 % (ref 11.5–14.5)
GFR SERPL CREATININE-BSD FRML MDRD: 68 ML/MIN/1.73M*2
GLUCOSE SERPL-MCNC: 135 MG/DL (ref 74–99)
HCT VFR BLD AUTO: 28.2 % (ref 41–52)
HGB BLD-MCNC: 9.2 G/DL (ref 13.5–17.5)
INR PPP: 1.1 (ref 0.9–1.1)
MAGNESIUM SERPL-MCNC: 1.51 MG/DL (ref 1.6–2.4)
MCH RBC QN AUTO: 28.9 PG (ref 26–34)
MCHC RBC AUTO-ENTMCNC: 32.6 G/DL (ref 32–36)
MCV RBC AUTO: 89 FL (ref 80–100)
NRBC BLD-RTO: 0 /100 WBCS (ref 0–0)
PHOSPHATE SERPL-MCNC: 2 MG/DL (ref 2.5–4.9)
PLATELET # BLD AUTO: 232 X10*3/UL (ref 150–450)
POTASSIUM SERPL-SCNC: 3.5 MMOL/L (ref 3.5–5.3)
PROTHROMBIN TIME: 12.2 SECONDS (ref 9.8–12.8)
RBC # BLD AUTO: 3.18 X10*6/UL (ref 4.5–5.9)
SODIUM SERPL-SCNC: 140 MMOL/L (ref 136–145)
VANCOMYCIN TROUGH SERPL-MCNC: 32.7 UG/ML (ref 5–20)
WBC # BLD AUTO: 8.7 X10*3/UL (ref 4.4–11.3)

## 2023-12-03 PROCEDURE — 2500000004 HC RX 250 GENERAL PHARMACY W/ HCPCS (ALT 636 FOR OP/ED): Performed by: PHARMACY TECHNICIAN

## 2023-12-03 PROCEDURE — 2500000004 HC RX 250 GENERAL PHARMACY W/ HCPCS (ALT 636 FOR OP/ED)

## 2023-12-03 PROCEDURE — 2500000004 HC RX 250 GENERAL PHARMACY W/ HCPCS (ALT 636 FOR OP/ED): Performed by: STUDENT IN AN ORGANIZED HEALTH CARE EDUCATION/TRAINING PROGRAM

## 2023-12-03 PROCEDURE — B3141ZZ FLUOROSCOPY OF LEFT COMMON CAROTID ARTERY USING LOW OSMOLAR CONTRAST: ICD-10-PCS | Performed by: NEUROLOGICAL SURGERY

## 2023-12-03 PROCEDURE — 70450 CT HEAD/BRAIN W/O DYE: CPT | Performed by: RADIOLOGY

## 2023-12-03 PROCEDURE — 80069 RENAL FUNCTION PANEL: CPT | Performed by: NEUROLOGICAL SURGERY

## 2023-12-03 PROCEDURE — 83735 ASSAY OF MAGNESIUM: CPT | Performed by: NEUROLOGICAL SURGERY

## 2023-12-03 PROCEDURE — 37799 UNLISTED PX VASCULAR SURGERY: CPT | Performed by: NEUROLOGICAL SURGERY

## 2023-12-03 PROCEDURE — 85610 PROTHROMBIN TIME: CPT | Performed by: NEUROLOGICAL SURGERY

## 2023-12-03 PROCEDURE — 96372 THER/PROPH/DIAG INJ SC/IM: CPT | Performed by: STUDENT IN AN ORGANIZED HEALTH CARE EDUCATION/TRAINING PROGRAM

## 2023-12-03 PROCEDURE — 85027 COMPLETE CBC AUTOMATED: CPT | Performed by: NEUROLOGICAL SURGERY

## 2023-12-03 PROCEDURE — 1200000002 HC GENERAL ROOM WITH TELEMETRY DAILY

## 2023-12-03 PROCEDURE — 82330 ASSAY OF CALCIUM: CPT | Performed by: NEUROLOGICAL SURGERY

## 2023-12-03 PROCEDURE — 80202 ASSAY OF VANCOMYCIN: CPT

## 2023-12-03 PROCEDURE — 70450 CT HEAD/BRAIN W/O DYE: CPT

## 2023-12-03 RX ORDER — MAGNESIUM SULFATE HEPTAHYDRATE 40 MG/ML
2 INJECTION, SOLUTION INTRAVENOUS ONCE
Status: COMPLETED | OUTPATIENT
Start: 2023-12-03 | End: 2023-12-03

## 2023-12-03 RX ORDER — POTASSIUM CHLORIDE 14.9 MG/ML
20 INJECTION INTRAVENOUS ONCE
Status: COMPLETED | OUTPATIENT
Start: 2023-12-03 | End: 2023-12-03

## 2023-12-03 RX ORDER — VANCOMYCIN HYDROCHLORIDE 1 G/200ML
1000 INJECTION, SOLUTION INTRAVENOUS EVERY 24 HOURS
Status: DISCONTINUED | OUTPATIENT
Start: 2023-12-03 | End: 2023-12-04

## 2023-12-03 RX ADMIN — LABETALOL HYDROCHLORIDE 10 MG: 5 INJECTION INTRAVENOUS at 08:05

## 2023-12-03 RX ADMIN — HYDRALAZINE HYDROCHLORIDE 10 MG: 20 INJECTION INTRAMUSCULAR; INTRAVENOUS at 18:44

## 2023-12-03 RX ADMIN — LABETALOL HYDROCHLORIDE 10 MG: 5 INJECTION INTRAVENOUS at 17:18

## 2023-12-03 RX ADMIN — LABETALOL HYDROCHLORIDE 10 MG: 5 INJECTION INTRAVENOUS at 16:43

## 2023-12-03 RX ADMIN — HEPARIN SODIUM 5000 UNITS: 5000 INJECTION INTRAVENOUS; SUBCUTANEOUS at 02:09

## 2023-12-03 RX ADMIN — HEPARIN SODIUM 5000 UNITS: 5000 INJECTION INTRAVENOUS; SUBCUTANEOUS at 17:05

## 2023-12-03 RX ADMIN — VANCOMYCIN HYDROCHLORIDE 1000 MG: 1 INJECTION, SOLUTION INTRAVENOUS at 20:19

## 2023-12-03 RX ADMIN — POTASSIUM CHLORIDE 20 MEQ: 14.9 INJECTION, SOLUTION INTRAVENOUS at 11:13

## 2023-12-03 RX ADMIN — HYDRALAZINE HYDROCHLORIDE 10 MG: 20 INJECTION INTRAMUSCULAR; INTRAVENOUS at 10:05

## 2023-12-03 RX ADMIN — HYDRALAZINE HYDROCHLORIDE 10 MG: 20 INJECTION INTRAMUSCULAR; INTRAVENOUS at 02:09

## 2023-12-03 RX ADMIN — CEFEPIME 2 G: 1 INJECTION, SOLUTION INTRAVENOUS at 02:09

## 2023-12-03 RX ADMIN — SODIUM CHLORIDE 75 ML/HR: 9 INJECTION, SOLUTION INTRAVENOUS at 09:16

## 2023-12-03 RX ADMIN — LEVETIRACETAM 500 MG: 5 INJECTION INTRAVENOUS at 17:06

## 2023-12-03 RX ADMIN — SODIUM CHLORIDE 75 ML/HR: 9 INJECTION, SOLUTION INTRAVENOUS at 17:06

## 2023-12-03 RX ADMIN — MAGNESIUM SULFATE HEPTAHYDRATE 2 G: 40 INJECTION, SOLUTION INTRAVENOUS at 09:17

## 2023-12-03 RX ADMIN — CEFEPIME 2 G: 1 INJECTION, SOLUTION INTRAVENOUS at 14:19

## 2023-12-03 RX ADMIN — HEPARIN SODIUM 5000 UNITS: 5000 INJECTION INTRAVENOUS; SUBCUTANEOUS at 09:04

## 2023-12-03 RX ADMIN — LEVETIRACETAM 500 MG: 5 INJECTION INTRAVENOUS at 04:26

## 2023-12-03 ASSESSMENT — COGNITIVE AND FUNCTIONAL STATUS - GENERAL
DAILY ACTIVITIY SCORE: 6
DRESSING REGULAR LOWER BODY CLOTHING: TOTAL
HELP NEEDED FOR BATHING: TOTAL
WALKING IN HOSPITAL ROOM: TOTAL
DRESSING REGULAR UPPER BODY CLOTHING: TOTAL
EATING MEALS: TOTAL
TURNING FROM BACK TO SIDE WHILE IN FLAT BAD: TOTAL
STANDING UP FROM CHAIR USING ARMS: TOTAL
MOVING TO AND FROM BED TO CHAIR: TOTAL
MOBILITY SCORE: 6
CLIMB 3 TO 5 STEPS WITH RAILING: TOTAL
TOILETING: TOTAL
PERSONAL GROOMING: TOTAL
MOVING FROM LYING ON BACK TO SITTING ON SIDE OF FLAT BED WITH BEDRAILS: TOTAL

## 2023-12-03 ASSESSMENT — PAIN - FUNCTIONAL ASSESSMENT
PAIN_FUNCTIONAL_ASSESSMENT: CPOT (CRITICAL CARE PAIN OBSERVATION TOOL)
PAIN_FUNCTIONAL_ASSESSMENT: CPOT (CRITICAL CARE PAIN OBSERVATION TOOL)
PAIN_FUNCTIONAL_ASSESSMENT: 0-10
PAIN_FUNCTIONAL_ASSESSMENT: CPOT (CRITICAL CARE PAIN OBSERVATION TOOL)

## 2023-12-03 ASSESSMENT — PAIN SCALES - GENERAL
PAINLEVEL_OUTOF10: 0 - NO PAIN

## 2023-12-03 NOTE — CARE PLAN
Problem: Pain  Goal: My pain/discomfort is manageable  Outcome: Progressing     Problem: Safety  Goal: Patient will be injury free during hospitalization  Outcome: Progressing  Goal: I will remain free of falls  Outcome: Progressing     Problem: Daily Care  Goal: Daily care needs are met  Outcome: Progressing     Problem: Psychosocial Needs  Goal: Demonstrates ability to cope with hospitalization/illness  Outcome: Progressing  Goal: Collaborate with me, my family, and caregiver to identify my specific goals  Outcome: Progressing     Problem: Discharge Barriers  Goal: My discharge needs are met  Outcome: Progressing     Problem: General Stroke  Goal: Demonstrate improvement in neurological exam throughout the shift  Outcome: Progressing  Goal: Maintain BP within ordered limits throughout shift  Outcome: Progressing  Goal: Participate in treatment (ie., meds, therapy) throughout shift  Outcome: Progressing  Goal: No symptoms of aspiration throughout shift  Outcome: Progressing  Goal: No symptoms of hemorrhage throughout shift  Outcome: Progressing  Goal: Tolerate enteral feeding throughout shift  Outcome: Progressing  Goal: Decreased nausea/vomiting throughout shift  Outcome: Progressing  Goal: Controlled blood glucose throughout shift  Outcome: Progressing  Goal: Out of bed three times today  Outcome: Progressing     Problem: Skin  Goal: Decreased wound size/increased tissue granulation at next dressing change  Outcome: Progressing  Goal: Participates in plan/prevention/treatment measures  Outcome: Progressing  Goal: Prevent/manage excess moisture  Outcome: Progressing  Goal: Prevent/minimize sheer/friction injuries  Outcome: Progressing  Goal: Promote/optimize nutrition  Outcome: Progressing  Goal: Promote skin healing  Outcome: Progressing     Problem: Safety - Medical Restraint  Goal: Remains free of injury from restraints (Restraint for Interference with Medical Device)  Outcome: Progressing  Goal: Free from  restraint(s) (Restraint for Interference with Medical Device)  Outcome: Progressing     Problem: Nutrition  Goal: Nutrition support goals are met within 48 hrs  Outcome: Progressing  Goal: Lab values WNL  Outcome: Progressing  Goal: Electrolytes WNL  Outcome: Progressing  Goal: Promote healing  Outcome: Progressing  Goal: Maintain stable weight  Outcome: Progressing  Goal: Reduce weight from edema/fluid  Outcome: Progressing     Problem: Pain - Adult  Goal: Verbalizes/displays adequate comfort level or baseline comfort level  Outcome: Progressing     Problem: Safety - Adult  Goal: Free from fall injury  Outcome: Progressing     Problem: Discharge Planning  Goal: Discharge to home or other facility with appropriate resources  Outcome: Progressing     Problem: Chronic Conditions and Co-morbidities  Goal: Patient's chronic conditions and co-morbidity symptoms are monitored and maintained or improved  Outcome: Progressing     Problem: Fall/Injury  Goal: Not fall by end of shift  Outcome: Progressing  Goal: Be free from injury by end of the shift  Outcome: Progressing  Goal: Verbalize understanding of personal risk factors for fall in the hospital  Outcome: Progressing     Problem: Diabetes  Goal: Increase stability of blood glucose readings by end of shift  Outcome: Progressing  Goal: Maintain electrolyte levels within acceptable range throughout shift  Outcome: Progressing  Goal: Maintain glucose levels >70mg/dl to <250mg/dl throughout shift  Outcome: Progressing  Goal: No changes in neurological exam by end of shift  Outcome: Progressing   The patient's goals for the shift include      The clinical goals for the shift include pt will remain safe throughout shift.    Over the shift, the patient did not make progress toward the following goals. Barriers to progression include mental status. Recommendations to address these barriers include proper safety.

## 2023-12-03 NOTE — NURSING NOTE
0715 Spoke with provider Saima Abad MD about Mag 1.51, Potassium 3.5 and Vancomycin trough 32.7 critical high. See new orders

## 2023-12-03 NOTE — PROGRESS NOTES
"Haile Ayers is a 83 y.o. male on day 11 of admission presenting with Subdural hematoma (CMS/HCC).    Subjective   No acute events overnight    Objective     Physical Exam  Eyes opened spontaneous  Expressive aphasia  Ox0  Spontaneous x4    Last Recorded Vitals  Blood pressure 168/88, pulse 87, temperature 37 °C (98.6 °F), temperature source Temporal, resp. rate 19, height 1.727 m (5' 7.99\"), weight 74.2 kg (163 lb 9.3 oz), SpO2 97 %.  Intake/Output last 3 Shifts:  I/O last 3 completed shifts:  In: 3164.3 (42.6 mL/kg) [I.V.:2014.3 (27.1 mL/kg); IV Piggyback:1150]  Out: 2160 (29.1 mL/kg) [Urine:2140 (0.8 mL/kg/hr); Drains:20]  Weight: 74.2 kg     Relevant Results    This patient has a urinary catheter   Reason for the urinary catheter remaining today? Urine catheter unnecessary, will be removed today    This patient is intubated   Reason for patient to remain intubated today? Intubation unnecessary, will extubate today      Assessment/Plan   Principal Problem:    Subdural hematoma (CMS/HCC)    83 y.o. male h/o CLL, anemia, DM, p/w AMS, found to have UTI, CTH w/ L large chronic SDH, 11/22 s/p L crani for SDH evac, CTH POC, 11/24 drain dc'd, Bcx +GPC, 11/25 febrile, remaining vitals stable, exam unchanged, repeat bcx negative     11/28 s/p midline   11/29 Lethargic, not FC, s/p CTH w/ increased L SDH w/ 8mm MLS s/p 1g Keppra load, CXR stable opacities, rCTH stable, s/p R side wd, s/p keppra load, rrCTH stable L SDH,  11/30 S/p L crani for redo SDH evac, CTH POC with improved MLS  12/1 s/p extubation  12/3 CTH stable     PLAN:  Downgrade to floor  SDD-discontinue today  SBP <160  S/p Scx1  Vanc and cefepime off today  Heme recs-cont to hold ibrutinib until POD14, daily CBC  SLP eval (failed bedside)  PTOT-rehab; needs re-eval  SCD, SQH    Tracy Phillips MD      "

## 2023-12-03 NOTE — PROGRESS NOTES
Vancomycin Dosing by Pharmacy- FOLLOW UP    Haile Ayers is a 83 y.o. year old male who Pharmacy has been consulted for vancomycin dosing for other urinary tract infection. Also has staph hominis bacteremia . Based on the patient's indication and renal status this patient is being dosed based on a goal AUC of 400-600.     Renal function is currently stable.    Current vancomycin dose: 1250 mg given every 24 hours    Estimated vancomycin AUC on current dose: 613 mg/L.hr     Visit Vitals  /78 (BP Location: Left arm, Patient Position: Lying)   Pulse 87   Temp 36.5 °C (97.7 °F) (Temporal)   Resp 18        Lab Results   Component Value Date    CREATININE 1.07 12/03/2023    CREATININE 1.09 12/01/2023    CREATININE 1.06 12/01/2023    CREATININE 0.86 11/29/2023 11/24 blood cx with staphylococcus hominis, resistant to clindamycin, erythromycin, oxacillin  11/26 blood cx NGTD    I/O last 3 completed shifts:  In: 3164.3 (42.6 mL/kg) [I.V.:2014.3 (27.1 mL/kg); IV Piggyback:1150]  Out: 2160 (29.1 mL/kg) [Urine:2140 (0.8 mL/kg/hr); Drains:20]  Weight: 74.2 kg     afebrile    Assessment/Plan    Above goal AUC. Orders placed for new vancomcyin regimen of 1000 mg every 24 hours to begin at 2000.    This dosing regimen is predicted by [employer] to result in the following pharmacokinetic parameters:  AUC24,ss: 499 mg/L.hr  Probability of AUC24 > 400: 96 %  Ctrough,ss: 15.9 mg/L  Probability of Ctrough,ss > 20: 14 %  Probability of nephrotoxicity (Lodise FILIBERTO 2009): 11 %    The next level will be obtained on 12/4 at AM labs. May be obtained sooner if clinically indicated.   Will continue to monitor renal function daily while on vancomycin and order serum creatinine at least every 48 hours if not already ordered.  Follow for continued vancomycin needs, clinical response, and signs/symptoms of toxicity.       Alison Canales, MckaylaD

## 2023-12-04 ENCOUNTER — ANESTHESIA (OUTPATIENT)
Dept: RADIOLOGY | Facility: HOSPITAL | Age: 83
DRG: 025 | End: 2023-12-04
Payer: MEDICARE

## 2023-12-04 ENCOUNTER — APPOINTMENT (OUTPATIENT)
Dept: RADIOLOGY | Facility: HOSPITAL | Age: 83
DRG: 025 | End: 2023-12-04
Payer: MEDICARE

## 2023-12-04 ENCOUNTER — ANESTHESIA EVENT (OUTPATIENT)
Dept: RADIOLOGY | Facility: HOSPITAL | Age: 83
DRG: 025 | End: 2023-12-04
Payer: MEDICARE

## 2023-12-04 PROBLEM — E11.9 DIABETES MELLITUS, TYPE 2 (MULTI): Status: ACTIVE | Noted: 2023-12-04

## 2023-12-04 PROBLEM — I63.9 CVA (CEREBRAL VASCULAR ACCIDENT) (MULTI): Status: ACTIVE | Noted: 2023-12-04

## 2023-12-04 PROBLEM — I10 HTN (HYPERTENSION): Status: ACTIVE | Noted: 2023-12-04

## 2023-12-04 LAB
ALBUMIN SERPL BCP-MCNC: 2.6 G/DL (ref 3.4–5)
ALBUMIN SERPL BCP-MCNC: 2.6 G/DL (ref 3.4–5)
AMMONIA PLAS-SCNC: 64 UMOL/L (ref 16–53)
ANION GAP BLDA CALCULATED.4IONS-SCNC: 11 MMO/L (ref 10–25)
ANION GAP SERPL CALC-SCNC: 14 MMOL/L (ref 10–20)
ANION GAP SERPL CALC-SCNC: 19 MMOL/L (ref 10–20)
APPEARANCE UR: ABNORMAL
BASE EXCESS BLDA CALC-SCNC: -7 MMOL/L (ref -2–3)
BILIRUB UR STRIP.AUTO-MCNC: NEGATIVE MG/DL
BODY TEMPERATURE: ABNORMAL
BUN SERPL-MCNC: 17 MG/DL (ref 6–23)
BUN SERPL-MCNC: 19 MG/DL (ref 6–23)
CA-I BLDA-SCNC: 1.18 MMOL/L (ref 1.1–1.33)
CALCIUM SERPL-MCNC: 7.7 MG/DL (ref 8.6–10.6)
CALCIUM SERPL-MCNC: 7.8 MG/DL (ref 8.6–10.6)
CHLORIDE BLDA-SCNC: 113 MMOL/L (ref 98–107)
CHLORIDE SERPL-SCNC: 112 MMOL/L (ref 98–107)
CHLORIDE SERPL-SCNC: 113 MMOL/L (ref 98–107)
CO2 SERPL-SCNC: 13 MMOL/L (ref 21–32)
CO2 SERPL-SCNC: 17 MMOL/L (ref 21–32)
COLOR UR: YELLOW
CREAT SERPL-MCNC: 1.08 MG/DL (ref 0.5–1.3)
CREAT SERPL-MCNC: 1.12 MG/DL (ref 0.5–1.3)
ERYTHROCYTE [DISTWIDTH] IN BLOOD BY AUTOMATED COUNT: 14.8 % (ref 11.5–14.5)
GFR SERPL CREATININE-BSD FRML MDRD: 65 ML/MIN/1.73M*2
GFR SERPL CREATININE-BSD FRML MDRD: 68 ML/MIN/1.73M*2
GLUCOSE BLDA-MCNC: 127 MG/DL (ref 74–99)
GLUCOSE SERPL-MCNC: 113 MG/DL (ref 74–99)
GLUCOSE SERPL-MCNC: 118 MG/DL (ref 74–99)
GLUCOSE UR STRIP.AUTO-MCNC: NEGATIVE MG/DL
HCO3 BLDA-SCNC: 17.2 MMOL/L (ref 22–26)
HCT VFR BLD AUTO: 26.4 % (ref 41–52)
HCT VFR BLD EST: 27 % (ref 41–52)
HGB BLD-MCNC: 8.6 G/DL (ref 13.5–17.5)
HGB BLDA-MCNC: 8.9 G/DL (ref 13.5–17.5)
HOLD SPECIMEN: NORMAL
HYALINE CASTS #/AREA URNS AUTO: ABNORMAL /LPF
INHALED O2 CONCENTRATION: 21 %
KETONES UR STRIP.AUTO-MCNC: ABNORMAL MG/DL
LACTATE BLDA-SCNC: 0.8 MMOL/L (ref 0.4–2)
LEUKOCYTE ESTERASE UR QL STRIP.AUTO: NEGATIVE
MCH RBC QN AUTO: 29 PG (ref 26–34)
MCHC RBC AUTO-ENTMCNC: 32.6 G/DL (ref 32–36)
MCV RBC AUTO: 89 FL (ref 80–100)
NITRITE UR QL STRIP.AUTO: NEGATIVE
NRBC BLD-RTO: 0 /100 WBCS (ref 0–0)
OXYHGB MFR BLDA: 97.7 % (ref 94–98)
PCO2 BLDA: 29 MM HG (ref 38–42)
PH BLDA: 7.38 PH (ref 7.38–7.42)
PH UR STRIP.AUTO: 6 [PH]
PHOSPHATE SERPL-MCNC: 2.5 MG/DL (ref 2.5–4.9)
PHOSPHATE SERPL-MCNC: 3 MG/DL (ref 2.5–4.9)
PLATELET # BLD AUTO: 225 X10*3/UL (ref 150–450)
PO2 BLDA: 102 MM HG (ref 85–95)
POTASSIUM BLDA-SCNC: 3.3 MMOL/L (ref 3.5–5.3)
POTASSIUM SERPL-SCNC: 3.4 MMOL/L (ref 3.5–5.3)
POTASSIUM SERPL-SCNC: 3.5 MMOL/L (ref 3.5–5.3)
PROT UR STRIP.AUTO-MCNC: ABNORMAL MG/DL
RBC # BLD AUTO: 2.97 X10*6/UL (ref 4.5–5.9)
RBC # UR STRIP.AUTO: ABNORMAL /UL
RBC #/AREA URNS AUTO: >20 /HPF
SAO2 % BLDA: 99 % (ref 94–100)
SODIUM BLDA-SCNC: 138 MMOL/L (ref 136–145)
SODIUM SERPL-SCNC: 140 MMOL/L (ref 136–145)
SODIUM SERPL-SCNC: 141 MMOL/L (ref 136–145)
SP GR UR STRIP.AUTO: 1.02
SQUAMOUS #/AREA URNS AUTO: ABNORMAL /HPF
UROBILINOGEN UR STRIP.AUTO-MCNC: <2 MG/DL
VANCOMYCIN SERPL-MCNC: 22 UG/ML (ref 5–20)
WBC # BLD AUTO: 8.7 X10*3/UL (ref 4.4–11.3)
WBC #/AREA URNS AUTO: ABNORMAL /HPF

## 2023-12-04 PROCEDURE — 2550000001 HC RX 255 CONTRASTS: Performed by: NEUROLOGICAL SURGERY

## 2023-12-04 PROCEDURE — 97530 THERAPEUTIC ACTIVITIES: CPT | Mod: GO

## 2023-12-04 PROCEDURE — 97530 THERAPEUTIC ACTIVITIES: CPT | Mod: GP

## 2023-12-04 PROCEDURE — 36224 PLACE CATH CAROTD ART: CPT | Performed by: NEUROLOGICAL SURGERY

## 2023-12-04 PROCEDURE — 2780000003 HC OR 278 NO HCPCS

## 2023-12-04 PROCEDURE — 2500000004 HC RX 250 GENERAL PHARMACY W/ HCPCS (ALT 636 FOR OP/ED): Performed by: STUDENT IN AN ORGANIZED HEALTH CARE EDUCATION/TRAINING PROGRAM

## 2023-12-04 PROCEDURE — 87040 BLOOD CULTURE FOR BACTERIA: CPT | Performed by: STUDENT IN AN ORGANIZED HEALTH CARE EDUCATION/TRAINING PROGRAM

## 2023-12-04 PROCEDURE — C1760 CLOSURE DEV, VASC: HCPCS

## 2023-12-04 PROCEDURE — C1725 CATH, TRANSLUMIN NON-LASER: HCPCS

## 2023-12-04 PROCEDURE — 84132 ASSAY OF SERUM POTASSIUM: CPT | Performed by: STUDENT IN AN ORGANIZED HEALTH CARE EDUCATION/TRAINING PROGRAM

## 2023-12-04 PROCEDURE — 82140 ASSAY OF AMMONIA: CPT

## 2023-12-04 PROCEDURE — A36217 PR PLACE CATH SUBSUBSELECT ART,NECK: Performed by: ANESTHESIOLOGY

## 2023-12-04 PROCEDURE — 96372 THER/PROPH/DIAG INJ SC/IM: CPT | Performed by: STUDENT IN AN ORGANIZED HEALTH CARE EDUCATION/TRAINING PROGRAM

## 2023-12-04 PROCEDURE — 74018 RADEX ABDOMEN 1 VIEW: CPT | Performed by: RADIOLOGY

## 2023-12-04 PROCEDURE — 36222 PLACE CATH CAROTID/INOM ART: CPT | Performed by: NEUROLOGICAL SURGERY

## 2023-12-04 PROCEDURE — 71045 X-RAY EXAM CHEST 1 VIEW: CPT | Performed by: RADIOLOGY

## 2023-12-04 PROCEDURE — 85027 COMPLETE CBC AUTOMATED: CPT

## 2023-12-04 PROCEDURE — C1769 GUIDE WIRE: HCPCS

## 2023-12-04 PROCEDURE — 1100000001 HC PRIVATE ROOM DAILY

## 2023-12-04 PROCEDURE — 71045 X-RAY EXAM CHEST 1 VIEW: CPT

## 2023-12-04 PROCEDURE — 84132 ASSAY OF SERUM POTASSIUM: CPT

## 2023-12-04 PROCEDURE — 3700000002 HC GENERAL ANESTHESIA TIME - EACH INCREMENTAL 1 MINUTE

## 2023-12-04 PROCEDURE — 80202 ASSAY OF VANCOMYCIN: CPT | Performed by: PHARMACY TECHNICIAN

## 2023-12-04 PROCEDURE — 75710 ARTERY X-RAYS ARM/LEG: CPT | Mod: RT | Performed by: NEUROLOGICAL SURGERY

## 2023-12-04 PROCEDURE — 70450 CT HEAD/BRAIN W/O DYE: CPT

## 2023-12-04 PROCEDURE — 36415 COLL VENOUS BLD VENIPUNCTURE: CPT | Performed by: STUDENT IN AN ORGANIZED HEALTH CARE EDUCATION/TRAINING PROGRAM

## 2023-12-04 PROCEDURE — 70450 CT HEAD/BRAIN W/O DYE: CPT | Performed by: RADIOLOGY

## 2023-12-04 PROCEDURE — C1887 CATHETER, GUIDING: HCPCS

## 2023-12-04 PROCEDURE — 2720000007 HC OR 272 NO HCPCS

## 2023-12-04 PROCEDURE — 3700000001 HC GENERAL ANESTHESIA TIME - INITIAL BASE CHARGE

## 2023-12-04 PROCEDURE — 74018 RADEX ABDOMEN 1 VIEW: CPT

## 2023-12-04 PROCEDURE — 81001 URINALYSIS AUTO W/SCOPE: CPT

## 2023-12-04 PROCEDURE — 99100 ANES PT EXTEME AGE<1 YR&>70: CPT | Performed by: ANESTHESIOLOGY

## 2023-12-04 RX ORDER — SODIUM CHLORIDE, SODIUM LACTATE, POTASSIUM CHLORIDE, CALCIUM CHLORIDE 600; 310; 30; 20 MG/100ML; MG/100ML; MG/100ML; MG/100ML
100 INJECTION, SOLUTION INTRAVENOUS CONTINUOUS
OUTPATIENT
Start: 2023-12-04

## 2023-12-04 RX ORDER — LIDOCAINE IN NACL,ISO-OSMOT/PF 30 MG/3 ML
0.1 SYRINGE (ML) INJECTION ONCE
OUTPATIENT
Start: 2023-12-04 | End: 2023-12-04

## 2023-12-04 RX ORDER — HYDROMORPHONE HYDROCHLORIDE 1 MG/ML
0.1 INJECTION, SOLUTION INTRAMUSCULAR; INTRAVENOUS; SUBCUTANEOUS EVERY 5 MIN PRN
OUTPATIENT
Start: 2023-12-04

## 2023-12-04 RX ORDER — SODIUM CHLORIDE, SODIUM LACTATE, POTASSIUM CHLORIDE, CALCIUM CHLORIDE 600; 310; 30; 20 MG/100ML; MG/100ML; MG/100ML; MG/100ML
INJECTION, SOLUTION INTRAVENOUS CONTINUOUS PRN
Status: DISCONTINUED | OUTPATIENT
Start: 2023-12-04 | End: 2023-12-04

## 2023-12-04 RX ORDER — FENTANYL CITRATE 50 UG/ML
INJECTION, SOLUTION INTRAMUSCULAR; INTRAVENOUS AS NEEDED
Status: DISCONTINUED | OUTPATIENT
Start: 2023-12-04 | End: 2023-12-04

## 2023-12-04 RX ADMIN — LEVETIRACETAM 500 MG: 5 INJECTION INTRAVENOUS at 16:50

## 2023-12-04 RX ADMIN — SODIUM CHLORIDE, POTASSIUM CHLORIDE, SODIUM LACTATE AND CALCIUM CHLORIDE: 600; 310; 30; 20 INJECTION, SOLUTION INTRAVENOUS at 13:09

## 2023-12-04 RX ADMIN — HEPARIN SODIUM 5000 UNITS: 5000 INJECTION INTRAVENOUS; SUBCUTANEOUS at 17:22

## 2023-12-04 RX ADMIN — CEFEPIME 2 G: 1 INJECTION, SOLUTION INTRAVENOUS at 02:14

## 2023-12-04 RX ADMIN — FENTANYL CITRATE 12.5 MCG: 50 INJECTION, SOLUTION INTRAMUSCULAR; INTRAVENOUS at 13:00

## 2023-12-04 RX ADMIN — FENTANYL CITRATE 12.5 MCG: 50 INJECTION, SOLUTION INTRAMUSCULAR; INTRAVENOUS at 13:11

## 2023-12-04 RX ADMIN — LABETALOL HYDROCHLORIDE 10 MG: 5 INJECTION INTRAVENOUS at 13:20

## 2023-12-04 RX ADMIN — HYDRALAZINE HYDROCHLORIDE 10 MG: 20 INJECTION INTRAMUSCULAR; INTRAVENOUS at 17:22

## 2023-12-04 RX ADMIN — HYDRALAZINE HYDROCHLORIDE 10 MG: 20 INJECTION INTRAMUSCULAR; INTRAVENOUS at 06:47

## 2023-12-04 RX ADMIN — HYDRALAZINE HYDROCHLORIDE 10 MG: 20 INJECTION INTRAMUSCULAR; INTRAVENOUS at 16:20

## 2023-12-04 RX ADMIN — FENTANYL CITRATE 12.5 MCG: 50 INJECTION, SOLUTION INTRAMUSCULAR; INTRAVENOUS at 13:48

## 2023-12-04 RX ADMIN — LABETALOL HYDROCHLORIDE 10 MG: 5 INJECTION INTRAVENOUS at 13:56

## 2023-12-04 RX ADMIN — HEPARIN SODIUM 5000 UNITS: 5000 INJECTION INTRAVENOUS; SUBCUTANEOUS at 02:14

## 2023-12-04 RX ADMIN — ONDANSETRON 4 MG: 2 INJECTION INTRAMUSCULAR; INTRAVENOUS at 14:01

## 2023-12-04 RX ADMIN — LEVETIRACETAM 500 MG: 5 INJECTION INTRAVENOUS at 06:29

## 2023-12-04 RX ADMIN — IOHEXOL 100 ML: 350 INJECTION, SOLUTION INTRAVENOUS at 14:17

## 2023-12-04 RX ADMIN — HEPARIN SODIUM 5000 UNITS: 5000 INJECTION INTRAVENOUS; SUBCUTANEOUS at 10:00

## 2023-12-04 SDOH — HEALTH STABILITY: MENTAL HEALTH: CURRENT SMOKER: 0

## 2023-12-04 ASSESSMENT — PAIN SCALES - GENERAL
PAINLEVEL_OUTOF10: 0 - NO PAIN

## 2023-12-04 ASSESSMENT — PAIN - FUNCTIONAL ASSESSMENT

## 2023-12-04 ASSESSMENT — PAIN SCALES - PAIN ASSESSMENT IN ADVANCED DEMENTIA (PAINAD)
BODYLANGUAGE: RELAXED
CONSOLABILITY: NO NEED TO CONSOLE
BREATHING: NORMAL
TOTALSCORE: 0
FACIALEXPRESSION: SMILING OR INEXPRESSIVE

## 2023-12-04 ASSESSMENT — COGNITIVE AND FUNCTIONAL STATUS - GENERAL
STANDING UP FROM CHAIR USING ARMS: TOTAL
DAILY ACTIVITIY SCORE: 6
MOBILITY SCORE: 6
CLIMB 3 TO 5 STEPS WITH RAILING: TOTAL
TURNING FROM BACK TO SIDE WHILE IN FLAT BAD: TOTAL
DRESSING REGULAR UPPER BODY CLOTHING: TOTAL
HELP NEEDED FOR BATHING: TOTAL
PERSONAL GROOMING: TOTAL
WALKING IN HOSPITAL ROOM: TOTAL
MOVING FROM LYING ON BACK TO SITTING ON SIDE OF FLAT BED WITH BEDRAILS: TOTAL
EATING MEALS: TOTAL
TOILETING: TOTAL
DRESSING REGULAR LOWER BODY CLOTHING: TOTAL
MOVING TO AND FROM BED TO CHAIR: TOTAL

## 2023-12-04 ASSESSMENT — ENCOUNTER SYMPTOMS: FEVER: 1

## 2023-12-04 NOTE — PRE-PROCEDURE NOTE
Pre-Procedure H&P     Provider Assessment:  Diagnosis/Reason for Procedure: subdural hematoma  Procedure: middle meningeal artery embolization  Medications Reviewed:   yes   Prophylatic Antibiotics Needed:   no    Neuro status: opens eyes to noxious stim, says few words, spontaneous x4  Mouth Opening OK: yes   Neck Flexibility OK: yes   Sedation Plan: anesthesia  COVID-19 Risk Consent:  Surgeon has reviewed key risks related to the risk of garry COVID-19 and if they contract COVID-19 what the risks are.

## 2023-12-04 NOTE — CARE PLAN
Problem: Pain  Goal: My pain/discomfort is manageable  Outcome: Progressing     Problem: Safety  Goal: Patient will be injury free during hospitalization  Outcome: Progressing     Problem: Safety  Goal: I will remain free of falls  Outcome: Progressing     Problem: Daily Care  Goal: Daily care needs are met  Outcome: Progressing     Problem: General Stroke  Goal: Demonstrate improvement in neurological exam throughout the shift  Outcome: Progressing     Problem: General Stroke  Goal: Maintain BP within ordered limits throughout shift  Outcome: Progressing     Problem: General Stroke  Goal: No symptoms of hemorrhage throughout shift  Outcome: Progressing     Problem: Safety - Medical Restraint  Goal: Remains free of injury from restraints (Restraint for Interference with Medical Device)  Outcome: Progressing   The patient's goals for the shift include      The clinical goals for the shift include pt will remain safe throuhgout shift    Over the shift, the patient did make progress toward the following goals. Patient remains lethargic, responding to voice and pain.

## 2023-12-04 NOTE — ANESTHESIA POSTPROCEDURE EVALUATION
Patient: Haile Ayers    Procedure Summary       Date: 12/04/23 Room / Location: [facility]    Anesthesia Start: 1241 Anesthesia Stop: 1426    Procedure: IR ANGIOGRAM Diagnosis: (MMA embo)    Scheduled Providers: Brent Howell MD; Christen Velasquez MD Responsible Provider: Brent Howell MD    Anesthesia Type: MAC ASA Status: 3            Anesthesia Type: MAC    Vitals Value Taken Time   /91 12/04/23 1415   Temp 37.1 12/04/23 1426   Pulse 90 12/04/23 1415   Resp 16 12/04/23 1415   SpO2 96 % 12/04/23 1415       Anesthesia Post Evaluation    Patient location during evaluation: PACU  Patient participation: complete - patient cannot participate  Level of consciousness: obtunded/minimal responses  Pain management: adequate  Airway patency: patent  Cardiovascular status: acceptable and blood pressure returned to baseline  Respiratory status: acceptable and room air  Hydration status: acceptable  Postoperative Nausea and Vomiting: none        No notable events documented.

## 2023-12-04 NOTE — PROGRESS NOTES
"Haile Ayers is a 83 y.o. male on day 12 of admission presenting with Subdural hematoma (CMS/HCC).    Subjective   Patient with decreased mental status during the day and overnight.    Objective     Physical Exam  Eyes opened to noxious stimuli, not sustained  Expressive aphasia  Ox0  BUE spontaneous L>R  BLE min spontaneous L>R    Last Recorded Vitals  Blood pressure 160/72, pulse 90, temperature 36.9 °C (98.4 °F), resp. rate 18, height 1.727 m (5' 7.99\"), weight 74.2 kg (163 lb 9.3 oz), SpO2 95 %.  Intake/Output last 3 Shifts:  I/O last 3 completed shifts:  In: 1514.3 (20.4 mL/kg) [I.V.:964.3 (13 mL/kg); IV Piggyback:550]  Out: 3097.5 (41.7 mL/kg) [Urine:3085 (1.2 mL/kg/hr); Drains:12.5]  Weight: 74.2 kg     Relevant Results    This patient has a urinary catheter   Reason for the urinary catheter remaining today? Urine catheter unnecessary, will be removed today    This patient is intubated   Reason for patient to remain intubated today? Intubation unnecessary, will extubate today      Assessment/Plan   Principal Problem:    Subdural hematoma (CMS/HCC)    83 y.o. male h/o CLL, anemia, DM, p/w AMS, found to have UTI, CTH w/ L large chronic SDH, 11/22 s/p L crani for SDH evac, CTH POC, 11/24 drain dc'd, Bcx +GPC, 11/25 febrile, remaining vitals stable, exam unchanged, repeat bcx negative     11/28 s/p midline   11/29 Lethargic, not FC, s/p CTH w/ increased L SDH w/ 8mm MLS s/p 1g Keppra load, CXR stable opacities, rCTH stable, s/p R side wd, s/p keppra load, rrCTH stable L SDH,  11/30 S/p L crani for redo SDH evac, CTH POC with improved MLS  12/1 s/p extubation  12/3 CTH stable     Patient poor neurologic exam, worsened this morning.     PLAN:    Floor  CTH to evaluate for possible worsening of subdural  SDD-possible discontinue today  Chronic ignacio  Heme recs-cont to hold ibrutinib until POD14, daily CBC  SLP eval   PTOT-rehab; needs re-eval  SCD, SQH    Tracy Phillips MD      "

## 2023-12-04 NOTE — CONSULTS
Vancomycin Dosing by Pharmacy- Cessation of Therapy    Consult to pharmacy for vancomycin dosing has been discontinued by the prescriber, pharmacy will sign off at this time.    Please call pharmacy if there are further questions or re-enter a consult if vancomycin is resumed.     Isaiah Do, MckaylaD

## 2023-12-04 NOTE — CONSULTS
"Nutrition Follow Up Assessment:   Nutrition Assessment    Reason for Assessment: Tube feeding recommendations    Patient is a 83 y.o. male presenting with SDH    11/30/2023: s/p L-crani for re-evacuation of SDH   12/4 decreased mental status during the day and overnight.     Nutrition History:  Food and Nutrient History: pt off floor for testing.  11/21-24 - NPO  11/24-11/27- Tube feeding ordered -no record that he received the tube feeding.   11/27-29 - Soft and Bite sized diet with Mildly thick liquids ordered. No intake records available so unclear if he was able to eat anything.   NPO 11/30 -12/4 following surgery.     Anthropometrics:  Height: 172.7 cm (5' 7.99\")   Weight: 74.2 kg (163 lb 9.3 oz)   BMI (Calculated): 24.88  IBW/kg (Dietitian Calculated): 70 kg  Percent of IBW: 106 %       Weight History:   Weight         11/24/2023  0000 11/24/2023  0405 12/1/2023  0947 12/2/2023  1133 12/3/2023  0422    Weight: -- 69.8 kg (153 lb 14.1 oz) 69.8 kg (153 lb 14.1 oz) 70 kg (154 lb 5.2 oz) 74.2 kg (163 lb 9.3 oz)           Weight Change %:   Increased 4.4kg since admission, likely secondary to fluid shifts.  Will monitor.           Nutrition Focused Physical Exam Findings:  defer: pt off floor    Nutrition Significant Labs:  CBC Trend:   Results from last 7 days   Lab Units 12/04/23  0809 12/03/23  0223 12/02/23  0438 12/01/23  1349   WBC AUTO x10*3/uL 8.7 8.7 9.1 9.8   RBC AUTO x10*6/uL 2.97* 3.18* 3.23* 3.32*   HEMOGLOBIN g/dL 8.6* 9.2* 9.4* 9.7*   HEMATOCRIT % 26.4* 28.2* 26.8* 29.2*   MCV fL 89 89 83 88   PLATELETS AUTO x10*3/uL 225 232 234 236    , BMP Trend:   Results from last 7 days   Lab Units 12/04/23  0809 12/03/23  0224 12/01/23  2336 12/01/23  1349   GLUCOSE mg/dL 118* 135* 173* 153*   CALCIUM mg/dL 7.7* 7.9* 7.4* 7.5*   SODIUM mmol/L 141 140 138 138   POTASSIUM mmol/L 3.5 3.5 3.4* 3.3*   CO2 mmol/L 13* 17* 21 19*   CHLORIDE mmol/L 113* 114* 112* 110*   BUN mg/dL 19 21 21 23   CREATININE mg/dL 1.12 1.07 " 1.09 1.06    , BG POCT trend:   Results from last 7 days   Lab Units 11/30/23  1959 11/30/23  1141 11/30/23  0812 11/28/23  2134   POCT GLUCOSE mg/dL 167* 158* 138* 243*        Nutrition Specific Medications:  Vitamin D, Miralax    I/O:   Stool Appearance: Soft (12/03/23 0400)        Dietary Orders (From admission, onward)       Start     Ordered    12/04/23 1255  Enteral feeding with NPO Isosource 1.5; NG (nasogastric tube); 55 (mL/H); 250; Water; Every 6 hours  Diet effective now        Comments: Start at 20 mL/h and increase as tolerated by 10 mL every 6 hours to goal   Question Answer Comment   Tube feeding formula: Isosource 1.5    Feeding route: NG (nasogastric tube)    Tube feeding continuous rate (mL/hr): 55 mL/H   Tube feeding flush (mL): 250    Flush type: Water    Flush frequency: Every 6 hours        12/04/23 1256                     Estimated Needs:   Total Energy Estimated Needs (kCal): 2000 kCal  Method for Estimating Needs: 25kcal/kg  Total Protein Estimated Needs (g): 90 g  Method for Estimating Needs: 1.2gm/kg  Total Fluid Estimated Needs (mL):  (per MD)           Nutrition Diagnosis   Nutrition Diagnosis:  Malnutrition Diagnosis  Patient has Malnutrition Diagnosis: No  But pt and high risk for malnutrition given length of time without nutrition support and increased nutrient needs.     Nutrition Diagnosis  Patient has Nutrition Diagnosis: Yes  Diagnosis Status (1): Ongoing  Nutrition Diagnosis 1: Increased nutrient needs  Related to (1): increased metabolic demands  As Evidenced by (1): s/p re-do L-crani    Nutrition Interventions/Recommendations   Nutrition Interventions and Recommendations:        Nutrition Prescription:  Individualized Nutrition Prescription Provided for : Isosource 1.5 at goal rate of 55ml/hr to provide 1980 calories, 90 gm pro and 1008ml free water.        Nutrition Recommendations:    Pt is high risk for refeeding syndrome. Recommend checking RFP and Mag daily during  initiation of nutrition support. If low hold tube feeding at current rate and replace before advancing.   Initiate Isosource 1.5 at 10ml/hr x12 hours.  If tolerated and labs WNL increase by 10ml/hr [id number] to reach goal rate of 55ml/hr.   Free water flushes per team. Tube feeding at goal rate will provide 1008ml free water. Current order provides 1000ml for a total of 2008ml free water.     Nutrition Education:   Will continue to follow for education needs.        Nutrition Monitoring and Evaluation   Monitoring/Evaluation:   Food/Nutrient Related History Monitoring  Monitoring and Evaluation Plan: Energy intake, Enteral and parenteral nutrition intake  Criteria: meet >75% of estimated energy needs;tolerate tube feeding    Body Composition/Growth/Weight History  Monitoring and Evaluation Plan: Weight  Criteria: maintain stable weight    Biochemical Data, Medical Tests and Procedures  Monitoring and Evaluation Plan: Electrolyte/renal panel, Glucose/endocrine profile  Criteria: Labs WNL; prevent refeeding syndrome      Time Spent/Follow-up Reminder:   Follow Up  Time Spent (min): 45 minutes  Last Date of Nutrition Visit: 12/04/23  Nutrition Follow-Up Needed?: Dietitian to reassess per policy

## 2023-12-04 NOTE — PROGRESS NOTES
Physical Therapy    Physical Therapy Treatment    Patient Name: Haile Ayers  MRN: [id number]  Today's Date: 12/4/2023  Time Calculation  Start Time: 1042  Stop Time: 1055  Time Calculation (min): 13 min       Assessment/Plan   PT Assessment  PT Assessment Results: Decreased strength, Decreased endurance, Impaired balance, Decreased mobility, Decreased cognition  Rehab Prognosis: Fair  End of Session Communication: Bedside nurse  Assessment Comment: Pt demo dec cognition, dec strength, balance, & endurance limiting all functional mobility.  Pt would benefit from continued therapy to address these deficits and improve safety & indep.  End of Session Patient Position: Bed, 4 rail up, Alarm on (restraints B wrists)  PT Plan  Inpatient/Swing Bed or Outpatient: Inpatient  PT Plan  Treatment/Interventions: Bed mobility, Transfer training, Gait training, Balance training, Neuromuscular re-education, Strengthening, Range of motion, Endurance training, Therapeutic exercise, Therapeutic activity, Positioning  PT Plan: Skilled PT  PT Frequency: 5 times per week  PT Discharge Recommendations: Moderate intensity level of continued care  Equipment Recommended upon Discharge:  (TBD; Anticipate wheeled walker)  PT Recommended Transfer Status: Assist x2  PT - OK to Discharge: Yes (PT evaluation has been completed and discharge recommendations have been made.)      General Visit Information:   PT  Visit  PT Received On: 12/04/23  Response to Previous Treatment: Patient unable to report, no changes reported from family or staff  General  Family/Caregiver Present: No  Prior to Session Communication: Bedside nurse  Patient Position Received: Bed, 4 rail up, Alarm on (wrist restraints)  General Comment: Pt maintains eye closure throughout session; does not actively participate during session.  Does not follow commands.    Subjective   Precautions:  Precautions  Medical Precautions: Fall precautions       Objective   Pain:  Pain  Assessment  Pain Assessment:  (no s/s of pain during session)  Cognition:  Cognition  Overall Cognitive Status: Unable to assess  Orientation Level: Disoriented X4  Following Commands:  (Does not follow any commands during session)  Postural Control:  Postural Control  Postural Control: Impaired  Head Control:  (spontaneous head turning during session; good control in sitting)  Extremity/Trunk Assessments:     LLE   LLE :  (spontaneous movement only)  Activity Tolerance:  Activity Tolerance  Endurance: Tolerates less than 10 min exercise, no significant change in vital signs  Treatments:  Balance/Neuromuscular Re-Education  Balance/Neuromuscular Re-Education Activity Performed: Yes  Balance/Neuromuscular Re-Education Activity 1: Sitting EOB x 2 min, DEP, no righting reactions noted    Bed Mobility  Bed Mobility: Yes  Bed Mobility 1  Bed Mobility 1: Supine to sitting  Level of Assistance 1: Dependent (x2)  Bed Mobility 2  Bed Mobility  2: Sitting to supine  Level of Assistance 2: Dependent (x2)    Ambulation/Gait Training  Ambulation/Gait Training Performed: No  Transfers  Transfer: No    Outcome Measures:  [facility] Basic Mobility  Turning from your back to your side while in a flat bed without using bedrails: Total  Moving from lying on your back to sitting on the side of a flat bed without using bedrails: Total  Moving to and from bed to chair (including a wheelchair): Total  Standing up from a chair using your arms (e.g. wheelchair or bedside chair): Total  To walk in hospital room: Total  Climbing 3-5 steps with railing: Total  Basic Mobility - Total Score: 6    Education Documentation  Precautions, taught by Bonita Lees, PT at 12/4/2023  2:17 PM.  Learner: Patient  Readiness: Acceptance  Method: Explanation  Response: No Evidence of Learning    Mobility Training, taught by Bonita Lees, PT at 12/4/2023  2:17 PM.  Learner: Patient  Readiness: Acceptance  Method: Explanation  Response: No Evidence of  Learning    Education Comments  No comments found.        OP EDUCATION:       Encounter Problems       Encounter Problems (Active)       Balance       Patient will stand with UE support of LRD and </= MOD A x1 at least 3 min to improve balance required for self-care tasks.  (Not Progressing)       Start:  11/22/23    Expected End:  12/06/23               Mobility       Patient will ambulate at least 20 ft. with </= MOD A x1 and LRD to improve tolerance of household distances.  (Not Progressing)       Start:  11/22/23    Expected End:  12/06/23            Patient will perform bed mobility with </= MOD A x1 to reduce risk of developing decubitus ulcers.  (Progressing)       Start:  11/22/23    Expected End:  12/06/23            Patient will perform home exercise program as prescribed with cues as needed.   (Progressing)       Start:  11/22/23    Expected End:  12/06/23               Pain - Adult          Transfers       Patient will perform sit to stand and stand to sit transfers with </= MAX A x1 and LRD to increase functional strength.  (Not Progressing)       Start:  11/22/23    Expected End:  12/06/23

## 2023-12-04 NOTE — NURSING NOTE
"#12 Fr, small bore feeding tube inserted into Lt nare with Enteral Access System CORTRAK 2 per provider orders, secured with hypafix tape unable to place bridle, patient tolerated procedure; feeding tube secured at 78 cm; team made aware; bedside nurse notified and educated on appropriate use; no bleeding or adverse reaction noted; KUB activated for placement verification.\"       "

## 2023-12-04 NOTE — PROGRESS NOTES
Occupational Therapy    Occupational Therapy Treatment    Name: Haile Ayers  MRN: [id number]  : 1940  Date: 23  Time Calculation  Start Time: 1042  Stop Time: 1055  Time Calculation (min): 13 min    Plan:  Treatment Interventions: ADL retraining, Visual perceptual retraining, Functional transfer training, UE strengthening/ROM, Endurance training, Cognitive reorientation, Patient/family training, Neuromuscular reeducation, Fine motor coordination activities  OT Frequency: 4 times per week  OT Discharge Recommendations: Moderate intensity level of continued care  Equipment Recommended upon Discharge:  (TBD; Anticipate wheeled walker)  OT Recommended Transfer Status: Maximum assist  OT - OK to Discharge: Yes (Recommending High Intensity.)    Subjective     Lines/Tubes/Drains:  Midline 23 Single lumen Right Brachial vein (Active)   Number of days: 6       Urethral Catheter Straight-tip (Active)   Number of days: 11       Open Drain Left Scalp (Active)   Number of days: 3        23 1043   OT Last Visit   OT Received On 23   General   Co-Treatment PT   Co-Treatment Reason maxmimize Pt safety and participation d/t 2 person skilled assist for all mobility   Prior to Session Communication Bedside nurse   Patient Position Received Bed, 4 rail up;Alarm on  (B wrist restraints)   General Comment Pt with eyes closed during session; spontaneous L UE/LE movement and head rotation from L to R   Precautions   Medical Precautions Fall precautions   Pain Assessment   Pain Assessment   (no s/s of pain during session)   Cognition   Orientation Level Disoriented X4   Following Commands   (follows 0% commands)   Bed Mobility   Bed Mobility Yes   Bed Mobility 1   Bed Mobility 1 Supine to sitting   Level of Assistance 1 Dependent  (2 person assist)   Bed Mobility 2   Bed Mobility  2 Sitting to supine   Level of Assistance 2 Dependent  (2 person assist)   Therapeutic Exercise   Therapeutic Exercise Performed  Yes   Therapeutic Exercise Activity 1 PROM 1x5 B UE shoulder flexion, elbow flexion open/close gross grasp   Therapeutic Activity   Therapeutic Activity Performed Yes   Therapeutic Activity 1 Pt sat EOB with dependent A for sitting balance. Pt arousal did not improve with mobility   IP OT Assessment   OT Assessment impaired ADLs   End of Session Communication Bedside nurse   End of Session Patient Position Bed, 4 rail up;Alarm on  (B wrist restraints)   OT Assessment   OT Assessment Results Decreased ADL status;Decreased upper extremity range of motion;Decreased upper extremity strength;Decreased safe judgment during ADL;Decreased cognition;Decreased endurance;Visual deficit;Decreased fine motor control;Decreased functional mobility;Decreased gross motor control;Decreased IADLs;Decreased trunk control for functional activities   Inpatient/Swing Bed or Outpatient   Inpatient/Swing Bed or Outpatient Inpatient   Inpatient Plan   Treatment Interventions ADL retraining;Visual perceptual retraining;Functional transfer training;UE strengthening/ROM;Endurance training;Cognitive reorientation;Patient/family training;Neuromuscular reeducation;Fine motor coordination activities   OT Frequency 4 times per week   OT Discharge Recommendations Moderate intensity level of continued care         Outcome Measures:       12/04/23 1043   [facility] Daily Activity   Putting on and taking off regular lower body clothing 1   Bathing (including washing, rinsing, drying) 1   Putting on and taking off regular upper body clothing 1   Toileting, which includes using toilet, bedpan or urinal 1   Taking care of personal grooming such as brushing teeth 1   Eating Meals 1   Daily Activity - Total Score 6         Education Documentation  Body Mechanics, taught by Isabela Hoff OT at 12/4/2023  2:57 PM.  Learner: Patient  Readiness: Acceptance  Method: Explanation  Response: Needs Reinforcement    Precautions, taught by Isabela Hoff OT at  12/4/2023  2:57 PM.  Learner: Patient  Readiness: Acceptance  Method: Explanation  Response: Needs Reinforcement    ADL Training, taught by Isabela Hoff OT at 12/4/2023  2:57 PM.  Learner: Patient  Readiness: Acceptance  Method: Explanation  Response: Needs Reinforcement          Goals:  Encounter Problems       Encounter Problems (Active)       ADLs       Patient will perform UB and LB bathing with CGA (Progressing)       Start:  11/22/23    Expected End:  12/06/23            Patient will complete upper body dressing with contact guard assist level of assistance donning all UE clothes while edge of bed  (Progressing)       Start:  11/22/23    Expected End:  12/06/23            Patient will complete lower body dressing with moderate assist level of assistance donning all LE clothes  with LRD while edge of bed and standing (Progressing)       Start:  11/22/23    Expected End:  12/06/23            Patient will complete daily grooming tasks brushing teeth, washing face/hair, and unsupported sitting with SBA and PRN adaptive equipment while edge of bed . (Progressing)       Start:  11/22/23    Expected End:  12/06/23            Patient will complete toileting including hygiene clothing management/hygiene with moderate assist level of assistance  (Progressing)       Start:  11/22/23    Expected End:  12/06/23               BALANCE       Pt will maintain dynamic sitting balance during ADL task with modified independent level of assistance in order to demonstrate decreased risk of falling and improved postural control. (Progressing)       Start:  11/22/23    Expected End:  12/06/23                 COGNITION/SAFETY       Patient will follow >85% simple commands to allow improved ADL performance with min verbal cueing.  (Progressing)       Start:  11/22/23    Expected End:  12/06/23            Patient will demonstrated orientation x 4 with min verbal cueing. (Progressing)       Start:  11/22/23    Expected End:   12/06/23       ORIENTATION         Patient will sequence a functional task including 2-3 steps with >85% accuracy with min verbal cueing.  (Progressing)       Start:  11/22/23    Expected End:  12/06/23            Patient will attend to functional task with 90% accuracy for >10 min with min verbal cueing.  (Progressing)       Start:  11/22/23    Expected End:  12/06/23                 TRANSFERS       Patient will perform bed mobility contact guard assist level of assistance and bed rails in order to improve safety and independence with mobility (Progressing)       Start:  11/22/23    Expected End:  12/06/23            Patient will complete functional transfer with least restrictive device with minimal assist  level of assistance. (Progressing)       Start:  11/22/23    Expected End:  12/06/23                 VISION       Patient will visually track to all visual fields 100% of the time while performing a functional task with min verbal cueing.  (Progressing)       Start:  11/22/23    Expected End:  12/06/23 12/04/23 at 2:58 PM   Isabela Hoff, OT   [phone]

## 2023-12-04 NOTE — PROGRESS NOTES
12/04/23        Transitional Care Coordination Progress Note:   Patient discussed during interdisciplinary rounds.   Team members present: PAULINO CERVANTES MD    Plan per Medical/Surgical team: CTH to evaluate for possible worsening of subdural  SDD-possible discontinue today  Chronic ignacio  Heme recs-cont to hold ibrutinib until POD14, daily CBC  SLP eval   PTOT-rehab; needs re-eval  Discharge disposition: SNF   Status-Inpatient   Payer- Payor: AET MEDICARE / Plan: AET MEDICARE VALUE PLAN / Product Type: *No Product type* /    Potential Barriers: None   ADOD: 12/6/2023         Carlos Last RN [facility] [phone]

## 2023-12-04 NOTE — PROCEDURES
Pre-Procedure Verification and Time Out:  Procedure Location: procedure area  HUDDLE - Pre-procedure Verification:  completed  TIME OUT - Final Verification:  completed immediately prior to procedure start  DEBRIEF: completed    Complications:  None; patient tolerated the procedure well.     Disposition: rPCU  Condition: stable  Specimens Collected: No specimens collected    General Information:   Anesthesia/ sedation: Anesthesia  Indication(s)/Pre - Procedure Diagnoses: subdural hematoma  Post-Procedure Diagnosis: same  Procedure Name: Diagnostic Cerebral Angiogram  Procedure performed by: Dr. Uche Fernandez  Assistant(s): Dionne  Estimated Blood Loss (mL): 15  Specimen: no  Informed Consent: consent obtained and in chart    Access: 6 Fr Sheath in R CFA  Closure: Mynx  Vessels Injected: L CCA, R CFA  Moderate Sedation Time: per anesthesia flowsheet  Findings: Intended middle meningeal artery embolization with no suitable left middle meningeal artery distal target, no embolization performed. Full report to follow in PACS dictation

## 2023-12-04 NOTE — ANESTHESIA PREPROCEDURE EVALUATION
Patient: Haile Ayers    Procedure Information       Date/Time: 12/04/23 0800    Scheduled providers: Brent Howell MD; Christen Velasquez MD    Procedure: IR ANGIOGRAM    Location: [facility]            Relevant Problems   Anesthesia (within normal limits)      Cardiovascular   (+) HTN (hypertension)      Endocrine   (+) Diabetes mellitus, type 2 (CMS/HCC)      GI (within normal limits)      /Renal (within normal limits)  [place] in place      Neuro/Psych  SDH, obtunded   (+) CVA (cerebral vascular accident) (CMS/HCC)      Pulmonary (within normal limits)      Hematology  CLL      Infectious Disease  bacteremia       Clinical information reviewed:     Meds               NPO Detail:  No data recorded     Physical Exam    Airway  Mallampati: unable to assess  TM distance: >3 FB     Cardiovascular   Rhythm: regular  Rate: normal     Dental - normal exam     Pulmonary - normal exam     Abdominal      Other findings: Obtunded. Unable to participate in airway exam          Anesthesia Plan    ASA 3     MAC     The patient is not a current smoker.    Anesthetic plan and risks discussed with patient.    Plan discussed with attending.

## 2023-12-05 ENCOUNTER — APPOINTMENT (OUTPATIENT)
Dept: VASCULAR MEDICINE | Facility: HOSPITAL | Age: 83
DRG: 025 | End: 2023-12-05
Payer: MEDICARE

## 2023-12-05 ENCOUNTER — APPOINTMENT (OUTPATIENT)
Dept: RADIOLOGY | Facility: HOSPITAL | Age: 83
DRG: 025 | End: 2023-12-05
Payer: MEDICARE

## 2023-12-05 ENCOUNTER — APPOINTMENT (OUTPATIENT)
Dept: NEUROLOGY | Facility: HOSPITAL | Age: 83
DRG: 025 | End: 2023-12-05
Payer: MEDICARE

## 2023-12-05 LAB
ALBUMIN SERPL BCP-MCNC: 2.8 G/DL (ref 3.4–5)
ANION GAP BLDA CALCULATED.4IONS-SCNC: 16 MMO/L (ref 10–25)
ANION GAP BLDA CALCULATED.4IONS-SCNC: 18 MMO/L (ref 10–25)
ANION GAP BLDA CALCULATED.4IONS-SCNC: 19 MMO/L (ref 10–25)
ANION GAP SERPL CALC-SCNC: 20 MMOL/L (ref 10–20)
APPEARANCE UR: ABNORMAL
BASE EXCESS BLDA CALC-SCNC: -10.9 MMOL/L (ref -2–3)
BASE EXCESS BLDA CALC-SCNC: -12.4 MMOL/L (ref -2–3)
BASE EXCESS BLDA CALC-SCNC: -8.6 MMOL/L (ref -2–3)
BILIRUB UR STRIP.AUTO-MCNC: NEGATIVE MG/DL
BODY TEMPERATURE: 37 DEGREES CELSIUS
BODY TEMPERATURE: ABNORMAL
BODY TEMPERATURE: ABNORMAL
BUN SERPL-MCNC: 20 MG/DL (ref 6–23)
CA-I BLDA-SCNC: 1.27 MMOL/L (ref 1.1–1.33)
CA-I BLDA-SCNC: 1.28 MMOL/L (ref 1.1–1.33)
CA-I BLDA-SCNC: 1.3 MMOL/L (ref 1.1–1.33)
CALCIUM SERPL-MCNC: 8.2 MG/DL (ref 8.6–10.6)
CHLORIDE BLDA-SCNC: 113 MMOL/L (ref 98–107)
CHLORIDE SERPL-SCNC: 113 MMOL/L (ref 98–107)
CO2 SERPL-SCNC: 15 MMOL/L (ref 21–32)
COLOR UR: YELLOW
CREAT SERPL-MCNC: 1.22 MG/DL (ref 0.5–1.3)
ERYTHROCYTE [DISTWIDTH] IN BLOOD BY AUTOMATED COUNT: 15 % (ref 11.5–14.5)
ERYTHROCYTE [DISTWIDTH] IN BLOOD BY AUTOMATED COUNT: 15.2 % (ref 11.5–14.5)
GFR SERPL CREATININE-BSD FRML MDRD: 58 ML/MIN/1.73M*2
GLUCOSE BLDA-MCNC: 189 MG/DL (ref 74–99)
GLUCOSE BLDA-MCNC: 196 MG/DL (ref 74–99)
GLUCOSE BLDA-MCNC: 213 MG/DL (ref 74–99)
GLUCOSE SERPL-MCNC: 166 MG/DL (ref 74–99)
GLUCOSE UR STRIP.AUTO-MCNC: NEGATIVE MG/DL
HCO3 BLDA-SCNC: 13.5 MMOL/L (ref 22–26)
HCO3 BLDA-SCNC: 13.9 MMOL/L (ref 22–26)
HCO3 BLDA-SCNC: 16.2 MMOL/L (ref 22–26)
HCT VFR BLD AUTO: 27.8 % (ref 41–52)
HCT VFR BLD AUTO: 31.1 % (ref 41–52)
HCT VFR BLD EST: 27 % (ref 41–52)
HCT VFR BLD EST: 29 % (ref 41–52)
HCT VFR BLD EST: 30 % (ref 41–52)
HGB BLD-MCNC: 8.9 G/DL (ref 13.5–17.5)
HGB BLD-MCNC: 9.9 G/DL (ref 13.5–17.5)
HGB BLDA-MCNC: 10 G/DL (ref 13.5–17.5)
HGB BLDA-MCNC: 8.9 G/DL (ref 13.5–17.5)
HGB BLDA-MCNC: 9.7 G/DL (ref 13.5–17.5)
HOLD SPECIMEN: NORMAL
INHALED O2 CONCENTRATION: 21 %
INHALED O2 CONCENTRATION: 28 %
INHALED O2 CONCENTRATION: 28 %
KETONES UR STRIP.AUTO-MCNC: ABNORMAL MG/DL
LACTATE BLDA-SCNC: 0.6 MMOL/L (ref 0.4–2)
LACTATE BLDA-SCNC: 0.6 MMOL/L (ref 0.4–2)
LACTATE BLDA-SCNC: 0.9 MMOL/L (ref 0.4–2)
LEUKOCYTE ESTERASE UR QL STRIP.AUTO: NEGATIVE
MAGNESIUM SERPL-MCNC: 1.88 MG/DL (ref 1.6–2.4)
MCH RBC QN AUTO: 29 PG (ref 26–34)
MCH RBC QN AUTO: 29.4 PG (ref 26–34)
MCHC RBC AUTO-ENTMCNC: 31.8 G/DL (ref 32–36)
MCHC RBC AUTO-ENTMCNC: 32 G/DL (ref 32–36)
MCV RBC AUTO: 91 FL (ref 80–100)
MCV RBC AUTO: 92 FL (ref 80–100)
MRSA DNA SPEC QL NAA+PROBE: NOT DETECTED
MUCOUS THREADS #/AREA URNS AUTO: ABNORMAL /LPF
NITRITE UR QL STRIP.AUTO: NEGATIVE
NRBC BLD-RTO: 0 /100 WBCS (ref 0–0)
NRBC BLD-RTO: 0 /100 WBCS (ref 0–0)
OXYHGB MFR BLDA: 95.6 % (ref 94–98)
OXYHGB MFR BLDA: 95.8 % (ref 94–98)
OXYHGB MFR BLDA: 96.4 % (ref 94–98)
PCO2 BLDA: 27 MM HG (ref 38–42)
PCO2 BLDA: 30 MM HG (ref 38–42)
PCO2 BLDA: 30 MM HG (ref 38–42)
PH BLDA: 7.26 PH (ref 7.38–7.42)
PH BLDA: 7.32 PH (ref 7.38–7.42)
PH BLDA: 7.34 PH (ref 7.38–7.42)
PH UR STRIP.AUTO: 6 [PH]
PHOSPHATE SERPL-MCNC: 3.5 MG/DL (ref 2.5–4.9)
PLATELET # BLD AUTO: 227 X10*3/UL (ref 150–450)
PLATELET # BLD AUTO: 254 X10*3/UL (ref 150–450)
PO2 BLDA: 76 MM HG (ref 85–95)
PO2 BLDA: 76 MM HG (ref 85–95)
PO2 BLDA: 96 MM HG (ref 85–95)
POTASSIUM BLDA-SCNC: 3.7 MMOL/L (ref 3.5–5.3)
POTASSIUM BLDA-SCNC: 3.7 MMOL/L (ref 3.5–5.3)
POTASSIUM BLDA-SCNC: 3.8 MMOL/L (ref 3.5–5.3)
POTASSIUM SERPL-SCNC: 3.6 MMOL/L (ref 3.5–5.3)
PROT UR STRIP.AUTO-MCNC: ABNORMAL MG/DL
RBC # BLD AUTO: 3.07 X10*6/UL (ref 4.5–5.9)
RBC # BLD AUTO: 3.37 X10*6/UL (ref 4.5–5.9)
RBC # UR STRIP.AUTO: ABNORMAL /UL
RBC #/AREA URNS AUTO: ABNORMAL /HPF
SAO2 % BLDA: 97 % (ref 94–100)
SAO2 % BLDA: 98 % (ref 94–100)
SAO2 % BLDA: 98 % (ref 94–100)
SODIUM BLDA-SCNC: 141 MMOL/L (ref 136–145)
SODIUM BLDA-SCNC: 141 MMOL/L (ref 136–145)
SODIUM BLDA-SCNC: 142 MMOL/L (ref 136–145)
SODIUM SERPL-SCNC: 144 MMOL/L (ref 136–145)
SP GR UR STRIP.AUTO: 1.02
SQUAMOUS #/AREA URNS AUTO: ABNORMAL /HPF
UROBILINOGEN UR STRIP.AUTO-MCNC: 0.2 MG/DL
VANCOMYCIN SERPL-MCNC: 15.7 UG/ML (ref 5–20)
WBC # BLD AUTO: 10.8 X10*3/UL (ref 4.4–11.3)
WBC # BLD AUTO: 17.6 X10*3/UL (ref 4.4–11.3)
WBC #/AREA URNS AUTO: ABNORMAL /HPF

## 2023-12-05 PROCEDURE — 84132 ASSAY OF SERUM POTASSIUM: CPT

## 2023-12-05 PROCEDURE — 74018 RADEX ABDOMEN 1 VIEW: CPT | Performed by: RADIOLOGY

## 2023-12-05 PROCEDURE — 94660 CPAP INITIATION&MGMT: CPT

## 2023-12-05 PROCEDURE — 93970 EXTREMITY STUDY: CPT | Performed by: SURGERY

## 2023-12-05 PROCEDURE — 2500000004 HC RX 250 GENERAL PHARMACY W/ HCPCS (ALT 636 FOR OP/ED): Performed by: STUDENT IN AN ORGANIZED HEALTH CARE EDUCATION/TRAINING PROGRAM

## 2023-12-05 PROCEDURE — 71045 X-RAY EXAM CHEST 1 VIEW: CPT | Performed by: RADIOLOGY

## 2023-12-05 PROCEDURE — 2500000004 HC RX 250 GENERAL PHARMACY W/ HCPCS (ALT 636 FOR OP/ED): Performed by: NURSE PRACTITIONER

## 2023-12-05 PROCEDURE — 71045 X-RAY EXAM CHEST 1 VIEW: CPT

## 2023-12-05 PROCEDURE — 71275 CT ANGIOGRAPHY CHEST: CPT

## 2023-12-05 PROCEDURE — 2020000001 HC ICU ROOM DAILY

## 2023-12-05 PROCEDURE — 74018 RADEX ABDOMEN 1 VIEW: CPT

## 2023-12-05 PROCEDURE — 85027 COMPLETE CBC AUTOMATED: CPT | Performed by: NURSE PRACTITIONER

## 2023-12-05 PROCEDURE — 84132 ASSAY OF SERUM POTASSIUM: CPT | Performed by: STUDENT IN AN ORGANIZED HEALTH CARE EDUCATION/TRAINING PROGRAM

## 2023-12-05 PROCEDURE — 5A09357 ASSISTANCE WITH RESPIRATORY VENTILATION, LESS THAN 24 CONSECUTIVE HOURS, CONTINUOUS POSITIVE AIRWAY PRESSURE: ICD-10-PCS

## 2023-12-05 PROCEDURE — 70450 CT HEAD/BRAIN W/O DYE: CPT | Performed by: RADIOLOGY

## 2023-12-05 PROCEDURE — 93970 EXTREMITY STUDY: CPT

## 2023-12-05 PROCEDURE — 96372 THER/PROPH/DIAG INJ SC/IM: CPT | Performed by: STUDENT IN AN ORGANIZED HEALTH CARE EDUCATION/TRAINING PROGRAM

## 2023-12-05 PROCEDURE — 2500000004 HC RX 250 GENERAL PHARMACY W/ HCPCS (ALT 636 FOR OP/ED)

## 2023-12-05 PROCEDURE — 2550000001 HC RX 255 CONTRASTS: Performed by: NEUROLOGICAL SURGERY

## 2023-12-05 PROCEDURE — 70450 CT HEAD/BRAIN W/O DYE: CPT

## 2023-12-05 PROCEDURE — 71275 CT ANGIOGRAPHY CHEST: CPT | Performed by: RADIOLOGY

## 2023-12-05 PROCEDURE — 87640 STAPH A DNA AMP PROBE: CPT | Performed by: NURSE PRACTITIONER

## 2023-12-05 PROCEDURE — 84132 ASSAY OF SERUM POTASSIUM: CPT | Performed by: NURSE PRACTITIONER

## 2023-12-05 PROCEDURE — 81001 URINALYSIS AUTO W/SCOPE: CPT | Performed by: NURSE PRACTITIONER

## 2023-12-05 PROCEDURE — 99291 CRITICAL CARE FIRST HOUR: CPT | Performed by: REGISTERED NURSE

## 2023-12-05 PROCEDURE — 2500000001 HC RX 250 WO HCPCS SELF ADMINISTERED DRUGS (ALT 637 FOR MEDICARE OP): Performed by: NURSE PRACTITIONER

## 2023-12-05 PROCEDURE — 83735 ASSAY OF MAGNESIUM: CPT

## 2023-12-05 PROCEDURE — 2500000002 HC RX 250 W HCPCS SELF ADMINISTERED DRUGS (ALT 637 FOR MEDICARE OP, ALT 636 FOR OP/ED): Performed by: REGISTERED NURSE

## 2023-12-05 PROCEDURE — 80202 ASSAY OF VANCOMYCIN: CPT | Performed by: NURSE PRACTITIONER

## 2023-12-05 PROCEDURE — 94640 AIRWAY INHALATION TREATMENT: CPT

## 2023-12-05 PROCEDURE — 85027 COMPLETE CBC AUTOMATED: CPT | Performed by: STUDENT IN AN ORGANIZED HEALTH CARE EDUCATION/TRAINING PROGRAM

## 2023-12-05 RX ORDER — SODIUM CHLORIDE 9 MG/ML
75 INJECTION, SOLUTION INTRAVENOUS CONTINUOUS
Status: DISCONTINUED | OUTPATIENT
Start: 2023-12-05 | End: 2023-12-05

## 2023-12-05 RX ORDER — SODIUM CHLORIDE, SODIUM LACTATE, POTASSIUM CHLORIDE, CALCIUM CHLORIDE 600; 310; 30; 20 MG/100ML; MG/100ML; MG/100ML; MG/100ML
75 INJECTION, SOLUTION INTRAVENOUS CONTINUOUS
Status: DISCONTINUED | OUTPATIENT
Start: 2023-12-05 | End: 2023-12-05

## 2023-12-05 RX ORDER — BISACODYL 10 MG/1
10 SUPPOSITORY RECTAL DAILY
Status: DISCONTINUED | OUTPATIENT
Start: 2023-12-05 | End: 2023-12-29

## 2023-12-05 RX ORDER — POTASSIUM CHLORIDE 14.9 MG/ML
20 INJECTION INTRAVENOUS ONCE
Status: COMPLETED | OUTPATIENT
Start: 2023-12-05 | End: 2023-12-05

## 2023-12-05 RX ORDER — IPRATROPIUM BROMIDE AND ALBUTEROL SULFATE 2.5; .5 MG/3ML; MG/3ML
3 SOLUTION RESPIRATORY (INHALATION) EVERY 8 HOURS
Status: DISCONTINUED | OUTPATIENT
Start: 2023-12-05 | End: 2023-12-06

## 2023-12-05 RX ORDER — FUROSEMIDE 10 MG/ML
20 INJECTION INTRAMUSCULAR; INTRAVENOUS ONCE
Status: COMPLETED | OUTPATIENT
Start: 2023-12-05 | End: 2023-12-05

## 2023-12-05 RX ORDER — VANCOMYCIN HYDROCHLORIDE 1 G/200ML
1000 INJECTION, SOLUTION INTRAVENOUS ONCE
Status: COMPLETED | OUTPATIENT
Start: 2023-12-05 | End: 2023-12-05

## 2023-12-05 RX ORDER — PANTOPRAZOLE SODIUM 40 MG/10ML
40 INJECTION, POWDER, LYOPHILIZED, FOR SOLUTION INTRAVENOUS
Status: DISCONTINUED | OUTPATIENT
Start: 2023-12-06 | End: 2023-12-07

## 2023-12-05 RX ADMIN — HYDRALAZINE HYDROCHLORIDE 10 MG: 20 INJECTION INTRAMUSCULAR; INTRAVENOUS at 04:33

## 2023-12-05 RX ADMIN — HEPARIN SODIUM 5000 UNITS: 5000 INJECTION INTRAVENOUS; SUBCUTANEOUS at 02:26

## 2023-12-05 RX ADMIN — IPRATROPIUM BROMIDE AND ALBUTEROL SULFATE 3 ML: .5; 3 SOLUTION RESPIRATORY (INHALATION) at 23:18

## 2023-12-05 RX ADMIN — LEVETIRACETAM 500 MG: 5 INJECTION INTRAVENOUS at 17:19

## 2023-12-05 RX ADMIN — BISACODYL 10 MG: 10 SUPPOSITORY RECTAL at 08:17

## 2023-12-05 RX ADMIN — POTASSIUM CHLORIDE 20 MEQ: 14.9 INJECTION, SOLUTION INTRAVENOUS at 08:18

## 2023-12-05 RX ADMIN — FUROSEMIDE 20 MG: 10 INJECTION, SOLUTION INTRAVENOUS at 21:45

## 2023-12-05 RX ADMIN — ONDANSETRON 4 MG: 2 INJECTION INTRAMUSCULAR; INTRAVENOUS at 13:57

## 2023-12-05 RX ADMIN — SODIUM CHLORIDE 75 ML/HR: 9 INJECTION, SOLUTION INTRAVENOUS at 02:26

## 2023-12-05 RX ADMIN — HYDRALAZINE HYDROCHLORIDE 10 MG: 20 INJECTION INTRAMUSCULAR; INTRAVENOUS at 08:03

## 2023-12-05 RX ADMIN — PIPERACILLIN SODIUM AND TAZOBACTAM SODIUM 3.38 G: 3; .375 INJECTION, SOLUTION INTRAVENOUS at 18:01

## 2023-12-05 RX ADMIN — PIPERACILLIN SODIUM AND TAZOBACTAM SODIUM 3.38 G: 3; .375 INJECTION, SOLUTION INTRAVENOUS at 12:22

## 2023-12-05 RX ADMIN — VANCOMYCIN HYDROCHLORIDE 1000 MG: 1 INJECTION, SOLUTION INTRAVENOUS at 13:38

## 2023-12-05 RX ADMIN — ONDANSETRON 4 MG: 2 INJECTION INTRAMUSCULAR; INTRAVENOUS at 05:35

## 2023-12-05 RX ADMIN — IOHEXOL 80 ML: 350 INJECTION, SOLUTION INTRAVENOUS at 20:00

## 2023-12-05 RX ADMIN — HEPARIN SODIUM 5000 UNITS: 5000 INJECTION INTRAVENOUS; SUBCUTANEOUS at 08:03

## 2023-12-05 RX ADMIN — HEPARIN SODIUM 5000 UNITS: 5000 INJECTION INTRAVENOUS; SUBCUTANEOUS at 17:19

## 2023-12-05 RX ADMIN — SODIUM CHLORIDE, POTASSIUM CHLORIDE, SODIUM LACTATE AND CALCIUM CHLORIDE 75 ML/HR: 600; 310; 30; 20 INJECTION, SOLUTION INTRAVENOUS at 11:41

## 2023-12-05 RX ADMIN — LEVETIRACETAM 500 MG: 5 INJECTION INTRAVENOUS at 04:33

## 2023-12-05 ASSESSMENT — PAIN SCALES - PAIN ASSESSMENT IN ADVANCED DEMENTIA (PAINAD)
TOTALSCORE: 1
BREATHING: OCCASIONAL LABORED BREATHING, SHORT PERIOD OF HYPERVENTILATION
CONSOLABILITY: NO NEED TO CONSOLE
BODYLANGUAGE: RELAXED
FACIALEXPRESSION: SMILING OR INEXPRESSIVE

## 2023-12-05 ASSESSMENT — PAIN SCALES - GENERAL
PAINLEVEL_OUTOF10: 0 - NO PAIN
PAINLEVEL_OUTOF10: 0 - NO PAIN

## 2023-12-05 ASSESSMENT — PAIN - FUNCTIONAL ASSESSMENT
PAIN_FUNCTIONAL_ASSESSMENT: WONG-BAKER FACES
PAIN_FUNCTIONAL_ASSESSMENT: WONG-BAKER FACES

## 2023-12-05 ASSESSMENT — PAIN SCALES - WONG BAKER: WONGBAKER_NUMERICALRESPONSE: NO HURT

## 2023-12-05 NOTE — PROGRESS NOTES
"Vancomycin Dosing by Pharmacy- INITIAL    Haile Ayers is a 83 y.o. year old male who Pharmacy has been consulted for vancomycin dosing for pneumonia. Based on the patient's indication and renal status this patient will be dosed based on a goal trough/random level of 15-20.     Renal function is currently declining.  Srcr 0.81 ---> 1.22    Visit Vitals  /67   Pulse 109   Temp 36.8 °C (98.2 °F) (Temporal)   Resp 24        Lab Results   Component Value Date    CREATININE 1.22 12/05/2023    CREATININE 1.08 12/04/2023    CREATININE 1.12 12/04/2023    CREATININE 1.07 12/03/2023        Patient weight is No results found for: \"PTWEIGHT\"    No results found for: \"CULTURE\"     I/O last 3 completed shifts:  In: 4000.5 (53.9 mL/kg) [I.V.:3900.5 (52.6 mL/kg); IV Piggyback:100]  Out: 1747.5 (23.6 mL/kg) [Urine:1725 (0.6 mL/kg/hr); Drains:7.5; Blood:15]  Weight: 74.2 kg   [unfilled]    Lab Results   Component Value Date    PATIENTTEMP 37.0 12/05/2023    PATIENTTEMP  12/05/2023      Comment:      NOTE: Patient Results are Not Corrected for Temperature    PATIENTTEMP  12/04/2023      Comment:      NOTE: Patient Results are Not Corrected for Temperature          Assessment/Plan     Patient will not be given a loading dose. Restarted consult with last dose 12/3 @ 2019 gave 35 hour level of 15.7.  Will dose vancomycin by levels and give a one time dose of 1000 mg.  Follow-up level will be ordered on 12/6 at 1300 unless clinically indicated sooner.  Will continue to monitor renal function daily while on vancomycin and order serum creatinine at least every 48 hours if not already ordered.  Follow for continued vancomycin needs, clinical response, and signs/symptoms of toxicity.       Josemanuel Fontanez RPh       "

## 2023-12-05 NOTE — SIGNIFICANT EVENT
"Preliminary EEG Report    This vEEG is indicative of a severe diffuse encephalopathy.    This EEG was read up until 4:28PM on 12/05/23.     The final impression will be available tomorrow under Chart Review in the Media tab.   To discontinue video EEG, place \"Discontinue Continuous VEEG\" order.     Nancy Bailey MD  Epilepsy Fellow [id number]     "

## 2023-12-05 NOTE — PROGRESS NOTES
"Haile Ayers is a 83 y.o. male on day 13 of admission presenting with Subdural hematoma (CMS/HCC).    Subjective   Patient with episodes of vomiting and coughing    Objective     Physical Exam  Eyes opened to noxious stimuli, not sustained  Expressive aphasia  Ox0  BUE spontaneous L>R  BLE min withdraw    Last Recorded Vitals  Blood pressure 166/81, pulse 102, temperature 37.5 °C (99.5 °F), temperature source Temporal, resp. rate 18, height 1.727 m (5' 7.99\"), weight 74.2 kg (163 lb 9.3 oz), SpO2 92 %.  Intake/Output last 3 Shifts:  I/O last 3 completed shifts:  In: 4000.5 (53.9 mL/kg) [I.V.:3900.5 (52.6 mL/kg); IV Piggyback:100]  Out: 2880 (38.8 mL/kg) [Urine:2855 (1.1 mL/kg/hr); Drains:10; Blood:15]  Weight: 74.2 kg     Relevant Results    This patient has a urinary catheter   Reason for the urinary catheter remaining today? Urine catheter unnecessary, will be removed today      Assessment/Plan   Principal Problem:    Subdural hematoma (CMS/HCC)  Active Problems:    HTN (hypertension)    Diabetes mellitus, type 2 (CMS/HCC)    CVA (cerebral vascular accident) (CMS/HCC)    83 y.o. male h/o CLL, anemia, DM, p/w AMS, found to have UTI, CTH w/ L large chronic SDH, 11/22 s/p L crani for SDH evac, CTH POC, 11/24 drain dc'd, Bcx +GPC, 11/25 febrile, remaining vitals stable, exam unchanged, repeat bcx negative     11/28 s/p midline   11/29 Lethargic, not FC, s/p CTH w/ increased L SDH w/ 8mm MLS s/p 1g Keppra load, CXR stable opacities, rCTH stable, s/p R side wd, s/p keppra load, rrCTH stable L SDH,  11/30 S/p L crani for redo SDH evac, CTH POC with improved MLS  12/1 s/p extubation  12/3 CTH stable     Patient poor neurologic exam, worsened this morning.     PLAN:    Floor  KUB, RFP, magnesum this AM for the vomiting, TF held  SDD-possible discontinue today  Chronic ignacio  Heme recs-cont to hold ibrutinib until POD14, daily CBC  SLP eval   PTOT-rehab; needs re-eval  SCD, SQH    Saima Abad MD      "

## 2023-12-06 ENCOUNTER — APPOINTMENT (OUTPATIENT)
Dept: NEUROLOGY | Facility: HOSPITAL | Age: 83
DRG: 025 | End: 2023-12-06
Payer: MEDICARE

## 2023-12-06 ENCOUNTER — APPOINTMENT (OUTPATIENT)
Dept: RADIOLOGY | Facility: HOSPITAL | Age: 83
DRG: 025 | End: 2023-12-06
Payer: MEDICARE

## 2023-12-06 ENCOUNTER — APPOINTMENT (OUTPATIENT)
Dept: CARDIOLOGY | Facility: HOSPITAL | Age: 83
DRG: 025 | End: 2023-12-06
Payer: MEDICARE

## 2023-12-06 LAB
ALBUMIN SERPL BCP-MCNC: 2.6 G/DL (ref 3.4–5)
AMMONIA PLAS-SCNC: 18 UMOL/L (ref 16–53)
ANION GAP BLDA CALCULATED.4IONS-SCNC: 14 MMO/L (ref 10–25)
ANION GAP SERPL CALC-SCNC: 18 MMOL/L (ref 10–20)
AORTIC VALVE PEAK VELOCITY: 1.95
APPARATUS: ABNORMAL
ARTERIAL PATENCY WRIST A: POSITIVE
AV PEAK GRADIENT: 15.2
AVA (PEAK VEL): 2.3
BASE EXCESS BLDA CALC-SCNC: -6.2 MMOL/L (ref -2–3)
BODY TEMPERATURE: ABNORMAL
BUN SERPL-MCNC: 27 MG/DL (ref 6–23)
CA-I BLD-SCNC: 1.25 MMOL/L (ref 1.1–1.33)
CA-I BLDA-SCNC: 1.27 MMOL/L (ref 1.1–1.33)
CALCIUM SERPL-MCNC: 8.4 MG/DL (ref 8.6–10.6)
CHLORIDE BLDA-SCNC: 114 MMOL/L (ref 98–107)
CHLORIDE SERPL-SCNC: 113 MMOL/L (ref 98–107)
CO2 SERPL-SCNC: 19 MMOL/L (ref 21–32)
CREAT SERPL-MCNC: 1.92 MG/DL (ref 0.5–1.3)
EJECTION FRACTION APICAL 4 CHAMBER: 82.9
EJECTION FRACTION: 82
ERYTHROCYTE [DISTWIDTH] IN BLOOD BY AUTOMATED COUNT: 15.4 % (ref 11.5–14.5)
GFR SERPL CREATININE-BSD FRML MDRD: 34 ML/MIN/1.73M*2
GLUCOSE BLDA-MCNC: 199 MG/DL (ref 74–99)
GLUCOSE SERPL-MCNC: 186 MG/DL (ref 74–99)
HCO3 BLDA-SCNC: 18.9 MMOL/L (ref 22–26)
HCT VFR BLD AUTO: 24.6 % (ref 41–52)
HCT VFR BLD EST: 26 % (ref 41–52)
HGB BLD-MCNC: 8.1 G/DL (ref 13.5–17.5)
HGB BLDA-MCNC: 8.7 G/DL (ref 13.5–17.5)
INHALED O2 CONCENTRATION: 30 %
LACTATE BLDA-SCNC: 0.6 MMOL/L (ref 0.4–2)
LEFT ATRIUM VOLUME AREA LENGTH INDEX BSA: 27.9
LEFT VENTRICLE INTERNAL DIMENSION DIASTOLE: 4.5 (ref 3.5–6)
LEFT VENTRICULAR OUTFLOW TRACT DIAMETER: 2
MAGNESIUM SERPL-MCNC: 1.77 MG/DL (ref 1.6–2.4)
MCH RBC QN AUTO: 29.5 PG (ref 26–34)
MCHC RBC AUTO-ENTMCNC: 32.9 G/DL (ref 32–36)
MCV RBC AUTO: 90 FL (ref 80–100)
MITRAL VALVE E/A RATIO: 0.78
MITRAL VALVE E/E' RATIO: 13.54
NRBC BLD-RTO: 0 /100 WBCS (ref 0–0)
OXYHGB MFR BLDA: 96.5 % (ref 94–98)
PCO2 BLDA: 35 MM HG (ref 38–42)
PH BLDA: 7.34 PH (ref 7.38–7.42)
PHOSPHATE SERPL-MCNC: 3.2 MG/DL (ref 2.5–4.9)
PLATELET # BLD AUTO: 245 X10*3/UL (ref 150–450)
PO2 BLDA: 102 MM HG (ref 85–95)
POTASSIUM BLDA-SCNC: 3.5 MMOL/L (ref 3.5–5.3)
POTASSIUM SERPL-SCNC: 3.5 MMOL/L (ref 3.5–5.3)
PROCALCITONIN SERPL-MCNC: 0.31 NG/ML
PSA SERPL-MCNC: 11.73 NG/ML
RBC # BLD AUTO: 2.75 X10*6/UL (ref 4.5–5.9)
RIGHT VENTRICLE FREE WALL PEAK S': 24.4
RIGHT VENTRICLE PEAK SYSTOLIC PRESSURE: 50
SAO2 % BLDA: 98 % (ref 94–100)
SODIUM BLDA-SCNC: 143 MMOL/L (ref 136–145)
SODIUM SERPL-SCNC: 146 MMOL/L (ref 136–145)
SPECIMEN DRAWN FROM PATIENT: ABNORMAL
TIDAL VOLUME: 410 ML
TRICUSPID ANNULAR PLANE SYSTOLIC EXCURSION: 2.6
VANCOMYCIN SERPL-MCNC: 20.6 UG/ML (ref 5–20)
VENTILATOR MODE: ABNORMAL
VENTILATOR RATE: 26 BPM
WBC # BLD AUTO: 10.3 X10*3/UL (ref 4.4–11.3)

## 2023-12-06 PROCEDURE — 2500000005 HC RX 250 GENERAL PHARMACY W/O HCPCS

## 2023-12-06 PROCEDURE — 80202 ASSAY OF VANCOMYCIN: CPT | Performed by: NURSE PRACTITIONER

## 2023-12-06 PROCEDURE — 82140 ASSAY OF AMMONIA: CPT | Performed by: REGISTERED NURSE

## 2023-12-06 PROCEDURE — 36415 COLL VENOUS BLD VENIPUNCTURE: CPT | Performed by: REGISTERED NURSE

## 2023-12-06 PROCEDURE — 96372 THER/PROPH/DIAG INJ SC/IM: CPT

## 2023-12-06 PROCEDURE — 2500000004 HC RX 250 GENERAL PHARMACY W/ HCPCS (ALT 636 FOR OP/ED): Performed by: STUDENT IN AN ORGANIZED HEALTH CARE EDUCATION/TRAINING PROGRAM

## 2023-12-06 PROCEDURE — 74018 RADEX ABDOMEN 1 VIEW: CPT | Mod: FY

## 2023-12-06 PROCEDURE — 84132 ASSAY OF SERUM POTASSIUM: CPT | Performed by: REGISTERED NURSE

## 2023-12-06 PROCEDURE — 2020000001 HC ICU ROOM DAILY

## 2023-12-06 PROCEDURE — P9047 ALBUMIN (HUMAN), 25%, 50ML: HCPCS | Mod: JZ

## 2023-12-06 PROCEDURE — 84132 ASSAY OF SERUM POTASSIUM: CPT | Performed by: STUDENT IN AN ORGANIZED HEALTH CARE EDUCATION/TRAINING PROGRAM

## 2023-12-06 PROCEDURE — C9113 INJ PANTOPRAZOLE SODIUM, VIA: HCPCS | Performed by: REGISTERED NURSE

## 2023-12-06 PROCEDURE — 2500000001 HC RX 250 WO HCPCS SELF ADMINISTERED DRUGS (ALT 637 FOR MEDICARE OP): Performed by: REGISTERED NURSE

## 2023-12-06 PROCEDURE — 94640 AIRWAY INHALATION TREATMENT: CPT

## 2023-12-06 PROCEDURE — 71045 X-RAY EXAM CHEST 1 VIEW: CPT

## 2023-12-06 PROCEDURE — 2500000002 HC RX 250 W HCPCS SELF ADMINISTERED DRUGS (ALT 637 FOR MEDICARE OP, ALT 636 FOR OP/ED)

## 2023-12-06 PROCEDURE — 82330 ASSAY OF CALCIUM: CPT | Performed by: REGISTERED NURSE

## 2023-12-06 PROCEDURE — 2500000004 HC RX 250 GENERAL PHARMACY W/ HCPCS (ALT 636 FOR OP/ED): Mod: JZ

## 2023-12-06 PROCEDURE — 94660 CPAP INITIATION&MGMT: CPT

## 2023-12-06 PROCEDURE — 95720 EEG PHY/QHP EA INCR W/VEEG: CPT | Performed by: STUDENT IN AN ORGANIZED HEALTH CARE EDUCATION/TRAINING PROGRAM

## 2023-12-06 PROCEDURE — 95700 EEG CONT REC W/VID EEG TECH: CPT

## 2023-12-06 PROCEDURE — 99291 CRITICAL CARE FIRST HOUR: CPT

## 2023-12-06 PROCEDURE — 2500000004 HC RX 250 GENERAL PHARMACY W/ HCPCS (ALT 636 FOR OP/ED): Performed by: REGISTERED NURSE

## 2023-12-06 PROCEDURE — 84145 PROCALCITONIN (PCT): CPT | Performed by: STUDENT IN AN ORGANIZED HEALTH CARE EDUCATION/TRAINING PROGRAM

## 2023-12-06 PROCEDURE — 2500000004 HC RX 250 GENERAL PHARMACY W/ HCPCS (ALT 636 FOR OP/ED)

## 2023-12-06 PROCEDURE — 74018 RADEX ABDOMEN 1 VIEW: CPT | Performed by: RADIOLOGY

## 2023-12-06 PROCEDURE — 84153 ASSAY OF PSA TOTAL: CPT | Performed by: STUDENT IN AN ORGANIZED HEALTH CARE EDUCATION/TRAINING PROGRAM

## 2023-12-06 PROCEDURE — 2500000004 HC RX 250 GENERAL PHARMACY W/ HCPCS (ALT 636 FOR OP/ED): Performed by: NURSE PRACTITIONER

## 2023-12-06 PROCEDURE — 36415 COLL VENOUS BLD VENIPUNCTURE: CPT | Performed by: STUDENT IN AN ORGANIZED HEALTH CARE EDUCATION/TRAINING PROGRAM

## 2023-12-06 PROCEDURE — 93306 TTE W/DOPPLER COMPLETE: CPT

## 2023-12-06 PROCEDURE — 71045 X-RAY EXAM CHEST 1 VIEW: CPT | Performed by: RADIOLOGY

## 2023-12-06 PROCEDURE — 95716 VEEG EA 12-26HR CONT MNTR: CPT

## 2023-12-06 PROCEDURE — 85027 COMPLETE CBC AUTOMATED: CPT | Performed by: STUDENT IN AN ORGANIZED HEALTH CARE EDUCATION/TRAINING PROGRAM

## 2023-12-06 PROCEDURE — 83735 ASSAY OF MAGNESIUM: CPT | Performed by: REGISTERED NURSE

## 2023-12-06 PROCEDURE — 2500000002 HC RX 250 W HCPCS SELF ADMINISTERED DRUGS (ALT 637 FOR MEDICARE OP, ALT 636 FOR OP/ED): Performed by: REGISTERED NURSE

## 2023-12-06 PROCEDURE — 93306 TTE W/DOPPLER COMPLETE: CPT | Performed by: INTERNAL MEDICINE

## 2023-12-06 RX ORDER — FUROSEMIDE 10 MG/ML
60 INJECTION INTRAMUSCULAR; INTRAVENOUS ONCE
Status: COMPLETED | OUTPATIENT
Start: 2023-12-06 | End: 2023-12-06

## 2023-12-06 RX ORDER — FUROSEMIDE 10 MG/ML
40 INJECTION INTRAMUSCULAR; INTRAVENOUS ONCE
Status: COMPLETED | OUTPATIENT
Start: 2023-12-06 | End: 2023-12-06

## 2023-12-06 RX ORDER — ALBUMIN HUMAN 250 G/1000ML
25 SOLUTION INTRAVENOUS EVERY 8 HOURS
Status: COMPLETED | OUTPATIENT
Start: 2023-12-06 | End: 2023-12-07

## 2023-12-06 RX ORDER — BUMETANIDE 0.25 MG/ML
1 INJECTION INTRAMUSCULAR; INTRAVENOUS ONCE
Status: COMPLETED | OUTPATIENT
Start: 2023-12-06 | End: 2023-12-06

## 2023-12-06 RX ORDER — VANCOMYCIN HYDROCHLORIDE 1 G/20ML
INJECTION, POWDER, LYOPHILIZED, FOR SOLUTION INTRAVENOUS DAILY PRN
Status: DISCONTINUED | OUTPATIENT
Start: 2023-12-06 | End: 2023-12-11

## 2023-12-06 RX ORDER — BUMETANIDE 0.25 MG/ML
2 INJECTION INTRAMUSCULAR; INTRAVENOUS ONCE
Status: DISCONTINUED | OUTPATIENT
Start: 2023-12-06 | End: 2023-12-06

## 2023-12-06 RX ORDER — IPRATROPIUM BROMIDE AND ALBUTEROL SULFATE 2.5; .5 MG/3ML; MG/3ML
SOLUTION RESPIRATORY (INHALATION)
Status: COMPLETED
Start: 2023-12-06 | End: 2023-12-06

## 2023-12-06 RX ORDER — POTASSIUM CHLORIDE 14.9 MG/ML
20 INJECTION INTRAVENOUS
Status: COMPLETED | OUTPATIENT
Start: 2023-12-06 | End: 2023-12-07

## 2023-12-06 RX ORDER — IPRATROPIUM BROMIDE AND ALBUTEROL SULFATE 2.5; .5 MG/3ML; MG/3ML
3 SOLUTION RESPIRATORY (INHALATION) 3 TIMES DAILY
Status: DISCONTINUED | OUTPATIENT
Start: 2023-12-06 | End: 2023-12-08

## 2023-12-06 RX ADMIN — HYDRALAZINE HYDROCHLORIDE 10 MG: 20 INJECTION INTRAMUSCULAR; INTRAVENOUS at 19:12

## 2023-12-06 RX ADMIN — HEPARIN SODIUM 5000 UNITS: 5000 INJECTION INTRAVENOUS; SUBCUTANEOUS at 17:42

## 2023-12-06 RX ADMIN — BISACODYL 10 MG: 10 SUPPOSITORY RECTAL at 09:01

## 2023-12-06 RX ADMIN — IPRATROPIUM BROMIDE AND ALBUTEROL SULFATE 3 ML: .5; 3 SOLUTION RESPIRATORY (INHALATION) at 08:38

## 2023-12-06 RX ADMIN — PIPERACILLIN SODIUM AND TAZOBACTAM SODIUM 3.38 G: 3; .375 INJECTION, SOLUTION INTRAVENOUS at 00:58

## 2023-12-06 RX ADMIN — Medication 30 PERCENT: at 08:38

## 2023-12-06 RX ADMIN — LEVETIRACETAM 500 MG: 5 INJECTION INTRAVENOUS at 04:59

## 2023-12-06 RX ADMIN — IPRATROPIUM BROMIDE AND ALBUTEROL SULFATE 3 ML: .5; 3 SOLUTION RESPIRATORY (INHALATION) at 21:10

## 2023-12-06 RX ADMIN — POTASSIUM CHLORIDE 20 MEQ: 14.9 INJECTION, SOLUTION INTRAVENOUS at 20:08

## 2023-12-06 RX ADMIN — PIPERACILLIN SODIUM AND TAZOBACTAM SODIUM 3.38 G: 3; .375 INJECTION, SOLUTION INTRAVENOUS at 06:25

## 2023-12-06 RX ADMIN — HEPARIN SODIUM 5000 UNITS: 5000 INJECTION INTRAVENOUS; SUBCUTANEOUS at 02:29

## 2023-12-06 RX ADMIN — ALBUMIN HUMAN 25 G: 0.25 SOLUTION INTRAVENOUS at 18:24

## 2023-12-06 RX ADMIN — ALBUMIN HUMAN 25 G: 0.25 SOLUTION INTRAVENOUS at 11:30

## 2023-12-06 RX ADMIN — PERFLUTREN 10 ML OF DILUTION: 6.52 INJECTION, SUSPENSION INTRAVENOUS at 13:39

## 2023-12-06 RX ADMIN — LEVETIRACETAM 500 MG: 5 INJECTION INTRAVENOUS at 17:42

## 2023-12-06 RX ADMIN — PIPERACILLIN SODIUM AND TAZOBACTAM SODIUM 3.38 G: 3; .375 INJECTION, SOLUTION INTRAVENOUS at 13:30

## 2023-12-06 RX ADMIN — PANTOPRAZOLE SODIUM 40 MG: 40 INJECTION, POWDER, FOR SOLUTION INTRAVENOUS at 06:24

## 2023-12-06 RX ADMIN — BUMETANIDE 1 MG: 0.25 INJECTION INTRAMUSCULAR; INTRAVENOUS at 13:30

## 2023-12-06 RX ADMIN — Medication: at 09:00

## 2023-12-06 RX ADMIN — HEPARIN SODIUM 5000 UNITS: 5000 INJECTION INTRAVENOUS; SUBCUTANEOUS at 09:01

## 2023-12-06 RX ADMIN — FUROSEMIDE 40 MG: 10 INJECTION, SOLUTION INTRAVENOUS at 02:28

## 2023-12-06 RX ADMIN — POTASSIUM CHLORIDE 20 MEQ: 14.9 INJECTION, SOLUTION INTRAVENOUS at 22:05

## 2023-12-06 RX ADMIN — PIPERACILLIN SODIUM AND TAZOBACTAM SODIUM 3.38 G: 3; .375 INJECTION, SOLUTION INTRAVENOUS at 18:23

## 2023-12-06 RX ADMIN — FUROSEMIDE 60 MG: 10 INJECTION, SOLUTION INTRAVENOUS at 09:01

## 2023-12-06 ASSESSMENT — COGNITIVE AND FUNCTIONAL STATUS - GENERAL
EATING MEALS: TOTAL
DRESSING REGULAR LOWER BODY CLOTHING: TOTAL
DAILY ACTIVITIY SCORE: 6
HELP NEEDED FOR BATHING: TOTAL
CLIMB 3 TO 5 STEPS WITH RAILING: TOTAL
WALKING IN HOSPITAL ROOM: TOTAL
DRESSING REGULAR UPPER BODY CLOTHING: TOTAL
TOILETING: TOTAL
PERSONAL GROOMING: TOTAL
MOBILITY SCORE: 6
MOVING FROM LYING ON BACK TO SITTING ON SIDE OF FLAT BED WITH BEDRAILS: TOTAL
STANDING UP FROM CHAIR USING ARMS: TOTAL
TURNING FROM BACK TO SIDE WHILE IN FLAT BAD: TOTAL
MOVING TO AND FROM BED TO CHAIR: TOTAL

## 2023-12-06 NOTE — PROGRESS NOTES
"Vancomycin Dosing by Pharmacy- FOLLOW UP    Haile Ayers is a 83 y.o. year old male who Pharmacy has been consulted for vancomycin dosing for pneumonia. Based on the patient's indication and renal status this patient is being dosed based on a goal trough/random level of 10-15.     Renal function is currently declining. LIN SCR 1.92 (1.22. 1.08, 0.72)  Continue to dose by Levels    Current vancomycin dose: Dose By Levels        Most recent trough level: 20.6 mcg/mL    Visit Vitals  /80   Pulse 105   Temp 37.3 °C (99.1 °F) (Temporal)   Resp 24        Lab Results   Component Value Date    CREATININE 1.92 (H) 12/06/2023    CREATININE 1.22 12/05/2023    CREATININE 1.08 12/04/2023    CREATININE 1.12 12/04/2023        Patient weight is No results found for: \"PTWEIGHT\"    No results found for: \"CULTURE\"     I/O last 3 completed shifts:  In: 952.5 (12.8 mL/kg) [I.V.:752.5 (10.1 mL/kg); IV Piggyback:200]  Out: 1540 (20.8 mL/kg) [Urine:1520 (0.6 mL/kg/hr); Drains:20]  Weight: 74.2 kg   [unfilled]    Lab Results   Component Value Date    PATIENTTEMP  12/06/2023      Comment:      NOTE: Patient Results are Not Corrected for Temperature    PATIENTTEMP  12/05/2023      Comment:      NOTE: Patient Results are Not Corrected for Temperature    PATIENTTEMP 37.0 12/05/2023        Assessment/Plan    Level = 20.6 will Continue to Dose by Levels and Hold dose tonight Will  follow up with AM Level tomorrow.     This dosing regimen is predicted by [employer] to result in the following pharmacokinetic parameters:      The next level will be obtained on 12/7 at AM Draw. May be obtained sooner if clinically indicated.   Will continue to monitor renal function daily while on vancomycin and order serum creatinine at least every 48 hours if not already ordered.  Follow for continued vancomycin needs, clinical response, and signs/symptoms of toxicity.       Isaiah Do, PharmD           "

## 2023-12-06 NOTE — SIGNIFICANT EVENT
Rapid Response RN at bedside for RADAR score 7 due to the following VS: T 37.4 °Celsius; HR  100; RR 27; /54; SPO2 94% .      Reviewed abnormal VS with bedside RN, who expressed no concerns regarding the patient's clinical condition based upon these VS.  No interventions by Rapid Response team indicated at this time.

## 2023-12-06 NOTE — PROGRESS NOTES
Subjective   83 y.o. man with CLL, anemia, DM, dementia, chronic indwelling ignacio 2/2 BPH, HTN who presented on 11/22/23 for concerns of altered mental status. Found to have an acute large left SDH with midline shift. S/p evacuation on 11/22. CTH POC, 11/24 drain dc'd, Bcx +GPC, 11/25 febrile, remaining vitals stable, exam unchanged, repeat bcx negative    Interval Events:   11/28 s/p midline   11/29 Lethargic, not FC, s/p CTH w/ increased L SDH w/ 8mm MLS s/p 1g Keppra load, CXR stable opacities, rCTH stable, s/p R side wd, s/p keppra load, rrCTH stable L SDH  11/30 S/p L crani for redo SDH evac, CTH POC with improved MLS  12/1 s/p extubation  12/3 CTH stable  12/4: patient was upgraded to NSU status for worsening neurologic exam, and increase O2 requirement. ABG 7.34/30/76. CTH stable with improved brain compression. CT-PE negative for pulmonary embolism. New b/l moderate pleural effusions s/p Lasix 20mg.   12/6: Improved ABG on CPAP. Poor UOP despite total 60mg IV Lasix.       Objective     Vitals 24 hour ranges:  Heart Rate:  []   Temp:  [36.6 °C (97.9 °F)-37.5 °C (99.5 °F)]   Resp:  [22-28]   BP: (126-160)/(52-75)   SpO2:  [93 %-100 %]      Intake/Output for last 24 hours:    Intake/Output Summary (Last 24 hours) at 12/6/2023 1140  Last data filed at 12/6/2023 1000  Gross per 24 hour   Intake 952.5 ml   Output 1965 ml   Net -1012.5 ml      Physical Exam:  NEURO:  Awake, EOV, Pupils 4mm>3mm and brisk, not following commands  LUE spont AG BLE w/d to nox stim. RUE flicker w/d to nox stim   CV:  ST on tele. No M/G/R.   RESP:  On CPAP  :  Chronic ignacio with clear yellow urine  GI:  Abdomen soft, distended. NT, no rigidity or guarding   SKIN:  Left frontal incision c/d/I with drain       Medications    Scheduled:  albumin human, 25 g, intravenous, q8h  atorvastatin, 20 mg, oral, Daily  bisacodyl, 10 mg, rectal, Daily  bumetanide, 1 mg, intravenous, Once  cholecalciferol, 4,000 Units, oral, Daily  heparin  (porcine), 5,000 Units, subcutaneous, q8h  ipratropium-albuteroL, 3 mL, nebulization, q8h  levETIRAcetam, 500 mg, intravenous, q12h  pantoprazole, 40 mg, intravenous, Daily before breakfast  piperacillin-tazobactam, 3.375 g, intravenous, q6h  polyethylene glycol, 17 g, oral, Daily    PRN:  PRN medications: [Held by provider] acetaminophen, albuterol, hydrALAZINE, labetaloL, naloxone, ondansetron **OR** ondansetron, oxyCODONE, oxyCODONE     Continuous:  oxygen, , Last Rate: 4 mL/hr at 12/06/23 0900      Lab Results  Results from last 72 hours   Lab Units 12/06/23  0736 12/05/23  1303   WBC AUTO x10*3/uL 10.3 17.6*   NRBC AUTO /100 WBCs 0.0 0.0   RBC AUTO x10*6/uL 2.75* 3.37*   HEMOGLOBIN g/dL 8.1* 9.9*   HEMATOCRIT % 24.6* 31.1*   MCV fL 90 92   MCH pg 29.5 29.4   MCHC g/dL 32.9 31.8*   RDW % 15.4* 15.2*   PLATELETS AUTO x10*3/uL 245 254      Results from last 72 hours   Lab Units 12/06/23  0736 12/05/23  0645   GLUCOSE mg/dL 186* 166*   SODIUM mmol/L 146* 144   POTASSIUM mmol/L 3.5 3.6   CHLORIDE mmol/L 113* 113*   CO2 mmol/L 19* 15*   ANION GAP mmol/L 18 20   BUN mg/dL 27* 20   CREATININE mg/dL 1.92* 1.22   EGFR mL/min/1.73m*2 34* 58*   CALCIUM mg/dL 8.4* 8.2*   PHOSPHORUS mg/dL 3.2 3.5   ALBUMIN g/dL 2.6* 2.8*   MAGNESIUM mg/dL 1.77 1.88   POCT CALCIUM IONIZED (MMOL/L) IN BLOOD mmol/L 1.25  --           Imaging Results  CT head wo IV contrast   Final Result   Redemonstration of postsurgical changes of left frontoparietal   craniotomy and left subdural drain placement with interval reduction   in size of the previously noted mixed attenuation extra-axial fluid   collection overlying the left cerebral convexity when compared to   prior examination from 12/04/2023. Additionally, there has been   interval improvement in associated sulcal effacement, mass effect,   and midline shift.        I personally reviewed the images/study and I agree with the findings   as stated above by resident physician, Dr. Kedar VAZ  Paula.        MACRO:   none        Signed by: Kristan Cobb 12/6/2023 6:04 AM   Dictation workstation:   [id number]      CT angio chest for pulmonary embolism         XR abdomen 1 view   Final Result   1.  Enteric tube tip is in the right mainstem bronchus. Recommend   repositioning.   2. Nonobstructive bowel gas pattern.        I personally reviewed the images/study and I agree with the findings   as stated by resident physician Dr. Regino Quiñones . This study   was interpreted at [place], [place].        MACRO:   Regino Quiñones discussed the significance and urgency of this   critical finding by telephone with  Dr. Kiran on 12/5/2023 at   4:47 pm.  (**-RCF-**) Findings:  See findings.        Signed by: Viral Sommers 12/5/2023 4:50 PM   Dictation workstation:   [id number]      Vascular US lower extremity venous duplex bilateral   Final Result      Vascular US upper extremity venous duplex bilateral   Final Result      XR chest 1 view   Final Result   1. Diffuse and extensive pulmonary infiltrates in particular within   lower lobe segments.   2. Improved pulmonary aeration within lower lobe segments bilaterally   in comparison to the prior study.                  Signed by: Viarl Sommers 12/5/2023 4:50 PM   Dictation workstation:   [id number]      XR abdomen 1 view   Final Result   1. The feeding tube is in satisfactory position                  I personally reviewed the images/study and I agree with the findings   as stated above by resident physician, Jerome Singh MD. This study   was interpreted at [place], [place].        MACRO:   None.        Signed by: Viral Sommers 12/5/2023 4:25 PM   Dictation workstation:   [id number]      XR abdomen 1 view   Final Result   1. The feeding tube appears positioned within the region of the   gastric antrum             I personally reviewed the  images/study and I agree with the findings   as stated above by resident physician, Dr. Kedar Mitchell. The   study was interpreted at [facility] in [facility].        MACRO:   none        Signed by: Viral  12/5/2023 11:30 AM   Dictation workstation:   [id number]      IR angiogram   Preliminary Result   Attempted left middle meningeal artery embolization aborted due to   truncated left middle meningeal artery with no suitable distal target   for embolization.        I was present for and/or performed the critical portions of the   procedure and immediately available throughout the entire procedure.   I personally reviewed the image(s)/study and interpretation. I agree   with the findings as stated. Performed and dictated at [facility].        MACRO:   None             Dictation workstation:   [id number]      CT head wo IV contrast   Final Result   Similar appearance of the head compared to prior examination with   unchanged appearance of subdural fluid surrounding the left frontal   subdural catheter and unchanged mixed attenuation subdural collection   along the left parietal, occipital, and temporal convexities.        I personally reviewed the images/study and I agree with the findings   as stated above by resident physician, Jerome Singh MD. This study   was interpreted at [facility],   [place], [place].             MACRO:   None.        Signed by: Syed Amezquita 12/4/2023 7:10 AM   Dictation workstation:   [id number]      XR chest 1 view   Final Result   1.  Interval increased bilateral patchy airspace opacities likely   represent alveolar edema with multifocal infectious process not   excluded.   2. Small bilateral pleural effusions with adjacent atelectasis.        I personally reviewed the images/study and I agree with the findings   as stated above by resident physician, Jerome  MD Francisco. This study   was interpreted at [facility],   [place], [place].             MACRO:   None.        Signed by: Viral Sommers 12/5/2023 7:54 AM   Dictation workstation:   [id number]      CT head wo IV contrast   Final Result   Slightly increased subdural fluid surrounding the subdural catheter   in the left frontal region. This may be due to decreased   pneumocephalus.        Stable component of subdural mixed attenuation hemorrhage within the   left parieto-occipital and temporal region.        Signed by: Kerrie Magana 12/3/2023 9:52 AM   Dictation workstation:   [id number]      CT head wo IV contrast   Final Result   1. Postsurgical changes consistent with interval left frontal   craniotomy for re-evacuation of mixed density subdural hematoma   overlying the left cerebral convexity with interval reduction in size   of the subdural hematoma and improved mass effect, midline shift, and   sulcal effacement as described above.   2. No new transcortical infarction, intraparenchymal hemorrhage, or   herniation.        I personally reviewed the images/study and I agree with the findings   as stated above by resident physician, Dr. Kedar Mitchell. The   study was interpreted at [facility] [place] in [facility].        Signed by: Orion Harris 12/1/2023 7:42 AM   Dictation workstation:   [id number]      CT head wo IV contrast   Final Result   1. Redemonstration of postoperative changes of left-sided craniotomy   with extra-axial mixed attenuation fluid and air collection measuring   approximately 9 mm in maximal thickness, previously measuring 7 mm.   There is superimposed mixed hyperdense and hypodense attenuation left   hemispheric subdural hematoma, grossly unchanged when compared to the   prior exam. There is effacement of the adjacent sulci and partial   effacement of the left lateral ventricle without significant changes   when  compared to the prior exam. There is a proximally 8 mm   left-to-right midline shift, similar to prior.        I personally reviewed the images/study and I agree with the findings   as stated by resident physician Dr. Regino Quiñones . This study   was interpreted at [facility],   [place], [place].        MACRO:   None        Signed by: Sneha Pérez 11/30/2023 7:02 AM   Dictation workstation:   [id number]      CT head wo IV contrast   Final Result   Postoperative changes are again identified compatible with a recent   left sided craniotomy.        There is again evidence of scattered pneumocephalus.        Immediately deep to the craniotomy site, there is again evidence of   an extra-axial likely epidural mixed attenuation hemorrhagic fluid   and air collection measuring approximately 7 mm in thickness on both   the current and prior study.        There is again evidence of a superimposed mixed hyperdense and   hypodense attenuation left hemispheric subdural hematoma again   measuring approximately 21 mm in greatest thickness as seen on the   coronal reconstructed images adjacent to the left parietal lobe.   There is again evidence of mass effect upon the adjacent left   cerebral hemisphere with asymmetric effacement/partial effacement of   sulci of the left cerebral hemisphere as well as partial effacement   of the left lateral ventricle and 3rd ventricle. There is again   evidence of approximately 8 mm of bowing of the midline structures   from left to right.        The above findings are again noted be superimposed upon moderate   brain parenchymal volume loss.        Patchy nonspecific white matter changes are again noted within   cerebral hemispheres bilaterally which while nonspecific, given the   patient's age, likely represent sequelae of small-vessel ischemic   change.        MACRO:   None.        Signed by: Cassius Richardson 11/29/2023 8:51 AM   Dictation workstation:    [id number]      CT head wo IV contrast   Final Result   Evolving postoperative changes with increased size of the residual   left subdural hematoma and increased volume of high attenuation blood   products, attention on follow-up is recommended. Rightward midline   shift is similar to previous.        I personally reviewed the images/study and I agree with the findings   as stated above by resident physician, Dr. Kedar Mitchell. The   study was interpreted at [facility] in [facility].        Signed by: Fredrick Hemphill 11/29/2023 9:10 AM   Dictation workstation:   [id number]      XR chest 1 view   Final Result   Redemonstration patchy airspace opacities that are noted bilaterally,   favored to represent pulmonary alveolar edema. However, multifocal   pneumonia can have similar appearance. Small left layering effusion   with associated adjacent atelectasis.        I personally reviewed the images/study and I agree with the findings   as stated above by resident physician, Dr. Kedar Mitchell. The   study was interpreted at [facility] in [facility].        Signed by: Tushar Hartman 11/29/2023 8:36 AM   Dictation workstation:   [id number]      XR chest 1 view   Final Result   1. Redemonstration of findings suggestive of pulmonary edema with   areas of patchy airspace opacities throughout the right lung which   are nonspecific and may represent multifocal pneumonia. Blunting of   the bilateral costophrenic angles may represent small pleural   effusions versus atelectasis.        I personally reviewed the images/study and I agree with the findings   as stated by resident physician Dr. Regino Quiñones . This study   was interpreted at [facility],   [place], [place].        MACRO:   None        Signed by: Melissa Castro 11/26/2023 8:59 AM   Dictation workstation:   [id number]      XR chest  1 view   Final Result   1. Slight interval worsening of bilateral lung aeration with findings   suggestive of pulmonary edema, however multifocal infection is not   excluded in the appropriate clinical setting. Delete   2. Small bilateral pleural effusions.        I personally reviewed the images/study and I agree with the findings   as stated above by resident physician, Jerome Singh MD. This study   was interpreted at [place], [place].             MACRO:   None.        Signed by: Brandon Yuan 11/25/2023 9:38 AM   Dictation workstation:   [id number]      XR chest 1 view   Final Result   1. Bibasilar atelectasis with or without trace bilateral pleural   effusions, left-greater-than-right. Superimposed   infectious/inflammatory infiltrate is not excluded, for example in   the setting of aspiration pneumonitis.        I personally reviewed the images/study and I agree with the resident   findings as stated by Yokasta Rubin MD. This study was interpreted at   [place], [place].        MACRO:   None        Signed by: Tushar Hartman 11/24/2023 8:28 AM   Dictation workstation:   [id number]      XR abdomen 1 view   Final Result   1. No enteric tube is seen within field of view and correlate   clinically.   2. Nonobstructive bowel gas pattern.   3. Visualized basilar lungs demonstrate coarse airspace opacities   bilaterally with small pleural effusions not excluded. Correlate with   concern for underlying infectious process.        Signed by: Kendall Calix 11/23/2023 6:52 AM   Dictation workstation:   [id number]      CT head wo IV contrast   Final Result   1. Postsurgical changes consistent with interval left frontoparietal   craniotomy for left subdural hematoma evacuation and subdural drain   placement with interval reduction in size of the mixed density fluid   collection and improvement in mass effect, midline shift, and  sulcal   effacement when compared to prior, same day CT of the head as   described in detail above.   2. No evidence of new hemorrhage or acute cortical infarction.        I personally reviewed the images/study and I agree with the findings   as stated above by resident physician, Dr. Kedar Mitchell. The   study was interpreted at [facility] in [facility].        Signed by: Sneha Pérez 11/22/2023 6:55 AM   Dictation workstation:   [id number]      CT head wo IV contrast   Final Result   1. Large predominantly hypodense left convexity subdural hematoma   with multiple septations measuring up to 2.6 cm at its greatest width   with significant associated mass effect, sulcal effacement, 10-11 mm   of rightward midline shift, and effacement of the left lateral   ventricle.   2. No evidence of additional hemorrhage or acute cortical infarct.        I personally reviewed the images/study and I agree with the findings   as stated above by resident physician, Dr. Kedar Mitchell. The   study was interpreted at [facility] in [facility].        Signed by: Shiv Magallanes 11/22/2023 12:51 AM   Dictation workstation:   [id number]      FL modified barium swallow study    (Results Pending)   Bedside Midline Imaging    (Results Pending)   XR chest 1 view    (Results Pending)   Transthoracic Echo (TTE) Complete    (Results Pending)   XR abdomen 1 view    (Results Pending)         Assessment/Plan   83 y.o. man with CLL, anemia, DM, dementia, chronic indwelling ignacio 2/2 BPH, HTN who presented on 11/22/23 for concerns of altered mental status. Found to have an acute large left SDH with midline shift. S/p evacuation on 11/22. CTH POC, 11/24 drain dc'd, Bcx +GPC, 11/25 febrile, remaining vitals stable, exam unchanged, repeat bcx negative.    Interval Events:   11/28 s/p midline   11/29 Lethargic, not FC, s/p CTH w/ increased L SDH w/ 8mm MLS s/p 1g  Keppra load, CXR stable opacities, rCTH stable, s/p R side wd, s/p keppra load, rrCTH stable L SDH   S/p L crani for redo SDH evac, CTH POC with improved MLS   s/p extubation  12/3 CTH stable  : patient was upgraded to NSU status for worsening neurologic exam, and increase O2 requirement. ABG 7.. CTH stable with improved brain compression. CT-PE negative for pulmonary embolism. New b/l moderate pleural effusions s/p Lasix 20mg.  : Improved ABG on CPAP. Poor UOP despite total 60mg IV Lasix.    NEURO:  #SDH s/p evacuation x2  #MMA embolization on   Assessment:   Neurologically: Awake, EOSp, Pupils 5mm>3mm and brisk  LUE spont AG LLE w/d to nox stim. R side flicker w/d to nox stim   L crani with drain c/d/I  EEG prelim read with severe diffuse encephalopathy  Plan:  NSU  Q1H neuro checks  Drain per NSGY   Pain: acetaminophen, oxycodone, hydromorphone PRN  Sedation: None  cvEEG, if continues to be negative will dc  Keppra 500mg BID  PT/OT/SLP    CARDIOVASCULAR:  # HTN, HLD  #Intravascular hypovolemia  #c/f Heart failure  Assessment:   ST on tele  Likely intravascularly depleted given likely pre-renal LIN  Plan:   Continue to monitor on telemetry  Goal SBP < 160  Continue atorvastatin  Hydralazine and Labetalol PRN  Albumin 25g q8hr for 3 doses  Echocardiogram    RESPIRATORY:  #Bilateral pleural effusions   #Intubated for airway protection during OR (resolved)  Assessment:   On 4L NC  S/p Lasix 20mg and 40mg IV  CT-PE negative, but did show bilateral mod-large pleural effusions  AB.32 -> 7.  Plan:   Continuous pulse oximetry   O2 PRN to maintain SpO2 > 94%, wean as tolerated  CPAP as able   Additional Lasix 60mg IV with poor response  Bumetanide 1mg IV once  Repeat CXR tomorrow    RENAL/:  #BPH  #LIN, potential cardiorenal syndrome  Assessment:   Results from last 72 hours   Lab Units 23  0736 23  0645   BUN mg/dL 27* 20   CREATININE mg/dL 1.92* 1.22   Plan:    Monitor with daily RFP  Chronic ignacio for BPH  Albumin as above    FEN/GI:  Assessment:   Last BM Date: 12/02/23  Plan:   Monitor and replace electrolytes per protocol  IVF: None  Diet: NPO  Bowel Regimen: Miralax PRN    ENDOCRINE:  #DM  Assessment:   Results from last 7 days   Lab Units 12/06/23  0736 12/05/23  0645 12/01/23  1349 11/30/23  1959 11/30/23  1141 11/30/23  0812   POCT GLUCOSE mg/dL  --   --   --  167* 158* 138*   GLUCOSE mg/dL 186* 166*   < >  --   --   --     < > = values in this interval not displayed.   BS over last 24h: 138-243  Plan:    Accuchecks & ISS Q6H  Hypoglycemia protocol    HEMATOLOGY:  #CLL  Assessment:   Results from last 7 days   Lab Units 12/06/23  0736 12/05/23  1303   HEMOGLOBIN g/dL 8.1* 9.9*   HEMATOCRIT % 24.6* 31.1*   PLATELETS AUTO x10*3/uL 245 254   Plan:  Continue to monitor with daily CBC and Coag panel  Heme consulted, recs appreciated  Hold Ibrutinib until POD14    INFECTIOUS DISEASE:  #concerns for aspiration PNA  Assessment:   Results from last 7 days   Lab Units 12/06/23  0736 12/05/23  1303   WBC AUTO x10*3/uL 10.3 17.6*     Afebrile   Repeat BCx 12/4 pending  Repeat BCx 11/26 NGTD, MRSA negative, vanco d/c'd   BCx 11/24 with staph hominis  Ucx 11/24  neg  Plan:  Continue to monitor for s/sx of infection  Pan culture for temperature > 38.4 C  C/w zosyn   F/up Bcx 12/4  Send procal     MUSCULOSKELETAL:  No acute issues    SKIN:  No acute issues  Turns and skin care per NSU protocol    ACCESS:  PIV    PROPHYLAXIS:  DVT/VTE: SCDs, SQH    RESTRAINTS:  I agree with nursing assessment of the patient's need for restraints to protect the patient from injury and facilitate healing. The patient is unable to cooperate with the plan of care and at risk for disrupting critical therapy (i.e., removing medical devices, lines, tubes and/or dressings).  Please see order for specifics. Restraints can be removed when the patient is able to cooperate with plan of care and allow healing  to occur, or the medical devices at risk are discontinued by the medical team.     Xavi Mcmullen MD  Neuroscience Intensive Care

## 2023-12-06 NOTE — PROGRESS NOTES
"Haile Ayers is a 83 y.o. male on day 14 of admission presenting with Subdural hematoma (CMS/HCC).    Subjective   Starting CPAP overnight    Objective     Physical Exam  Eyes opened to noxious stimuli, not sustained  BUE spontaneous L>R  BLE min withdraw    Last Recorded Vitals  Blood pressure 147/69, pulse 98, temperature 37.4 °C (99.3 °F), temperature source Temporal, resp. rate 26, height 1.727 m (5' 7.99\"), weight 74.2 kg (163 lb 9.3 oz), SpO2 96 %.  Intake/Output last 3 Shifts:  I/O last 3 completed shifts:  In: 952.5 (12.8 mL/kg) [I.V.:752.5 (10.1 mL/kg); IV Piggyback:200]  Out: 1540 (20.8 mL/kg) [Urine:1520 (0.6 mL/kg/hr); Drains:20]  Weight: 74.2 kg     Relevant Results    This patient has a urinary catheter   Reason for the urinary catheter remaining today? Urine catheter unnecessary, will be removed today      Assessment/Plan   Principal Problem:    Subdural hematoma (CMS/HCC)  Active Problems:    HTN (hypertension)    Diabetes mellitus, type 2 (CMS/HCC)    CVA (cerebral vascular accident) (CMS/HCC)    83 y.o. male h/o CLL, anemia, DM, p/w AMS, found to have UTI, CTH w/ L large chronic SDH, 11/22 s/p L crani for SDH evac, CTH POC, 11/24 drain dc'd, Bcx +GPC, 11/25 febrile, remaining vitals stable, exam unchanged, repeat bcx negative     11/28 s/p midline   11/29 Lethargic, not FC, s/p CTH w/ increased L SDH w/ 8mm MLS s/p 1g Keppra load, CXR stable opacities, rCTH stable, s/p R side wd, s/p keppra load, rrCTH stable L SDH,  11/30 S/p L crani for redo SDH evac, CTH POC with improved MLS  12/1 s/p extubation  12/3 CTH stable  12/4 s/psmall L MMA w no distal branches, not embolized   12/5 DVT US x 4 RLE chornic changes no DVT, CTH stable, CT PE neg for PE, BL pleural effusions s/p 20 lasix     Patient poor neurologic exam improved slightly after CPAP    PLAN:    NSU   CPAP wean  SDD-possible discontinue today  Diuresis   Fu CXR  Fu cultures  Cont vanc/zosyn  Chronic ignacio  Heme recs-cont to hold ibrutinib " until POD14, daily CBC  SLP eval   PTOT-rehab; needs re-eval  SCD, SQH    Fidel Smith MD

## 2023-12-06 NOTE — CONSULTS
Nutrition Calorie Count and Note:   Nutrition Assessment    Reason for Assessment: Provider Consult Order    Patient is a 83-year-old male presenting with SDH s/p redo L-crani for SDH evacuation (11/30/2023). Patient upgraded to NSU status worsening exam + increased O2 requirements 12/4/2023, new b/l moderate pleural effusions noted. Patient with improved ABG on CPAP 12/6/2023.     Patient previous seen by RDN for Tube Feeding Recommendations + Assessment 12/4/2023. Patient on NC with small-bore feeding tube placed 12/6/2023. Quick note to reiterate recommendations-see below.      Estimated Needs:   Total Energy Estimated Needs (calories): 7535-1403 calories/day  Method for Estimating Needs: 25-30 calories/kg actual body weight   Total Protein Estimated Needs (g):  g/day  Method for Estimating Needs: 1.2-1.4 g/kg actual body weight  Total Fluid Estimated Needs (mL): 1 mL/calorie or per team     Nutrition Diagnosis   Nutrition Diagnosis:  Malnutrition Diagnosis  Patient has Malnutrition Diagnosis: No    Nutrition Diagnosis  Patient has Nutrition Diagnosis: Yes  Diagnosis Status (1): Declining  Nutrition Diagnosis 1: Increased nutrient needs  Related to (1): increased metabolic demands  As Evidenced by (1): s/p re-do L-crani  Additional Assessment Information (1): based on documentation very limited nutrition support for the past 13 days; patient still with increased needs, but not as high directly following L-crani.  Additional Nutrition Diagnosis: Diagnosis 2  Diagnosis Status (2): Ongoing  Nutrition Diagnosis 2: Inadequate enteral nutrition infusion  Related to (2): NPO status  As Evidenced by (2): based on documentation very limited nutrition support for the past 13 days.     Nutrition Interventions/Recommendations   Nutrition Interventions and Recommendations:        Nutrition Prescription:  Individualized Nutrition Prescription Provided for : Isosource 1.5 @ 55 mL/hour provides 1320 mL, 1980 calories, 90 g  of protein, 1008 mL; Monitor Phos, Potassium + Mg for drops, if drops occur, keep TF @ rate + replete, continue increasing to goal once WNL.  Once placement is confirmed, initiate Isosource 1.5 @ 10 mL/hour increasing by 10 mL/hour q8-12h until goal rate of 55 mL/hour is reached.   FWF to team, TF @ goal provides 1008 mL H2O  Monitor Phos, Potassium + Mg for drops. If drops occur, keep TF @ rate + replete. Continue increasing to goal once WNL.   Recommend 100 mg Thiamin x 7-10 given patient with prolonged inadequate nutrition       Nutrition Interventions:   Food and/or Nutrient Delivery Interventions  Interventions: Enteral intake  Goal: > 75% of needs, TF tolerance    Coordination of Nutrition Care by a Nutrition Professional  Collaboration and Referral of Nutrition Care: Collaboration by nutrition professional with other nutrition professionals, Collaboration by nutrition professional with other providers    Nutrition Monitoring and Evaluation   Monitoring/Evaluation:   Food/Nutrient Related History Monitoring  Monitoring and Evaluation Plan: Energy intake  Enteral and Parenteral Nutrition Intake: Enteral nutrition formula/solution    Body Composition/Growth/Weight History  Monitoring and Evaluation Plan: Weight change  Criteria: maintain stable weight    Biochemical Data, Medical Tests and Procedures  Electrolyte and Renal Panel: Magnesium, Phosphorus, Potassium  Glucose/Endocrine Profile: Glucose, casual  Criteria: ICU BG Control 140-180mg/dL               Time Spent/Follow-up Reminder:   Follow Up  Time Spent (min): 30 minutes  Last Date of Nutrition Visit: 12/06/23  Nutrition Follow-Up Needed?: Dietitian to reassess per policy  Follow up Comment: quick note to reiterate TF recommendations

## 2023-12-06 NOTE — CARE PLAN
The clinical goals for the shift include pt will improve ventillation      Problem: Pain  Goal: My pain/discomfort is manageable  Outcome: Progressing     Problem: Safety  Goal: Patient will be injury free during hospitalization  Outcome: Progressing  Goal: I will remain free of falls  Outcome: Progressing     Problem: Daily Care  Goal: Daily care needs are met  Outcome: Progressing     Problem: Psychosocial Needs  Goal: Demonstrates ability to cope with hospitalization/illness  Outcome: Progressing  Goal: Collaborate with me, my family, and caregiver to identify my specific goals  Outcome: Progressing     Problem: Discharge Barriers  Goal: My discharge needs are met  Outcome: Progressing     Problem: General Stroke  Goal: Demonstrate improvement in neurological exam throughout the shift  Outcome: Progressing  Goal: Maintain BP within ordered limits throughout shift  Outcome: Progressing  Goal: Participate in treatment (ie., meds, therapy) throughout shift  Outcome: Progressing  Goal: No symptoms of aspiration throughout shift  Outcome: Progressing  Goal: No symptoms of hemorrhage throughout shift  Outcome: Progressing  Goal: Tolerate enteral feeding throughout shift  Outcome: Progressing  Goal: Decreased nausea/vomiting throughout shift  Outcome: Progressing  Goal: Controlled blood glucose throughout shift  Outcome: Progressing  Goal: Out of bed three times today  Outcome: Progressing     Problem: ICU Stroke  Goal: Maintain ICP within ordered limits throughout shift  Outcome: Progressing  Goal: Tolerate EVD clamping trial throughout shift  Outcome: Progressing  Goal: Tolerate ventilator weaning trial during shift  Outcome: Progressing  Goal: Maintain patent airway throughout shift  Outcome: Progressing  Goal: Achieve/maintain targeted sodium level throughout shift  Outcome: Progressing     Problem: Skin  Goal: Decreased wound size/increased tissue granulation at next dressing change  Outcome: Progressing  Goal:  Participates in plan/prevention/treatment measures  Outcome: Progressing  Goal: Prevent/manage excess moisture  Outcome: Progressing  Goal: Prevent/minimize sheer/friction injuries  Outcome: Progressing  Goal: Promote/optimize nutrition  Outcome: Progressing  Goal: Promote skin healing  Outcome: Progressing     Problem: Safety - Medical Restraint  Goal: Remains free of injury from restraints (Restraint for Interference with Medical Device)  Outcome: Progressing  Goal: Free from restraint(s) (Restraint for Interference with Medical Device)  Outcome: Progressing     Problem: Nutrition  Goal: Nutrition support goals are met within 48 hrs  Outcome: Progressing  Goal: Lab values WNL  Outcome: Progressing  Goal: Electrolytes WNL  Outcome: Progressing  Goal: Promote healing  Outcome: Progressing  Goal: Maintain stable weight  Outcome: Progressing  Goal: Reduce weight from edema/fluid  Outcome: Progressing     Problem: Pain - Adult  Goal: Verbalizes/displays adequate comfort level or baseline comfort level  Outcome: Progressing     Problem: Safety - Adult  Goal: Free from fall injury  Outcome: Progressing     Problem: Discharge Planning  Goal: Discharge to home or other facility with appropriate resources  Outcome: Progressing     Problem: Chronic Conditions and Co-morbidities  Goal: Patient's chronic conditions and co-morbidity symptoms are monitored and maintained or improved  Outcome: Progressing     Problem: Fall/Injury  Goal: Not fall by end of shift  Outcome: Progressing  Goal: Be free from injury by end of the shift  Outcome: Progressing  Goal: Verbalize understanding of personal risk factors for fall in the hospital  Outcome: Progressing     Problem: Diabetes  Goal: Increase stability of blood glucose readings by end of shift  Outcome: Progressing  Goal: Maintain electrolyte levels within acceptable range throughout shift  Outcome: Progressing  Goal: Maintain glucose levels >70mg/dl to <250mg/dl throughout  shift  Outcome: Progressing  Goal: No changes in neurological exam by end of shift  Outcome: Progressing

## 2023-12-06 NOTE — PROGRESS NOTES
Subjective   83 y.o. man with CLL, anemia, DM, dementia, chronic indwelling ignacio 2/2 BPH, HTN who presented on 11/22/23 for concerns of altered mental status. Found to have an acute large left SDH with midline shift. S/p evacuation on 11/22. CTH POC, 11/24 drain dc'd, Bcx +GPC, 11/25 febrile, remaining vitals stable, exam unchanged, repeat bcx negative    Interval Events:   11/28 s/p midline   11/29 Lethargic, not FC, s/p CTH w/ increased L SDH w/ 8mm MLS s/p 1g Keppra load, CXR stable opacities, rCTH stable, s/p R side wd, s/p keppra load, rrCTH stable L SDH  11/30 S/p L crani for redo SDH evac, CTH POC with improved MLS  12/1 s/p extubation  12/3 CTH stable  12/4: patient was upgraded to NSU status for worsening neurologic exam, and increase O2 requirement. ABG 7.34/30/76. CTH stable with improved brain compression. CT-PE negative for pulmonary embolism. New b/l moderate pleural effusions s/p Lasix 20mg.        Objective     Vitals 24 hour ranges:  Heart Rate:  []   Temp:  [36.6 °C (97.9 °F)-37.5 °C (99.5 °F)]   Resp:  [18-28]   BP: (137-170)/(54-81)   SpO2:  [86 %-99 %]      Intake/Output for last 24 hours:    Intake/Output Summary (Last 24 hours) at 12/5/2023 2301  Last data filed at 12/5/2023 2232  Gross per 24 hour   Intake 752.5 ml   Output 640 ml   Net 112.5 ml      Physical Exam:  NEURO:  Awake, EOSp, Pupils 5mm>3mm and brisk  LUE spont AG LLE w/d to nox stim. R side flicker w/d to nox stim   CV:  ST on tele. No M/G/R.   RESP:  On 4L NC  :  Chronic ignacio with clear yellow urine  GI:  Abdomen soft, distended. NT, no rigidity or guarding   SKIN:  Left frontal incision c/d/I with drain       Medications    Scheduled:  atorvastatin, 20 mg, oral, Daily  bisacodyl, 10 mg, rectal, Daily  cholecalciferol, 4,000 Units, oral, Daily  heparin (porcine), 5,000 Units, subcutaneous, q8h  ipratropium-albuteroL, 3 mL, nebulization, q8h  levETIRAcetam, 500 mg, intravenous, q12h  [START ON 12/6/2023] pantoprazole, 40  mg, intravenous, Daily before breakfast  piperacillin-tazobactam, 3.375 g, intravenous, q6h  polyethylene glycol, 17 g, oral, Daily    PRN:  PRN medications: [Held by provider] acetaminophen, albuterol, hydrALAZINE, labetaloL, naloxone, ondansetron **OR** ondansetron, oxyCODONE, oxyCODONE     Continuous:  oxygen,       Lab Results  Results from last 72 hours   Lab Units 12/05/23  1303 12/05/23  0226   WBC AUTO x10*3/uL 17.6* 10.8   NRBC AUTO /100 WBCs 0.0 0.0   RBC AUTO x10*6/uL 3.37* 3.07*   HEMOGLOBIN g/dL 9.9* 8.9*   HEMATOCRIT % 31.1* 27.8*   MCV fL 92 91   MCH pg 29.4 29.0   MCHC g/dL 31.8* 32.0   RDW % 15.2* 15.0*   PLATELETS AUTO x10*3/uL 254 227      Results from last 72 hours   Lab Units 12/05/23  0645 12/04/23  2251 12/04/23  0809 12/03/23  0224   GLUCOSE mg/dL 166* 113*   < > 135*   SODIUM mmol/L 144 140   < > 140   POTASSIUM mmol/L 3.6 3.4*   < > 3.5   CHLORIDE mmol/L 113* 112*   < > 114*   CO2 mmol/L 15* 17*   < > 17*   ANION GAP mmol/L 20 14   < > 13   BUN mg/dL 20 17   < > 21   CREATININE mg/dL 1.22 1.08   < > 1.07   EGFR mL/min/1.73m*2 58* 68   < > 68   CALCIUM mg/dL 8.2* 7.8*   < > 7.9*   PHOSPHORUS mg/dL 3.5 3.0   < > 2.0*   ALBUMIN g/dL 2.8* 2.6*   < > 2.7*   MAGNESIUM mg/dL 1.88  --   --  1.51*   POCT CALCIUM IONIZED (MMOL/L) IN BLOOD mmol/L  --   --   --  1.14    < > = values in this interval not displayed.          Imaging Results  CT head wo IV contrast         CT angio chest for pulmonary embolism         XR abdomen 1 view   Final Result   1.  Enteric tube tip is in the right mainstem bronchus. Recommend   repositioning.   2. Nonobstructive bowel gas pattern.        I personally reviewed the images/study and I agree with the findings   as stated by resident physician Dr. Regino Quiñones . This study   was interpreted at [facility],   [place], [place].        MACRO:   Regino Quiñones discussed the significance and urgency of this   critical finding  by telephone with  Dr. Kiran on 12/5/2023 at   4:47 pm.  (**-RCF-**) Findings:  See findings.        Signed by: Viral Sommers 12/5/2023 4:50 PM   Dictation workstation:   [id number]      Vascular US lower extremity venous duplex bilateral   Final Result      Vascular US upper extremity venous duplex bilateral   Final Result      XR chest 1 view   Final Result   1. Diffuse and extensive pulmonary infiltrates in particular within   lower lobe segments.   2. Improved pulmonary aeration within lower lobe segments bilaterally   in comparison to the prior study.                  Signed by: Viral Sommers 12/5/2023 4:50 PM   Dictation workstation:   [id number]      XR abdomen 1 view   Final Result   1. The feeding tube is in satisfactory position                  I personally reviewed the images/study and I agree with the findings   as stated above by resident physician, Jerome Singh MD. This study   was interpreted at [place], [place].        MACRO:   None.        Signed by: Viral Sommers 12/5/2023 4:25 PM   Dictation workstation:   [id number]      XR abdomen 1 view   Final Result   1. The feeding tube appears positioned within the region of the   gastric antrum             I personally reviewed the images/study and I agree with the findings   as stated above by resident physician, Dr. Kedar Mitchell. The   study was interpreted at [facility] in [facility].        MACRO:   none        Signed by: Viral Sommers 12/5/2023 11:30 AM   Dictation workstation:   [id number]      IR angiogram   Preliminary Result   Attempted left middle meningeal artery embolization aborted due to   truncated left middle meningeal artery with no suitable distal target   for embolization.        I was present for and/or performed the critical portions of the   procedure and immediately available throughout the entire procedure.   I personally  reviewed the image(s)/study and interpretation. I agree   with the findings as stated. Performed and dictated at [facility].        MACRO:   None             Dictation workstation:   [id number]      CT head wo IV contrast   Final Result   Similar appearance of the head compared to prior examination with   unchanged appearance of subdural fluid surrounding the left frontal   subdural catheter and unchanged mixed attenuation subdural collection   along the left parietal, occipital, and temporal convexities.        I personally reviewed the images/study and I agree with the findings   as stated above by resident physician, Jerome Singh MD. This study   was interpreted at [place], [place].             MACRO:   None.        Signed by: Syed Amezquita 12/4/2023 7:10 AM   Dictation workstation:   [id number]      XR chest 1 view   Final Result   1.  Interval increased bilateral patchy airspace opacities likely   represent alveolar edema with multifocal infectious process not   excluded.   2. Small bilateral pleural effusions with adjacent atelectasis.        I personally reviewed the images/study and I agree with the findings   as stated above by resident physician, Jerome Singh MD. This study   was interpreted at [place], [place].             MACRO:   None.        Signed by: Viral Sommers 12/5/2023 7:54 AM   Dictation workstation:   [id number]      CT head wo IV contrast   Final Result   Slightly increased subdural fluid surrounding the subdural catheter   in the left frontal region. This may be due to decreased   pneumocephalus.        Stable component of subdural mixed attenuation hemorrhage within the   left parieto-occipital and temporal region.        Signed by: Kerrie Magana 12/3/2023 9:52 AM   Dictation workstation:   [id number]      CT head wo IV contrast   Final Result   1.  Postsurgical changes consistent with interval left frontal   craniotomy for re-evacuation of mixed density subdural hematoma   overlying the left cerebral convexity with interval reduction in size   of the subdural hematoma and improved mass effect, midline shift, and   sulcal effacement as described above.   2. No new transcortical infarction, intraparenchymal hemorrhage, or   herniation.        I personally reviewed the images/study and I agree with the findings   as stated above by resident physician, Dr. Kedar Mitchell. The   study was interpreted at [facility] in [facility].        Signed by: Orion Harris 12/1/2023 7:42 AM   Dictation workstation:   [id number]      CT head wo IV contrast   Final Result   1. Redemonstration of postoperative changes of left-sided craniotomy   with extra-axial mixed attenuation fluid and air collection measuring   approximately 9 mm in maximal thickness, previously measuring 7 mm.   There is superimposed mixed hyperdense and hypodense attenuation left   hemispheric subdural hematoma, grossly unchanged when compared to the   prior exam. There is effacement of the adjacent sulci and partial   effacement of the left lateral ventricle without significant changes   when compared to the prior exam. There is a proximally 8 mm   left-to-right midline shift, similar to prior.        I personally reviewed the images/study and I agree with the findings   as stated by resident physician Dr. Regino Quiñones . This study   was interpreted at [facility],   [place], [place].        MACRO:   None        Signed by: Sneha Pérez 11/30/2023 7:02 AM   Dictation workstation:   [id number]      CT head wo IV contrast   Final Result   Postoperative changes are again identified compatible with a recent   left sided craniotomy.        There is again evidence of scattered pneumocephalus.        Immediately deep to the  craniotomy site, there is again evidence of   an extra-axial likely epidural mixed attenuation hemorrhagic fluid   and air collection measuring approximately 7 mm in thickness on both   the current and prior study.        There is again evidence of a superimposed mixed hyperdense and   hypodense attenuation left hemispheric subdural hematoma again   measuring approximately 21 mm in greatest thickness as seen on the   coronal reconstructed images adjacent to the left parietal lobe.   There is again evidence of mass effect upon the adjacent left   cerebral hemisphere with asymmetric effacement/partial effacement of   sulci of the left cerebral hemisphere as well as partial effacement   of the left lateral ventricle and 3rd ventricle. There is again   evidence of approximately 8 mm of bowing of the midline structures   from left to right.        The above findings are again noted be superimposed upon moderate   brain parenchymal volume loss.        Patchy nonspecific white matter changes are again noted within   cerebral hemispheres bilaterally which while nonspecific, given the   patient's age, likely represent sequelae of small-vessel ischemic   change.        MACRO:   None.        Signed by: Cassius Richardson 11/29/2023 8:51 AM   Dictation workstation:   [id number]      CT head wo IV contrast   Final Result   Evolving postoperative changes with increased size of the residual   left subdural hematoma and increased volume of high attenuation blood   products, attention on follow-up is recommended. Rightward midline   shift is similar to previous.        I personally reviewed the images/study and I agree with the findings   as stated above by resident physician, Dr. Kedar Mitchell. The   study was interpreted at [facility] in [facility].        Signed by: Fredrick Hemphill 11/29/2023 9:10 AM   Dictation workstation:   [id number]      XR chest 1 view   Final Result    Redemonstration patchy airspace opacities that are noted bilaterally,   favored to represent pulmonary alveolar edema. However, multifocal   pneumonia can have similar appearance. Small left layering effusion   with associated adjacent atelectasis.        I personally reviewed the images/study and I agree with the findings   as stated above by resident physician, Dr. Kedar Mitchell. The   study was interpreted at [facility] [place] in [facility].        Signed by: Tushar Hartman 11/29/2023 8:36 AM   Dictation workstation:   [id number]      XR chest 1 view   Final Result   1. Redemonstration of findings suggestive of pulmonary edema with   areas of patchy airspace opacities throughout the right lung which   are nonspecific and may represent multifocal pneumonia. Blunting of   the bilateral costophrenic angles may represent small pleural   effusions versus atelectasis.        I personally reviewed the images/study and I agree with the findings   as stated by resident physician Dr. Regino Quiñones . This study   was interpreted at [place], [place].        MACRO:   None        Signed by: Melissa Castro 11/26/2023 8:59 AM   Dictation workstation:   [id number]      XR chest 1 view   Final Result   1. Slight interval worsening of bilateral lung aeration with findings   suggestive of pulmonary edema, however multifocal infection is not   excluded in the appropriate clinical setting. Delete   2. Small bilateral pleural effusions.        I personally reviewed the images/study and I agree with the findings   as stated above by resident physician, Jerome Singh MD. This study   was interpreted at [place], [place].             MACRO:   None.        Signed by: Brandon Yuan 11/25/2023 9:38 AM   Dictation workstation:   [id number]      XR chest 1 view   Final Result   1. Bibasilar  atelectasis with or without trace bilateral pleural   effusions, left-greater-than-right. Superimposed   infectious/inflammatory infiltrate is not excluded, for example in   the setting of aspiration pneumonitis.        I personally reviewed the images/study and I agree with the resident   findings as stated by Yokasta Rubin MD. This study was interpreted at   [facility], [place], [place].        MACRO:   None        Signed by: Tushar Hartman 11/24/2023 8:28 AM   Dictation workstation:   [id number]      XR abdomen 1 view   Final Result   1. No enteric tube is seen within field of view and correlate   clinically.   2. Nonobstructive bowel gas pattern.   3. Visualized basilar lungs demonstrate coarse airspace opacities   bilaterally with small pleural effusions not excluded. Correlate with   concern for underlying infectious process.        Signed by: Kendall Calix 11/23/2023 6:52 AM   Dictation workstation:   [id number]      CT head wo IV contrast   Final Result   1. Postsurgical changes consistent with interval left frontoparietal   craniotomy for left subdural hematoma evacuation and subdural drain   placement with interval reduction in size of the mixed density fluid   collection and improvement in mass effect, midline shift, and sulcal   effacement when compared to prior, same day CT of the head as   described in detail above.   2. No evidence of new hemorrhage or acute cortical infarction.        I personally reviewed the images/study and I agree with the findings   as stated above by resident physician, Dr. Kedar Mitchell. The   study was interpreted at [facility] in [facility].        Signed by: Sneha Pérez 11/22/2023 6:55 AM   Dictation workstation:   [id number]      CT head wo IV contrast   Final Result   1. Large predominantly hypodense left convexity subdural hematoma   with multiple septations measuring up to 2.6 cm at its  greatest width   with significant associated mass effect, sulcal effacement, 10-11 mm   of rightward midline shift, and effacement of the left lateral   ventricle.   2. No evidence of additional hemorrhage or acute cortical infarct.        I personally reviewed the images/study and I agree with the findings   as stated above by resident physician, Dr. Kedar Mitchell. The   study was interpreted at [facility] in [facility].        Signed by: Shiv Magallanes 11/22/2023 12:51 AM   Dictation workstation:   [id number]      FL modified barium swallow study    (Results Pending)   Bedside Midline Imaging    (Results Pending)         Assessment/Plan   83 y.o. man with CLL, anemia, DM, dementia, chronic indwelling ignacio 2/2 BPH, HTN who presented on 11/22/23 for concerns of altered mental status. Found to have an acute large left SDH with midline shift. S/p evacuation on 11/22. CTH POC, 11/24 drain dc'd, Bcx +GPC, 11/25 febrile, remaining vitals stable, exam unchanged, repeat bcx negative    Interval Events:   11/28 s/p midline   11/29 Lethargic, not FC, s/p CTH w/ increased L SDH w/ 8mm MLS s/p 1g Keppra load, CXR stable opacities, rCTH stable, s/p R side wd, s/p keppra load, rrCTH stable L SDH  11/30 S/p L crani for redo SDH evac, CTH POC with improved MLS  12/1 s/p extubation  12/3 CTH stable  12/4: patient was upgraded to NSU status for worsening neurologic exam, and increase O2 requirement. ABG 7.34/30/76. CTH stable with improved brain compression. CT-PE negative for pulmonary embolism. New b/l moderate pleural effusions s/p Lasix 20mg.    NEURO:  #SDH s/p evacuation x2  #MMA embolization on 11/30  Assessment:   Neurologically: Awake, EOSp, Pupils 5mm>3mm and brisk  LUE spont AG LLE w/d to nox stim. R side flicker w/d to nox stim   L crani with drain c/d/i  Plan:  NSU  Q1H neuro checks  Drain per NSGY   Pain: acetaminophen, oxycodone, hydromorphone PRN  Sedation:  None  cvEEG  Keppra 500mg BID  PT/OT/SLP    CARDIOVASCULAR:  # HTN, HLD  Assessment:   ST on tele  Plan:   Continue to monitor on telemetry  Goal SBP < 160  Continue atorvastatin  Hydralazine and Labetalol PRN    RESPIRATORY:  #Bilateral pleural effusions   #Intubated for airway protection during OR (resolved)  Assessment:   On 4L NC  S/p Lasix 20mg  CT-PE prelim negative  AB.  Plan:   Continuous pulse oximetry   O2 PRN to maintain SpO2 > 94%, wean as tolerated  CPAP as able   Follow up CT-PE final read    RENAL/:  #BPH  Assessment:   Results from last 72 hours   Lab Units 23  0645 23  2251   BUN mg/dL 20 17   CREATININE mg/dL 1.22 1.08   Plan:   Monitor with daily RFP  Chronic ignacio for BPH    FEN/GI:  Assessment:   Last BM Date: 23  Plan:   Monitor and replace electrolytes per protocol  IVF: 75 ml NS  Diet: NPO  Bowel Regimen: Miralax PRN    ENDOCRINE:  #DM  Assessment:   Results from last 7 days   Lab Units 23  0645 23  2251 23  1349 23  1959 23  1141 23  0812   POCT GLUCOSE mg/dL  --   --   --  167* 158* 138*   GLUCOSE mg/dL 166* 113*   < >  --   --   --     < > = values in this interval not displayed.   BS over last 24h: 138-243  Plan:    Accuchecks & ISS Q6H  Hypoglycemia protocol    HEMATOLOGY:  #CLL  Assessment:   Results from last 7 days   Lab Units 23  1303 23  0226   HEMOGLOBIN g/dL 9.9* 8.9*   HEMATOCRIT % 31.1* 27.8*   PLATELETS AUTO x10*3/uL 254 227   Plan:  Continue to monitor with daily CBC and Coag panel  Heme consulted, recs appreciated  Hold Ibrutinib until POD14    INFECTIOUS DISEASE:  #concerns for aspiration PNA  Assessment:   Results from last 7 days   Lab Units 23  1303 23  0226   WBC AUTO x10*3/uL 17.6* 10.8     Afebrile   Repeat BCx  pending  Repeat BCx  NGTD, MRSA negative, vanco d/c'd   BCx  with staph hominis  Ucx   neg  Plan:  Continue to monitor for s/sx of infection  Pan  culture for temperature > 38.4 C  C/w zosyn   Send procal     MUSCULOSKELETAL:  No acute issues    SKIN:  No acute issues  Turns and skin care per NSU protocol    ACCESS:  PIV    PROPHYLAXIS:  DVT/VTE: SCDs, SQH    RESTRAINTS:  I agree with nursing assessment of the patient's need for restraints to protect the patient from injury and facilitate healing. The patient is unable to cooperate with the plan of care and at risk for disrupting critical therapy (i.e., removing medical devices, lines, tubes and/or dressings).  Please see order for specifics. Restraints can be removed when the patient is able to cooperate with plan of care and allow healing to occur, or the medical devices at risk are discontinued by the medical team.     Hilario Valenzuela, APRN-CNP  Neuroscience Intensive Care     Total additional critical care time of 60 minutes, with > 50% of time spent in direct contact with patient/family for education, counseling and coordination of care.

## 2023-12-06 NOTE — CARE PLAN
Interprofessional Rounds    Summary:  CLL, Dementia, SDH evacuated 11/22 and 11/29.  Worsening bilateral pleural effusions, transferred to NSU. Poor neurologic exam.  Wife involved.     Participants: Advance Practice Provider, Ethicist, Physical Therapist, Physician, and     Care Plan Reviewed with:  Interdisciplinary Team

## 2023-12-06 NOTE — PROGRESS NOTES
Speech-Language Pathology                 Therapy Communication Note    Patient Name: Haile Ayers  MRN: [id number]  Today's Date: 12/6/2023     Discipline: Speech Language Pathology    Missed Visit Reason:  Pt too lethargic to participate in repeat clinical swallow evaluation. SLP will continue to follow.     Missed Time: [id number]

## 2023-12-07 ENCOUNTER — APPOINTMENT (OUTPATIENT)
Dept: RADIOLOGY | Facility: HOSPITAL | Age: 83
DRG: 025 | End: 2023-12-07
Payer: MEDICARE

## 2023-12-07 LAB
ABO GROUP (TYPE) IN BLOOD: NORMAL
ALBUMIN SERPL BCP-MCNC: 3.4 G/DL (ref 3.4–5)
ALBUMIN SERPL BCP-MCNC: 3.4 G/DL (ref 3.4–5)
ALBUMIN SERPL BCP-MCNC: 3.5 G/DL (ref 3.4–5)
ALP SERPL-CCNC: 53 U/L (ref 33–136)
ALT SERPL W P-5'-P-CCNC: 5 U/L (ref 10–52)
ANION GAP SERPL CALC-SCNC: 14 MMOL/L (ref 10–20)
ANION GAP SERPL CALC-SCNC: 16 MMOL/L (ref 10–20)
ANTIBODY SCREEN: NORMAL
AST SERPL W P-5'-P-CCNC: 7 U/L (ref 9–39)
BASOPHILS # BLD AUTO: 0.03 X10*3/UL (ref 0–0.1)
BASOPHILS NFR BLD AUTO: 0.3 %
BILIRUB DIRECT SERPL-MCNC: 0.1 MG/DL (ref 0–0.3)
BILIRUB SERPL-MCNC: 0.5 MG/DL (ref 0–1.2)
BUN SERPL-MCNC: 22 MG/DL (ref 6–23)
BUN SERPL-MCNC: 26 MG/DL (ref 6–23)
CA-I BLD-SCNC: 1.2 MMOL/L (ref 1.1–1.33)
CALCIUM SERPL-MCNC: 8.5 MG/DL (ref 8.6–10.6)
CALCIUM SERPL-MCNC: 8.8 MG/DL (ref 8.6–10.6)
CHLORIDE SERPL-SCNC: 112 MMOL/L (ref 98–107)
CHLORIDE SERPL-SCNC: 113 MMOL/L (ref 98–107)
CHLORIDE UR-SCNC: 152 MMOL/L
CHLORIDE/CREATININE (MMOL/G) IN URINE: 800 MMOL/G CREAT (ref 23–275)
CO2 SERPL-SCNC: 24 MMOL/L (ref 21–32)
CO2 SERPL-SCNC: 24 MMOL/L (ref 21–32)
CREAT SERPL-MCNC: 2.06 MG/DL (ref 0.5–1.3)
CREAT SERPL-MCNC: 2.09 MG/DL (ref 0.5–1.3)
CREAT UR-MCNC: 19 MG/DL (ref 20–370)
EOSINOPHIL # BLD AUTO: 0.05 X10*3/UL (ref 0–0.4)
EOSINOPHIL NFR BLD AUTO: 0.5 %
ERYTHROCYTE [DISTWIDTH] IN BLOOD BY AUTOMATED COUNT: 15.2 % (ref 11.5–14.5)
ERYTHROCYTE [DISTWIDTH] IN BLOOD BY AUTOMATED COUNT: 15.3 % (ref 11.5–14.5)
GFR SERPL CREATININE-BSD FRML MDRD: 31 ML/MIN/1.73M*2
GFR SERPL CREATININE-BSD FRML MDRD: 31 ML/MIN/1.73M*2
GLUCOSE SERPL-MCNC: 167 MG/DL (ref 74–99)
GLUCOSE SERPL-MCNC: 184 MG/DL (ref 74–99)
HCT VFR BLD AUTO: 21.3 % (ref 41–52)
HCT VFR BLD AUTO: 22.7 % (ref 41–52)
HGB BLD-MCNC: 7 G/DL (ref 13.5–17.5)
HGB BLD-MCNC: 7.7 G/DL (ref 13.5–17.5)
HOLD SPECIMEN: NORMAL
IMM GRANULOCYTES # BLD AUTO: 0.04 X10*3/UL (ref 0–0.5)
IMM GRANULOCYTES NFR BLD AUTO: 0.4 % (ref 0–0.9)
LYMPHOCYTES # BLD AUTO: 1.64 X10*3/UL (ref 0.8–3)
LYMPHOCYTES NFR BLD AUTO: 17.3 %
MAGNESIUM SERPL-MCNC: 1.66 MG/DL (ref 1.6–2.4)
MCH RBC QN AUTO: 28.6 PG (ref 26–34)
MCH RBC QN AUTO: 29.8 PG (ref 26–34)
MCHC RBC AUTO-ENTMCNC: 32.9 G/DL (ref 32–36)
MCHC RBC AUTO-ENTMCNC: 33.9 G/DL (ref 32–36)
MCV RBC AUTO: 87 FL (ref 80–100)
MCV RBC AUTO: 88 FL (ref 80–100)
MONOCYTES # BLD AUTO: 0.95 X10*3/UL (ref 0.05–0.8)
MONOCYTES NFR BLD AUTO: 10 %
NEUTROPHILS # BLD AUTO: 6.78 X10*3/UL (ref 1.6–5.5)
NEUTROPHILS NFR BLD AUTO: 71.5 %
NRBC BLD-RTO: 0 /100 WBCS (ref 0–0)
NRBC BLD-RTO: 0 /100 WBCS (ref 0–0)
PHOSPHATE SERPL-MCNC: 2.4 MG/DL (ref 2.5–4.9)
PHOSPHATE SERPL-MCNC: 2.5 MG/DL (ref 2.5–4.9)
PLATELET # BLD AUTO: 202 X10*3/UL (ref 150–450)
PLATELET # BLD AUTO: 217 X10*3/UL (ref 150–450)
POTASSIUM SERPL-SCNC: 3.2 MMOL/L (ref 3.5–5.3)
POTASSIUM SERPL-SCNC: 3.2 MMOL/L (ref 3.5–5.3)
POTASSIUM UR-SCNC: 18 MMOL/L
POTASSIUM/CREAT UR-RTO: 95 MMOL/G CREAT
PROT SERPL-MCNC: 5.9 G/DL (ref 6.4–8.2)
RBC # BLD AUTO: 2.45 X10*6/UL (ref 4.5–5.9)
RBC # BLD AUTO: 2.58 X10*6/UL (ref 4.5–5.9)
RH FACTOR (ANTIGEN D): NORMAL
SODIUM SERPL-SCNC: 148 MMOL/L (ref 136–145)
SODIUM SERPL-SCNC: 149 MMOL/L (ref 136–145)
SODIUM UR-SCNC: 137 MMOL/L
SODIUM/CREAT UR-RTO: 721 MMOL/G CREAT
VANCOMYCIN SERPL-MCNC: 18.2 UG/ML (ref 5–20)
WBC # BLD AUTO: 8.9 X10*3/UL (ref 4.4–11.3)
WBC # BLD AUTO: 9.5 X10*3/UL (ref 4.4–11.3)

## 2023-12-07 PROCEDURE — 71045 X-RAY EXAM CHEST 1 VIEW: CPT | Performed by: RADIOLOGY

## 2023-12-07 PROCEDURE — 2500000004 HC RX 250 GENERAL PHARMACY W/ HCPCS (ALT 636 FOR OP/ED): Mod: JZ

## 2023-12-07 PROCEDURE — 94660 CPAP INITIATION&MGMT: CPT

## 2023-12-07 PROCEDURE — 96372 THER/PROPH/DIAG INJ SC/IM: CPT

## 2023-12-07 PROCEDURE — 2500000002 HC RX 250 W HCPCS SELF ADMINISTERED DRUGS (ALT 637 FOR MEDICARE OP, ALT 636 FOR OP/ED): Performed by: STUDENT IN AN ORGANIZED HEALTH CARE EDUCATION/TRAINING PROGRAM

## 2023-12-07 PROCEDURE — 76770 US EXAM ABDO BACK WALL COMP: CPT | Performed by: RADIOLOGY

## 2023-12-07 PROCEDURE — 31720 CLEARANCE OF AIRWAYS: CPT

## 2023-12-07 PROCEDURE — 71045 X-RAY EXAM CHEST 1 VIEW: CPT | Mod: FY

## 2023-12-07 PROCEDURE — 85025 COMPLETE CBC W/AUTO DIFF WBC: CPT | Performed by: REGISTERED NURSE

## 2023-12-07 PROCEDURE — 97530 THERAPEUTIC ACTIVITIES: CPT | Mod: GP

## 2023-12-07 PROCEDURE — 36415 COLL VENOUS BLD VENIPUNCTURE: CPT | Performed by: REGISTERED NURSE

## 2023-12-07 PROCEDURE — 82436 ASSAY OF URINE CHLORIDE: CPT | Performed by: STUDENT IN AN ORGANIZED HEALTH CARE EDUCATION/TRAINING PROGRAM

## 2023-12-07 PROCEDURE — P9047 ALBUMIN (HUMAN), 25%, 50ML: HCPCS | Mod: JZ

## 2023-12-07 PROCEDURE — 85027 COMPLETE CBC AUTOMATED: CPT

## 2023-12-07 PROCEDURE — 2500000001 HC RX 250 WO HCPCS SELF ADMINISTERED DRUGS (ALT 637 FOR MEDICARE OP)

## 2023-12-07 PROCEDURE — 94640 AIRWAY INHALATION TREATMENT: CPT

## 2023-12-07 PROCEDURE — 2500000004 HC RX 250 GENERAL PHARMACY W/ HCPCS (ALT 636 FOR OP/ED)

## 2023-12-07 PROCEDURE — 80069 RENAL FUNCTION PANEL: CPT | Performed by: STUDENT IN AN ORGANIZED HEALTH CARE EDUCATION/TRAINING PROGRAM

## 2023-12-07 PROCEDURE — 99291 CRITICAL CARE FIRST HOUR: CPT

## 2023-12-07 PROCEDURE — C9113 INJ PANTOPRAZOLE SODIUM, VIA: HCPCS

## 2023-12-07 PROCEDURE — 2500000004 HC RX 250 GENERAL PHARMACY W/ HCPCS (ALT 636 FOR OP/ED): Performed by: REGISTERED NURSE

## 2023-12-07 PROCEDURE — 2020000001 HC ICU ROOM DAILY

## 2023-12-07 PROCEDURE — 87040 BLOOD CULTURE FOR BACTERIA: CPT | Performed by: REGISTERED NURSE

## 2023-12-07 PROCEDURE — 2500000001 HC RX 250 WO HCPCS SELF ADMINISTERED DRUGS (ALT 637 FOR MEDICARE OP): Performed by: REGISTERED NURSE

## 2023-12-07 PROCEDURE — 87205 SMEAR GRAM STAIN: CPT | Performed by: NURSE PRACTITIONER

## 2023-12-07 PROCEDURE — 37799 UNLISTED PX VASCULAR SURGERY: CPT | Performed by: STUDENT IN AN ORGANIZED HEALTH CARE EDUCATION/TRAINING PROGRAM

## 2023-12-07 PROCEDURE — 37799 UNLISTED PX VASCULAR SURGERY: CPT | Performed by: REGISTERED NURSE

## 2023-12-07 PROCEDURE — 81001 URINALYSIS AUTO W/SCOPE: CPT | Performed by: REGISTERED NURSE

## 2023-12-07 PROCEDURE — 86901 BLOOD TYPING SEROLOGIC RH(D): CPT | Performed by: STUDENT IN AN ORGANIZED HEALTH CARE EDUCATION/TRAINING PROGRAM

## 2023-12-07 PROCEDURE — 2500000001 HC RX 250 WO HCPCS SELF ADMINISTERED DRUGS (ALT 637 FOR MEDICARE OP): Performed by: STUDENT IN AN ORGANIZED HEALTH CARE EDUCATION/TRAINING PROGRAM

## 2023-12-07 PROCEDURE — 82330 ASSAY OF CALCIUM: CPT | Performed by: REGISTERED NURSE

## 2023-12-07 PROCEDURE — 83735 ASSAY OF MAGNESIUM: CPT | Performed by: REGISTERED NURSE

## 2023-12-07 PROCEDURE — 2500000004 HC RX 250 GENERAL PHARMACY W/ HCPCS (ALT 636 FOR OP/ED): Performed by: NURSE PRACTITIONER

## 2023-12-07 PROCEDURE — C9113 INJ PANTOPRAZOLE SODIUM, VIA: HCPCS | Performed by: REGISTERED NURSE

## 2023-12-07 PROCEDURE — 80202 ASSAY OF VANCOMYCIN: CPT | Performed by: NURSE PRACTITIONER

## 2023-12-07 PROCEDURE — 82040 ASSAY OF SERUM ALBUMIN: CPT

## 2023-12-07 PROCEDURE — 80069 RENAL FUNCTION PANEL: CPT | Performed by: REGISTERED NURSE

## 2023-12-07 PROCEDURE — 76770 US EXAM ABDO BACK WALL COMP: CPT

## 2023-12-07 RX ORDER — LANOLIN ALCOHOL/MO/W.PET/CERES
100 CREAM (GRAM) TOPICAL DAILY
Status: DISCONTINUED | OUTPATIENT
Start: 2023-12-07 | End: 2023-12-29

## 2023-12-07 RX ORDER — POTASSIUM CHLORIDE 1.5 G/1.58G
20 POWDER, FOR SOLUTION ORAL EVERY 6 HOURS PRN
Status: DISCONTINUED | OUTPATIENT
Start: 2023-12-07 | End: 2023-12-26

## 2023-12-07 RX ORDER — MAGNESIUM SULFATE HEPTAHYDRATE 40 MG/ML
2 INJECTION, SOLUTION INTRAVENOUS EVERY 6 HOURS PRN
Status: DISCONTINUED | OUTPATIENT
Start: 2023-12-07 | End: 2023-12-26

## 2023-12-07 RX ORDER — POTASSIUM CHLORIDE 20 MEQ/1
20 TABLET, EXTENDED RELEASE ORAL EVERY 6 HOURS PRN
Status: DISCONTINUED | OUTPATIENT
Start: 2023-12-07 | End: 2023-12-26

## 2023-12-07 RX ORDER — CEFEPIME 1 G/50ML
2 INJECTION, SOLUTION INTRAVENOUS EVERY 24 HOURS
Status: DISCONTINUED | OUTPATIENT
Start: 2023-12-07 | End: 2023-12-07

## 2023-12-07 RX ORDER — HYDRALAZINE HYDROCHLORIDE 50 MG/1
25 TABLET, FILM COATED ORAL 3 TIMES DAILY
Status: COMPLETED | OUTPATIENT
Start: 2023-12-07 | End: 2023-12-07

## 2023-12-07 RX ORDER — POTASSIUM CHLORIDE 14.9 MG/ML
20 INJECTION INTRAVENOUS
Status: COMPLETED | OUTPATIENT
Start: 2023-12-07 | End: 2023-12-07

## 2023-12-07 RX ORDER — CALCIUM GLUCONATE 20 MG/ML
1 INJECTION, SOLUTION INTRAVENOUS EVERY 6 HOURS PRN
Status: DISCONTINUED | OUTPATIENT
Start: 2023-12-07 | End: 2023-12-26

## 2023-12-07 RX ORDER — ACETAMINOPHEN 325 MG/1
650 TABLET ORAL EVERY 6 HOURS PRN
Status: DISCONTINUED | OUTPATIENT
Start: 2023-12-07 | End: 2023-12-29

## 2023-12-07 RX ORDER — CALCIUM GLUCONATE 20 MG/ML
2 INJECTION, SOLUTION INTRAVENOUS EVERY 6 HOURS PRN
Status: DISCONTINUED | OUTPATIENT
Start: 2023-12-07 | End: 2023-12-26

## 2023-12-07 RX ORDER — ALBUMIN HUMAN 250 G/1000ML
25 SOLUTION INTRAVENOUS EVERY 8 HOURS
Status: COMPLETED | OUTPATIENT
Start: 2023-12-07 | End: 2023-12-08

## 2023-12-07 RX ORDER — POTASSIUM CHLORIDE 1.5 G/1.58G
40 POWDER, FOR SOLUTION ORAL EVERY 6 HOURS PRN
Status: DISCONTINUED | OUTPATIENT
Start: 2023-12-07 | End: 2023-12-26

## 2023-12-07 RX ORDER — POTASSIUM CHLORIDE 1.5 G/1.58G
20 POWDER, FOR SOLUTION ORAL ONCE
Status: DISCONTINUED | OUTPATIENT
Start: 2023-12-07 | End: 2023-12-07

## 2023-12-07 RX ORDER — VANCOMYCIN HYDROCHLORIDE 750 MG/150ML
750 INJECTION, SOLUTION INTRAVENOUS ONCE
Status: DISCONTINUED | OUTPATIENT
Start: 2023-12-07 | End: 2023-12-07

## 2023-12-07 RX ORDER — POTASSIUM CHLORIDE 1.5 G/1.58G
20 POWDER, FOR SOLUTION ORAL ONCE
Status: COMPLETED | OUTPATIENT
Start: 2023-12-07 | End: 2023-12-07

## 2023-12-07 RX ORDER — POTASSIUM CHLORIDE 14.9 MG/ML
20 INJECTION INTRAVENOUS EVERY 6 HOURS PRN
Status: DISCONTINUED | OUTPATIENT
Start: 2023-12-07 | End: 2023-12-26

## 2023-12-07 RX ORDER — PANTOPRAZOLE SODIUM 40 MG/10ML
40 INJECTION, POWDER, LYOPHILIZED, FOR SOLUTION INTRAVENOUS 2 TIMES DAILY
Status: DISCONTINUED | OUTPATIENT
Start: 2023-12-07 | End: 2023-12-29

## 2023-12-07 RX ORDER — MAGNESIUM SULFATE HEPTAHYDRATE 40 MG/ML
4 INJECTION, SOLUTION INTRAVENOUS EVERY 6 HOURS PRN
Status: DISCONTINUED | OUTPATIENT
Start: 2023-12-07 | End: 2023-12-26

## 2023-12-07 RX ORDER — AMLODIPINE BESYLATE 10 MG/1
10 TABLET ORAL DAILY
Status: DISCONTINUED | OUTPATIENT
Start: 2023-12-07 | End: 2023-12-29

## 2023-12-07 RX ORDER — POTASSIUM CHLORIDE 20 MEQ/1
40 TABLET, EXTENDED RELEASE ORAL EVERY 6 HOURS PRN
Status: DISCONTINUED | OUTPATIENT
Start: 2023-12-07 | End: 2023-12-26

## 2023-12-07 RX ORDER — CEFEPIME 1 G/50ML
1 INJECTION, SOLUTION INTRAVENOUS EVERY 12 HOURS
Status: DISCONTINUED | OUTPATIENT
Start: 2023-12-07 | End: 2023-12-11

## 2023-12-07 RX ADMIN — AMLODIPINE BESYLATE 10 MG: 10 TABLET ORAL at 08:24

## 2023-12-07 RX ADMIN — CEFEPIME 1 G: 1 INJECTION, SOLUTION INTRAVENOUS at 22:49

## 2023-12-07 RX ADMIN — LEVETIRACETAM 500 MG: 5 INJECTION INTRAVENOUS at 16:22

## 2023-12-07 RX ADMIN — HYDRALAZINE HYDROCHLORIDE 25 MG: 50 TABLET, FILM COATED ORAL at 08:24

## 2023-12-07 RX ADMIN — HYDRALAZINE HYDROCHLORIDE 25 MG: 50 TABLET, FILM COATED ORAL at 16:21

## 2023-12-07 RX ADMIN — SODIUM CHLORIDE 1000 ML: 9 INJECTION, SOLUTION INTRAVENOUS at 03:07

## 2023-12-07 RX ADMIN — POTASSIUM CHLORIDE 20 MEQ: 200 INJECTION, SOLUTION INTRAVENOUS at 16:14

## 2023-12-07 RX ADMIN — THIAMINE HCL TAB 100 MG 100 MG: 100 TAB at 11:11

## 2023-12-07 RX ADMIN — PANTOPRAZOLE SODIUM 40 MG: 40 INJECTION, POWDER, FOR SOLUTION INTRAVENOUS at 06:11

## 2023-12-07 RX ADMIN — PIPERACILLIN SODIUM AND TAZOBACTAM SODIUM 3.38 G: 3; .375 INJECTION, SOLUTION INTRAVENOUS at 05:31

## 2023-12-07 RX ADMIN — LEVETIRACETAM 500 MG: 5 INJECTION INTRAVENOUS at 04:57

## 2023-12-07 RX ADMIN — PIPERACILLIN SODIUM AND TAZOBACTAM SODIUM 3.38 G: 3; .375 INJECTION, SOLUTION INTRAVENOUS at 18:08

## 2023-12-07 RX ADMIN — ALBUMIN HUMAN 25 G: 0.25 SOLUTION INTRAVENOUS at 02:08

## 2023-12-07 RX ADMIN — IPRATROPIUM BROMIDE AND ALBUTEROL SULFATE 3 ML: .5; 3 SOLUTION RESPIRATORY (INHALATION) at 15:14

## 2023-12-07 RX ADMIN — POLYETHYLENE GLYCOL 3350 17 G: 17 POWDER, FOR SOLUTION ORAL at 08:24

## 2023-12-07 RX ADMIN — ALBUMIN HUMAN 25 G: 0.25 SOLUTION INTRAVENOUS at 21:00

## 2023-12-07 RX ADMIN — HYDRALAZINE HYDROCHLORIDE 10 MG: 20 INJECTION INTRAMUSCULAR; INTRAVENOUS at 01:03

## 2023-12-07 RX ADMIN — ATORVASTATIN CALCIUM 20 MG: 20 TABLET, FILM COATED ORAL at 08:24

## 2023-12-07 RX ADMIN — LABETALOL HYDROCHLORIDE 10 MG: 5 INJECTION INTRAVENOUS at 06:11

## 2023-12-07 RX ADMIN — PANTOPRAZOLE SODIUM 40 MG: 40 INJECTION, POWDER, FOR SOLUTION INTRAVENOUS at 21:20

## 2023-12-07 RX ADMIN — HYDRALAZINE HYDROCHLORIDE 25 MG: 50 TABLET, FILM COATED ORAL at 21:20

## 2023-12-07 RX ADMIN — ALBUMIN HUMAN 25 G: 0.25 SOLUTION INTRAVENOUS at 11:11

## 2023-12-07 RX ADMIN — PIPERACILLIN SODIUM AND TAZOBACTAM SODIUM 3.38 G: 3; .375 INJECTION, SOLUTION INTRAVENOUS at 00:34

## 2023-12-07 RX ADMIN — HEPARIN SODIUM 5000 UNITS: 5000 INJECTION INTRAVENOUS; SUBCUTANEOUS at 18:08

## 2023-12-07 RX ADMIN — Medication 4000 UNITS: at 08:24

## 2023-12-07 RX ADMIN — HYDRALAZINE HYDROCHLORIDE 10 MG: 20 INJECTION INTRAMUSCULAR; INTRAVENOUS at 03:28

## 2023-12-07 RX ADMIN — PIPERACILLIN SODIUM AND TAZOBACTAM SODIUM 3.38 G: 3; .375 INJECTION, SOLUTION INTRAVENOUS at 11:11

## 2023-12-07 RX ADMIN — POTASSIUM CHLORIDE 20 MEQ: 200 INJECTION, SOLUTION INTRAVENOUS at 18:08

## 2023-12-07 RX ADMIN — ACETAMINOPHEN 650 MG: 325 TABLET ORAL at 21:27

## 2023-12-07 RX ADMIN — HEPARIN SODIUM 5000 UNITS: 5000 INJECTION INTRAVENOUS; SUBCUTANEOUS at 02:08

## 2023-12-07 RX ADMIN — HEPARIN SODIUM 5000 UNITS: 5000 INJECTION INTRAVENOUS; SUBCUTANEOUS at 11:10

## 2023-12-07 RX ADMIN — IPRATROPIUM BROMIDE AND ALBUTEROL SULFATE 3 ML: .5; 3 SOLUTION RESPIRATORY (INHALATION) at 21:18

## 2023-12-07 RX ADMIN — IPRATROPIUM BROMIDE AND ALBUTEROL SULFATE 3 ML: .5; 3 SOLUTION RESPIRATORY (INHALATION) at 09:30

## 2023-12-07 RX ADMIN — LABETALOL HYDROCHLORIDE 10 MG: 5 INJECTION INTRAVENOUS at 07:50

## 2023-12-07 RX ADMIN — POTASSIUM CHLORIDE 20 MEQ: 1.5 POWDER, FOR SOLUTION ORAL at 05:30

## 2023-12-07 RX ADMIN — LABETALOL HYDROCHLORIDE 10 MG: 5 INJECTION INTRAVENOUS at 02:07

## 2023-12-07 RX ADMIN — HYDRALAZINE HYDROCHLORIDE 10 MG: 20 INJECTION INTRAMUSCULAR; INTRAVENOUS at 08:06

## 2023-12-07 ASSESSMENT — COGNITIVE AND FUNCTIONAL STATUS - GENERAL
CLIMB 3 TO 5 STEPS WITH RAILING: TOTAL
MOVING FROM LYING ON BACK TO SITTING ON SIDE OF FLAT BED WITH BEDRAILS: A LOT
STANDING UP FROM CHAIR USING ARMS: TOTAL
WALKING IN HOSPITAL ROOM: TOTAL
MOVING TO AND FROM BED TO CHAIR: TOTAL
TURNING FROM BACK TO SIDE WHILE IN FLAT BAD: TOTAL
MOBILITY SCORE: 7

## 2023-12-07 ASSESSMENT — PAIN - FUNCTIONAL ASSESSMENT
PAIN_FUNCTIONAL_ASSESSMENT: UNABLE TO SELF-REPORT
PAIN_FUNCTIONAL_ASSESSMENT: CPOT (CRITICAL CARE PAIN OBSERVATION TOOL)

## 2023-12-07 NOTE — CARE PLAN
Problem: Pain  Goal: My pain/discomfort is manageable  Outcome: Progressing     Problem: Safety  Goal: Patient will be injury free during hospitalization  Outcome: Progressing  Goal: I will remain free of falls  Outcome: Progressing     Problem: Daily Care  Goal: Daily care needs are met  Outcome: Progressing     Problem: Psychosocial Needs  Goal: Demonstrates ability to cope with hospitalization/illness  Outcome: Progressing  Goal: Collaborate with me, my family, and caregiver to identify my specific goals  Outcome: Progressing     Problem: Discharge Barriers  Goal: My discharge needs are met  Outcome: Progressing     Problem: General Stroke  Goal: Demonstrate improvement in neurological exam throughout the shift  Outcome: Progressing  Goal: Maintain BP within ordered limits throughout shift  Outcome: Progressing  Goal: Participate in treatment (ie., meds, therapy) throughout shift  Outcome: Progressing  Goal: No symptoms of aspiration throughout shift  Outcome: Progressing  Goal: No symptoms of hemorrhage throughout shift  Outcome: Progressing  Goal: Tolerate enteral feeding throughout shift  Outcome: Progressing  Goal: Decreased nausea/vomiting throughout shift  Outcome: Progressing  Goal: Controlled blood glucose throughout shift  Outcome: Progressing  Goal: Out of bed three times today  Outcome: Progressing     Problem: ICU Stroke  Goal: Maintain ICP within ordered limits throughout shift  Outcome: Progressing  Goal: Tolerate EVD clamping trial throughout shift  Outcome: Progressing  Goal: Tolerate ventilator weaning trial during shift  Outcome: Progressing  Goal: Maintain patent airway throughout shift  Outcome: Progressing  Goal: Achieve/maintain targeted sodium level throughout shift  Outcome: Progressing     Problem: Skin  Goal: Decreased wound size/increased tissue granulation at next dressing change  Outcome: Progressing  Goal: Participates in plan/prevention/treatment measures  Outcome:  Progressing  Goal: Prevent/manage excess moisture  Outcome: Progressing  Goal: Prevent/minimize sheer/friction injuries  Outcome: Progressing  Goal: Promote/optimize nutrition  Outcome: Progressing  Goal: Promote skin healing  Outcome: Progressing     Problem: Safety - Medical Restraint  Goal: Remains free of injury from restraints (Restraint for Interference with Medical Device)  Outcome: Progressing  Goal: Free from restraint(s) (Restraint for Interference with Medical Device)  Outcome: Progressing     Problem: Nutrition  Goal: Nutrition support goals are met within 48 hrs  Outcome: Progressing  Goal: Lab values WNL  Outcome: Progressing  Goal: Electrolytes WNL  Outcome: Progressing  Goal: Promote healing  Outcome: Progressing  Goal: Maintain stable weight  Outcome: Progressing  Goal: Reduce weight from edema/fluid  Outcome: Progressing     Problem: Pain - Adult  Goal: Verbalizes/displays adequate comfort level or baseline comfort level  Outcome: Progressing     Problem: Safety - Adult  Goal: Free from fall injury  Outcome: Progressing     Problem: Discharge Planning  Goal: Discharge to home or other facility with appropriate resources  Outcome: Progressing     Problem: Chronic Conditions and Co-morbidities  Goal: Patient's chronic conditions and co-morbidity symptoms are monitored and maintained or improved  Outcome: Progressing     Problem: Fall/Injury  Goal: Not fall by end of shift  Outcome: Progressing  Goal: Be free from injury by end of the shift  Outcome: Progressing  Goal: Verbalize understanding of personal risk factors for fall in the hospital  Outcome: Progressing     Problem: Diabetes  Goal: Increase stability of blood glucose readings by end of shift  Outcome: Progressing  Goal: Maintain electrolyte levels within acceptable range throughout shift  Outcome: Progressing  Goal: Maintain glucose levels >70mg/dl to <250mg/dl throughout shift  Outcome: Progressing  Goal: No changes in neurological exam by  end of shift  Outcome: Progressing        The clinical goals for the shift include improve venttillstion and remain neurologically intact

## 2023-12-07 NOTE — PROGRESS NOTES
Physical Therapy  Physical Therapy    Physical Therapy Treatment    Patient Name: Haile Ayers  MRN: [id number]  Today's Date: 12/7/2023  Time Calculation  Start Time: 1527  Stop Time: 1557  Time Calculation (min): 30 min       Assessment/Plan   PT Assessment  PT Assessment Results: Decreased strength, Decreased range of motion, Decreased endurance, Impaired balance, Decreased mobility, Decreased cognition  Rehab Prognosis: Fair  Evaluation/Treatment Tolerance: Patient limited by fatigue  Strengths: Support and attitude of living partners  End of Session Communication: Bedside nurse  Assessment Comment: Patient to benefit from ongoing PT services at this time and at discharge to continue addressing the above limitations and to prepare patient for safe and timely return to prior level of function.  End of Session Patient Position: Bed, 3 rail up, Alarm on  PT Plan  Inpatient/Swing Bed or Outpatient: Inpatient  PT Plan  Treatment/Interventions: Bed mobility, Transfer training, Gait training, Stair training, Balance training, Neuromuscular re-education, Strengthening, Endurance training, Range of motion, Therapeutic exercise, Therapeutic activity, Home exercise program, Orthotic fitting/training, Positioning, Postural re-education  PT Plan: Skilled PT  PT Frequency: 5 times per week  PT Discharge Recommendations: Moderate intensity level of continued care  Equipment Recommended upon Discharge:  (TBD)  PT Recommended Transfer Status: Total assist  PT - OK to Discharge: Yes (PT evaluation has been completed and discharge recommendations have been made.)      General Visit Information:   PT  Visit  PT Received On: 12/07/23  Response to Previous Treatment: Patient unable to report, no changes reported from family or staff  Co-Treatment:  (Utilized rehab aide during session for line management and increased safety with mobility activities requiring assist of 2.)  Co-Treatment Reason: n/a  Prior to Session Communication:  Bedside nurse  Patient Position Received: Bed, 3 rail up, Alarm off, not on at start of session  Family/Caregiver Present: Yes  Caregiver Feedback: Pt's spouse present throughout session and very attentive.  General Comment: Tolerates chair position in bed with VSS. Performs bed mobility with MAX A x1 to MAX A x2.     Subjective   Precautions:  Precautions  Hearing/Visual Limitations: [place] [employer] [place], vision WFL  Medical Precautions: Fall precautions  Precautions Comment: SBP <160  Vital Signs:  Vital Signs  Heart Rate: 97 ( during. 95 post.)  Resp: 26 (Max 31, post 26)  SpO2: 97 % ( during, 97 post)  BP: 152/74 (153/73 (97) during, 138/70 (89) post)  MAP (mmHg): 97  BP Method: Automatic    Objective   Pain:  Pain Assessment  Pain Assessment: Unable to self-report (No signs of pain during session)  Cognition:  Cognition  Overall Cognitive Status: Impaired  Arousal/Alertness:  (Eyes closed initially, then open with HOB elevation and in response to name.)  Orientation Level: Unable to assess  Following Commands:  (Pt does not follow commands this date.)  Impulsive: Mildly  Processing Speed: Delayed  Lines/Tubes/Drains:  Midline 11/28/23 Single lumen Right Brachial vein (Active)   Number of days: 9       Urethral Catheter Straight-tip (Active)   Number of days: 2       NG/OG/Feeding Tube Left nostril 10 Fr. (Active)   Number of days: 1       PT Treatments:  Therapeutic Exercise  Therapeutic Exercise Performed: Yes  Therapeutic Exercise Activity 1: PROM therapist-facilitated: 15x B knee flexion/extension, 10x B ankle plantar/dorsiflexion  Therapeutic Activity  Therapeutic Activity Performed: Yes  Therapeutic Activity 1: 10 min chair position with skilled vitals monitoring to faciliate increased tolerance of upright position.  Balance/Neuromuscular Re-Education  Balance/Neuromuscular Re-Education Activity Performed: No  Bed Mobility  Bed Mobility: Yes  Bed Mobility 1  Bed Mobility 1: Long sit  Level of Assistance 1:  Dependent (x2)  Bed Mobility Comments 1: Used draw sheet; Verbal cues to maximize pt participation in task.  Bed Mobility 2  Bed Mobility  2: Rolling left, Rolling right  Level of Assistance 2: Maximum assistance (x1)  Bed Mobility Comments 2: Used draw sheet, HOB flat.  Bed Mobility 3  Bed Mobility 3:  (scooting/boosting)  Level of Assistance 3: Dependent (x2)  Bed Mobility Comments 3: Used draw sheet, HOB flat.  Ambulation/Gait Training  Ambulation/Gait Training Performed: No  Transfers  Transfer: No             Outcome Measures:  [facility] Basic Mobility  Turning from your back to your side while in a flat bed without using bedrails: A lot  Moving from lying on your back to sitting on the side of a flat bed without using bedrails: Total  Moving to and from bed to chair (including a wheelchair): Total  Standing up from a chair using your arms (e.g. wheelchair or bedside chair): Total  To walk in hospital room: Total  Climbing 3-5 steps with railing: Total  Basic Mobility - Total Score: 7           FSS-ICU  Ambulation: Unable to attempt due to weakness  Rolling: Maximal assistance (performs 25% - 49% of task)  Sitting: Total assistance (performs 25% or requires another person)  Transfer Sit-to-Stand: Unable to perform  Transfer Supine-to-Sit: Total assistance (performs 25% or requires another person)  Total Score: 4  ICU Mobility Screen  Early Mobility/Exercise Safety Screen: Proceed with mobilization - No exclusion criteria met             Education Documentation  Body Mechanics, taught by Lamar Alvarez, PT at 12/7/2023  4:42 PM.  Learner: Significant Other  Readiness: Acceptance  Method: Explanation  Response: Verbalizes Understanding    Precautions, taught by Lamar Alvarez PT at 12/7/2023  4:42 PM.  Learner: Significant Other  Readiness: Acceptance  Method: Explanation  Response: Verbalizes Understanding    Mobility Training, taught by Lamar Alvarez PT at 12/7/2023  4:42  PM.  Learner: Significant Other  Readiness: Acceptance  Method: Explanation  Response: Verbalizes Understanding    Education Comments  No comments found.          OP EDUCATION:       Encounter Problems       Encounter Problems (Active)       Balance       Patient will stand with UE support of LRD and </= MOD A x1 at least 3 min to improve balance required for self-care tasks.  (Not met)       Start:  11/22/23    Expected End:  12/06/23    Resolved:  12/07/23    Updated to: Patient will sit at edge of bed at least 15 min and perform dynamic reaching activities with </= MOD A x1 and no excessive sway or loss of balance.    Update reason: change in pt condition          Patient will sit at edge of bed at least 15 min and perform dynamic reaching activities with </= MOD A x1 and no excessive sway or loss of balance.       Start:  12/07/23    Expected End:  12/20/23                Patient will sit at edge of bed at least 15 min and perform dynamic reaching activities with </= MOD A x1 and no excessive sway or loss of balance.       Start:  12/07/23    Expected End:  12/20/23                   Mobility       Patient will ambulate at least 20 ft. with </= MOD A x1 and LRD to improve tolerance of household distances.  (Not met)       Start:  11/22/23    Expected End:  12/20/23    Resolved:  12/07/23    Updated to: Patient will ambulate at least 5 ft. with </= MAX A x2 and LRD to improve tolerance of household distances.    Update reason: change in pt condition          Patient will perform bed mobility with </= MOD A x1 to reduce risk of developing decubitus ulcers.  (Progressing)       Start:  11/22/23    Expected End:  12/20/23            Patient will perform home exercise program as prescribed with cues as needed.   (Not met)       Start:  11/22/23    Expected End:  12/20/23    Resolved:  12/07/23    Updated to: BLE >/= 3+/5    Update reason: change in pt condition          Patient will ambulate at least 5 ft. with </=  MAX A x2 and LRD to improve tolerance of household distances.       Start:  12/07/23    Expected End:  12/20/23                BLE >/= 3+/5       Start:  12/07/23    Expected End:  12/20/23                Patient will ambulate at least 5 ft. with </= MAX A x2 and LRD to improve tolerance of household distances.       Start:  12/07/23    Expected End:  12/20/23                BLE >/= 3+/5       Start:  12/07/23    Expected End:  12/20/23                   Pain - Adult          Transfers       Patient will perform sit to stand and stand to sit transfers with </= MAX A x1 and LRD to increase functional strength.  (Not met)       Start:  11/22/23    Expected End:  12/06/23    Resolved:  12/07/23    Updated to: Patient will perform sit to stand and stand to sit transfers with </= MAX A x2 and LRD to increase functional strength.    Update reason: change in pt condition          Patient will perform sit to stand and stand to sit transfers with </= MAX A x2 and LRD to increase functional strength.       Start:  12/07/23    Expected End:  12/20/23 12/07/23 at 4:43 PM   Lamar Alvarez, PT   Rehab Office: [phone]

## 2023-12-07 NOTE — PROGRESS NOTES
"Haile Ayers is a 83 y.o. male on day 15 of admission presenting with Subdural hematoma (CMS/HCC).    Subjective   No events overnight.       Objective     Physical Exam  EONS  BUE spont  BLE wd L more brisk than right    Last Recorded Vitals  Blood pressure 147/74, pulse 95, temperature 36.6 °C (97.9 °F), resp. rate (!) 32, height 1.727 m (5' 7.99\"), weight 79.8 kg (175 lb 14.8 oz), SpO2 97 %.  Intake/Output last 3 Shifts:  I/O last 3 completed shifts:  In: 2553.3 (32 mL/kg) [I.V.:752.5 (9.4 mL/kg); Blood:200; NG/GT:180; IV Piggyback:1420.8]  Out: 7680 (96.2 mL/kg) [Urine:7660 (2.7 mL/kg/hr); Drains:20]  Weight: 79.8 kg     Relevant Results           This patient currently has cardiac telemetry ordered; if you would like to modify or discontinue the telemetry order, click here to go to the orders activity to modify/discontinue the order.                 Assessment/Plan   Principal Problem:    Subdural hematoma (CMS/HCC)  Active Problems:    HTN (hypertension)    Diabetes mellitus, type 2 (CMS/HCC)    CVA (cerebral vascular accident) (CMS/HCC)    83 y.o. male h/o CLL, anemia, DM, p/w AMS, found to have UTI, CTH w/ L large chronic SDH, 11/22 s/p L crani for SDH evac, CTH POC, 11/24 drain dc'd, Bcx +GPC, 11/25 febrile, remaining vitals stable, exam unchanged, repeat bcx negative     11/28 s/p midline   11/29 Lethargic, not FC, s/p CTH w/ increased L SDH w/ 8mm MLS s/p 1g Keppra load, CXR stable opacities, rCTH stable, s/p R side wd, s/p keppra load, rrCTH stable L SDH,  11/30 S/p L crani for redo SDH evac, CTH POC with improved MLS  12/1 s/p extubation  12/3 CTH stable  12/4 s/psmall L MMA w no distal branches, not embolized   12/5 DVT US x 4 RLE chornic changes no DVT, CTH stable, CT PE neg for PE, BL pleural effusions s/p 20 lasix  12/6 TTE EF 65-70%, SDD dc'd     PLAN:     NSU   SBP<160  BID labs (hgb 7)  CPAP wean  Slow fluid resuscitation  C/s for PEG  Fu CXR  Fu cultures  Zosyn  Chronic ignacio  Heme recs-cont to " hold ibrutinib   SLP eval   PTOT-rehab; needs re-eval, resume trickle tube feeds  SCD, SQH           Yael Whiting MD

## 2023-12-07 NOTE — PROGRESS NOTES
Subjective   83 y.o. man with CLL, anemia, DM, dementia, chronic indwelling ignacio 2/2 BPH, HTN who presented on 11/22/23 for concerns of altered mental status. Found to have an acute large left SDH with midline shift. S/p evacuation on 11/22. CTH POC, 11/24 drain dc'd, Bcx +GPC, 11/25 febrile, remaining vitals stable, exam unchanged, repeat bcx negative    Interval Events:   11/28 s/p midline   11/29 Lethargic, not FC, s/p CTH w/ increased L SDH w/ 8mm MLS s/p 1g Keppra load, CXR stable opacities, rCTH stable, s/p R side wd, s/p keppra load, rrCTH stable L SDH  11/30 S/p L crani for redo SDH evac, CTH POC with improved MLS  12/1 s/p extubation  12/3 CTH stable  12/4: patient was upgraded to NSU status for worsening neurologic exam, and increase O2 requirement. ABG 7.34/30/76. CTH stable with improved brain compression. CT-PE negative for pulmonary embolism. New b/l moderate pleural effusions s/p Lasix 20mg.   12/6: Improved ABG on CPAP. Poor UOP despite total 60mg IV Lasix.  12/7: markedly net negative after diuretics, replaced 1L, started albumin 25%, LIN mildly worsening, placed again on CPAP due to persistent tachypnea to 30s. History of bloody emesis per wife while on the floor        Objective     Vitals 24 hour ranges:  Heart Rate:  []   Temp:  [36.6 °C (97.9 °F)-37.9 °C (100.2 °F)]   Resp:  [21-33]   BP: (126-180)/(65-96)   Weight:  [79.8 kg (175 lb 14.8 oz)]   SpO2:  [92 %-99 %]      Intake/Output for last 24 hours:    Intake/Output Summary (Last 24 hours) at 12/7/2023 1356  Last data filed at 12/7/2023 1300  Gross per 24 hour   Intake 2880.83 ml   Output 6100 ml   Net -3219.17 ml      Physical Exam:  NEURO:  Drowsy, EOV, Pupils 4mm>3mm and brisk, not following commands  LUE spont AG BLE w/d to nox stim. RUE flicker w/d to nox stim   CV:  ST on tele. No M/G/R.   RESP:  On CPAP  :  Chronic ignacio with clear yellow urine  GI:  Abdomen soft, distended. NT, no rigidity or guarding   SKIN:  Left frontal  incision c/d/I with drain       Medications    Scheduled:  albumin human, 25 g, intravenous, q8h  amLODIPine, 10 mg, nasogastric tube, Daily  atorvastatin, 20 mg, oral, Daily  bisacodyl, 10 mg, rectal, Daily  cholecalciferol, 4,000 Units, oral, Daily  heparin (porcine), 5,000 Units, subcutaneous, q8h  hydrALAZINE, 25 mg, nasogastric tube, TID  ipratropium-albuteroL, 3 mL, nebulization, TID  levETIRAcetam, 500 mg, intravenous, q12h  pantoprazole, 40 mg, intravenous, Daily before breakfast  piperacillin-tazobactam, 3.375 g, intravenous, q6h  polyethylene glycol, 17 g, oral, Daily  thiamine, 100 mg, nasogastric tube, Daily    PRN:  PRN medications: [Held by provider] acetaminophen, albuterol, hydrALAZINE, labetaloL, naloxone, ondansetron **OR** ondansetron, oxyCODONE, oxyCODONE, vancomycin     Continuous:  oxygen, , Last Rate: 4 L/min (12/07/23 0453)      Lab Results  Results from last 72 hours   Lab Units 12/07/23  1110 12/07/23  0304   WBC AUTO x10*3/uL 8.9 9.5   NRBC AUTO /100 WBCs 0.0 0.0   RBC AUTO x10*6/uL 2.58* 2.45*   HEMOGLOBIN g/dL 7.7* 7.0*   HEMATOCRIT % 22.7* 21.3*   MCV fL 88 87   MCH pg 29.8 28.6   MCHC g/dL 33.9 32.9   RDW % 15.3* 15.2*   PLATELETS AUTO x10*3/uL 202 217      Results from last 72 hours   Lab Units 12/07/23  1110 12/07/23  0304 12/06/23  0736   GLUCOSE mg/dL 167* 184* 186*   SODIUM mmol/L 149* 148* 146*   POTASSIUM mmol/L 3.2* 3.2* 3.5   CHLORIDE mmol/L 112* 113* 113*   CO2 mmol/L 24 24 19*   ANION GAP mmol/L 16 14 18   BUN mg/dL 22 26* 27*   CREATININE mg/dL 2.09* 2.06* 1.92*   EGFR mL/min/1.73m*2 31* 31* 34*   CALCIUM mg/dL 8.5* 8.8 8.4*   PHOSPHORUS mg/dL 2.5 2.4* 3.2   ALBUMIN g/dL 3.4 3.5 2.6*   MAGNESIUM mg/dL  --  1.66 1.77   POCT CALCIUM IONIZED (MMOL/L) IN BLOOD mmol/L  --  1.20 1.25          Imaging Results  XR chest 1 view   Final Result   1.  Slight improvement in right basilar aeration with continued   basilar edema/effusions. Correlate with fluid status.             Signed  by: Rob Winter 12/7/2023 8:02 AM   Dictation workstation:   [id number]      Transthoracic Echo (TTE) Complete   Final Result      XR abdomen 1 view   Final Result   1.  Dobbhoff tube overlies the 2nd duodenum.        Signed by: Rob Winter 12/7/2023 8:01 AM   Dictation workstation:   [id number]      XR chest 1 view   Final Result   1. Interval development of probable right middle lobe atelectasis.        2. Mild interval increase in interstitial opacities in the left lung.        3. Probable trace bilateral pleural effusions.        4. Removal of enteric tube.        I personally reviewed the images/study and I agree with Dr. María Butler findings as stated. This study was interpreted at [place], [place]        MACRO:   None        Signed by: Brandon Yuan 12/6/2023 11:56 AM   Dictation workstation:   [id number]      CT head wo IV contrast   Final Result   Redemonstration of postsurgical changes of left frontoparietal   craniotomy and left subdural drain placement with interval reduction   in size of the previously noted mixed attenuation extra-axial fluid   collection overlying the left cerebral convexity when compared to   prior examination from 12/04/2023. Additionally, there has been   interval improvement in associated sulcal effacement, mass effect,   and midline shift.        I personally reviewed the images/study and I agree with the findings   as stated above by resident physician, Dr. Kedar Mitchell.        MACRO:   none        Signed by: Kristan Cobb 12/6/2023 6:04 AM   Dictation workstation:   [id number]      CT angio chest for pulmonary embolism   Final Result   1. No evidence of acute pulmonary embolism.   2. Moderate to large volume bilateral layering pleural effusions with   associated compressive atelectasis and diffuse body wall anasarca   along with smooth interlobular septal thickening is noted.   Additionally, large volume  abdominal ascites is incidentally noted.   Correlation with patient's fluid status is recommended. Superimposed   pneumonia or aspiration pneumonitis not excluded.   3. Bilateral pulmonary arteries are dilated which may be sequela of   chronic pulmonary arterial hypertension.   4. Diffuse sclerotic lesions throughout the axial and appendicular   skeleton of varying sizes. 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 Correlate with any available outside   hospital imaging studies. Correlate with PSA levels. Updated findings   are communicated through epic secure chat message to the referring   provider at 12:35 p.m. on 12/06/2023.   5. Additional chronic findings as above.        I personally reviewed the images/study and I agree with the findings   as stated above by resident physician, Dr. Kedar Mitchell. The   study was interpreted at [facility] in [facility].        Signed by: Gabriella Gee 12/6/2023 12:35 PM   Dictation workstation:   [id number]      XR abdomen 1 view   Final Result   1.  Enteric tube tip is in the right mainstem bronchus. Recommend   repositioning.   2. Nonobstructive bowel gas pattern.        I personally reviewed the images/study and I agree with the findings   as stated by resident physician Dr. Regino Quiñones . This study   was interpreted at [facility],   [place], [place].        MACRO:   Regino Quiñones discussed the significance and urgency of this   critical finding by telephone with  Dr. Kiran on 12/5/2023 at   4:47 pm.  (**-RCF-**) Findings:  See findings.        Signed by: Viral Sommers 12/5/2023 4:50 PM   Dictation workstation:   [id number]      Vascular US lower extremity venous duplex bilateral   Final Result      Vascular US upper extremity venous duplex bilateral   Final Result      XR chest 1  view   Final Result   1. Diffuse and extensive pulmonary infiltrates in particular within   lower lobe segments.   2. Improved pulmonary aeration within lower lobe segments bilaterally   in comparison to the prior study.                  Signed by: Viral Sommers 12/5/2023 4:50 PM   Dictation workstation:   [id number]      XR abdomen 1 view   Final Result   1. The feeding tube is in satisfactory position                  I personally reviewed the images/study and I agree with the findings   as stated above by resident physician, Jerome Singh MD. This study   was interpreted at [place], [place].        MACRO:   None.        Signed by: Viral Sommers 12/5/2023 4:25 PM   Dictation workstation:   [id number]      XR abdomen 1 view   Final Result   1. The feeding tube appears positioned within the region of the   gastric antrum             I personally reviewed the images/study and I agree with the findings   as stated above by resident physician, Dr. Kedar Mitchell. The   study was interpreted at [facility] in [facility] [place].        MACRO:   none        Signed by: Viral Sommers 12/5/2023 11:30 AM   Dictation workstation:   [id number]      IR angiogram   Preliminary Result   Attempted left middle meningeal artery embolization aborted due to   truncated left middle meningeal artery with no suitable distal target   for embolization.        I was present for and/or performed the critical portions of the   procedure and immediately available throughout the entire procedure.   I personally reviewed the image(s)/study and interpretation. I agree   with the findings as stated. Performed and dictated at [facility] [place].        MACRO:   None             Dictation workstation:   [id number]      CT head wo IV contrast   Final Result   Similar appearance of the head compared to prior examination with    unchanged appearance of subdural fluid surrounding the left frontal   subdural catheter and unchanged mixed attenuation subdural collection   along the left parietal, occipital, and temporal convexities.        I personally reviewed the images/study and I agree with the findings   as stated above by resident physician, Jerome Singh MD. This study   was interpreted at [place], [place].             MACRO:   None.        Signed by: Syed Amezquita 12/4/2023 7:10 AM   Dictation workstation:   [id number]      XR chest 1 view   Final Result   1.  Interval increased bilateral patchy airspace opacities likely   represent alveolar edema with multifocal infectious process not   excluded.   2. Small bilateral pleural effusions with adjacent atelectasis.        I personally reviewed the images/study and I agree with the findings   as stated above by resident physician, Jerome Singh MD. This study   was interpreted at [place], [place].             MACRO:   None.        Signed by: Viral Sommers 12/5/2023 7:54 AM   Dictation workstation:   [id number]      CT head wo IV contrast   Final Result   Slightly increased subdural fluid surrounding the subdural catheter   in the left frontal region. This may be due to decreased   pneumocephalus.        Stable component of subdural mixed attenuation hemorrhage within the   left parieto-occipital and temporal region.        Signed by: Kerrie Magana 12/3/2023 9:52 AM   Dictation workstation:   [id number]      CT head wo IV contrast   Final Result   1. Postsurgical changes consistent with interval left frontal   craniotomy for re-evacuation of mixed density subdural hematoma   overlying the left cerebral convexity with interval reduction in size   of the subdural hematoma and improved mass effect, midline shift, and   sulcal effacement as described above.   2. No new transcortical infarction,  intraparenchymal hemorrhage, or   herniation.        I personally reviewed the images/study and I agree with the findings   as stated above by resident physician, Dr. Kedar Mitchell. The   study was interpreted at [facility] in [facility].        Signed by: Orion Harris 12/1/2023 7:42 AM   Dictation workstation:   [id number]      CT head wo IV contrast   Final Result   1. Redemonstration of postoperative changes of left-sided craniotomy   with extra-axial mixed attenuation fluid and air collection measuring   approximately 9 mm in maximal thickness, previously measuring 7 mm.   There is superimposed mixed hyperdense and hypodense attenuation left   hemispheric subdural hematoma, grossly unchanged when compared to the   prior exam. There is effacement of the adjacent sulci and partial   effacement of the left lateral ventricle without significant changes   when compared to the prior exam. There is a proximally 8 mm   left-to-right midline shift, similar to prior.        I personally reviewed the images/study and I agree with the findings   as stated by resident physician Dr. Regino Quiñones . This study   was interpreted at [facility],   [place], [place].        MACRO:   None        Signed by: Sneha Pérez 11/30/2023 7:02 AM   Dictation workstation:   [id number]      CT head wo IV contrast   Final Result   Postoperative changes are again identified compatible with a recent   left sided craniotomy.        There is again evidence of scattered pneumocephalus.        Immediately deep to the craniotomy site, there is again evidence of   an extra-axial likely epidural mixed attenuation hemorrhagic fluid   and air collection measuring approximately 7 mm in thickness on both   the current and prior study.        There is again evidence of a superimposed mixed hyperdense and   hypodense attenuation left hemispheric subdural hematoma again    measuring approximately 21 mm in greatest thickness as seen on the   coronal reconstructed images adjacent to the left parietal lobe.   There is again evidence of mass effect upon the adjacent left   cerebral hemisphere with asymmetric effacement/partial effacement of   sulci of the left cerebral hemisphere as well as partial effacement   of the left lateral ventricle and 3rd ventricle. There is again   evidence of approximately 8 mm of bowing of the midline structures   from left to right.        The above findings are again noted be superimposed upon moderate   brain parenchymal volume loss.        Patchy nonspecific white matter changes are again noted within   cerebral hemispheres bilaterally which while nonspecific, given the   patient's age, likely represent sequelae of small-vessel ischemic   change.        MACRO:   None.        Signed by: Cassius Richardson 11/29/2023 8:51 AM   Dictation workstation:   [id number]      CT head wo IV contrast   Final Result   Evolving postoperative changes with increased size of the residual   left subdural hematoma and increased volume of high attenuation blood   products, attention on follow-up is recommended. Rightward midline   shift is similar to previous.        I personally reviewed the images/study and I agree with the findings   as stated above by resident physician, Dr. Kedar Mitchell. The   study was interpreted at [facility] in [facility].        Signed by: Fredrick Hemphill 11/29/2023 9:10 AM   Dictation workstation:   [id number]      XR chest 1 view   Final Result   Redemonstration patchy airspace opacities that are noted bilaterally,   favored to represent pulmonary alveolar edema. However, multifocal   pneumonia can have similar appearance. Small left layering effusion   with associated adjacent atelectasis.        I personally reviewed the images/study and I agree with the findings   as stated above by resident  physician, Dr. Kedar Mitchell. The   study was interpreted at [facility] in [facility].        Signed by: Tushar Hartman 11/29/2023 8:36 AM   Dictation workstation:   [id number]      XR chest 1 view   Final Result   1. Redemonstration of findings suggestive of pulmonary edema with   areas of patchy airspace opacities throughout the right lung which   are nonspecific and may represent multifocal pneumonia. Blunting of   the bilateral costophrenic angles may represent small pleural   effusions versus atelectasis.        I personally reviewed the images/study and I agree with the findings   as stated by resident physician Dr. Regino Quiñones . This study   was interpreted at [place], [place].        MACRO:   None        Signed by: Nasirpablotheresa Rejielvi Castro 11/26/2023 8:59 AM   Dictation workstation:   [id number]      XR chest 1 view   Final Result   1. Slight interval worsening of bilateral lung aeration with findings   suggestive of pulmonary edema, however multifocal infection is not   excluded in the appropriate clinical setting. Delete   2. Small bilateral pleural effusions.        I personally reviewed the images/study and I agree with the findings   as stated above by resident physician, Jerome Singh MD. This study   was interpreted at [facility],   [place], [place].             MACRO:   None.        Signed by: Brandon Yuan 11/25/2023 9:38 AM   Dictation workstation:   [id number]      XR chest 1 view   Final Result   1. Bibasilar atelectasis with or without trace bilateral pleural   effusions, left-greater-than-right. Superimposed   infectious/inflammatory infiltrate is not excluded, for example in   the setting of aspiration pneumonitis.        I personally reviewed the images/study and I agree with the resident   findings as stated by Yokasta Rubin MD. This study was interpreted at    [facility], [place], [place].        MACRO:   None        Signed by: Tushar Hartman 11/24/2023 8:28 AM   Dictation workstation:   [id number]      XR abdomen 1 view   Final Result   1. No enteric tube is seen within field of view and correlate   clinically.   2. Nonobstructive bowel gas pattern.   3. Visualized basilar lungs demonstrate coarse airspace opacities   bilaterally with small pleural effusions not excluded. Correlate with   concern for underlying infectious process.        Signed by: Kendall Calix 11/23/2023 6:52 AM   Dictation workstation:   [id number]      CT head wo IV contrast   Final Result   1. Postsurgical changes consistent with interval left frontoparietal   craniotomy for left subdural hematoma evacuation and subdural drain   placement with interval reduction in size of the mixed density fluid   collection and improvement in mass effect, midline shift, and sulcal   effacement when compared to prior, same day CT of the head as   described in detail above.   2. No evidence of new hemorrhage or acute cortical infarction.        I personally reviewed the images/study and I agree with the findings   as stated above by resident physician, Dr. Kedar Mitchell. The   study was interpreted at [facility] in [facility].        Signed by: Sneha Pérez 11/22/2023 6:55 AM   Dictation workstation:   [id number]      CT head wo IV contrast   Final Result   1. Large predominantly hypodense left convexity subdural hematoma   with multiple septations measuring up to 2.6 cm at its greatest width   with significant associated mass effect, sulcal effacement, 10-11 mm   of rightward midline shift, and effacement of the left lateral   ventricle.   2. No evidence of additional hemorrhage or acute cortical infarct.        I personally reviewed the images/study and I agree with the findings   as stated above by resident physician, Dr. Kedar VAZ  Paula. The   study was interpreted at [facility] in [facility].        Signed by: Shiv Magallanes 11/22/2023 12:51 AM   Dictation workstation:   [id number]      FL modified barium swallow study    (Results Pending)   Bedside Midline Imaging    (Results Pending)   US renal complete    (Results Pending)         Assessment/Plan   83 y.o. man with CLL, anemia, DM, dementia, chronic indwelling ignacio 2/2 BPH, HTN who presented on 11/22/23 for concerns of altered mental status. Found to have an acute large left SDH with midline shift. S/p evacuation on 11/22. CTH POC, 11/24 drain dc'd, Bcx +GPC, 11/25 febrile, remaining vitals stable, exam unchanged, repeat bcx negative.    Interval Events:   11/28 s/p midline   11/29 Lethargic, not FC, s/p CTH w/ increased L SDH w/ 8mm MLS s/p 1g Keppra load, CXR stable opacities, rCTH stable, s/p R side wd, s/p keppra load, rrCTH stable L SDH  11/30 S/p L crani for redo SDH evac, CTH POC with improved MLS  12/1 s/p extubation  12/3 CTH stable  12/4: patient was upgraded to NSU status for worsening neurologic exam, and increase O2 requirement. ABG 7.34/30/76. CTH stable with improved brain compression. CT-PE negative for pulmonary embolism. New b/l moderate pleural effusions s/p Lasix 20mg.  12/6: Improved ABG on CPAP. Poor UOP despite total 60mg IV Lasix.  12/7:   - markedly net negative after diuretics, replaced 1L, started albumin 25%, LIN mildly worsening  - placed again on CPAP due to persistent tachypnea to 30s.   - History of bloody emesis per wife while on the floor, he's on PPI.   - Oncology consulted for anasarca, sclerotic bone lesions, PSA 11.  CT AP+C pending tmw if Cr improves.  - T 37.9, will continue to monitor and continue zosyn    NEURO:  #SDH s/p evacuation x2  #MMA embolization on 11/30  Assessment:   Neurologically: Awake, EOSp, Pupils 5mm>3mm and brisk  LUE spont AG LLE w/d to nox stim. R side flicker w/d to nox stim   L  crani with drain c/d/I  EEG prelim read with severe diffuse encephalopathy  Plan:  NSU  Q1H neuro checks  Drain per NSGY   Pain: acetaminophen, oxycodone, hydromorphone PRN  Sedation: None  cvEEG, if continues to be negative will dc  Keppra 500mg BID  PT/OT/SLP    CARDIOVASCULAR:  # HTN, HLD  #Intravascular hypovolemia  #c/f Heart failure  Assessment:   ST on tele  Likely intravascularly depleted given likely pre-renal LIN  Plan:   Continue to monitor on telemetry  Goal SBP < 160  Continue atorvastatin  Hydralazine and Labetalol PRN  Albumin 25g q8hr  Echocardiogram: EF 65-70%, impaired diastolic function    RESPIRATORY:  #Bilateral pleural effusions   #Intubated for airway protection during OR (resolved)  Assessment:   On 4L NC  S/p Lasix 20mg and 40mg IV  CT-PE negative, but did show bilateral mod-large pleural effusions  AB.32 -> 7.  Plan:   Continuous pulse oximetry   O2 PRN to maintain SpO2 > 94%, wean as tolerated  CPAP as able   Additional Lasix 60mg IV with poor response, S/p bumetanide 1mg IV once, net -ve >4L; replaced 1L NS over 4h overnight   Repeat CXR with mild improvement    RENAL/:  #BPH  #LIN, potential cardiorenal syndrome vs contrast nephropathy  Assessment:   - FENa 10%  Results from last 72 hours   Lab Units 23  1110 23  0304   BUN mg/dL 22 26*   CREATININE mg/dL 2.09* 2.06*   Plan:   Monitor with daily RFP  Chronic ignacio for BPH  Albumin as above    FEN/GI:  #UGIB  Assessment:   Last BM Date: 23  Bloody emesis on floor per wife, no recent episodes  Plan:   Monitor and replace electrolytes per protocol  IVF: None  Diet: NPO  Bowel Regimen: Miralax PRN  Pantoprazole 40mg IV BID    ENDOCRINE:  #DM  Assessment:   Results from last 7 days   Lab Units 23  1110 23  0304 23  1349 23   POCT GLUCOSE mg/dL  --   --   --  167*   GLUCOSE mg/dL 167* 184*   < >  --     < > = values in this interval not displayed.   Plan:    Accuchecks &  ISS Q6H  Hypoglycemia protocol    HEMATOLOGY:  #CLL  Assessment:   Results from last 7 days   Lab Units 12/07/23  1110 12/07/23  0304   HEMOGLOBIN g/dL 7.7* 7.0*   HEMATOCRIT % 22.7* 21.3*   PLATELETS AUTO x10*3/uL 202 217   Plan:  Continue to monitor with daily CBC and Coag panel  Heme consulted, recs appreciated  Hold Ibrutinib, resume when NSGY is OK with that (heme recs 11/28)    INFECTIOUS DISEASE:  #concerns for aspiration PNA  Assessment:   Results from last 7 days   Lab Units 12/07/23  1110 12/07/23  0304   WBC AUTO x10*3/uL 8.9 9.5     Afebrile   Repeat BCx 12/4 pending  Repeat BCx 11/26 NGTD, MRSA negative, vanco d/c'd   BCx 11/24 with staph hominis  Ucx 11/24  neg  Procal 0.31 (12/6)  Plan:  Continue to monitor for s/sx of infection  Pan culture for temperature > 38.4 C  C/w zosyn   F/up Bcx 12/4  Trend procal    MUSCULOSKELETAL:  No acute issues    SKIN:  No acute issues  Turns and skin care per NSU protocol    ACCESS:  PIV    PROPHYLAXIS:  DVT/VTE: SCDs, SQH    RESTRAINTS:  I agree with nursing assessment of the patient's need for restraints to protect the patient from injury and facilitate healing. The patient is unable to cooperate with the plan of care and at risk for disrupting critical therapy (i.e., removing medical devices, lines, tubes and/or dressings).  Please see order for specifics. Restraints can be removed when the patient is able to cooperate with plan of care and allow healing to occur, or the medical devices at risk are discontinued by the medical team.     Kervin Altamirano MD  Neuroscience Intensive Care

## 2023-12-07 NOTE — CARE PLAN
Problem: Pain  Goal: My pain/discomfort is manageable  12/7/2023 0420 by Abbie Bermeo RN  Outcome: Progressing  12/7/2023 0418 by Abbie Bermeo RN  Outcome: Progressing     Problem: Safety  Goal: Patient will be injury free during hospitalization  12/7/2023 0420 by Abbie Bermeo RN  Outcome: Progressing  12/7/2023 0418 by Abbie Bermeo RN  Outcome: Progressing  Goal: I will remain free of falls  12/7/2023 0420 by Abbie Bermeo RN  Outcome: Progressing  12/7/2023 0418 by Abbie Bermeo RN  Outcome: Progressing     Problem: Daily Care  Goal: Daily care needs are met  12/7/2023 0420 by Abbie Bermeo RN  Outcome: Progressing  12/7/2023 0418 by Abbie Bermeo RN  Outcome: Progressing     Problem: Psychosocial Needs  Goal: Demonstrates ability to cope with hospitalization/illness  12/7/2023 0420 by Abbie Bermeo RN  Outcome: Progressing  12/7/2023 0418 by Abbie Bermeo RN  Outcome: Progressing  Goal: Collaborate with me, my family, and caregiver to identify my specific goals  12/7/2023 0420 by Abbie Bermeo RN  Outcome: Progressing  12/7/2023 0418 by Abbie Bermeo RN  Outcome: Progressing     Problem: Discharge Barriers  Goal: My discharge needs are met  12/7/2023 0420 by Abbie Bermeo RN  Outcome: Progressing  12/7/2023 0418 by Abbie Bermeo RN  Outcome: Progressing     Problem: General Stroke  Goal: Demonstrate improvement in neurological exam throughout the shift  12/7/2023 0420 by Abbie Bermeo RN  Outcome: Progressing  12/7/2023 0418 by Abbie Bermeo RN  Outcome: Progressing  Goal: Maintain BP within ordered limits throughout shift  12/7/2023 0420 by Abbie Bermeo RN  Outcome: Progressing  12/7/2023 0418 by Abbie Bermeo RN  Outcome: Progressing  Goal: Participate in treatment (ie., meds, therapy) throughout shift  12/7/2023 0420 by Abbie A Defabio, RN  Outcome: Progressing  12/7/2023 0418 by Abbie Bermeo RN  Outcome: Progressing  Goal: No  symptoms of aspiration throughout shift  12/7/2023 0420 by Abbie Bermeo RN  Outcome: Progressing  12/7/2023 0418 by Abbie Bermeo RN  Outcome: Progressing  Goal: No symptoms of hemorrhage throughout shift  12/7/2023 0420 by Abbie Bermeo RN  Outcome: Progressing  12/7/2023 0418 by Abbie Bermeo RN  Outcome: Progressing  Goal: Tolerate enteral feeding throughout shift  12/7/2023 0420 by Abbie Bermeo RN  Outcome: Progressing  12/7/2023 0418 by Abbie Bermeo RN  Outcome: Progressing  Goal: Decreased nausea/vomiting throughout shift  12/7/2023 0420 by Abbie Bermeo RN  Outcome: Progressing  12/7/2023 0418 by Abbie Bermeo RN  Outcome: Progressing  Goal: Controlled blood glucose throughout shift  12/7/2023 0420 by Abbie Bermeo RN  Outcome: Progressing  12/7/2023 0418 by Abbie Bermeo RN  Outcome: Progressing  Goal: Out of bed three times today  12/7/2023 0420 by Abbie Bermeo RN  Outcome: Progressing  12/7/2023 0418 by Abbie Bermeo RN  Outcome: Progressing     Problem: ICU Stroke  Goal: Maintain ICP within ordered limits throughout shift  12/7/2023 0420 by Abbie Bermeo RN  Outcome: Progressing  12/7/2023 0418 by Abbie Bermeo RN  Outcome: Progressing  Goal: Tolerate EVD clamping trial throughout shift  12/7/2023 0420 by Abbie Bermeo RN  Outcome: Progressing  12/7/2023 0418 by Abbie Bermeo RN  Outcome: Progressing  Goal: Tolerate ventilator weaning trial during shift  12/7/2023 0420 by Abbie Bermeo RN  Outcome: Progressing  12/7/2023 0418 by Abbie Bermeo RN  Outcome: Progressing  Goal: Maintain patent airway throughout shift  12/7/2023 0420 by Abbie Bermeo RN  Outcome: Progressing  12/7/2023 0418 by Abbie Bermeo RN  Outcome: Progressing  Goal: Achieve/maintain targeted sodium level throughout shift  12/7/2023 0420 by Abbie Bermeo RN  Outcome: Progressing  12/7/2023 0418 by Abbie Bermeo RN  Outcome: Progressing     Problem:  Skin  Goal: Decreased wound size/increased tissue granulation at next dressing change  12/7/2023 0420 by Abbie Bermeo RN  Outcome: Progressing  12/7/2023 0418 by Abbie Bermeo RN  Outcome: Progressing  Goal: Participates in plan/prevention/treatment measures  12/7/2023 0420 by Abbie Bermeo RN  Outcome: Progressing  12/7/2023 0418 by Abbie Bermeo RN  Outcome: Progressing  Goal: Prevent/manage excess moisture  12/7/2023 0420 by Abbie Bermeo RN  Outcome: Progressing  12/7/2023 0418 by Abbie Bermeo RN  Outcome: Progressing  Goal: Prevent/minimize sheer/friction injuries  12/7/2023 0420 by Abbie Bermeo RN  Outcome: Progressing  12/7/2023 0418 by Abbie Bermeo RN  Outcome: Progressing  Goal: Promote/optimize nutrition  12/7/2023 0420 by Abbie Bermeo RN  Outcome: Progressing  12/7/2023 0418 by Abbie Bermeo RN  Outcome: Progressing  Goal: Promote skin healing  12/7/2023 0420 by Abbie Bermeo RN  Outcome: Progressing  12/7/2023 0418 by Abbie Bermeo RN  Outcome: Progressing     Problem: Safety - Medical Restraint  Goal: Remains free of injury from restraints (Restraint for Interference with Medical Device)  12/7/2023 0420 by Abbie Bermeo RN  Outcome: Progressing  12/7/2023 0418 by Abbie Bermeo RN  Outcome: Progressing  Goal: Free from restraint(s) (Restraint for Interference with Medical Device)  12/7/2023 0420 by Abbie Bermeo RN  Outcome: Progressing  12/7/2023 0418 by Abbie Bermeo RN  Outcome: Progressing     Problem: Nutrition  Goal: Nutrition support goals are met within 48 hrs  12/7/2023 0420 by Abbie Bermeo RN  Outcome: Progressing  12/7/2023 0418 by Abbie Bermeo RN  Outcome: Progressing  Goal: Lab values WNL  12/7/2023 0420 by Abbie Bermeo RN  Outcome: Progressing  12/7/2023 0418 by Abbie A Defabio, RN  Outcome: Progressing  Goal: Electrolytes WNL  12/7/2023 0420 by Abbie Bermeo RN  Outcome: Progressing  12/7/2023 0418 by  Abbie Bermeo RN  Outcome: Progressing  Goal: Promote healing  12/7/2023 0420 by Abbie Bermeo RN  Outcome: Progressing  12/7/2023 0418 by Abbie Bermeo RN  Outcome: Progressing  Goal: Maintain stable weight  12/7/2023 0420 by Abbie Bermeo RN  Outcome: Progressing  12/7/2023 0418 by Abbie Bermeo RN  Outcome: Progressing  Goal: Reduce weight from edema/fluid  12/7/2023 0420 by Abbie Bermeo RN  Outcome: Progressing  12/7/2023 0418 by Abbie Bermeo RN  Outcome: Progressing     Problem: Pain - Adult  Goal: Verbalizes/displays adequate comfort level or baseline comfort level  12/7/2023 0420 by Abbie Bermeo RN  Outcome: Progressing  12/7/2023 0418 by Abbie Bermeo RN  Outcome: Progressing     Problem: Safety - Adult  Goal: Free from fall injury  12/7/2023 0420 by Abbie Bermeo RN  Outcome: Progressing  12/7/2023 0418 by Abbie Bermeo RN  Outcome: Progressing     Problem: Discharge Planning  Goal: Discharge to home or other facility with appropriate resources  12/7/2023 0420 by Abbie Bermeo RN  Outcome: Progressing  12/7/2023 0418 by Abbie Bermeo RN  Outcome: Progressing     Problem: Chronic Conditions and Co-morbidities  Goal: Patient's chronic conditions and co-morbidity symptoms are monitored and maintained or improved  12/7/2023 0420 by Abbie Bermeo RN  Outcome: Progressing  12/7/2023 0418 by Abbie Bermeo RN  Outcome: Progressing     Problem: Fall/Injury  Goal: Not fall by end of shift  12/7/2023 0420 by Abbie Bermeo RN  Outcome: Progressing  12/7/2023 0418 by Abbie Bermeo RN  Outcome: Progressing  Goal: Be free from injury by end of the shift  12/7/2023 0420 by Abbie Bermeo RN  Outcome: Progressing  12/7/2023 0418 by Abbie Bermeo RN  Outcome: Progressing  Goal: Verbalize understanding of personal risk factors for fall in the hospital  12/7/2023 0420 by Abbie Bermeo, RN  Outcome: Progressing  12/7/2023 0418 by Abbie Bermeo,  RN  Outcome: Progressing     Problem: Diabetes  Goal: Increase stability of blood glucose readings by end of shift  12/7/2023 0420 by Abbie Bermeo RN  Outcome: Progressing  12/7/2023 0418 by Abbie Bermeo RN  Outcome: Progressing  Goal: Maintain electrolyte levels within acceptable range throughout shift  12/7/2023 0420 by Abbie Bermeo RN  Outcome: Progressing  12/7/2023 0418 by Abbie Bermeo RN  Outcome: Progressing  Goal: Maintain glucose levels >70mg/dl to <250mg/dl throughout shift  12/7/2023 0420 by Abbie Bermeo RN  Outcome: Progressing  12/7/2023 0418 by Abbie Bermeo RN  Outcome: Progressing  Goal: No changes in neurological exam by end of shift  12/7/2023 0420 by Abbie Bermeo RN  Outcome: Progressing  12/7/2023 0418 by Abbie Bermeo RN  Outcome: Progressing   The patient's goals for the shift include      The clinical goals for the shift include improve venttillstion and remain neurologically intact

## 2023-12-08 LAB
ABO GROUP (TYPE) IN BLOOD: NORMAL
ALBUMIN SERPL BCP-MCNC: 3.7 G/DL (ref 3.4–5)
ALBUMIN SERPL BCP-MCNC: 3.9 G/DL (ref 3.4–5)
ALBUMIN SERPL BCP-MCNC: 4.1 G/DL (ref 3.4–5)
ANION GAP SERPL CALC-SCNC: 13 MMOL/L (ref 10–20)
ANION GAP SERPL CALC-SCNC: 13 MMOL/L (ref 10–20)
ANION GAP SERPL CALC-SCNC: 16 MMOL/L (ref 10–20)
ANTIBODY SCREEN: NORMAL
APPEARANCE UR: ABNORMAL
APPEARANCE UR: CLEAR
BACTERIA #/AREA URNS AUTO: ABNORMAL /HPF
BASOPHILS # BLD AUTO: 0.02 X10*3/UL (ref 0–0.1)
BASOPHILS # BLD AUTO: 0.02 X10*3/UL (ref 0–0.1)
BASOPHILS NFR BLD AUTO: 0.3 %
BASOPHILS NFR BLD AUTO: 0.3 %
BILIRUB UR STRIP.AUTO-MCNC: NEGATIVE MG/DL
BILIRUB UR STRIP.AUTO-MCNC: NEGATIVE MG/DL
BUN SERPL-MCNC: 23 MG/DL (ref 6–23)
BUN SERPL-MCNC: 23 MG/DL (ref 6–23)
BUN SERPL-MCNC: 24 MG/DL (ref 6–23)
CA-I BLD-SCNC: 1.18 MMOL/L (ref 1.1–1.33)
CALCIUM SERPL-MCNC: 8.5 MG/DL (ref 8.6–10.6)
CALCIUM SERPL-MCNC: 9.1 MG/DL (ref 8.6–10.6)
CALCIUM SERPL-MCNC: 9.3 MG/DL (ref 8.6–10.6)
CHLORIDE SERPL-SCNC: 110 MMOL/L (ref 98–107)
CHLORIDE SERPL-SCNC: 112 MMOL/L (ref 98–107)
CHLORIDE SERPL-SCNC: 112 MMOL/L (ref 98–107)
CO2 SERPL-SCNC: 26 MMOL/L (ref 21–32)
CO2 SERPL-SCNC: 28 MMOL/L (ref 21–32)
CO2 SERPL-SCNC: 29 MMOL/L (ref 21–32)
COLOR UR: ABNORMAL
COLOR UR: ABNORMAL
CREAT SERPL-MCNC: 1.71 MG/DL (ref 0.5–1.3)
CREAT SERPL-MCNC: 1.77 MG/DL (ref 0.5–1.3)
CREAT SERPL-MCNC: 1.91 MG/DL (ref 0.5–1.3)
EOSINOPHIL # BLD AUTO: 0.05 X10*3/UL (ref 0–0.4)
EOSINOPHIL # BLD AUTO: 0.06 X10*3/UL (ref 0–0.4)
EOSINOPHIL NFR BLD AUTO: 0.7 %
EOSINOPHIL NFR BLD AUTO: 0.9 %
ERYTHROCYTE [DISTWIDTH] IN BLOOD BY AUTOMATED COUNT: 15 % (ref 11.5–14.5)
ERYTHROCYTE [DISTWIDTH] IN BLOOD BY AUTOMATED COUNT: 15.4 % (ref 11.5–14.5)
FLUAV RNA RESP QL NAA+PROBE: NOT DETECTED
FLUBV RNA RESP QL NAA+PROBE: NOT DETECTED
GFR SERPL CREATININE-BSD FRML MDRD: 34 ML/MIN/1.73M*2
GFR SERPL CREATININE-BSD FRML MDRD: 37 ML/MIN/1.73M*2
GFR SERPL CREATININE-BSD FRML MDRD: 39 ML/MIN/1.73M*2
GLUCOSE SERPL-MCNC: 207 MG/DL (ref 74–99)
GLUCOSE SERPL-MCNC: 217 MG/DL (ref 74–99)
GLUCOSE SERPL-MCNC: 248 MG/DL (ref 74–99)
GLUCOSE UR STRIP.AUTO-MCNC: ABNORMAL MG/DL
GLUCOSE UR STRIP.AUTO-MCNC: NEGATIVE MG/DL
HCT VFR BLD AUTO: 20.7 % (ref 41–52)
HCT VFR BLD AUTO: 21.7 % (ref 41–52)
HGB BLD-MCNC: 7.1 G/DL (ref 13.5–17.5)
HGB BLD-MCNC: 7.1 G/DL (ref 13.5–17.5)
HOLD SPECIMEN: NORMAL
HYALINE CASTS #/AREA URNS AUTO: ABNORMAL /LPF
IGA SERPL-MCNC: 197 MG/DL (ref 70–400)
IGG SERPL-MCNC: 875 MG/DL (ref 700–1600)
IGM SERPL-MCNC: 68 MG/DL (ref 40–230)
IMM GRANULOCYTES # BLD AUTO: 0.03 X10*3/UL (ref 0–0.5)
IMM GRANULOCYTES # BLD AUTO: 0.04 X10*3/UL (ref 0–0.5)
IMM GRANULOCYTES NFR BLD AUTO: 0.4 % (ref 0–0.9)
IMM GRANULOCYTES NFR BLD AUTO: 0.5 % (ref 0–0.9)
KETONES UR STRIP.AUTO-MCNC: ABNORMAL MG/DL
KETONES UR STRIP.AUTO-MCNC: NEGATIVE MG/DL
LEUKOCYTE ESTERASE UR QL STRIP.AUTO: ABNORMAL
LEUKOCYTE ESTERASE UR QL STRIP.AUTO: NEGATIVE
LYMPHOCYTES # BLD AUTO: 1.29 X10*3/UL (ref 0.8–3)
LYMPHOCYTES # BLD AUTO: 1.46 X10*3/UL (ref 0.8–3)
LYMPHOCYTES NFR BLD AUTO: 18.5 %
LYMPHOCYTES NFR BLD AUTO: 20 %
MAGNESIUM SERPL-MCNC: 1.64 MG/DL (ref 1.6–2.4)
MAGNESIUM SERPL-MCNC: 2.49 MG/DL (ref 1.6–2.4)
MCH RBC QN AUTO: 28.9 PG (ref 26–34)
MCH RBC QN AUTO: 29.1 PG (ref 26–34)
MCHC RBC AUTO-ENTMCNC: 32.7 G/DL (ref 32–36)
MCHC RBC AUTO-ENTMCNC: 34.3 G/DL (ref 32–36)
MCV RBC AUTO: 84 FL (ref 80–100)
MCV RBC AUTO: 89 FL (ref 80–100)
MONOCYTES # BLD AUTO: 0.69 X10*3/UL (ref 0.05–0.8)
MONOCYTES # BLD AUTO: 0.81 X10*3/UL (ref 0.05–0.8)
MONOCYTES NFR BLD AUTO: 11.1 %
MONOCYTES NFR BLD AUTO: 9.9 %
MUCOUS THREADS #/AREA URNS AUTO: ABNORMAL /LPF
MUCOUS THREADS #/AREA URNS AUTO: ABNORMAL /LPF
NEUTROPHILS # BLD AUTO: 4.88 X10*3/UL (ref 1.6–5.5)
NEUTROPHILS # BLD AUTO: 4.93 X10*3/UL (ref 1.6–5.5)
NEUTROPHILS NFR BLD AUTO: 67.4 %
NEUTROPHILS NFR BLD AUTO: 70 %
NITRITE UR QL STRIP.AUTO: NEGATIVE
NITRITE UR QL STRIP.AUTO: NEGATIVE
NRBC BLD-RTO: 0 /100 WBCS (ref 0–0)
NRBC BLD-RTO: 0 /100 WBCS (ref 0–0)
PH UR STRIP.AUTO: 5 [PH]
PH UR STRIP.AUTO: 5 [PH]
PHOSPHATE SERPL-MCNC: 1.9 MG/DL (ref 2.5–4.9)
PHOSPHATE SERPL-MCNC: 1.9 MG/DL (ref 2.5–4.9)
PHOSPHATE SERPL-MCNC: 2.5 MG/DL (ref 2.5–4.9)
PLATELET # BLD AUTO: 177 X10*3/UL (ref 150–450)
PLATELET # BLD AUTO: 186 X10*3/UL (ref 150–450)
POTASSIUM SERPL-SCNC: 3 MMOL/L (ref 3.5–5.3)
POTASSIUM SERPL-SCNC: 3.2 MMOL/L (ref 3.5–5.3)
POTASSIUM SERPL-SCNC: 3.3 MMOL/L (ref 3.5–5.3)
PROCALCITONIN SERPL-MCNC: 0.17 NG/ML
PROT SERPL-MCNC: 6.2 G/DL (ref 6.4–8.2)
PROT UR STRIP.AUTO-MCNC: ABNORMAL MG/DL
PROT UR STRIP.AUTO-MCNC: ABNORMAL MG/DL
RBC # BLD AUTO: 2.44 X10*6/UL (ref 4.5–5.9)
RBC # BLD AUTO: 2.46 X10*6/UL (ref 4.5–5.9)
RBC # UR STRIP.AUTO: ABNORMAL /UL
RBC # UR STRIP.AUTO: ABNORMAL /UL
RBC #/AREA URNS AUTO: ABNORMAL /HPF
RBC #/AREA URNS AUTO: ABNORMAL /HPF
RH FACTOR (ANTIGEN D): NORMAL
SARS-COV-2 RNA RESP QL NAA+PROBE: DETECTED
SODIUM SERPL-SCNC: 149 MMOL/L (ref 136–145)
SODIUM SERPL-SCNC: 150 MMOL/L (ref 136–145)
SODIUM SERPL-SCNC: 151 MMOL/L (ref 136–145)
SP GR UR STRIP.AUTO: 1.01
SP GR UR STRIP.AUTO: 1.01
URATE CRY #/AREA UR COMP ASSIST: ABNORMAL /HPF
UROBILINOGEN UR STRIP.AUTO-MCNC: <2 MG/DL
UROBILINOGEN UR STRIP.AUTO-MCNC: <2 MG/DL
VANCOMYCIN SERPL-MCNC: 12.1 UG/ML (ref 5–20)
WBC # BLD AUTO: 7 X10*3/UL (ref 4.4–11.3)
WBC # BLD AUTO: 7.3 X10*3/UL (ref 4.4–11.3)
WBC #/AREA URNS AUTO: ABNORMAL /HPF
WBC #/AREA URNS AUTO: ABNORMAL /HPF

## 2023-12-08 PROCEDURE — 2500000001 HC RX 250 WO HCPCS SELF ADMINISTERED DRUGS (ALT 637 FOR MEDICARE OP): Performed by: REGISTERED NURSE

## 2023-12-08 PROCEDURE — 94660 CPAP INITIATION&MGMT: CPT

## 2023-12-08 PROCEDURE — 80069 RENAL FUNCTION PANEL: CPT | Performed by: REGISTERED NURSE

## 2023-12-08 PROCEDURE — 86301 IMMUNOASSAY TUMOR CA 19-9: CPT

## 2023-12-08 PROCEDURE — 2500000004 HC RX 250 GENERAL PHARMACY W/ HCPCS (ALT 636 FOR OP/ED)

## 2023-12-08 PROCEDURE — 86320 SERUM IMMUNOELECTROPHORESIS: CPT

## 2023-12-08 PROCEDURE — 82784 ASSAY IGA/IGD/IGG/IGM EACH: CPT

## 2023-12-08 PROCEDURE — 80202 ASSAY OF VANCOMYCIN: CPT | Performed by: NURSE PRACTITIONER

## 2023-12-08 PROCEDURE — 87086 URINE CULTURE/COLONY COUNT: CPT | Performed by: STUDENT IN AN ORGANIZED HEALTH CARE EDUCATION/TRAINING PROGRAM

## 2023-12-08 PROCEDURE — 84145 PROCALCITONIN (PCT): CPT

## 2023-12-08 PROCEDURE — 2500000004 HC RX 250 GENERAL PHARMACY W/ HCPCS (ALT 636 FOR OP/ED): Mod: JZ

## 2023-12-08 PROCEDURE — 80069 RENAL FUNCTION PANEL: CPT | Performed by: STUDENT IN AN ORGANIZED HEALTH CARE EDUCATION/TRAINING PROGRAM

## 2023-12-08 PROCEDURE — 94640 AIRWAY INHALATION TREATMENT: CPT

## 2023-12-08 PROCEDURE — 86304 IMMUNOASSAY TUMOR CA 125: CPT

## 2023-12-08 PROCEDURE — 2020000001 HC ICU ROOM DAILY

## 2023-12-08 PROCEDURE — 2500000002 HC RX 250 W HCPCS SELF ADMINISTERED DRUGS (ALT 637 FOR MEDICARE OP, ALT 636 FOR OP/ED): Performed by: STUDENT IN AN ORGANIZED HEALTH CARE EDUCATION/TRAINING PROGRAM

## 2023-12-08 PROCEDURE — 96372 THER/PROPH/DIAG INJ SC/IM: CPT

## 2023-12-08 PROCEDURE — 99291 CRITICAL CARE FIRST HOUR: CPT

## 2023-12-08 PROCEDURE — 2500000001 HC RX 250 WO HCPCS SELF ADMINISTERED DRUGS (ALT 637 FOR MEDICARE OP)

## 2023-12-08 PROCEDURE — 82232 ASSAY OF BETA-2 PROTEIN: CPT | Performed by: STUDENT IN AN ORGANIZED HEALTH CARE EDUCATION/TRAINING PROGRAM

## 2023-12-08 PROCEDURE — 85025 COMPLETE CBC W/AUTO DIFF WBC: CPT | Performed by: REGISTERED NURSE

## 2023-12-08 PROCEDURE — 37799 UNLISTED PX VASCULAR SURGERY: CPT

## 2023-12-08 PROCEDURE — 2500000001 HC RX 250 WO HCPCS SELF ADMINISTERED DRUGS (ALT 637 FOR MEDICARE OP): Performed by: STUDENT IN AN ORGANIZED HEALTH CARE EDUCATION/TRAINING PROGRAM

## 2023-12-08 PROCEDURE — 86334 IMMUNOFIX E-PHORESIS SERUM: CPT

## 2023-12-08 PROCEDURE — 99223 1ST HOSP IP/OBS HIGH 75: CPT | Performed by: STUDENT IN AN ORGANIZED HEALTH CARE EDUCATION/TRAINING PROGRAM

## 2023-12-08 PROCEDURE — 83735 ASSAY OF MAGNESIUM: CPT | Performed by: REGISTERED NURSE

## 2023-12-08 PROCEDURE — C9113 INJ PANTOPRAZOLE SODIUM, VIA: HCPCS

## 2023-12-08 PROCEDURE — 36415 COLL VENOUS BLD VENIPUNCTURE: CPT | Performed by: REGISTERED NURSE

## 2023-12-08 PROCEDURE — 2500000005 HC RX 250 GENERAL PHARMACY W/O HCPCS

## 2023-12-08 PROCEDURE — P9047 ALBUMIN (HUMAN), 25%, 50ML: HCPCS | Mod: JZ

## 2023-12-08 PROCEDURE — 86901 BLOOD TYPING SEROLOGIC RH(D): CPT

## 2023-12-08 PROCEDURE — 87636 SARSCOV2 & INF A&B AMP PRB: CPT

## 2023-12-08 PROCEDURE — 84155 ASSAY OF PROTEIN SERUM: CPT

## 2023-12-08 PROCEDURE — 36415 COLL VENOUS BLD VENIPUNCTURE: CPT | Performed by: NURSE PRACTITIONER

## 2023-12-08 PROCEDURE — 2500000004 HC RX 250 GENERAL PHARMACY W/ HCPCS (ALT 636 FOR OP/ED): Performed by: STUDENT IN AN ORGANIZED HEALTH CARE EDUCATION/TRAINING PROGRAM

## 2023-12-08 PROCEDURE — 94760 N-INVAS EAR/PLS OXIMETRY 1: CPT

## 2023-12-08 PROCEDURE — 81001 URINALYSIS AUTO W/SCOPE: CPT | Performed by: STUDENT IN AN ORGANIZED HEALTH CARE EDUCATION/TRAINING PROGRAM

## 2023-12-08 PROCEDURE — 2500000004 HC RX 250 GENERAL PHARMACY W/ HCPCS (ALT 636 FOR OP/ED): Performed by: REGISTERED NURSE

## 2023-12-08 PROCEDURE — 92526 ORAL FUNCTION THERAPY: CPT | Mod: GN | Performed by: SPEECH-LANGUAGE PATHOLOGIST

## 2023-12-08 PROCEDURE — 84165 PROTEIN E-PHORESIS SERUM: CPT

## 2023-12-08 PROCEDURE — 82330 ASSAY OF CALCIUM: CPT | Performed by: REGISTERED NURSE

## 2023-12-08 PROCEDURE — 83521 IG LIGHT CHAINS FREE EACH: CPT

## 2023-12-08 PROCEDURE — 37799 UNLISTED PX VASCULAR SURGERY: CPT | Performed by: REGISTERED NURSE

## 2023-12-08 PROCEDURE — 82378 CARCINOEMBRYONIC ANTIGEN: CPT

## 2023-12-08 PROCEDURE — 87040 BLOOD CULTURE FOR BACTERIA: CPT | Performed by: STUDENT IN AN ORGANIZED HEALTH CARE EDUCATION/TRAINING PROGRAM

## 2023-12-08 RX ORDER — BUMETANIDE 0.25 MG/ML
1 INJECTION INTRAMUSCULAR; INTRAVENOUS ONCE
Status: COMPLETED | OUTPATIENT
Start: 2023-12-08 | End: 2023-12-08

## 2023-12-08 RX ORDER — ALBUMIN HUMAN 250 G/1000ML
12.5 SOLUTION INTRAVENOUS EVERY 8 HOURS
Status: COMPLETED | OUTPATIENT
Start: 2023-12-08 | End: 2023-12-09

## 2023-12-08 RX ORDER — POTASSIUM CHLORIDE 1.5 G/1.58G
20 POWDER, FOR SOLUTION ORAL ONCE
Status: COMPLETED | OUTPATIENT
Start: 2023-12-08 | End: 2023-12-08

## 2023-12-08 RX ORDER — POTASSIUM CHLORIDE 1.5 G/1.58G
40 POWDER, FOR SOLUTION ORAL ONCE
Status: COMPLETED | OUTPATIENT
Start: 2023-12-08 | End: 2023-12-08

## 2023-12-08 RX ORDER — POTASSIUM CHLORIDE 14.9 MG/ML
20 INJECTION INTRAVENOUS
Status: COMPLETED | OUTPATIENT
Start: 2023-12-08 | End: 2023-12-09

## 2023-12-08 RX ORDER — VANCOMYCIN HYDROCHLORIDE 750 MG/150ML
750 INJECTION, SOLUTION INTRAVENOUS ONCE
Status: COMPLETED | OUTPATIENT
Start: 2023-12-08 | End: 2023-12-08

## 2023-12-08 RX ORDER — MAGNESIUM SULFATE HEPTAHYDRATE 40 MG/ML
4 INJECTION, SOLUTION INTRAVENOUS ONCE
Status: COMPLETED | OUTPATIENT
Start: 2023-12-08 | End: 2023-12-08

## 2023-12-08 RX ADMIN — HEPARIN SODIUM 5000 UNITS: 5000 INJECTION INTRAVENOUS; SUBCUTANEOUS at 09:55

## 2023-12-08 RX ADMIN — ALBUMIN HUMAN 12.5 G: 0.25 SOLUTION INTRAVENOUS at 18:01

## 2023-12-08 RX ADMIN — ACETAMINOPHEN 650 MG: 325 TABLET ORAL at 14:58

## 2023-12-08 RX ADMIN — LABETALOL HYDROCHLORIDE 10 MG: 5 INJECTION INTRAVENOUS at 20:48

## 2023-12-08 RX ADMIN — VANCOMYCIN HYDROCHLORIDE 750 MG: 750 INJECTION, SOLUTION INTRAVENOUS at 08:45

## 2023-12-08 RX ADMIN — PANTOPRAZOLE SODIUM 40 MG: 40 INJECTION, POWDER, FOR SOLUTION INTRAVENOUS at 20:45

## 2023-12-08 RX ADMIN — BISACODYL 10 MG: 10 SUPPOSITORY RECTAL at 08:17

## 2023-12-08 RX ADMIN — CEFEPIME 1 G: 1 INJECTION, SOLUTION INTRAVENOUS at 07:46

## 2023-12-08 RX ADMIN — MAGNESIUM SULFATE IN WATER 4 G: 40 INJECTION, SOLUTION INTRAVENOUS at 08:09

## 2023-12-08 RX ADMIN — Medication 4000 UNITS: at 08:36

## 2023-12-08 RX ADMIN — Medication 30 PERCENT: at 15:02

## 2023-12-08 RX ADMIN — THIAMINE HCL TAB 100 MG 100 MG: 100 TAB at 08:36

## 2023-12-08 RX ADMIN — BUMETANIDE 1 MG: 0.25 INJECTION INTRAMUSCULAR; INTRAVENOUS at 11:41

## 2023-12-08 RX ADMIN — CEFEPIME 1 G: 1 INJECTION, SOLUTION INTRAVENOUS at 20:45

## 2023-12-08 RX ADMIN — POLYETHYLENE GLYCOL 3350 17 G: 17 POWDER, FOR SOLUTION ORAL at 08:16

## 2023-12-08 RX ADMIN — POTASSIUM CHLORIDE 20 MEQ: 1.5 POWDER, FOR SOLUTION ORAL at 08:12

## 2023-12-08 RX ADMIN — IPRATROPIUM BROMIDE AND ALBUTEROL SULFATE 3 ML: .5; 3 SOLUTION RESPIRATORY (INHALATION) at 08:04

## 2023-12-08 RX ADMIN — ALBUMIN HUMAN 12.5 G: 0.25 SOLUTION INTRAVENOUS at 11:41

## 2023-12-08 RX ADMIN — LEVETIRACETAM 500 MG: 5 INJECTION INTRAVENOUS at 05:18

## 2023-12-08 RX ADMIN — HEPARIN SODIUM 5000 UNITS: 5000 INJECTION INTRAVENOUS; SUBCUTANEOUS at 18:01

## 2023-12-08 RX ADMIN — POTASSIUM CHLORIDE 20 MEQ: 1.5 POWDER, FOR SOLUTION ORAL at 08:09

## 2023-12-08 RX ADMIN — POTASSIUM CHLORIDE 40 MEQ: 1.5 POWDER, FOR SOLUTION ORAL at 21:59

## 2023-12-08 RX ADMIN — POTASSIUM CHLORIDE 20 MEQ: 14.9 INJECTION, SOLUTION INTRAVENOUS at 23:42

## 2023-12-08 RX ADMIN — ATORVASTATIN CALCIUM 20 MG: 20 TABLET, FILM COATED ORAL at 08:16

## 2023-12-08 RX ADMIN — POTASSIUM CHLORIDE 20 MEQ: 1.5 POWDER, FOR SOLUTION ORAL at 02:49

## 2023-12-08 RX ADMIN — PANTOPRAZOLE SODIUM 40 MG: 40 INJECTION, POWDER, FOR SOLUTION INTRAVENOUS at 08:15

## 2023-12-08 RX ADMIN — ALBUMIN HUMAN 25 G: 0.25 SOLUTION INTRAVENOUS at 03:58

## 2023-12-08 RX ADMIN — LEVETIRACETAM 500 MG: 5 INJECTION INTRAVENOUS at 18:02

## 2023-12-08 RX ADMIN — AMLODIPINE BESYLATE 10 MG: 10 TABLET ORAL at 08:35

## 2023-12-08 RX ADMIN — HEPARIN SODIUM 5000 UNITS: 5000 INJECTION INTRAVENOUS; SUBCUTANEOUS at 02:49

## 2023-12-08 RX ADMIN — POTASSIUM CHLORIDE 20 MEQ: 14.9 INJECTION, SOLUTION INTRAVENOUS at 21:59

## 2023-12-08 RX ADMIN — IPRATROPIUM BROMIDE AND ALBUTEROL SULFATE 3 ML: .5; 3 SOLUTION RESPIRATORY (INHALATION) at 15:01

## 2023-12-08 ASSESSMENT — PAIN SCALES - GENERAL
PAINLEVEL_OUTOF10: 0 - NO PAIN

## 2023-12-08 ASSESSMENT — COGNITIVE AND FUNCTIONAL STATUS - GENERAL
MOVING FROM LYING ON BACK TO SITTING ON SIDE OF FLAT BED WITH BEDRAILS: A LOT
WALKING IN HOSPITAL ROOM: TOTAL
TURNING FROM BACK TO SIDE WHILE IN FLAT BAD: TOTAL
CLIMB 3 TO 5 STEPS WITH RAILING: TOTAL
STANDING UP FROM CHAIR USING ARMS: TOTAL
MOVING TO AND FROM BED TO CHAIR: TOTAL

## 2023-12-08 ASSESSMENT — PAIN - FUNCTIONAL ASSESSMENT
PAIN_FUNCTIONAL_ASSESSMENT: CPOT (CRITICAL CARE PAIN OBSERVATION TOOL)

## 2023-12-08 NOTE — PROGRESS NOTES
"Vancomycin Dosing by Pharmacy- FOLLOW UP    Haile Ayers is a 83 y.o. year old male who Pharmacy has been consulted for vancomycin dosing for pneumonia. Based on the patient's indication and renal status this patient is being dosed based on a goal trough/random level of 15-20.     Renal function is currently declining.    Current vancomycin dose: 1000 mg given  once on 12/6 @1338    Most recent random level: 12.1 mcg/mL    Visit Vitals  /69   Pulse 89   Temp 36.6 °C (97.9 °F)   Resp 20        Lab Results   Component Value Date    CREATININE 1.91 (H) 12/08/2023    CREATININE 2.09 (H) 12/07/2023    CREATININE 2.06 (H) 12/07/2023    CREATININE 1.92 (H) 12/06/2023        Patient weight is No results found for: \"PTWEIGHT\"    No results found for: \"CULTURE\"     I/O last 3 completed shifts:  In: 3840.8 (50.3 mL/kg) [Blood:300; NG/GT:1195; IV Piggyback:2345.8]  Out: 6335 (83 mL/kg) [Urine:6335 (2.3 mL/kg/hr)]  Weight: 76.3 kg   [unfilled]    Lab Results   Component Value Date    PATIENTTEMP  12/06/2023      Comment:      NOTE: Patient Results are Not Corrected for Temperature    PATIENTTEMP  12/05/2023      Comment:      NOTE: Patient Results are Not Corrected for Temperature    PATIENTTEMP 37.0 12/05/2023        Assessment/Plan    Current level is below goal. Will re-dose 750 mg x1 and check 24 hour level due to poor renal function    The next level will be obtained on 12/9 at 0600. May be obtained sooner if clinically indicated.   Will continue to monitor renal function daily while on vancomycin and order serum creatinine at least every 48 hours if not already ordered.  Follow for continued vancomycin needs, clinical response, and signs/symptoms of toxicity.       Josemanuel Fontanez RPh           "

## 2023-12-08 NOTE — PROGRESS NOTES
"Haile Ayers is a 83 y.o. male on day 16 of admission presenting with Subdural hematoma (CMS/HCC).    Subjective   No events overnight.       Objective     Physical Exam  EONS  LUE spon  RUE withdraws  BLE withdrawing    Last Recorded Vitals  Blood pressure 158/84, pulse 96, temperature 36.6 °C (97.9 °F), resp. rate 21, height 1.727 m (5' 7.99\"), weight 76.3 kg (168 lb 3.4 oz), SpO2 97 %.  Intake/Output last 3 Shifts:  I/O last 3 completed shifts:  In: 3840.8 (50.3 mL/kg) [Blood:300; NG/GT:1195; IV Piggyback:2345.8]  Out: 6335 (83 mL/kg) [Urine:6335 (2.3 mL/kg/hr)]  Weight: 76.3 kg     Relevant Results    Assessment/Plan   Principal Problem:    Subdural hematoma (CMS/HCC)  Active Problems:    HTN (hypertension)    Diabetes mellitus, type 2 (CMS/HCC)    CVA (cerebral vascular accident) (CMS/HCC)    83 y.o. male h/o CLL, anemia, DM, p/w AMS, found to have UTI, CTH w/ L large chronic SDH, 11/22 s/p L crani for SDH evac, CTH POC, 11/24 drain dc'd, Bcx +GPC, 11/25 febrile, remaining vitals stable, exam unchanged, repeat bcx negative     11/28 s/p midline   11/29 Lethargic, not FC, s/p CTH w/ increased L SDH w/ 8mm MLS s/p 1g Keppra load, CXR stable opacities, rCTH stable, s/p R side wd, s/p keppra load, rrCTH stable L SDH,  11/30 S/p L crani for redo SDH evac, CTH POC with improved MLS  12/1 s/p extubation  12/3 CTH stable  12/4 s/psmall L MMA w no distal branches, not embolized   12/5 DVT US x 4 RLE chornic changes no DVT, CTH stable, CT PE neg for PE, BL pleural effusions s/p 20 lasix  12/6 TTE EF 65-70%, SDD dc'd     Patient with fever overnight through zosyn. Re-cultured and abx broadened.    PLAN:     NSU   SBP<160  Med onc recs re GOC, CT CAP v PET  BIPAP as needed  C/s for PEG  Vanc, cefe  ID recs  Fever curve  Chronic ignacio  Heme recs-cont to hold ibrutinib   SLP eval   PTOT-rehab; needs re-eval, resume trickle tube feeds  SCD, SQH      Tracy Phillips MD      "

## 2023-12-08 NOTE — PROGRESS NOTES
Speech-Language Pathology    Inpatient  Speech-Language Pathology Treatment     Patient Name: Haile Ayers  MRN: [id number]  Today's Date: 12/8/2023  Time Calculation  Start Time: 0918  Stop Time: 0944  Time Calculation (min): 26 min         Current Problem:   1. Subdural hematoma (CMS/HCC)  Case Request Operating Room: Left Craniotomy for subdural hematoma evacuation    Case Request Operating Room: Left Craniotomy for subdural hematoma evacuation    EEG    Case Request Operating Room: L crani for re-evacuation of SDH    Case Request Operating Room: L crani for re-evacuation of SDH    EEG      2. Hypertension, unspecified type        3. Limb swelling  Vascular US lower extremity venous duplex bilateral    Vascular US lower extremity venous duplex bilateral    Vascular US upper extremity venous duplex bilateral    Vascular US upper extremity venous duplex bilateral      4. Generalized edema  Vascular US lower extremity venous duplex bilateral    Transthoracic Echo (TTE) Complete    Transthoracic Echo (TTE) Complete            SLP Assessment:  Re-evaluation of swallow function due to recent surgery. Pt much more alert, non-verbal and not following directions. Stringent oral care provided with removal of large piece of dried secretions from hard/soft palate and tongue. Trials of mildly thick liquids from spoon since pt was on a level 6 with mildly thick liquids prior to this second surgery.  Pt initially appeared to tolerated spoonful of mildly thick, however upon 4th trial weak post prandial cough evident. Deferred further PO trials due to pt at high risk for aspiration. Pt will need a repeat MBSS to ascertain readiness to resume PO diet.        Plan:  SLP Frequency: 2x per week  Duration: 3 weeks  SLP Discharge Recommendations: Continue skilled SLP services at the next level of care  Discussed POC: Patient, Caregiver/family, Nursing, Physician  Patient/Caregiver Agreeable: Yes  SLP - OK to Discharge:  Yes      Subjective   Current Problem: Oral/pharyngeal dysphagia           Inpatient:  Education Documentation  Extensive education provided to pt re: current swallow function, recommendations/results and dysphagia POC.       Consultations/Referrals/Coordination of Services: N/A

## 2023-12-08 NOTE — CONSULTS
ONCOLOGY CONSULT NOTE:    Patient name: Haile Ayers  MRN: [id number]  Reason For Consult  Diffuse sclerotic lesions    History Of Present Illness-  Haile Ayers is a 83 y.o. male with CLL (unknown CLL-IPI score; being followed by Dr. Claros at [facility]) on Ibrutinib who presented to the hospital on 11/22 with AMS 2/2 UTI also found to have L large chronic SDH with midline shift. He underwent L crani for SDH evacuation on 11/22. Meanwhile he was found to have GPC bacteremia. He's extubated on 12/1.    12/5- CTA angio chest showed diffuse sclerotic lesions throughout the axial and appendicular skeleton of varying sizes. I t also demonstrated moderate size ascites as well as pleural effusion. PSA level is 11. Oncology is consulted for further assessment.     During my encounter he was not interacting much. Per bedside nurses he started following commands slowly. He is only oriented to time. Per nursing staff, patient was independent in ADLs prior to this admission and oriented x3. I was also informed by nursing staff that he was frequently falling prior to the admission.     Of note, he had a hospitalization last August for GI bleed. He was found on imaging to have moderate to large ascites as well as bone lesions. Bone scan was obtained which demonstrated heterogenous radiotracer uptake in the right posterior ribs with demonstration abnormal focus of mild increase in radiotracer uptake at right posteroinferior rib/cage/ right posterior 10th rib likely corresponding to sclerotic lesion in the CT, compatible with metastatic osseous disease. Other sclerotic lesions on CT did not show increased tracer uptake on the bone scan, which might be related to the small size or correspond to sclerotic lesions from prior lymphomatous disease. PSA was measured at that time and was 6.35.    Wife was at bedside this evening. She reported that patient had been having problems with urinary frequency and nocturia. Seen by urologist  who prescribed Flomax for him which caused severe dizziness and falls. He was not complaining of any pain prior to admission.       Past Medical History  Past Medical History:   Diagnosis Date    Anemia     Chronic indwelling Mckee catheter     CLL (chronic lymphocytic leukemia) (CMS/HCC)     Dementia (CMS/HCC)     DM II (diabetes mellitus, type II), controlled (CMS/HCC)     HTN (hypertension)     Hyperlipidemia     Leukemia (CMS/HCC)       Surgical History  11/22- Craniotomy     Social History  Never smoked  No etoh use    Family History    Allergies  Januvia [sitagliptin]    Review of Systems  Not done due to the acuity of his condition     Physical Exam  Alert, looks confused  Oriented to time only (confirmed with Georgian speaker nurse)  ISI MONTERROSO 4/5  BLE withdraws, wiggling toes     Last Recorded Vitals  Vitals:    12/08/23 1200   BP: 148/78   Pulse: 103   Resp: 25   Temp:    SpO2: 96%        Relevant Results  Lab Results   Component Value Date    WBC 7.0 12/08/2023    HGB 7.1 (L) 12/08/2023    HCT 20.7 (L) 12/08/2023    MCV 84 12/08/2023     12/08/2023      Lab Results   Component Value Date    GLUCOSE 207 (H) 12/08/2023    CALCIUM 8.5 (L) 12/08/2023     (H) 12/08/2023    K 3.2 (L) 12/08/2023    CO2 26 12/08/2023     (H) 12/08/2023    BUN 23 12/08/2023    CREATININE 1.91 (H) 12/08/2023      PSA 11.73 ng/mL    Imaging studies:    XR chest 11/25/2023  1. Slight interval worsening of bilateral lung aeration with findings suggestive of pulmonary edema, however multifocal infection is not excluded in the appropriate clinical setting.    2. Small bilateral pleural effusions.      CTA chest 12/5  FINDINGS:  POTENTIAL LIMITATIONS OF THE STUDY: The assessment is limited by  respiratory motion.      HEART AND VESSELS:  No discrete filling defects within the main pulmonary artery or its  branches. The bilateral main pulmonary arteries are dilated measuring  up to 2.6 cm on the right and 2.8 cm on the  left. Findings may be  sequela of chronic pulmonary arterial hypertension.      The thoracic aorta normal in course and caliber.There is mild  scattered atherosclerosis present, including calcified and  noncalcified plaques.Although, the study is not tailored for  evaluation of aorta, there is no definite evidence of acute aortic  pathology. Mild coronary artery calcifications are seen. Please  note,the study is not optimized for evaluation of coronary arteries.      The cardiac chambers are not enlarged.There are no findings to  suggest right heart strain.      There is no pericardial effusion seen.      MEDIASTINUM AND ELKIN, LOWER NECK AND AXILLA:  The visualized thyroid gland is within normal limits.  No evidence of thoracic lymphadenopathy by CT criteria.  Esophagus appears within normal limits as seen.      LUNGS AND AIRWAYS:  Images appear to have been obtained during expiration.      The trachea and central airways are patent. No endobronchial lesion  is seen. Diffuse peribronchial thickening is noted which is likely  due to edema.      Small to moderate volume layering bilateral pleural effusions are  noted with associated adjacent atelectasis. Superimposed  consolidation is not entirely excluded. There is mosaic attenuation  of the lungs predominantly within the bilateral upper lobes. There is  smooth interlobular septal thickening. A few areas of subpleural  scarring/atelectasis are noted within the right middle lobe and  lingula and bilateral lower lobes. No pneumothorax. No discrete  nodularity is appreciated.      UPPER ABDOMEN:  Moderate to large volume abdominal ascites is noted. Hypodense  structure within the liver is favored to represent hepatic cyst.  Otherwise, the subdiaphragmatic structures appear unremarkable.      CHEST WALL AND OSSEOUS STRUCTURES:  Diffuse chest wall anasarca is noted.  No suspicious osseous lesions are identified. No acute osseous injury  is evident. Diffuse sclerotic  lesions scattered throughout the  entirety of the visualized osseous structures is the classic  appearance of osteopoikilosis. Bones are diffusely demineralized.      IMPRESSION:  1. No evidence of acute pulmonary embolism.  2. Moderate to large volume bilateral layering pleural effusions with  associated compressive atelectasis and diffuse body wall anasarca  along with smooth interlobular septal thickening is noted.  Additionally, large volume abdominal ascites is incidentally noted.  Correlation with patient's fluid status is recommended. Superimposed  pneumonia or aspiration pneumonitis not excluded.  3. Bilateral pulmonary arteries are dilated which may be sequela of  chronic pulmonary arterial hypertension.  4. Diffuse sclerotic lesions throughout the axial and appendicular  skeleton of varying sizes. Findings are suspicious for diffuse  osseous metastasis with differential considerations to include  prostate cancer metastasis. Correlate with any available outside  hospital imaging studies. Correlate with PSA levels. Updated findings  are communicated through epic secure chat message to the referring  provider at 12:35 p.m. on 12/06/2023.     Assessment/Plan     84 yo with history of CLL (unknown risk, was on Ibrutinib with normal WBC/ALC) who is now found to have diffuse sclerotic bone lesions suggesting metastatic cancer. PSA 11.73 ng/mL.     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 patient  able to communicate and walk), he should either follow up with Dr. Mcmullen or his local oncologist    Discussed with Dr. Mcmullen.  Marisa Foley MD  Hematology Oncology, PGY-V    I have reviewed the fellow's note and edited where appropriate. I evaluated the patient independently and talked to the patients wife. I concur with the assessment and plan outlined above.    More than 80 minutes were spent in face-to-face encounter, review of records, coordination of care, and documentation.

## 2023-12-08 NOTE — PROGRESS NOTES
Subjective   83 y.o. man with CLL, anemia, DM, dementia, chronic indwelling ignacio 2/2 BPH, HTN who presented on 11/22/23 for concerns of altered mental status. Found to have an acute large left SDH with midline shift. S/p evacuation on 11/22. CTH POC, 11/24 drain dc'd, Bcx +GPC, 11/25 febrile, remaining vitals stable, exam unchanged, repeat bcx negative    Interval Events: Febrile overnight, cultures repeated and antibiotics broadened. Also noted to be tachypneic in the 30s, which improved upon switching from CPAP to BPAP. Improved mental status later in day, following commands when spoken in [place].         Objective     Vitals 24 hour ranges:  Heart Rate:  []   Temp:  [36.6 °C (97.9 °F)-38.4 °C (101.1 °F)]   Resp:  [16-33]   BP: (126-166)/(63-84)   Weight:  [76.3 kg (168 lb 3.4 oz)]   SpO2:  [92 %-100 %]      Intake/Output for last 24 hours:    Intake/Output Summary (Last 24 hours) at 12/8/2023 0805  Last data filed at 12/8/2023 0700  Gross per 24 hour   Intake 1290 ml   Output 2580 ml   Net -1290 ml      Physical Exam:  NEURO:  Drowsy, EOS, Pupils 4mm>3mm and brisk, follows commands in [place] and mimics  BUE and LLE spontaneous antigrav, RLE some effort vs grav; moves all extremities to command  CV:  ST on tele. No M/G/R.   RESP:  On BPAP  :  Chronic ignacio with clear yellow urine  GI:  Abdomen soft, distended. NT, no rigidity or guarding   SKIN:  Left frontal incision c/d/I with drain       Medications    Scheduled:  amLODIPine, 10 mg, nasogastric tube, Daily  atorvastatin, 20 mg, oral, Daily  bisacodyl, 10 mg, rectal, Daily  cefepime, 1 g, intravenous, q12h  cholecalciferol, 4,000 Units, oral, Daily  heparin (porcine), 5,000 Units, subcutaneous, q8h  ipratropium-albuteroL, 3 mL, nebulization, TID  levETIRAcetam, 500 mg, intravenous, q12h  magnesium sulfate, 4 g, intravenous, Once  pantoprazole, 40 mg, intravenous, BID  polyethylene glycol, 17 g, oral, Daily  potassium chloride, 20 mEq, oral,  Once  thiamine, 100 mg, nasogastric tube, Daily  vancomycin, 750 mg, intravenous, Once    PRN:  PRN medications: acetaminophen, albuterol, calcium gluconate, calcium gluconate, hydrALAZINE, labetaloL, magnesium sulfate, magnesium sulfate, naloxone, ondansetron **OR** ondansetron, oxyCODONE, oxyCODONE, potassium chloride CR **OR** potassium chloride, potassium chloride CR **OR** potassium chloride, potassium chloride, potassium chloride, vancomycin     Continuous:  oxygen, , Last Rate: 4 L/min (12/07/23 0453)      Lab Results  Results from last 72 hours   Lab Units 12/08/23  0009 12/07/23  1110   WBC AUTO x10*3/uL 7.3 8.9   NRBC AUTO /100 WBCs 0.0 0.0   RBC AUTO x10*6/uL 2.44* 2.58*   HEMOGLOBIN g/dL 7.1* 7.7*   HEMATOCRIT % 21.7* 22.7*   MCV fL 89 88   MCH pg 29.1 29.8   MCHC g/dL 32.7 33.9   RDW % 15.4* 15.3*   PLATELETS AUTO x10*3/uL 186 202      Results from last 72 hours   Lab Units 12/08/23  0009 12/07/23  1110 12/07/23  0304   GLUCOSE mg/dL 207* 167* 184*   SODIUM mmol/L 151* 149* 148*   POTASSIUM mmol/L 3.2* 3.2* 3.2*   CHLORIDE mmol/L 112* 112* 113*   CO2 mmol/L 26 24 24   ANION GAP mmol/L 16 16 14   BUN mg/dL 23 22 26*   CREATININE mg/dL 1.91* 2.09* 2.06*   EGFR mL/min/1.73m*2 34* 31* 31*   CALCIUM mg/dL 8.5* 8.5* 8.8   PHOSPHORUS mg/dL 2.5 2.5 2.4*   ALBUMIN g/dL 3.7 3.4  3.4 3.5   MAGNESIUM mg/dL 1.64  --  1.66   POCT CALCIUM IONIZED (MMOL/L) IN BLOOD mmol/L 1.18  --  1.20          Imaging Results  US renal complete   Final Result   1. Mild right pelvic fullness with otherwise unremarkable renal   ultrasound.   2. Incidental note of moderate volume abdominal ascites.        I personally reviewed the images/study, and I agree with the findings   as stated above. This study was interpreted at [facility], [place], [place].        MACRO:   None        Signed by: Bonifacio Zurita 12/7/2023 4:19 PM   Dictation workstation:   [id number]      XR chest 1 view   Final Result    1.  Slight improvement in right basilar aeration with continued   basilar edema/effusions. Correlate with fluid status.             Signed by: Rob Winter 12/7/2023 8:02 AM   Dictation workstation:   [id number]      Transthoracic Echo (TTE) Complete   Final Result      XR abdomen 1 view   Final Result   1.  Dobbhoff tube overlies the 2nd duodenum.        Signed by: Rob Winter 12/7/2023 8:01 AM   Dictation workstation:   [id number]      XR chest 1 view   Final Result   1. Interval development of probable right middle lobe atelectasis.        2. Mild interval increase in interstitial opacities in the left lung.        3. Probable trace bilateral pleural effusions.        4. Removal of enteric tube.        I personally reviewed the images/study and I agree with Dr. María Butler findings as stated. This study was interpreted at [place], [place]        MACRO:   None        Signed by: Brandon Yuan 12/6/2023 11:56 AM   Dictation workstation:   [id number]      CT head wo IV contrast   Final Result   Redemonstration of postsurgical changes of left frontoparietal   craniotomy and left subdural drain placement with interval reduction   in size of the previously noted mixed attenuation extra-axial fluid   collection overlying the left cerebral convexity when compared to   prior examination from 12/04/2023. Additionally, there has been   interval improvement in associated sulcal effacement, mass effect,   and midline shift.        I personally reviewed the images/study and I agree with the findings   as stated above by resident physician, Dr. Kedar Mitchell.        MACRO:   none        Signed by: Kristan Cobb 12/6/2023 6:04 AM   Dictation workstation:   [id number]      CT angio chest for pulmonary embolism   Final Result   1. No evidence of acute pulmonary embolism.   2. Moderate to large volume bilateral layering pleural effusions with   associated  compressive atelectasis and diffuse body wall anasarca   along with smooth interlobular septal thickening is noted.   Additionally, large volume abdominal ascites is incidentally noted.   Correlation with patient's fluid status is recommended. Superimposed   pneumonia or aspiration pneumonitis not excluded.   3. Bilateral pulmonary arteries are dilated which may be sequela of   chronic pulmonary arterial hypertension.   4. Diffuse sclerotic lesions throughout the axial and appendicular   skeleton of varying sizes. 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 Correlate with any available outside   hospital imaging studies. Correlate with PSA levels. Updated findings   are communicated through epic secure chat message to the referring   provider at 12:35 p.m. on 12/06/2023.   5. Additional chronic findings as above.        I personally reviewed the images/study and I agree with the findings   as stated above by resident physician, Dr. Kedar Mitchell. The   study was interpreted at [facility] in [facility].        Signed by: Gabriella Gee 12/6/2023 12:35 PM   Dictation workstation:   [id number]      XR abdomen 1 view   Final Result   1.  Enteric tube tip is in the right mainstem bronchus. Recommend   repositioning.   2. Nonobstructive bowel gas pattern.        I personally reviewed the images/study and I agree with the findings   as stated by resident physician Dr. Regino Quiñones . This study   was interpreted at [facility],   [place], [place].        MACRO:   Regino Quiñones discussed the significance and urgency of this   critical finding by telephone with  Dr. Kiran on 12/5/2023 at   4:47 pm.  (**-RCF-**) Findings:  See findings.        Signed by: Viral Sommers 12/5/2023 4:50 PM   Dictation workstation:   [id number]      Vascular US  lower extremity venous duplex bilateral   Final Result      Vascular US upper extremity venous duplex bilateral   Final Result      XR chest 1 view   Final Result   1. Diffuse and extensive pulmonary infiltrates in particular within   lower lobe segments.   2. Improved pulmonary aeration within lower lobe segments bilaterally   in comparison to the prior study.                  Signed by: Viral Sommers 12/5/2023 4:50 PM   Dictation workstation:   [id number]      XR abdomen 1 view   Final Result   1. The feeding tube is in satisfactory position                  I personally reviewed the images/study and I agree with the findings   as stated above by resident physician, Jerome Singh MD. This study   was interpreted at [place], [place].        MACRO:   None.        Signed by: Viral Sommers 12/5/2023 4:25 PM   Dictation workstation:   [id number]      XR abdomen 1 view   Final Result   1. The feeding tube appears positioned within the region of the   gastric antrum             I personally reviewed the images/study and I agree with the findings   as stated above by resident physician, Dr. Kedar Mitchell. The   study was interpreted at [facility] in [facility].        MACRO:   none        Signed by: Viral Sommers 12/5/2023 11:30 AM   Dictation workstation:   [id number]      IR angiogram   Preliminary Result   Attempted left middle meningeal artery embolization aborted due to   truncated left middle meningeal artery with no suitable distal target   for embolization.        I was present for and/or performed the critical portions of the   procedure and immediately available throughout the entire procedure.   I personally reviewed the image(s)/study and interpretation. I agree   with the findings as stated. Performed and dictated at [facility].        MACRO:   None             Dictation  workstation:   [id number]      CT head wo IV contrast   Final Result   Similar appearance of the head compared to prior examination with   unchanged appearance of subdural fluid surrounding the left frontal   subdural catheter and unchanged mixed attenuation subdural collection   along the left parietal, occipital, and temporal convexities.        I personally reviewed the images/study and I agree with the findings   as stated above by resident physician, Jerome Singh MD. This study   was interpreted at [place], [place].             MACRO:   None.        Signed by: Syed Amezquita 12/4/2023 7:10 AM   Dictation workstation:   [id number]      XR chest 1 view   Final Result   1.  Interval increased bilateral patchy airspace opacities likely   represent alveolar edema with multifocal infectious process not   excluded.   2. Small bilateral pleural effusions with adjacent atelectasis.        I personally reviewed the images/study and I agree with the findings   as stated above by resident physician, Jerome Singh MD. This study   was interpreted at [place], [place].             MACRO:   None.        Signed by: Viral Sommers 12/5/2023 7:54 AM   Dictation workstation:   [id number]      CT head wo IV contrast   Final Result   Slightly increased subdural fluid surrounding the subdural catheter   in the left frontal region. This may be due to decreased   pneumocephalus.        Stable component of subdural mixed attenuation hemorrhage within the   left parieto-occipital and temporal region.        Signed by: Kerrie Magana 12/3/2023 9:52 AM   Dictation workstation:   [id number]      CT head wo IV contrast   Final Result   1. Postsurgical changes consistent with interval left frontal   craniotomy for re-evacuation of mixed density subdural hematoma   overlying the left cerebral convexity with interval reduction in size   of the  subdural hematoma and improved mass effect, midline shift, and   sulcal effacement as described above.   2. No new transcortical infarction, intraparenchymal hemorrhage, or   herniation.        I personally reviewed the images/study and I agree with the findings   as stated above by resident physician, Dr. Kedar Mitchell. The   study was interpreted at [facility] in [facility].        Signed by: Orion Harris 12/1/2023 7:42 AM   Dictation workstation:   [id number]      CT head wo IV contrast   Final Result   1. Redemonstration of postoperative changes of left-sided craniotomy   with extra-axial mixed attenuation fluid and air collection measuring   approximately 9 mm in maximal thickness, previously measuring 7 mm.   There is superimposed mixed hyperdense and hypodense attenuation left   hemispheric subdural hematoma, grossly unchanged when compared to the   prior exam. There is effacement of the adjacent sulci and partial   effacement of the left lateral ventricle without significant changes   when compared to the prior exam. There is a proximally 8 mm   left-to-right midline shift, similar to prior.        I personally reviewed the images/study and I agree with the findings   as stated by resident physician Dr. Regino Quiñones . This study   was interpreted at [facility],   [place], [place].        MACRO:   None        Signed by: Sneha Pérez 11/30/2023 7:02 AM   Dictation workstation:   [id number]      CT head wo IV contrast   Final Result   Postoperative changes are again identified compatible with a recent   left sided craniotomy.        There is again evidence of scattered pneumocephalus.        Immediately deep to the craniotomy site, there is again evidence of   an extra-axial likely epidural mixed attenuation hemorrhagic fluid   and air collection measuring approximately 7 mm in thickness on both   the current and prior  study.        There is again evidence of a superimposed mixed hyperdense and   hypodense attenuation left hemispheric subdural hematoma again   measuring approximately 21 mm in greatest thickness as seen on the   coronal reconstructed images adjacent to the left parietal lobe.   There is again evidence of mass effect upon the adjacent left   cerebral hemisphere with asymmetric effacement/partial effacement of   sulci of the left cerebral hemisphere as well as partial effacement   of the left lateral ventricle and 3rd ventricle. There is again   evidence of approximately 8 mm of bowing of the midline structures   from left to right.        The above findings are again noted be superimposed upon moderate   brain parenchymal volume loss.        Patchy nonspecific white matter changes are again noted within   cerebral hemispheres bilaterally which while nonspecific, given the   patient's age, likely represent sequelae of small-vessel ischemic   change.        MACRO:   None.        Signed by: Cassius Richardson 11/29/2023 8:51 AM   Dictation workstation:   [id number]      CT head wo IV contrast   Final Result   Evolving postoperative changes with increased size of the residual   left subdural hematoma and increased volume of high attenuation blood   products, attention on follow-up is recommended. Rightward midline   shift is similar to previous.        I personally reviewed the images/study and I agree with the findings   as stated above by resident physician, Dr. Kedar Mitchell. The   study was interpreted at [facility] in [facility].        Signed by: Fredrick Hemphill 11/29/2023 9:10 AM   Dictation workstation:   [id number]      XR chest 1 view   Final Result   Redemonstration patchy airspace opacities that are noted bilaterally,   favored to represent pulmonary alveolar edema. However, multifocal   pneumonia can have similar appearance. Small left layering effusion   with  associated adjacent atelectasis.        I personally reviewed the images/study and I agree with the findings   as stated above by resident physician, Dr. Kedar Mitchell. The   study was interpreted at [facility] in [facility].        Signed by: Tushar Hartman 11/29/2023 8:36 AM   Dictation workstation:   [id number]      FL modified barium swallow study   Final Result   1. Please see detailed findings of modified barium swallow by speech   language pathologist.   2. No acute radiographic findings.        I personally reviewed the images/study and I agree with Dr. María Butler findings as stated. This study was interpreted at [place], [place]        MACRO:   None        Signed by: Rodney Kimball 12/7/2023 7:47 PM   Dictation workstation:   [id number]      XR chest 1 view   Final Result   1. Redemonstration of findings suggestive of pulmonary edema with   areas of patchy airspace opacities throughout the right lung which   are nonspecific and may represent multifocal pneumonia. Blunting of   the bilateral costophrenic angles may represent small pleural   effusions versus atelectasis.        I personally reviewed the images/study and I agree with the findings   as stated by resident physician Dr. Regino Quiñones . This study   was interpreted at [place], [place].        MACRO:   None        Signed by: Melissa Castro 11/26/2023 8:59 AM   Dictation workstation:   [id number]      XR chest 1 view   Final Result   1. Slight interval worsening of bilateral lung aeration with findings   suggestive of pulmonary edema, however multifocal infection is not   excluded in the appropriate clinical setting. Delete   2. Small bilateral pleural effusions.        I personally reviewed the images/study and I agree with the findings   as stated above by resident physician, Jerome Singh,  MD. This study   was interpreted at [place], [place].             MACRO:   None.        Signed by: Brandon Yuan 11/25/2023 9:38 AM   Dictation workstation:   [id number]      XR chest 1 view   Final Result   1. Bibasilar atelectasis with or without trace bilateral pleural   effusions, left-greater-than-right. Superimposed   infectious/inflammatory infiltrate is not excluded, for example in   the setting of aspiration pneumonitis.        I personally reviewed the images/study and I agree with the resident   findings as stated by Yokasta Rubin MD. This study was interpreted at   [place], [place].        MACRO:   None        Signed by: Tushar Hartman 11/24/2023 8:28 AM   Dictation workstation:   [id number]      XR abdomen 1 view   Final Result   1. No enteric tube is seen within field of view and correlate   clinically.   2. Nonobstructive bowel gas pattern.   3. Visualized basilar lungs demonstrate coarse airspace opacities   bilaterally with small pleural effusions not excluded. Correlate with   concern for underlying infectious process.        Signed by: Kendall Calix 11/23/2023 6:52 AM   Dictation workstation:   [id number]      CT head wo IV contrast   Final Result   1. Postsurgical changes consistent with interval left frontoparietal   craniotomy for left subdural hematoma evacuation and subdural drain   placement with interval reduction in size of the mixed density fluid   collection and improvement in mass effect, midline shift, and sulcal   effacement when compared to prior, same day CT of the head as   described in detail above.   2. No evidence of new hemorrhage or acute cortical infarction.        I personally reviewed the images/study and I agree with the findings   as stated above by resident physician, Dr. Kedar Mitchell. The   study was interpreted at [facility] in  [facility].        Signed by: Sneha Pérez 11/22/2023 6:55 AM   Dictation workstation:   [id number]      CT head wo IV contrast   Final Result   1. Large predominantly hypodense left convexity subdural hematoma   with multiple septations measuring up to 2.6 cm at its greatest width   with significant associated mass effect, sulcal effacement, 10-11 mm   of rightward midline shift, and effacement of the left lateral   ventricle.   2. No evidence of additional hemorrhage or acute cortical infarct.        I personally reviewed the images/study and I agree with the findings   as stated above by resident physician, Dr. Kedar Mitchell. The   study was interpreted at [facility] in [facility].        Signed by: Shiv Magallanes 11/22/2023 12:51 AM   Dictation workstation:   [id number]      Bedside Midline Imaging    (Results Pending)         Assessment/Plan   83 y.o. man with CLL, anemia, DM, dementia, chronic indwelling ignacio 2/2 BPH, HTN who presented on 11/22/23 for concerns of altered mental status. Found to have an acute large left SDH with midline shift. S/p evacuation on 11/22. CTH POC, 11/24 drain dc'd, Bcx +GPC, 11/25 febrile, remaining vitals stable, exam unchanged, repeat bcx negative.    Interval Events:   11/28 s/p midline   11/29 Lethargic, not FC, s/p CTH w/ increased L SDH w/ 8mm MLS s/p 1g Keppra load, CXR stable opacities, rCTH stable, s/p R side wd, s/p keppra load, rrCTH stable L SDH  11/30 S/p L crani for redo SDH evac, CTH POC with improved MLS  12/1 s/p extubation  12/3 CTH stable  12/4: patient was upgraded to NSU status for worsening neurologic exam, and increase O2 requirement. ABG 7.34/30/76. CTH stable with improved brain compression. CT-PE negative for pulmonary embolism. New b/l moderate pleural effusions s/p Lasix 20mg.  12/6: Improved ABG on CPAP. Poor UOP despite total 60mg IV Lasix.    12/8:   - Oncology consulted for anasarca, sclerotic bone  lesions, PSA 11. Recs pending  - T 38.4, cultures repeated and antibiotics broadened to vanc/cefepime    NEURO:  #SDH s/p evacuation x2  #MMA embolization on   Assessment:   Neurologically: Awake, EOSp, Pupils 5mm>3mm and brisk  Moves all extremities to command, AG in BUE and LLE, some effort vs grav in RLE  L crani with drain c/d/I  EEG read with severe diffuse encephalopathy  Plan:  NSU  Q1H neuro checks  Drain per NSGY   Pain: acetaminophen, oxycodone, hydromorphone PRN  Sedation: None  Dc cvEEG  Keppra 500mg BID  PT/OT/SLP    CARDIOVASCULAR:  # HTN, HLD  #Intravascular hypovolemia  #c/f Heart failure  Assessment:   ST on tele  Likely intravascularly depleted given likely pre-renal LIN  Plan:   Continue to monitor on telemetry  Goal SBP < 160  Continue atorvastatin  Hydralazine and Labetalol PRN  Albumin 25g once  Echocardiogram: EF 65-70%, impaired diastolic function    RESPIRATORY:  #Bilateral pleural effusions   #Intubated for airway protection during OR (resolved)  Assessment:   On 4L NC  S/p Lasix 20mg and 40mg IV  CT-PE negative, but did show bilateral mod-large pleural effusions  AB.32// -> 7.26//  Additional Lasix 60mg IV with poor response, S/p bumetanide 1mg IV once, net -ve >4L; replaced 1L NS over 4h overnight   Repeat CXR with mild improvement  Tachypnea improved following initiation of BPAP  Plan:   Continuous pulse oximetry   O2 PRN to maintain SpO2 > 94%, wean as tolerated  BPAP o/n and as needed   1mg bumex once      RENAL/:  #BPH  #LIN, potential cardiorenal syndrome vs contrast nephropathy  Assessment:   - FENa 10%  Results from last 72 hours   Lab Units 23  0009 23  1110   BUN mg/dL 23 22   CREATININE mg/dL 1.91* 2.09*   Plan:   Monitor with daily RFP  Chronic ignacio for BPH  Albumin as above    FEN/GI:  #UGIB  Assessment:   Last BM Date: 23  Bloody emesis on floor per wife, no recent episodes  Plan:   Monitor and replace electrolytes per protocol  IVF:  None  Diet: NPO  Bowel Regimen: Miralax PRN  Pantoprazole 40mg IV BID    ENDOCRINE:  #DM  Assessment:   Results from last 7 days   Lab Units 12/08/23  0009 12/07/23  1110   GLUCOSE mg/dL 207* 167*   Plan:    Accuchecks & ISS Q6H  Hypoglycemia protocol    HEMATOLOGY/ONCOLOGY:  #CLL  Assessment:   Sclerotic lesions incidentally noted on CT PE  PSA 11.73  Results from last 7 days   Lab Units 12/08/23  0009 12/07/23  1110   HEMOGLOBIN g/dL 7.1* 7.7*   HEMATOCRIT % 21.7* 22.7*   PLATELETS AUTO x10*3/uL 186 202   Plan:  Continue to monitor with daily CBC and Coag panel  Heme consulted, recs appreciated  Oncology consulted with concern for malignant cause of sclerotic bony lesions, appreciate recs  SPEP, immunoglobulins, and free light chains to eval for possible multiple myeloma  Hold Ibrutinib, resume when NSGY is OK with that (heme recs 11/28)    INFECTIOUS DISEASE:  #concerns for aspiration PNA  Assessment:   Results from last 7 days   Lab Units 12/08/23  0009 12/07/23  1110   WBC AUTO x10*3/uL 7.3 8.9     Afebrile   Repeat BCx 12/4 pending  Repeat BCx 11/26 NGTD, MRSA negative, vanco d/c'd   BCx 11/24 with staph hominis  Ucx 11/24  neg  Procal 0.31 (12/6) -> 0.17 (12/8)  Febrile to 38.4 o/n 12/7-12/8  Plan:  Continue to monitor for s/sx of infection  Pan culture for temperature > 38.4 C  Zosyn (12/5-12/7) -> Vanc/cefepime (12/7 -)  Covid and flu PCR  F/up Bcx 12/4  Trend procal    MUSCULOSKELETAL:  No acute issues    SKIN:  No acute issues  Turns and skin care per NSU protocol    ACCESS:  PIV    PROPHYLAXIS:  DVT/VTE: SCDs, SQH    RESTRAINTS:  I agree with nursing assessment of the patient's need for restraints to protect the patient from injury and facilitate healing. The patient is unable to cooperate with the plan of care and at risk for disrupting critical therapy (i.e., removing medical devices, lines, tubes and/or dressings).  Please see order for specifics. Restraints can be removed when the patient is able to  cooperate with plan of care and allow healing to occur, or the medical devices at risk are discontinued by the medical team.     Xavi Mcmullen MD  Neurology PGY-2  Neuroscience Intensive Care

## 2023-12-08 NOTE — CARE PLAN
The patient's goals for the shift include                            Problem: Pain  Goal: My pain/discomfort is manageable  Outcome: Progressing     Problem: Safety  Goal: Patient will be injury free during hospitalization  Outcome: Progressing  Goal: I will remain free of falls  Outcome: Progressing     Problem: Daily Care  Goal: Daily care needs are met  Outcome: Progressing     Problem: Psychosocial Needs  Goal: Demonstrates ability to cope with hospitalization/illness  Outcome: Progressing  Goal: Collaborate with me, my family, and caregiver to identify my specific goals  Outcome: Progressing     Problem: Discharge Barriers  Goal: My discharge needs are met  Outcome: Progressing     Problem: General Stroke  Goal: Demonstrate improvement in neurological exam throughout the shift  Outcome: Progressing  Goal: Maintain BP within ordered limits throughout shift  Outcome: Progressing  Goal: Participate in treatment (ie., meds, therapy) throughout shift  Outcome: Progressing  Goal: No symptoms of aspiration throughout shift  Outcome: Progressing  Goal: No symptoms of hemorrhage throughout shift  Outcome: Progressing  Goal: Tolerate enteral feeding throughout shift  Outcome: Progressing  Goal: Decreased nausea/vomiting throughout shift  Outcome: Progressing  Goal: Controlled blood glucose throughout shift  Outcome: Progressing  Goal: Out of bed three times today  Outcome: Progressing     Problem: ICU Stroke  Goal: Maintain ICP within ordered limits throughout shift  Outcome: Progressing  Goal: Tolerate EVD clamping trial throughout shift  Outcome: Progressing  Goal: Tolerate ventilator weaning trial during shift  Outcome: Progressing  Goal: Maintain patent airway throughout shift  Outcome: Progressing  Goal: Achieve/maintain targeted sodium level throughout shift  Outcome: Progressing     Problem: Skin  Goal: Decreased wound size/increased tissue granulation at next dressing change  Outcome: Progressing  Goal:  Participates in plan/prevention/treatment measures  Outcome: Progressing  Goal: Prevent/manage excess moisture  Outcome: Progressing  Goal: Prevent/minimize sheer/friction injuries  Outcome: Progressing  Goal: Promote/optimize nutrition  Outcome: Progressing  Goal: Promote skin healing  Outcome: Progressing     Problem: Safety - Medical Restraint  Goal: Remains free of injury from restraints (Restraint for Interference with Medical Device)  Outcome: Progressing  Goal: Free from restraint(s) (Restraint for Interference with Medical Device)  Outcome: Progressing     Problem: Nutrition  Goal: Nutrition support goals are met within 48 hrs  Outcome: Progressing  Goal: Lab values WNL  Outcome: Progressing  Goal: Electrolytes WNL  Outcome: Progressing  Goal: Promote healing  Outcome: Progressing  Goal: Maintain stable weight  Outcome: Progressing  Goal: Reduce weight from edema/fluid  Outcome: Progressing     Problem: Pain - Adult  Goal: Verbalizes/displays adequate comfort level or baseline comfort level  Outcome: Progressing     Problem: Safety - Adult  Goal: Free from fall injury  Outcome: Progressing     Problem: Discharge Planning  Goal: Discharge to home or other facility with appropriate resources  Outcome: Progressing     Problem: Chronic Conditions and Co-morbidities  Goal: Patient's chronic conditions and co-morbidity symptoms are monitored and maintained or improved  Outcome: Progressing     Problem: Fall/Injury  Goal: Not fall by end of shift  Outcome: Progressing  Goal: Be free from injury by end of the shift  Outcome: Progressing  Goal: Verbalize understanding of personal risk factors for fall in the hospital  Outcome: Progressing     Problem: Diabetes  Goal: Increase stability of blood glucose readings by end of shift  Outcome: Progressing  Goal: Maintain electrolyte levels within acceptable range throughout shift  Outcome: Progressing  Goal: Maintain glucose levels >70mg/dl to <250mg/dl throughout  shift  Outcome: Progressing  Goal: No changes in neurological exam by end of shift  Outcome: Progressing   The clinical goals for the shift include improve venttillstion and remain neurologically intact

## 2023-12-09 ENCOUNTER — APPOINTMENT (OUTPATIENT)
Dept: RADIOLOGY | Facility: HOSPITAL | Age: 83
DRG: 025 | End: 2023-12-09
Payer: MEDICARE

## 2023-12-09 LAB
ALBUMIN SERPL BCP-MCNC: 3.9 G/DL (ref 3.4–5)
ALBUMIN SERPL BCP-MCNC: 4 G/DL (ref 3.4–5)
ALBUMIN SERPL BCP-MCNC: 4.2 G/DL (ref 3.4–5)
ALP SERPL-CCNC: 50 U/L (ref 33–136)
ALT SERPL W P-5'-P-CCNC: 5 U/L (ref 10–52)
ANION GAP SERPL CALC-SCNC: 11 MMOL/L (ref 10–20)
ANION GAP SERPL CALC-SCNC: 11 MMOL/L (ref 10–20)
AST SERPL W P-5'-P-CCNC: 7 U/L (ref 9–39)
BACTERIA BLD CULT: NORMAL
BACTERIA BLD CULT: NORMAL
BACTERIA SPEC RESP CULT: NORMAL
BASOPHILS # BLD AUTO: 0.02 X10*3/UL (ref 0–0.1)
BASOPHILS NFR BLD AUTO: 0.2 %
BILIRUB DIRECT SERPL-MCNC: 0.1 MG/DL (ref 0–0.3)
BILIRUB SERPL-MCNC: 0.5 MG/DL (ref 0–1.2)
BUN SERPL-MCNC: 25 MG/DL (ref 6–23)
BUN SERPL-MCNC: 29 MG/DL (ref 6–23)
CA-I BLD-SCNC: 1.17 MMOL/L (ref 1.1–1.33)
CALCIUM SERPL-MCNC: 8.8 MG/DL (ref 8.6–10.6)
CALCIUM SERPL-MCNC: 9 MG/DL (ref 8.6–10.6)
CANCER AG125 SERPL-ACNC: 161.5 U/ML (ref 0–30.2)
CANCER AG19-9 SERPL-ACNC: 25.13 U/ML
CEA SERPL-MCNC: 1.2 UG/L
CHLORIDE SERPL-SCNC: 110 MMOL/L (ref 98–107)
CHLORIDE SERPL-SCNC: 112 MMOL/L (ref 98–107)
CO2 SERPL-SCNC: 30 MMOL/L (ref 21–32)
CO2 SERPL-SCNC: 32 MMOL/L (ref 21–32)
CREAT SERPL-MCNC: 1.61 MG/DL (ref 0.5–1.3)
CREAT SERPL-MCNC: 1.78 MG/DL (ref 0.5–1.3)
EOSINOPHIL # BLD AUTO: 0.09 X10*3/UL (ref 0–0.4)
EOSINOPHIL NFR BLD AUTO: 1.1 %
ERYTHROCYTE [DISTWIDTH] IN BLOOD BY AUTOMATED COUNT: 14.7 % (ref 11.5–14.5)
ERYTHROCYTE [DISTWIDTH] IN BLOOD BY AUTOMATED COUNT: 15.3 % (ref 11.5–14.5)
GFR SERPL CREATININE-BSD FRML MDRD: 37 ML/MIN/1.73M*2
GFR SERPL CREATININE-BSD FRML MDRD: 42 ML/MIN/1.73M*2
GLUCOSE BLD MANUAL STRIP-MCNC: 267 MG/DL (ref 74–99)
GLUCOSE BLD MANUAL STRIP-MCNC: 300 MG/DL (ref 74–99)
GLUCOSE BLD MANUAL STRIP-MCNC: 315 MG/DL (ref 74–99)
GLUCOSE BLD MANUAL STRIP-MCNC: 330 MG/DL (ref 74–99)
GLUCOSE SERPL-MCNC: 229 MG/DL (ref 74–99)
GLUCOSE SERPL-MCNC: 314 MG/DL (ref 74–99)
GRAM STN SPEC: NORMAL
GRAM STN SPEC: NORMAL
HCT VFR BLD AUTO: 22.5 % (ref 41–52)
HCT VFR BLD AUTO: 23.2 % (ref 41–52)
HGB BLD-MCNC: 7.7 G/DL (ref 13.5–17.5)
HGB BLD-MCNC: 7.8 G/DL (ref 13.5–17.5)
IMM GRANULOCYTES # BLD AUTO: 0.03 X10*3/UL (ref 0–0.5)
IMM GRANULOCYTES NFR BLD AUTO: 0.4 % (ref 0–0.9)
LYMPHOCYTES # BLD AUTO: 1.29 X10*3/UL (ref 0.8–3)
LYMPHOCYTES NFR BLD AUTO: 15.9 %
MCH RBC QN AUTO: 29.6 PG (ref 26–34)
MCH RBC QN AUTO: 29.8 PG (ref 26–34)
MCHC RBC AUTO-ENTMCNC: 33.2 G/DL (ref 32–36)
MCHC RBC AUTO-ENTMCNC: 34.7 G/DL (ref 32–36)
MCV RBC AUTO: 86 FL (ref 80–100)
MCV RBC AUTO: 89 FL (ref 80–100)
MONOCYTES # BLD AUTO: 0.69 X10*3/UL (ref 0.05–0.8)
MONOCYTES NFR BLD AUTO: 8.5 %
MRSA DNA SPEC QL NAA+PROBE: NOT DETECTED
NEUTROPHILS # BLD AUTO: 5.99 X10*3/UL (ref 1.6–5.5)
NEUTROPHILS NFR BLD AUTO: 73.9 %
NRBC BLD-RTO: 0 /100 WBCS (ref 0–0)
NRBC BLD-RTO: 0 /100 WBCS (ref 0–0)
PHOSPHATE SERPL-MCNC: 1.6 MG/DL (ref 2.5–4.9)
PHOSPHATE SERPL-MCNC: 1.7 MG/DL (ref 2.5–4.9)
PLATELET # BLD AUTO: 182 X10*3/UL (ref 150–450)
PLATELET # BLD AUTO: 197 X10*3/UL (ref 150–450)
POTASSIUM SERPL-SCNC: 3.4 MMOL/L (ref 3.5–5.3)
POTASSIUM SERPL-SCNC: 3.9 MMOL/L (ref 3.5–5.3)
PROCALCITONIN SERPL-MCNC: 0.12 NG/ML
PROT SERPL-MCNC: 6.3 G/DL (ref 6.4–8.2)
PROT UR-ACNC: 132 MG/DL (ref 5–25)
RBC # BLD AUTO: 2.6 X10*6/UL (ref 4.5–5.9)
RBC # BLD AUTO: 2.62 X10*6/UL (ref 4.5–5.9)
SODIUM SERPL-SCNC: 149 MMOL/L (ref 136–145)
SODIUM SERPL-SCNC: 150 MMOL/L (ref 136–145)
VANCOMYCIN SERPL-MCNC: 13.4 UG/ML (ref 5–20)
WBC # BLD AUTO: 7.9 X10*3/UL (ref 4.4–11.3)
WBC # BLD AUTO: 8.1 X10*3/UL (ref 4.4–11.3)

## 2023-12-09 PROCEDURE — 71045 X-RAY EXAM CHEST 1 VIEW: CPT

## 2023-12-09 PROCEDURE — 86325 OTHER IMMUNOELECTROPHORESIS: CPT

## 2023-12-09 PROCEDURE — 2500000005 HC RX 250 GENERAL PHARMACY W/O HCPCS: Performed by: STUDENT IN AN ORGANIZED HEALTH CARE EDUCATION/TRAINING PROGRAM

## 2023-12-09 PROCEDURE — 2500000004 HC RX 250 GENERAL PHARMACY W/ HCPCS (ALT 636 FOR OP/ED)

## 2023-12-09 PROCEDURE — 84156 ASSAY OF PROTEIN URINE: CPT

## 2023-12-09 PROCEDURE — 84166 PROTEIN E-PHORESIS/URINE/CSF: CPT

## 2023-12-09 PROCEDURE — XW033E5 INTRODUCTION OF REMDESIVIR ANTI-INFECTIVE INTO PERIPHERAL VEIN, PERCUTANEOUS APPROACH, NEW TECHNOLOGY GROUP 5: ICD-10-PCS

## 2023-12-09 PROCEDURE — 82040 ASSAY OF SERUM ALBUMIN: CPT

## 2023-12-09 PROCEDURE — 37799 UNLISTED PX VASCULAR SURGERY: CPT | Performed by: REGISTERED NURSE

## 2023-12-09 PROCEDURE — 85027 COMPLETE CBC AUTOMATED: CPT | Performed by: STUDENT IN AN ORGANIZED HEALTH CARE EDUCATION/TRAINING PROGRAM

## 2023-12-09 PROCEDURE — 86335 IMMUNFIX E-PHORSIS/URINE/CSF: CPT

## 2023-12-09 PROCEDURE — 82947 ASSAY GLUCOSE BLOOD QUANT: CPT

## 2023-12-09 PROCEDURE — 2500000001 HC RX 250 WO HCPCS SELF ADMINISTERED DRUGS (ALT 637 FOR MEDICARE OP): Performed by: STUDENT IN AN ORGANIZED HEALTH CARE EDUCATION/TRAINING PROGRAM

## 2023-12-09 PROCEDURE — C9113 INJ PANTOPRAZOLE SODIUM, VIA: HCPCS

## 2023-12-09 PROCEDURE — 2500000002 HC RX 250 W HCPCS SELF ADMINISTERED DRUGS (ALT 637 FOR MEDICARE OP, ALT 636 FOR OP/ED): Performed by: STUDENT IN AN ORGANIZED HEALTH CARE EDUCATION/TRAINING PROGRAM

## 2023-12-09 PROCEDURE — 96372 THER/PROPH/DIAG INJ SC/IM: CPT

## 2023-12-09 PROCEDURE — P9047 ALBUMIN (HUMAN), 25%, 50ML: HCPCS | Mod: JZ

## 2023-12-09 PROCEDURE — 84145 PROCALCITONIN (PCT): CPT

## 2023-12-09 PROCEDURE — 2500000001 HC RX 250 WO HCPCS SELF ADMINISTERED DRUGS (ALT 637 FOR MEDICARE OP)

## 2023-12-09 PROCEDURE — 82040 ASSAY OF SERUM ALBUMIN: CPT | Performed by: STUDENT IN AN ORGANIZED HEALTH CARE EDUCATION/TRAINING PROGRAM

## 2023-12-09 PROCEDURE — 94660 CPAP INITIATION&MGMT: CPT

## 2023-12-09 PROCEDURE — 85025 COMPLETE CBC W/AUTO DIFF WBC: CPT | Performed by: REGISTERED NURSE

## 2023-12-09 PROCEDURE — 2500000004 HC RX 250 GENERAL PHARMACY W/ HCPCS (ALT 636 FOR OP/ED): Performed by: STUDENT IN AN ORGANIZED HEALTH CARE EDUCATION/TRAINING PROGRAM

## 2023-12-09 PROCEDURE — 80202 ASSAY OF VANCOMYCIN: CPT | Performed by: REGISTERED NURSE

## 2023-12-09 PROCEDURE — 2020000001 HC ICU ROOM DAILY

## 2023-12-09 PROCEDURE — 71045 X-RAY EXAM CHEST 1 VIEW: CPT | Performed by: RADIOLOGY

## 2023-12-09 PROCEDURE — 2500000004 HC RX 250 GENERAL PHARMACY W/ HCPCS (ALT 636 FOR OP/ED): Mod: JZ

## 2023-12-09 PROCEDURE — 80069 RENAL FUNCTION PANEL: CPT | Performed by: STUDENT IN AN ORGANIZED HEALTH CARE EDUCATION/TRAINING PROGRAM

## 2023-12-09 PROCEDURE — 2500000004 HC RX 250 GENERAL PHARMACY W/ HCPCS (ALT 636 FOR OP/ED): Performed by: REGISTERED NURSE

## 2023-12-09 PROCEDURE — 2500000005 HC RX 250 GENERAL PHARMACY W/O HCPCS

## 2023-12-09 PROCEDURE — 87641 MR-STAPH DNA AMP PROBE: CPT | Performed by: REGISTERED NURSE

## 2023-12-09 PROCEDURE — 82330 ASSAY OF CALCIUM: CPT | Performed by: REGISTERED NURSE

## 2023-12-09 RX ORDER — INSULIN LISPRO 100 [IU]/ML
0-5 INJECTION, SOLUTION INTRAVENOUS; SUBCUTANEOUS
Status: DISCONTINUED | OUTPATIENT
Start: 2023-12-09 | End: 2023-12-10

## 2023-12-09 RX ORDER — ALBUMIN HUMAN 250 G/1000ML
25 SOLUTION INTRAVENOUS EVERY 8 HOURS
Status: DISCONTINUED | OUTPATIENT
Start: 2023-12-09 | End: 2023-12-09

## 2023-12-09 RX ORDER — BUMETANIDE 0.25 MG/ML
1 INJECTION INTRAMUSCULAR; INTRAVENOUS ONCE
Status: COMPLETED | OUTPATIENT
Start: 2023-12-09 | End: 2023-12-09

## 2023-12-09 RX ORDER — VANCOMYCIN HYDROCHLORIDE 1 G/200ML
1000 INJECTION, SOLUTION INTRAVENOUS ONCE
Status: COMPLETED | OUTPATIENT
Start: 2023-12-09 | End: 2023-12-09

## 2023-12-09 RX ORDER — POTASSIUM CHLORIDE 1.5 G/1.58G
40 POWDER, FOR SOLUTION ORAL ONCE
Status: COMPLETED | OUTPATIENT
Start: 2023-12-09 | End: 2023-12-09

## 2023-12-09 RX ORDER — DEXTROSE 50 % IN WATER (D50W) INTRAVENOUS SYRINGE
25
Status: DISCONTINUED | OUTPATIENT
Start: 2023-12-09 | End: 2023-12-21

## 2023-12-09 RX ORDER — DEXTROSE MONOHYDRATE 100 MG/ML
0.3 INJECTION, SOLUTION INTRAVENOUS ONCE AS NEEDED
Status: DISCONTINUED | OUTPATIENT
Start: 2023-12-09 | End: 2023-12-21

## 2023-12-09 RX ADMIN — PANTOPRAZOLE SODIUM 40 MG: 40 INJECTION, POWDER, FOR SOLUTION INTRAVENOUS at 09:15

## 2023-12-09 RX ADMIN — Medication 4000 UNITS: at 09:13

## 2023-12-09 RX ADMIN — HEPARIN SODIUM 5000 UNITS: 5000 INJECTION INTRAVENOUS; SUBCUTANEOUS at 17:15

## 2023-12-09 RX ADMIN — CEFEPIME 1 G: 1 INJECTION, SOLUTION INTRAVENOUS at 20:15

## 2023-12-09 RX ADMIN — REMDESIVIR 200 MG: 100 INJECTION, POWDER, LYOPHILIZED, FOR SOLUTION INTRAVENOUS at 15:25

## 2023-12-09 RX ADMIN — ALBUMIN HUMAN 12.5 G: 0.25 SOLUTION INTRAVENOUS at 01:55

## 2023-12-09 RX ADMIN — AMLODIPINE BESYLATE 10 MG: 10 TABLET ORAL at 09:14

## 2023-12-09 RX ADMIN — BUMETANIDE 1 MG: 0.25 INJECTION INTRAMUSCULAR; INTRAVENOUS at 12:56

## 2023-12-09 RX ADMIN — INSULIN LISPRO 4 UNITS: 100 INJECTION, SOLUTION INTRAVENOUS; SUBCUTANEOUS at 12:57

## 2023-12-09 RX ADMIN — ALBUMIN HUMAN 25 G: 0.25 SOLUTION INTRAVENOUS at 13:03

## 2023-12-09 RX ADMIN — HYDRALAZINE HYDROCHLORIDE 10 MG: 20 INJECTION INTRAMUSCULAR; INTRAVENOUS at 04:05

## 2023-12-09 RX ADMIN — HEPARIN SODIUM 5000 UNITS: 5000 INJECTION INTRAVENOUS; SUBCUTANEOUS at 02:03

## 2023-12-09 RX ADMIN — LEVETIRACETAM 500 MG: 5 INJECTION INTRAVENOUS at 17:15

## 2023-12-09 RX ADMIN — CEFEPIME 1 G: 1 INJECTION, SOLUTION INTRAVENOUS at 09:15

## 2023-12-09 RX ADMIN — POTASSIUM CHLORIDE 40 MEQ: 1.5 POWDER, FOR SOLUTION ORAL at 22:05

## 2023-12-09 RX ADMIN — LABETALOL HYDROCHLORIDE 10 MG: 5 INJECTION INTRAVENOUS at 06:29

## 2023-12-09 RX ADMIN — INSULIN LISPRO 4 UNITS: 100 INJECTION, SOLUTION INTRAVENOUS; SUBCUTANEOUS at 17:15

## 2023-12-09 RX ADMIN — THIAMINE HCL TAB 100 MG 100 MG: 100 TAB at 09:17

## 2023-12-09 RX ADMIN — PANTOPRAZOLE SODIUM 40 MG: 40 INJECTION, POWDER, FOR SOLUTION INTRAVENOUS at 20:15

## 2023-12-09 RX ADMIN — ATORVASTATIN CALCIUM 20 MG: 20 TABLET, FILM COATED ORAL at 09:15

## 2023-12-09 RX ADMIN — HEPARIN SODIUM 5000 UNITS: 5000 INJECTION INTRAVENOUS; SUBCUTANEOUS at 11:13

## 2023-12-09 RX ADMIN — LEVETIRACETAM 500 MG: 5 INJECTION INTRAVENOUS at 04:58

## 2023-12-09 RX ADMIN — INSULIN LISPRO 4 UNITS: 100 INJECTION, SOLUTION INTRAVENOUS; SUBCUTANEOUS at 09:43

## 2023-12-09 RX ADMIN — VANCOMYCIN HYDROCHLORIDE 1000 MG: 1 INJECTION, SOLUTION INTRAVENOUS at 09:52

## 2023-12-09 RX ADMIN — POTASSIUM PHOSPHATE, MONOBASIC POTASSIUM PHOSPHATE, DIBASIC 21 MMOL: 224; 236 INJECTION, SOLUTION, CONCENTRATE INTRAVENOUS at 09:48

## 2023-12-09 ASSESSMENT — PAIN - FUNCTIONAL ASSESSMENT
PAIN_FUNCTIONAL_ASSESSMENT: CPOT (CRITICAL CARE PAIN OBSERVATION TOOL)
PAIN_FUNCTIONAL_ASSESSMENT: 0-10
PAIN_FUNCTIONAL_ASSESSMENT: CPOT (CRITICAL CARE PAIN OBSERVATION TOOL)

## 2023-12-09 ASSESSMENT — COGNITIVE AND FUNCTIONAL STATUS - GENERAL
CLIMB 3 TO 5 STEPS WITH RAILING: TOTAL
STANDING UP FROM CHAIR USING ARMS: TOTAL
MOVING TO AND FROM BED TO CHAIR: TOTAL
TURNING FROM BACK TO SIDE WHILE IN FLAT BAD: TOTAL
WALKING IN HOSPITAL ROOM: TOTAL
MOVING FROM LYING ON BACK TO SITTING ON SIDE OF FLAT BED WITH BEDRAILS: A LOT
MOBILITY SCORE: 7

## 2023-12-09 ASSESSMENT — PAIN SCALES - GENERAL
PAINLEVEL_OUTOF10: 0 - NO PAIN

## 2023-12-09 NOTE — PROGRESS NOTES
Subjective   83 y.o. man with CLL, anemia, DM, dementia, chronic indwelling ignacio 2/2 BPH, HTN who presented on 11/22/23 for concerns of altered mental status. Found to have an acute large left SDH with midline shift. S/p evacuation on 11/22. CTH POC, 11/24 drain dc'd, Bcx +GPC, 11/25 febrile, remaining vitals stable, exam unchanged, repeat bcx negative    Interval Events: No acute events overnight. Found to be COVID positive. Improved exam this morning, mimicking and antigrav in all 4 extremities.         Objective     Vitals 24 hour ranges:  Heart Rate:  []   Temp:  [36.8 °C (98.2 °F)-37.6 °C (99.7 °F)]   Resp:  [18-29]   BP: (140-164)/(73-86)   SpO2:  [97 %-100 %]      Intake/Output for last 24 hours:    Intake/Output Summary (Last 24 hours) at 12/9/2023 1311  Last data filed at 12/9/2023 0900  Gross per 24 hour   Intake 1360 ml   Output 2060 ml   Net -700 ml      Physical Exam:  NEURO:  Awake, EOS, Pupils 4mm>3mm and brisk, mimics  Moves all extremities antigrav  CV:  ST on tele. No M/G/R.   RESP:  On 2L NC  No increased WOB  :  Chronic ignacio with clear yellow urine  GI:  Abdomen soft, distended. NT, no rigidity or guarding   SKIN:  Left frontal incision c/d/I with drain       Medications    Scheduled:  albumin human, 25 g, intravenous, q8h  amLODIPine, 10 mg, nasogastric tube, Daily  atorvastatin, 20 mg, oral, Daily  bisacodyl, 10 mg, rectal, Daily  cefepime, 1 g, intravenous, q12h  cholecalciferol, 4,000 Units, oral, Daily  heparin (porcine), 5,000 Units, subcutaneous, q8h  insulin lispro, 0-5 Units, subcutaneous, TID with meals  levETIRAcetam, 500 mg, intravenous, q12h  pantoprazole, 40 mg, intravenous, BID  polyethylene glycol, 17 g, oral, Daily  potassium phosphate, 21 mmol, intravenous, Once  remdesivir, 200 mg, intravenous, Once   Followed by  [START ON 12/10/2023] remdesivir, 100 mg, intravenous, q24h  thiamine, 100 mg, nasogastric tube, Daily    PRN:  PRN medications: [Held by provider]  acetaminophen, albuterol, calcium gluconate, calcium gluconate, dextrose 10 % in water (D10W), dextrose, glucagon, hydrALAZINE, labetaloL, magnesium sulfate, magnesium sulfate, naloxone, ondansetron **OR** ondansetron, oxyCODONE, oxyCODONE, potassium chloride CR **OR** potassium chloride, potassium chloride CR **OR** potassium chloride, potassium chloride, potassium chloride, vancomycin     Continuous:  oxygen, , Last Rate: 4 L/min (12/08/23 0845)      Lab Results  Results from last 72 hours   Lab Units 12/09/23  0154 12/08/23  1004   WBC AUTO x10*3/uL 8.1 7.0   NRBC AUTO /100 WBCs 0.0 0.0   RBC AUTO x10*6/uL 2.60* 2.46*   HEMOGLOBIN g/dL 7.7* 7.1*   HEMATOCRIT % 23.2* 20.7*   MCV fL 89 84   MCH pg 29.6 28.9   MCHC g/dL 33.2 34.3   RDW % 15.3* 15.0*   PLATELETS AUTO x10*3/uL 197 177      Results from last 72 hours   Lab Units 12/09/23  0504 12/09/23  0154 12/08/23  1935 12/08/23  1156 12/08/23  0009   GLUCOSE mg/dL  --  229* 248*   < > 207*   SODIUM mmol/L  --  149* 149*   < > 151*   POTASSIUM mmol/L  --  3.9 3.0*   < > 3.2*   CHLORIDE mmol/L  --  112* 110*   < > 112*   CO2 mmol/L  --  30 29   < > 26   ANION GAP mmol/L  --  11 13   < > 16   BUN mg/dL  --  25* 24*   < > 23   CREATININE mg/dL  --  1.78* 1.71*   < > 1.91*   EGFR mL/min/1.73m*2  --  37* 39*   < > 34*   CALCIUM mg/dL  --  8.8 9.1   < > 8.5*   PHOSPHORUS mg/dL  --  1.6* 1.9*   < > 2.5   ALBUMIN g/dL 4.0 3.9 4.1   < > 3.7   MAGNESIUM mg/dL  --   --  2.49*  --  1.64   POCT CALCIUM IONIZED (MMOL/L) IN BLOOD mmol/L  --  1.17  --   --  1.18    < > = values in this interval not displayed.          Imaging Results  US renal complete   Final Result   1. Mild right pelvic fullness with otherwise unremarkable renal   ultrasound.   2. Incidental note of moderate volume abdominal ascites.        I personally reviewed the images/study, and I agree with the findings   as stated above. This study was interpreted at [facility],  [place], [place].        MACRO:   None        Signed by: Bonifacio Zurita 12/7/2023 4:19 PM   Dictation workstation:   [id number]      XR chest 1 view   Final Result   1.  Slight improvement in right basilar aeration with continued   basilar edema/effusions. Correlate with fluid status.             Signed by: Rob Winter 12/7/2023 8:02 AM   Dictation workstation:   [id number]      Transthoracic Echo (TTE) Complete   Final Result      XR abdomen 1 view   Final Result   1.  Dobbhoff tube overlies the 2nd duodenum.        Signed by: Rob Winter 12/7/2023 8:01 AM   Dictation workstation:   [id number]      XR chest 1 view   Final Result   1. Interval development of probable right middle lobe atelectasis.        2. Mild interval increase in interstitial opacities in the left lung.        3. Probable trace bilateral pleural effusions.        4. Removal of enteric tube.        I personally reviewed the images/study and I agree with Dr. María Butler findings as stated. This study was interpreted at [place], [place]        MACRO:   None        Signed by: Brandon Yuan 12/6/2023 11:56 AM   Dictation workstation:   [id number]      CT head wo IV contrast   Final Result   Redemonstration of postsurgical changes of left frontoparietal   craniotomy and left subdural drain placement with interval reduction   in size of the previously noted mixed attenuation extra-axial fluid   collection overlying the left cerebral convexity when compared to   prior examination from 12/04/2023. Additionally, there has been   interval improvement in associated sulcal effacement, mass effect,   and midline shift.        I personally reviewed the images/study and I agree with the findings   as stated above by resident physician, Dr. Kedar Mitchell.        MACRO:   none        Signed by: Kristan Cobb 12/6/2023 6:04 AM   Dictation workstation:   [id number]      CT angio chest for  pulmonary embolism   Final Result   1. No evidence of acute pulmonary embolism.   2. Moderate to large volume bilateral layering pleural effusions with   associated compressive atelectasis and diffuse body wall anasarca   along with smooth interlobular septal thickening is noted.   Additionally, large volume abdominal ascites is incidentally noted.   Correlation with patient's fluid status is recommended. Superimposed   pneumonia or aspiration pneumonitis not excluded.   3. Bilateral pulmonary arteries are dilated which may be sequela of   chronic pulmonary arterial hypertension.   4. Diffuse sclerotic lesions throughout the axial and appendicular   skeleton of varying sizes. 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 Correlate with any available outside   hospital imaging studies. Correlate with PSA levels. Updated findings   are communicated through epic secure chat message to the referring   provider at 12:35 p.m. on 12/06/2023.   5. Additional chronic findings as above.        I personally reviewed the images/study and I agree with the findings   as stated above by resident physician, Dr. Kedar Mitchell. The   study was interpreted at [facility] in [facility].        Signed by: Gabriella Gee 12/6/2023 12:35 PM   Dictation workstation:   [id number]      XR abdomen 1 view   Final Result   1.  Enteric tube tip is in the right mainstem bronchus. Recommend   repositioning.   2. Nonobstructive bowel gas pattern.        I personally reviewed the images/study and I agree with the findings   as stated by resident physician Dr. Regino Quiñones . This study   was interpreted at [place], [place].        MACRO:   Regino Quiñones discussed the significance and urgency of this   critical finding by telephone with  Dr. Kiran on  12/5/2023 at   4:47 pm.  (**-RCF-**) Findings:  See findings.        Signed by: Viral Sommers 12/5/2023 4:50 PM   Dictation workstation:   [id number]      Vascular US lower extremity venous duplex bilateral   Final Result      Vascular US upper extremity venous duplex bilateral   Final Result      XR chest 1 view   Final Result   1. Diffuse and extensive pulmonary infiltrates in particular within   lower lobe segments.   2. Improved pulmonary aeration within lower lobe segments bilaterally   in comparison to the prior study.                  Signed by: Viral Sommers 12/5/2023 4:50 PM   Dictation workstation:   [id number]      XR abdomen 1 view   Final Result   1. The feeding tube is in satisfactory position                  I personally reviewed the images/study and I agree with the findings   as stated above by resident physician, Jerome Singh MD. This study   was interpreted at [place], [place].        MACRO:   None.        Signed by: Viral Sommers 12/5/2023 4:25 PM   Dictation workstation:   [id number]      XR abdomen 1 view   Final Result   1. The feeding tube appears positioned within the region of the   gastric antrum             I personally reviewed the images/study and I agree with the findings   as stated above by resident physician, Dr. Kedar Mitchell. The   study was interpreted at [facility] in [facility].        MACRO:   none        Signed by: Viral Sommers 12/5/2023 11:30 AM   Dictation workstation:   [id number]      IR angiogram   Preliminary Result   Attempted left middle meningeal artery embolization aborted due to   truncated left middle meningeal artery with no suitable distal target   for embolization.        I was present for and/or performed the critical portions of the   procedure and immediately available throughout the entire procedure.   I personally reviewed the image(s)/study and  interpretation. I agree   with the findings as stated. Performed and dictated at [facility].        MACRO:   None             Dictation workstation:   [id number]      CT head wo IV contrast   Final Result   Similar appearance of the head compared to prior examination with   unchanged appearance of subdural fluid surrounding the left frontal   subdural catheter and unchanged mixed attenuation subdural collection   along the left parietal, occipital, and temporal convexities.        I personally reviewed the images/study and I agree with the findings   as stated above by resident physician, Jerome Singh MD. This study   was interpreted at [place], [place].             MACRO:   None.        Signed by: Syed Amezquita 12/4/2023 7:10 AM   Dictation workstation:   [id number]      XR chest 1 view   Final Result   1.  Interval increased bilateral patchy airspace opacities likely   represent alveolar edema with multifocal infectious process not   excluded.   2. Small bilateral pleural effusions with adjacent atelectasis.        I personally reviewed the images/study and I agree with the findings   as stated above by resident physician, Jerome Singh MD. This study   was interpreted at [place], [place].             MACRO:   None.        Signed by: Viral Sommers 12/5/2023 7:54 AM   Dictation workstation:   [id number]      CT head wo IV contrast   Final Result   Slightly increased subdural fluid surrounding the subdural catheter   in the left frontal region. This may be due to decreased   pneumocephalus.        Stable component of subdural mixed attenuation hemorrhage within the   left parieto-occipital and temporal region.        Signed by: Kerrie Magana 12/3/2023 9:52 AM   Dictation workstation:   [id number]      CT head wo IV contrast   Final Result   1. Postsurgical changes consistent  with interval left frontal   craniotomy for re-evacuation of mixed density subdural hematoma   overlying the left cerebral convexity with interval reduction in size   of the subdural hematoma and improved mass effect, midline shift, and   sulcal effacement as described above.   2. No new transcortical infarction, intraparenchymal hemorrhage, or   herniation.        I personally reviewed the images/study and I agree with the findings   as stated above by resident physician, Dr. Kedar Mitchell. The   study was interpreted at [facility] in [facility].        Signed by: Orion Harris 12/1/2023 7:42 AM   Dictation workstation:   [id number]      CT head wo IV contrast   Final Result   1. Redemonstration of postoperative changes of left-sided craniotomy   with extra-axial mixed attenuation fluid and air collection measuring   approximately 9 mm in maximal thickness, previously measuring 7 mm.   There is superimposed mixed hyperdense and hypodense attenuation left   hemispheric subdural hematoma, grossly unchanged when compared to the   prior exam. There is effacement of the adjacent sulci and partial   effacement of the left lateral ventricle without significant changes   when compared to the prior exam. There is a proximally 8 mm   left-to-right midline shift, similar to prior.        I personally reviewed the images/study and I agree with the findings   as stated by resident physician Dr. Regino Quiñones . This study   was interpreted at [facility],   [place], [place].        MACRO:   None        Signed by: Sneha Pérez 11/30/2023 7:02 AM   Dictation workstation:   [id number]      CT head wo IV contrast   Final Result   Postoperative changes are again identified compatible with a recent   left sided craniotomy.        There is again evidence of scattered pneumocephalus.        Immediately deep to the craniotomy site, there is again  evidence of   an extra-axial likely epidural mixed attenuation hemorrhagic fluid   and air collection measuring approximately 7 mm in thickness on both   the current and prior study.        There is again evidence of a superimposed mixed hyperdense and   hypodense attenuation left hemispheric subdural hematoma again   measuring approximately 21 mm in greatest thickness as seen on the   coronal reconstructed images adjacent to the left parietal lobe.   There is again evidence of mass effect upon the adjacent left   cerebral hemisphere with asymmetric effacement/partial effacement of   sulci of the left cerebral hemisphere as well as partial effacement   of the left lateral ventricle and 3rd ventricle. There is again   evidence of approximately 8 mm of bowing of the midline structures   from left to right.        The above findings are again noted be superimposed upon moderate   brain parenchymal volume loss.        Patchy nonspecific white matter changes are again noted within   cerebral hemispheres bilaterally which while nonspecific, given the   patient's age, likely represent sequelae of small-vessel ischemic   change.        MACRO:   None.        Signed by: Cassius Richardson 11/29/2023 8:51 AM   Dictation workstation:   [id number]      CT head wo IV contrast   Final Result   Evolving postoperative changes with increased size of the residual   left subdural hematoma and increased volume of high attenuation blood   products, attention on follow-up is recommended. Rightward midline   shift is similar to previous.        I personally reviewed the images/study and I agree with the findings   as stated above by resident physician, Dr. eKdar Mitchell. The   study was interpreted at [facility] in [facility].        Signed by: Fredrick Hmephill 11/29/2023 9:10 AM   Dictation workstation:   [id number]      XR chest 1 view   Final Result   Redemonstration patchy airspace opacities  that are noted bilaterally,   favored to represent pulmonary alveolar edema. However, multifocal   pneumonia can have similar appearance. Small left layering effusion   with associated adjacent atelectasis.        I personally reviewed the images/study and I agree with the findings   as stated above by resident physician, Dr. Kedar Mitchell. The   study was interpreted at [facility] in [facility].        Signed by: Tushar Hartman 11/29/2023 8:36 AM   Dictation workstation:   [id number]      FL modified barium swallow study   Final Result   1. Please see detailed findings of modified barium swallow by speech   language pathologist.   2. No acute radiographic findings.        I personally reviewed the images/study and I agree with Dr. María Butler findings as stated. This study was interpreted at [place], [place]        MACRO:   None        Signed by: Rodney Kimball 12/7/2023 7:47 PM   Dictation workstation:   [id number]      XR chest 1 view   Final Result   1. Redemonstration of findings suggestive of pulmonary edema with   areas of patchy airspace opacities throughout the right lung which   are nonspecific and may represent multifocal pneumonia. Blunting of   the bilateral costophrenic angles may represent small pleural   effusions versus atelectasis.        I personally reviewed the images/study and I agree with the findings   as stated by resident physician Dr. Regino Quiñones . This study   was interpreted at [place], [place].        MACRO:   None        Signed by: Melissa Castro 11/26/2023 8:59 AM   Dictation workstation:   [id number]      XR chest 1 view   Final Result   1. Slight interval worsening of bilateral lung aeration with findings   suggestive of pulmonary edema, however multifocal infection is not   excluded in the appropriate clinical setting.  Delete   2. Small bilateral pleural effusions.        I personally reviewed the images/study and I agree with the findings   as stated above by resident physician, Jerome Singh MD. This study   was interpreted at [place], [place].             MACRO:   None.        Signed by: Brandon Yuan 11/25/2023 9:38 AM   Dictation workstation:   [id number]      XR chest 1 view   Final Result   1. Bibasilar atelectasis with or without trace bilateral pleural   effusions, left-greater-than-right. Superimposed   infectious/inflammatory infiltrate is not excluded, for example in   the setting of aspiration pneumonitis.        I personally reviewed the images/study and I agree with the resident   findings as stated by Yokasta Rubin MD. This study was interpreted at   [place], [place].        MACRO:   None        Signed by: Tushar Hartman 11/24/2023 8:28 AM   Dictation workstation:   [id number]      XR abdomen 1 view   Final Result   1. No enteric tube is seen within field of view and correlate   clinically.   2. Nonobstructive bowel gas pattern.   3. Visualized basilar lungs demonstrate coarse airspace opacities   bilaterally with small pleural effusions not excluded. Correlate with   concern for underlying infectious process.        Signed by: Kendall Calix 11/23/2023 6:52 AM   Dictation workstation:   [id number]      CT head wo IV contrast   Final Result   1. Postsurgical changes consistent with interval left frontoparietal   craniotomy for left subdural hematoma evacuation and subdural drain   placement with interval reduction in size of the mixed density fluid   collection and improvement in mass effect, midline shift, and sulcal   effacement when compared to prior, same day CT of the head as   described in detail above.   2. No evidence of new hemorrhage or acute cortical infarction.        I personally reviewed the images/study and I  agree with the findings   as stated above by resident physician, Dr. Kedar Mitchell. The   study was interpreted at [facility] in [facility].        Signed by: Sneha Pérez 11/22/2023 6:55 AM   Dictation workstation:   [id number]      CT head wo IV contrast   Final Result   1. Large predominantly hypodense left convexity subdural hematoma   with multiple septations measuring up to 2.6 cm at its greatest width   with significant associated mass effect, sulcal effacement, 10-11 mm   of rightward midline shift, and effacement of the left lateral   ventricle.   2. No evidence of additional hemorrhage or acute cortical infarct.        I personally reviewed the images/study and I agree with the findings   as stated above by resident physician, Dr. Kedar Mitchell. The   study was interpreted at [facility] in [facility].        Signed by: Shiv Magallanes 11/22/2023 12:51 AM   Dictation workstation:   [id number]      Bedside Midline Imaging    (Results Pending)   XR chest 1 view    (Results Pending)         Assessment/Plan   83 y.o. man with CLL, anemia, DM, dementia, chronic indwelling ignacio 2/2 BPH, HTN who presented on 11/22/23 for concerns of altered mental status. Found to have an acute large left SDH with midline shift. S/p evacuation on 11/22. CTH POC, 11/24 drain dc'd, Bcx +GPC, 11/25 febrile, remaining vitals stable, exam unchanged, repeat bcx negative.    Interval Events:   11/28 s/p midline   11/29 Lethargic, not FC, s/p CTH w/ increased L SDH w/ 8mm MLS s/p 1g Keppra load, CXR stable opacities, rCTH stable, s/p R side wd, s/p keppra load, rrCTH stable L SDH  11/30 S/p L crani for redo SDH evac, CTH POC with improved MLS  12/1 s/p extubation  12/3 CTH stable  12/4: patient was upgraded to NSU status for worsening neurologic exam, and increase O2 requirement. ABG 7.34/30/76. CTH stable with improved brain compression. CT-PE  negative for pulmonary embolism. New b/l moderate pleural effusions s/p Lasix 20mg.  : Improved ABG on CPAP. Poor UOP despite total 60mg IV Lasix.    :   - COVID positive, starting Remdesvir  - Continue albumin and bumex  - Improved exam today    NEURO:  #SDH s/p evacuation x2  #MMA embolization on   Assessment:   Neurologically: Awake, EOSp, Pupils 5mm>3mm and brisk  Moves all extremities to command, AG in all extremities  L crani with drain c/d/I  EEG read with severe diffuse encephalopathy  Plan:  NSU  Q1H neuro checks  Drain per NSGY   Pain: acetaminophen, oxycodone, hydromorphone PRN  Sedation: None  Dc cvEEG  Keppra 500mg BID  PT/OT/SLP    CARDIOVASCULAR:  # HTN, HLD  #Intravascular hypovolemia  #c/f Heart failure  Assessment:   ST on tele  Likely intravascularly depleted given likely pre-renal LIN  Plan:   Continue to monitor on telemetry  Goal SBP < 160  Continue atorvastatin  Hydralazine and Labetalol PRN  Albumin 25g once again  Echocardiogram: EF 65-70%, impaired diastolic function    RESPIRATORY:  #Bilateral pleural effusions   #Intubated for airway protection during OR (resolved)  Assessment:   On 4L NC  S/p Lasix 20mg and 40mg IV  CT-PE negative, but did show bilateral mod-large pleural effusions  AB.32// -> 7.26//  Additional Lasix 60mg IV with poor response, S/p bumetanide 1mg IV once, net -ve >4L; replaced 1L NS over 4h overnight   Repeat CXR with mild improvement  Tachypnea improved following initiation of BPAP  Plan:   Continuous pulse oximetry   O2 PRN to maintain SpO2 > 94%, wean as tolerated  Intermittent BPAP as needed, wean as tolerated  1mg bumex once again      RENAL/:  #BPH  #LIN, potential cardiorenal syndrome vs contrast nephropathy  Assessment:   - FENa 10%  Results from last 72 hours   Lab Units 23  0154 23  1935   BUN mg/dL 25* 24*   CREATININE mg/dL 1.78* 1.71*   Plan:   Monitor with daily RFP  Chronic ignacio for BPH  Albumin and diuresis as  above    FEN/GI:  #UGIB  Assessment:   Last BM Date: 12/09/23  Bloody emesis on floor per wife, no recent episodes  Plan:   Monitor and replace electrolytes per protocol  IVF: None  Diet: NPO  Bowel Regimen: Miralax PRN  Pantoprazole 40mg IV BID    ENDOCRINE:  #DM  Assessment:   Results from last 7 days   Lab Units 12/09/23  1208 12/09/23  0808 12/09/23  0154 12/08/23  1935   POCT GLUCOSE mg/dL 300* 315*  --   --    GLUCOSE mg/dL  --   --  229* 248*   Plan:    Accuchecks & ISS Q6H  Hypoglycemia protocol    HEMATOLOGY/ONCOLOGY:  #CLL  #c/f prostate cancer vs MM  Assessment:   Sclerotic lesions incidentally noted on CT PE  PSA 11.73  Results from last 7 days   Lab Units 12/09/23  0154 12/08/23  1004   HEMOGLOBIN g/dL 7.7* 7.1*   HEMATOCRIT % 23.2* 20.7*   PLATELETS AUTO x10*3/uL 197 177   Plan:  Continue to monitor with daily CBC and Coag panel  Heme consulted, recs appreciated  Oncology consulted with concern for malignant cause of sclerotic bony lesions, appreciate recs  SPEP, UPEP, and immunoglobulins to eval for possible multiple myeloma  CEA, CA 19-9 CA 25  MRI Pelvis and L-spine when able  Hold Ibrutinib, resume when NSGY is OK with that (heme recs 11/28)    INFECTIOUS DISEASE:  #concerns for aspiration PNA  Assessment:   Results from last 7 days   Lab Units 12/09/23  0154 12/08/23  1004   WBC AUTO x10*3/uL 8.1 7.0     Afebrile   Repeat BCx 12/4 pending  Repeat BCx 11/26 NGTD, MRSA negative, vanco d/c'd   BCx 11/24 with staph hominis  Ucx 11/24  neg  Procal 0.31 (12/6) -> 0.17 (12/8)  Febrile to 38.4 o/n 12/7-12/8  COVID positive  Plan:  Continue to monitor for s/sx of infection  Pan culture for temperature > 38.4 C  Zosyn (12/5-12/7) -> Vanc/cefepime (12/7 -)  Remdesivir 200mg today, then 100mg daily for total 5 day course (today day 1/5)  F/up Bcx 12/4, NGTD    MUSCULOSKELETAL:  No acute issues    SKIN:  No acute issues  Turns and skin care per NSU protocol    ACCESS:  PIV    PROPHYLAXIS:  DVT/VTE: SCDs,  [facility]    RESTRAINTS:  I agree with nursing assessment of the patient's need for restraints to protect the patient from injury and facilitate healing. The patient is unable to cooperate with the plan of care and at risk for disrupting critical therapy (i.e., removing medical devices, lines, tubes and/or dressings).  Please see order for specifics. Restraints can be removed when the patient is able to cooperate with plan of care and allow healing to occur, or the medical devices at risk are discontinued by the medical team.     Xavi Mcmullen MD  Neurology PGY-2  Neuroscience Intensive Care

## 2023-12-09 NOTE — PROGRESS NOTES
Haile Ayers is a 83 y.o. male on day 17 of admission presenting with Subdural hematoma (CMS/HCC).    Subjective   Interval History:   82yo male with hx of chronic urinary retention on chronic Ignacio ,  CLL ( well controlled with ibarutinib)  presented with altered mental status with subdural hematoma s/p evacution on 11/22 .     Last seen by ID on 11/26 for  fever that started postop day #1.   Based on clinical scenario and chest x-ray and urine study , they fel tmost likely source of fever would be aspiration pneumonia vs UTI. Treated for 10d of vac and cefepime (11/24-12/4) and fever resolved then.     ID rengaged for recurred fever fevers of unclear origin. Last 1 day ago at 38.4C, restarted on vanc and cefe, Bcx ngtd, Resp Cx with normal throat darrin, CT Chest with bilat effusion, UA/Ucx not done yet, has ignacio            Review of Systems    Objective   Range of Vitals (last 24 hours)  Heart Rate:  []   Temp:  [37.1 °C (98.7 °F)-37.6 °C (99.7 °F)]   Resp:  [18-29]   BP: (140-164)/(73-86)   SpO2:  [96 %-100 %]   Daily Weight  12/08/23 : 76.3 kg (168 lb 3.4 oz)    Body mass index is 25.58 kg/m².    Physical Exam  GEN: chronically ill appearing elderly man, frail, laying in bed, NAD, Aox3  HEENT: MMM, NCAT, non icteric eyes, L head craniotimy site well appearing without redness, drainage or warmth  RESP: coarse bilaterally, facial mask in place  CV: RRR, S1/S2, no appreciable murmurs,   GI: bs+ non distended, non tender  MSK: no bony deformities, no joint warmth or swelling  EXTREMITIES: warm and well-perfused, no peripheral edema  SKIN: moist, dry , no lesions  PSYCH: calm and cooperative    Scheduled meds  amLODIPine, 10 mg, nasogastric tube, Daily  atorvastatin, 20 mg, oral, Daily  bisacodyl, 10 mg, rectal, Daily  cefepime, 1 g, intravenous, q12h  cholecalciferol, 4,000 Units, oral, Daily  heparin (porcine), 5,000 Units, subcutaneous, q8h  insulin lispro, 0-5 Units, subcutaneous, TID with  "meals  levETIRAcetam, 500 mg, intravenous, q12h  pantoprazole, 40 mg, intravenous, BID  polyethylene glycol, 17 g, oral, Daily  potassium phosphate, 21 mmol, intravenous, Once  thiamine, 100 mg, nasogastric tube, Daily  vancomycin, 1,000 mg, intravenous, Once       Relevant Results  Labs  Results from last 72 hours   Lab Units 12/09/23  0154 12/08/23  1004 12/08/23  0009   WBC AUTO x10*3/uL 8.1 7.0 7.3   HEMOGLOBIN g/dL 7.7* 7.1* 7.1*   HEMATOCRIT % 23.2* 20.7* 21.7*   PLATELETS AUTO x10*3/uL 197 177 186   NEUTROS PCT AUTO % 73.9 70.0 67.4   LYMPHS PCT AUTO % 15.9 18.5 20.0   MONOS PCT AUTO % 8.5 9.9 11.1   EOS PCT AUTO % 1.1 0.9 0.7     Results from last 72 hours   Lab Units 12/09/23  0154 12/08/23  1935 12/08/23  1156   SODIUM mmol/L 149* 149* 150*   POTASSIUM mmol/L 3.9 3.0* 3.3*   CHLORIDE mmol/L 112* 110* 112*   CO2 mmol/L 30 29 28   BUN mg/dL 25* 24* 23   CREATININE mg/dL 1.78* 1.71* 1.77*   GLUCOSE mg/dL 229* 248* 217*   CALCIUM mg/dL 8.8 9.1 9.3   ANION GAP mmol/L 11 13 13   EGFR mL/min/1.73m*2 37* 39* 37*   PHOSPHORUS mg/dL 1.6* 1.9* 1.9*     Results from last 72 hours   Lab Units 12/09/23  0504 12/09/23  0154 12/08/23  1935 12/08/23  1431 12/08/23  0009 12/07/23  1110   ALK PHOS U/L 50  --   --   --   --  53   BILIRUBIN TOTAL mg/dL 0.5  --   --   --   --  0.5   BILIRUBIN DIRECT mg/dL 0.1  --   --   --   --  0.1   PROTEIN TOTAL g/dL 6.3*  --   --  6.2*  --  5.9*   ALT U/L 5*  --   --   --   --  5*   AST U/L 7*  --   --   --   --  7*   ALBUMIN g/dL 4.0 3.9 4.1  --    < > 3.4  3.4    < > = values in this interval not displayed.     Estimated Creatinine Clearance: 29.9 mL/min (A) (by C-G formula based on SCr of 1.78 mg/dL (H)).  No results found for: \"CRP\"  Microbiology  Susceptibility data from last 14 days.  Collected Specimen Info Organism   12/07/23 Fluid from SPUTUM Normal throat darrin       Imaging  12/87 renal US;   1. Mild right pelvic fullness with otherwise unremarkable renal   ultrasound.   2. " Incidental note of moderate volume abdominal ascites.     12/7 Cxray   1.  Slight improvement in right basilar aeration with continued   basilar edema/effusions. Correlate with fluid status.     12/5 CT Chest   1. No evidence of acute pulmonary embolism.   2. Moderate to large volume bilateral layering pleural effusions with   associated compressive atelectasis and diffuse body wall anasarca   along with smooth interlobular septal thickening is noted.   Additionally, large volume abdominal ascites is incidentally noted.   Correlation with patient's fluid status is recommended. Superimposed   pneumonia or aspiration pneumonitis not excluded.   3. Bilateral pulmonary arteries are dilated which may be sequela of   chronic pulmonary arterial hypertension.   4. Diffuse sclerotic lesions throughout the axial and appendicular   skeleton of varying sizes. 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 Correlate with any available outside   hospital imaging studies. Correlate with PSA levels. Updated findings   are communicated through epic secure chat message to the referring   provider at 12:35 p.m. on 12/06/2023.   5. Additional chronic findings as above.      Assessment/Plan   82yo male with hx of chronic urinary retention on chronic Mckee ,  CLL ( reportely well controlled with ibarutinib)  presented with altered mental status with subdural hematoma s/p evacution on 11/22 . Has screw in skull    Last seen by ID on 11/26 for  fever that started postop day #1.   Based on clinical scenario and chest x-ray and urine study , they fel tmost likely source of fever would be aspiration pneumonia vs UTI. Treated for 10d of vac and cefepime (11/24-12/4) and fever resolved then.     ID rengaged today for recurred fever fevers of unclear origin. Last fever was 1 day ago at 38.4C, primary restarted on vanc and cefe, Bcx ngtd, Resp Cx with normal  throat darrin, CT Chest with bilat effusion, UA/Ucx not done yet, has ignacio.    Recommendations:  - Source of fever unclear, suspect lungs vs UTI given layering bilateral effusion and chronic ignacio. Also has picc in place. Head incision site appear well but cant be absolutely sure uninfected w/o fluid analysis     - For now, advise send UA/Ucx from freshly placed ignacio system,           send PICC Bcx          send strep ag./legionell ag, procal    - If continues to fever and workup negative, will discuss any of the possibilities including:  re-scanning lungs, removing picc, tapping brain fluid, and  possibly PET     Thank you for consult, ID will follow   ID team A pager [phone].  For new consults, contact pager [phone].   Patient seen and staffed with ID attendant Dr Parson who agrees with plan as above  Bella Dang MD MPH

## 2023-12-09 NOTE — PROGRESS NOTES
"Vancomycin Dosing by Pharmacy- FOLLOW UP    Haile Ayers is a 83 y.o. year old male who Pharmacy has been consulted for vancomycin dosing for pneumonia. Based on the patient's indication and renal status this patient is being dosed based on a goal trough/random level of 15-20.     Renal function is currently stable.    Current vancomycin dose: 750 mg given every once     Most recent trough level: 13 mcg/mL    Visit Vitals  /86   Pulse 100   Temp 37.3 °C (99.1 °F) (Temporal)   Resp 19        Lab Results   Component Value Date    CREATININE 1.78 (H) 12/09/2023    CREATININE 1.71 (H) 12/08/2023    CREATININE 1.77 (H) 12/08/2023    CREATININE 1.91 (H) 12/08/2023        Patient weight is No results found for: \"PTWEIGHT\"    No results found for: \"CULTURE\"     I/O last 3 completed shifts:  In: 2970 (38.9 mL/kg) [Blood:100; NG/GT:1595; IV Piggyback:1275]  Out: 5210 (68.3 mL/kg) [Urine:5210 (1.9 mL/kg/hr)]  Weight: 76.3 kg   [unfilled]    Lab Results   Component Value Date    PATIENTTEMP  12/06/2023      Comment:      NOTE: Patient Results are Not Corrected for Temperature    PATIENTTEMP  12/05/2023      Comment:      NOTE: Patient Results are Not Corrected for Temperature    PATIENTTEMP 37.0 12/05/2023        Assessment/Plan    Tr is below goal at 13. Patient previously got a one time dose of 750 mg. Will give a one time dose of 1000 mg today and recheck trough tomorrow       The next level will be obtained on 12/10 at 0500. May be obtained sooner if clinically indicated.   Will continue to monitor renal function daily while on vancomycin and order serum creatinine at least every 48 hours if not already ordered.  Follow for continued vancomycin needs, clinical response, and signs/symptoms of toxicity.       Sneha Charlton, PharmD           "

## 2023-12-09 NOTE — PROGRESS NOTES
"Haile Ayers is a 83 y.o. male on day 17 of admission presenting with Subdural hematoma (CMS/HCC).    Subjective   No events overnight.       Objective     Physical Exam  EOSp  Mimics antigravity x 4 R>L      Last Recorded Vitals  Blood pressure 154/78, pulse 92, temperature 36.8 °C (98.2 °F), temperature source Temporal, resp. rate 19, height 1.727 m (5' 7.99\"), weight 76.3 kg (168 lb 3.4 oz), SpO2 97 %.  Intake/Output last 3 Shifts:  I/O last 3 completed shifts:  In: 1890 (24.8 mL/kg) [NG/GT:1890]  Out: 4010 (52.6 mL/kg) [Urine:4010 (1.5 mL/kg/hr)]  Weight: 76.3 kg     Relevant Results    Assessment/Plan   Principal Problem:    Subdural hematoma (CMS/HCC)  Active Problems:    HTN (hypertension)    Diabetes mellitus, type 2 (CMS/HCC)    CVA (cerebral vascular accident) (CMS/HCC)    83 y.o. male h/o CLL, anemia, DM, p/w AMS, found to have UTI, CTH w/ L large chronic SDH, 11/22 s/p L crani for SDH evac, CTH POC, 11/24 drain dc'd, Bcx +GPC, 11/25 febrile, remaining vitals stable, exam unchanged, repeat bcx negative     11/28 s/p midline   11/29 Lethargic, not FC, s/p CTH w/ increased L SDH w/ 8mm MLS s/p 1g Keppra load, CXR stable opacities, rCTH stable, s/p R side wd, s/p keppra load, rrCTH stable L SDH,  11/30 S/p L crani for redo SDH evac, CTH POC with improved MLS  12/1 s/p extubation  12/3 CTH stable  12/4 s/psmall L MMA w no distal branches, not embolized   12/5 DVT US x 4 RLE chornic changes no DVT, CTH stable, CT PE neg for PE, BL pleural effusions s/p 20 lasix  12/6 TTE EF 65-70%, SDD dc'd     Improved exam overnight    PLAN:     NSU   SBP<160  COVID positive  Med onc recs  BIPAP as needed  PEG convo  Vanc, cefe  ID recs  Fever curve  Chronic ignacio  Heme recs-cont to hold ibrutinib   SLP re-eval  PTOT-rehab; needs re-eval, resume trickle tube feeds  SCD, SQH      Fidel Smith MD      "

## 2023-12-10 ENCOUNTER — APPOINTMENT (OUTPATIENT)
Dept: RADIOLOGY | Facility: HOSPITAL | Age: 83
DRG: 025 | End: 2023-12-10
Payer: MEDICARE

## 2023-12-10 LAB
ALBUMIN SERPL BCP-MCNC: 3.8 G/DL (ref 3.4–5)
ALBUMIN SERPL BCP-MCNC: 3.9 G/DL (ref 3.4–5)
ANION GAP BLDA CALCULATED.4IONS-SCNC: 3 MMO/L (ref 10–25)
ANION GAP SERPL CALC-SCNC: 13 MMOL/L (ref 10–20)
ANION GAP SERPL CALC-SCNC: 13 MMOL/L (ref 10–20)
BACTERIA UR CULT: NO GROWTH
BASE EXCESS BLDA CALC-SCNC: 11.3 MMOL/L (ref -2–3)
BASE EXCESS BLDA CALC-SCNC: 9.1 MMOL/L (ref -2–3)
BASOPHILS # BLD AUTO: 0.02 X10*3/UL (ref 0–0.1)
BASOPHILS NFR BLD AUTO: 0.2 %
BODY TEMPERATURE: ABNORMAL
BODY TEMPERATURE: ABNORMAL
BUN SERPL-MCNC: 29 MG/DL (ref 6–23)
BUN SERPL-MCNC: 30 MG/DL (ref 6–23)
CA-I BLD-SCNC: 1.15 MMOL/L (ref 1.1–1.33)
CA-I BLDA-SCNC: 1.18 MMOL/L (ref 1.1–1.33)
CALCIUM SERPL-MCNC: 8.7 MG/DL (ref 8.6–10.6)
CALCIUM SERPL-MCNC: 8.8 MG/DL (ref 8.6–10.6)
CHLORIDE BLDA-SCNC: 113 MMOL/L (ref 98–107)
CHLORIDE SERPL-SCNC: 111 MMOL/L (ref 98–107)
CHLORIDE SERPL-SCNC: 113 MMOL/L (ref 98–107)
CO2 SERPL-SCNC: 31 MMOL/L (ref 21–32)
CO2 SERPL-SCNC: 31 MMOL/L (ref 21–32)
CREAT SERPL-MCNC: 1.58 MG/DL (ref 0.5–1.3)
CREAT SERPL-MCNC: 1.62 MG/DL (ref 0.5–1.3)
EOSINOPHIL # BLD AUTO: 0.15 X10*3/UL (ref 0–0.4)
EOSINOPHIL NFR BLD AUTO: 1.9 %
ERYTHROCYTE [DISTWIDTH] IN BLOOD BY AUTOMATED COUNT: 14.9 % (ref 11.5–14.5)
ERYTHROCYTE [DISTWIDTH] IN BLOOD BY AUTOMATED COUNT: 15.4 % (ref 11.5–14.5)
GFR SERPL CREATININE-BSD FRML MDRD: 42 ML/MIN/1.73M*2
GFR SERPL CREATININE-BSD FRML MDRD: 43 ML/MIN/1.73M*2
GLUCOSE BLD MANUAL STRIP-MCNC: 136 MG/DL (ref 74–99)
GLUCOSE BLD MANUAL STRIP-MCNC: 176 MG/DL (ref 74–99)
GLUCOSE BLD MANUAL STRIP-MCNC: 225 MG/DL (ref 74–99)
GLUCOSE BLD MANUAL STRIP-MCNC: 265 MG/DL (ref 74–99)
GLUCOSE BLDA-MCNC: 177 MG/DL (ref 74–99)
GLUCOSE SERPL-MCNC: 132 MG/DL (ref 74–99)
GLUCOSE SERPL-MCNC: 290 MG/DL (ref 74–99)
HCO3 BLDA-SCNC: 34.2 MMOL/L (ref 22–26)
HCO3 BLDA-SCNC: 36.3 MMOL/L (ref 22–26)
HCT VFR BLD AUTO: 22.3 % (ref 41–52)
HCT VFR BLD AUTO: 24.4 % (ref 41–52)
HCT VFR BLD EST: 23 % (ref 41–52)
HGB BLD-MCNC: 7.5 G/DL (ref 13.5–17.5)
HGB BLD-MCNC: 8.1 G/DL (ref 13.5–17.5)
HGB BLDA-MCNC: 7.7 G/DL (ref 13.5–17.5)
IMM GRANULOCYTES # BLD AUTO: 0.03 X10*3/UL (ref 0–0.5)
IMM GRANULOCYTES NFR BLD AUTO: 0.4 % (ref 0–0.9)
INHALED O2 CONCENTRATION: 28 %
INHALED O2 CONCENTRATION: 28 %
KAPPA LC SERPL-MCNC: 8.3 MG/DL (ref 0.33–1.94)
KAPPA LC/LAMBDA SER: 1.13 {RATIO} (ref 0.26–1.65)
LACTATE BLDA-SCNC: 0.4 MMOL/L (ref 0.4–2)
LAMBDA LC SERPL-MCNC: 7.32 MG/DL (ref 0.57–2.63)
LYMPHOCYTES # BLD AUTO: 1.45 X10*3/UL (ref 0.8–3)
LYMPHOCYTES NFR BLD AUTO: 18 %
MAGNESIUM SERPL-MCNC: 2.09 MG/DL (ref 1.6–2.4)
MCH RBC QN AUTO: 28.8 PG (ref 26–34)
MCH RBC QN AUTO: 29.8 PG (ref 26–34)
MCHC RBC AUTO-ENTMCNC: 33.2 G/DL (ref 32–36)
MCHC RBC AUTO-ENTMCNC: 33.6 G/DL (ref 32–36)
MCV RBC AUTO: 86 FL (ref 80–100)
MCV RBC AUTO: 90 FL (ref 80–100)
MONOCYTES # BLD AUTO: 0.66 X10*3/UL (ref 0.05–0.8)
MONOCYTES NFR BLD AUTO: 8.2 %
NEUTROPHILS # BLD AUTO: 5.73 X10*3/UL (ref 1.6–5.5)
NEUTROPHILS NFR BLD AUTO: 71.3 %
NRBC BLD-RTO: 0 /100 WBCS (ref 0–0)
NRBC BLD-RTO: 0 /100 WBCS (ref 0–0)
OXYHGB MFR BLDA: 96 % (ref 94–98)
OXYHGB MFR BLDA: 96.5 % (ref 94–98)
PCO2 BLDA: 47 MM HG (ref 38–42)
PCO2 BLDA: 51 MM HG (ref 38–42)
PH BLDA: 7.46 PH (ref 7.38–7.42)
PH BLDA: 7.47 PH (ref 7.38–7.42)
PHOSPHATE SERPL-MCNC: 1.8 MG/DL (ref 2.5–4.9)
PHOSPHATE SERPL-MCNC: 2.8 MG/DL (ref 2.5–4.9)
PLATELET # BLD AUTO: 172 X10*3/UL (ref 150–450)
PLATELET # BLD AUTO: 178 X10*3/UL (ref 150–450)
PO2 BLDA: 69 MM HG (ref 85–95)
PO2 BLDA: 93 MM HG (ref 85–95)
POTASSIUM BLDA-SCNC: 3.9 MMOL/L (ref 3.5–5.3)
POTASSIUM SERPL-SCNC: 3.8 MMOL/L (ref 3.5–5.3)
POTASSIUM SERPL-SCNC: 3.9 MMOL/L (ref 3.5–5.3)
PROCALCITONIN SERPL-MCNC: 0.13 NG/ML
RBC # BLD AUTO: 2.6 X10*6/UL (ref 4.5–5.9)
RBC # BLD AUTO: 2.72 X10*6/UL (ref 4.5–5.9)
SAO2 % BLDA: 98 % (ref 94–100)
SAO2 % BLDA: 99 % (ref 94–100)
SODIUM BLDA-SCNC: 148 MMOL/L (ref 136–145)
SODIUM SERPL-SCNC: 151 MMOL/L (ref 136–145)
SODIUM SERPL-SCNC: 153 MMOL/L (ref 136–145)
VANCOMYCIN TROUGH SERPL-MCNC: 16.1 UG/ML (ref 5–20)
WBC # BLD AUTO: 8 X10*3/UL (ref 4.4–11.3)
WBC # BLD AUTO: 8.4 X10*3/UL (ref 4.4–11.3)

## 2023-12-10 PROCEDURE — 2020000001 HC ICU ROOM DAILY

## 2023-12-10 PROCEDURE — 2500000004 HC RX 250 GENERAL PHARMACY W/ HCPCS (ALT 636 FOR OP/ED)

## 2023-12-10 PROCEDURE — 2500000001 HC RX 250 WO HCPCS SELF ADMINISTERED DRUGS (ALT 637 FOR MEDICARE OP)

## 2023-12-10 PROCEDURE — 82947 ASSAY GLUCOSE BLOOD QUANT: CPT

## 2023-12-10 PROCEDURE — 94660 CPAP INITIATION&MGMT: CPT

## 2023-12-10 PROCEDURE — 85025 COMPLETE CBC W/AUTO DIFF WBC: CPT | Performed by: REGISTERED NURSE

## 2023-12-10 PROCEDURE — 37799 UNLISTED PX VASCULAR SURGERY: CPT

## 2023-12-10 PROCEDURE — 84132 ASSAY OF SERUM POTASSIUM: CPT | Performed by: STUDENT IN AN ORGANIZED HEALTH CARE EDUCATION/TRAINING PROGRAM

## 2023-12-10 PROCEDURE — 84145 PROCALCITONIN (PCT): CPT

## 2023-12-10 PROCEDURE — 94760 N-INVAS EAR/PLS OXIMETRY 1: CPT

## 2023-12-10 PROCEDURE — 80202 ASSAY OF VANCOMYCIN: CPT | Performed by: REGISTERED NURSE

## 2023-12-10 PROCEDURE — 37799 UNLISTED PX VASCULAR SURGERY: CPT | Performed by: REGISTERED NURSE

## 2023-12-10 PROCEDURE — 82805 BLOOD GASES W/O2 SATURATION: CPT

## 2023-12-10 PROCEDURE — 71045 X-RAY EXAM CHEST 1 VIEW: CPT | Performed by: STUDENT IN AN ORGANIZED HEALTH CARE EDUCATION/TRAINING PROGRAM

## 2023-12-10 PROCEDURE — 2500000005 HC RX 250 GENERAL PHARMACY W/O HCPCS: Performed by: STUDENT IN AN ORGANIZED HEALTH CARE EDUCATION/TRAINING PROGRAM

## 2023-12-10 PROCEDURE — 71045 X-RAY EXAM CHEST 1 VIEW: CPT

## 2023-12-10 PROCEDURE — 84100 ASSAY OF PHOSPHORUS: CPT | Performed by: REGISTERED NURSE

## 2023-12-10 PROCEDURE — 36600 WITHDRAWAL OF ARTERIAL BLOOD: CPT

## 2023-12-10 PROCEDURE — 85027 COMPLETE CBC AUTOMATED: CPT

## 2023-12-10 PROCEDURE — 2500000004 HC RX 250 GENERAL PHARMACY W/ HCPCS (ALT 636 FOR OP/ED): Performed by: STUDENT IN AN ORGANIZED HEALTH CARE EDUCATION/TRAINING PROGRAM

## 2023-12-10 PROCEDURE — 2500000002 HC RX 250 W HCPCS SELF ADMINISTERED DRUGS (ALT 637 FOR MEDICARE OP, ALT 636 FOR OP/ED)

## 2023-12-10 PROCEDURE — 82330 ASSAY OF CALCIUM: CPT | Performed by: REGISTERED NURSE

## 2023-12-10 PROCEDURE — 80069 RENAL FUNCTION PANEL: CPT

## 2023-12-10 PROCEDURE — 83735 ASSAY OF MAGNESIUM: CPT | Performed by: REGISTERED NURSE

## 2023-12-10 PROCEDURE — 2500000005 HC RX 250 GENERAL PHARMACY W/O HCPCS

## 2023-12-10 PROCEDURE — 74018 RADEX ABDOMEN 1 VIEW: CPT

## 2023-12-10 PROCEDURE — 74018 RADEX ABDOMEN 1 VIEW: CPT | Performed by: STUDENT IN AN ORGANIZED HEALTH CARE EDUCATION/TRAINING PROGRAM

## 2023-12-10 PROCEDURE — 2500000004 HC RX 250 GENERAL PHARMACY W/ HCPCS (ALT 636 FOR OP/ED): Performed by: REGISTERED NURSE

## 2023-12-10 PROCEDURE — 99291 CRITICAL CARE FIRST HOUR: CPT

## 2023-12-10 PROCEDURE — C9113 INJ PANTOPRAZOLE SODIUM, VIA: HCPCS

## 2023-12-10 PROCEDURE — 96372 THER/PROPH/DIAG INJ SC/IM: CPT

## 2023-12-10 RX ORDER — INSULIN LISPRO 100 [IU]/ML
0-10 INJECTION, SOLUTION INTRAVENOUS; SUBCUTANEOUS EVERY 6 HOURS
Status: DISCONTINUED | OUTPATIENT
Start: 2023-12-10 | End: 2023-12-17

## 2023-12-10 RX ORDER — INSULIN GLARGINE 100 [IU]/ML
5 INJECTION, SOLUTION SUBCUTANEOUS NIGHTLY
Status: DISCONTINUED | OUTPATIENT
Start: 2023-12-10 | End: 2023-12-17

## 2023-12-10 RX ORDER — INSULIN LISPRO 100 [IU]/ML
0-10 INJECTION, SOLUTION INTRAVENOUS; SUBCUTANEOUS
Status: DISCONTINUED | OUTPATIENT
Start: 2023-12-10 | End: 2023-12-10

## 2023-12-10 RX ADMIN — HEPARIN SODIUM 5000 UNITS: 5000 INJECTION INTRAVENOUS; SUBCUTANEOUS at 01:51

## 2023-12-10 RX ADMIN — LEVETIRACETAM 500 MG: 5 INJECTION INTRAVENOUS at 17:31

## 2023-12-10 RX ADMIN — HEPARIN SODIUM 5000 UNITS: 5000 INJECTION INTRAVENOUS; SUBCUTANEOUS at 17:31

## 2023-12-10 RX ADMIN — INSULIN LISPRO 3 UNITS: 100 INJECTION, SOLUTION INTRAVENOUS; SUBCUTANEOUS at 09:09

## 2023-12-10 RX ADMIN — Medication 1250 MG: at 06:45

## 2023-12-10 RX ADMIN — CEFEPIME 1 G: 1 INJECTION, SOLUTION INTRAVENOUS at 08:30

## 2023-12-10 RX ADMIN — PANTOPRAZOLE SODIUM 40 MG: 40 INJECTION, POWDER, FOR SOLUTION INTRAVENOUS at 21:14

## 2023-12-10 RX ADMIN — POTASSIUM PHOSPHATE, MONOBASIC POTASSIUM PHOSPHATE, DIBASIC 21 MMOL: 224; 236 INJECTION, SOLUTION, CONCENTRATE INTRAVENOUS at 11:33

## 2023-12-10 RX ADMIN — PANTOPRAZOLE SODIUM 40 MG: 40 INJECTION, POWDER, FOR SOLUTION INTRAVENOUS at 09:09

## 2023-12-10 RX ADMIN — INSULIN GLARGINE 5 UNITS: 100 INJECTION, SOLUTION SUBCUTANEOUS at 21:44

## 2023-12-10 RX ADMIN — OXYCODONE HYDROCHLORIDE 5 MG: 5 TABLET ORAL at 21:14

## 2023-12-10 RX ADMIN — LEVETIRACETAM 500 MG: 5 INJECTION INTRAVENOUS at 04:47

## 2023-12-10 RX ADMIN — CEFEPIME 1 G: 1 INJECTION, SOLUTION INTRAVENOUS at 20:30

## 2023-12-10 RX ADMIN — REMDESIVIR 100 MG: 100 INJECTION, POWDER, LYOPHILIZED, FOR SOLUTION INTRAVENOUS at 11:32

## 2023-12-10 RX ADMIN — HEPARIN SODIUM 5000 UNITS: 5000 INJECTION INTRAVENOUS; SUBCUTANEOUS at 09:09

## 2023-12-10 RX ADMIN — INSULIN LISPRO 4 UNITS: 100 INJECTION, SOLUTION INTRAVENOUS; SUBCUTANEOUS at 13:22

## 2023-12-10 ASSESSMENT — COGNITIVE AND FUNCTIONAL STATUS - GENERAL
MOVING FROM LYING ON BACK TO SITTING ON SIDE OF FLAT BED WITH BEDRAILS: A LOT
WALKING IN HOSPITAL ROOM: TOTAL
MOVING TO AND FROM BED TO CHAIR: TOTAL
TURNING FROM BACK TO SIDE WHILE IN FLAT BAD: TOTAL
STANDING UP FROM CHAIR USING ARMS: TOTAL
CLIMB 3 TO 5 STEPS WITH RAILING: TOTAL
MOBILITY SCORE: 7

## 2023-12-10 ASSESSMENT — PAIN - FUNCTIONAL ASSESSMENT
PAIN_FUNCTIONAL_ASSESSMENT: CPOT (CRITICAL CARE PAIN OBSERVATION TOOL)
PAIN_FUNCTIONAL_ASSESSMENT: 0-10
PAIN_FUNCTIONAL_ASSESSMENT: CPOT (CRITICAL CARE PAIN OBSERVATION TOOL)
PAIN_FUNCTIONAL_ASSESSMENT: 0-10

## 2023-12-10 ASSESSMENT — PAIN SCALES - GENERAL
PAINLEVEL_OUTOF10: 0 - NO PAIN
PAINLEVEL_OUTOF10: 4
PAINLEVEL_OUTOF10: 0 - NO PAIN
PAINLEVEL_OUTOF10: 0 - NO PAIN

## 2023-12-10 NOTE — NURSING NOTE
#12 Fr, small bore feeding tube inserted into R nare with Enteral Access System CORTARK 2 per provider orders,including insertion of nasal bridle and removal of wire stylet; patient tolerated procedure; feeding tube secured at 80 cm; team made aware; bedside nurse notified and educated on appropriate use; no bleeding or adverse reaction noted; KUB activated for placement verification.

## 2023-12-10 NOTE — PROGRESS NOTES
"Haile Ayers is a 83 y.o. male on day 18 of admission presenting with Subdural hematoma (CMS/HCC).    Subjective   No events overnight.       Objective     Physical Exam  EOSp  Mimics antigravity x 4 R>L      Last Recorded Vitals  Blood pressure 149/74, pulse 101, temperature 37.2 °C (98.9 °F), temperature source Temporal, resp. rate 23, height 1.727 m (5' 7.99\"), weight 68 kg (149 lb 14.6 oz), SpO2 94 %.  Intake/Output last 3 Shifts:  I/O last 3 completed shifts:  In: 2785 (36.5 mL/kg) [NG/GT:2785]  Out: 5145 (67.4 mL/kg) [Urine:5145 (1.9 mL/kg/hr)]  Weight: 76.3 kg     Relevant Results    Assessment/Plan   Principal Problem:    Subdural hematoma (CMS/HCC)  Active Problems:    HTN (hypertension)    Diabetes mellitus, type 2 (CMS/HCC)    CVA (cerebral vascular accident) (CMS/HCC)    83 y.o. male h/o CLL, anemia, DM, p/w AMS, found to have UTI, CTH w/ L large chronic SDH, 11/22 s/p L crani for SDH evac, CTH POC, 11/24 drain dc'd, Bcx +GPC, 11/25 febrile, remaining vitals stable, exam unchanged, repeat bcx negative     11/28 s/p midline   11/29 Lethargic, not FC, s/p CTH w/ increased L SDH w/ 8mm MLS s/p 1g Keppra load, CXR stable opacities, rCTH stable, s/p R side wd, s/p keppra load, rrCTH stable L SDH,  11/30 S/p L crani for redo SDH evac, CTH POC with improved MLS  12/1 s/p extubation  12/3 CTH stable  12/4 s/psmall L MMA w no distal branches, not embolized   12/5 DVT US x 4 RLE chornic changes no DVT, CTH stable, CT PE neg for PE, BL pleural effusions s/p 20 lasix  12/6 TTE EF 65-70%, SDD dc'd     Improved exam overnight    PLAN:     NSU   SBP<160  COVID positive-treating with Remdesivir  Med onc recs-CT AP vs PET  BIPAP as needed  PEG convo pending onc work up  Vanc, cefe  ID recs  Fever curve  Chronic ignacio  Heme recs-cont to hold ibrutinib   SLP re-eval  PTOT-rehab; needs re-eval, resume trickle tube feeds  SCD, SQH      Lupillo Vazquez MD      "

## 2023-12-10 NOTE — PROGRESS NOTES
Subjective   83 y.o. man with CLL, anemia, DM, dementia, chronic indwelling ignacio 2/2 BPH, HTN who presented on 11/22/23 for concerns of altered mental status. Found to have an acute large left SDH with midline shift. S/p evacuation on 11/22. CTH POC, 11/24 drain dc'd, Bcx +GPC, 11/25 febrile, remaining vitals stable, exam unchanged, repeat bcx negative    Interval Events: Mr. Ayers pulled his feeding tube this morning. Otherwise more somnolent today         Objective     Vitals 24 hour ranges:  Heart Rate:  []   Temp:  [36.7 °C (98 °F)-37.3 °C (99.1 °F)]   Resp:  [15-29]   BP: (135-171)/(70-90)   Weight:  [68 kg (149 lb 14.6 oz)]   SpO2:  [94 %-100 %]      Intake/Output for last 24 hours:    Intake/Output Summary (Last 24 hours) at 12/10/2023 1217  Last data filed at 12/10/2023 0900  Gross per 24 hour   Intake 1935 ml   Output 2830 ml   Net -895 ml      Physical Exam:  NEURO:  Somnolent, EOV, Pupils 4mm>3mm and brisk, mimics  Moves all extremities antigrav with stimulation  CV:  ST on tele. No M/G/R.   RESP:  On 2L NC  No increased WOB  :  Chronic ignacio with clear yellow urine  GI:  Abdomen soft, distended. NT, no rigidity or guarding   SKIN:  Left frontal incision c/d/I with drain       Medications    Scheduled:  amLODIPine, 10 mg, nasogastric tube, Daily  atorvastatin, 20 mg, oral, Daily  bisacodyl, 10 mg, rectal, Daily  cefepime, 1 g, intravenous, q12h  cholecalciferol, 4,000 Units, oral, Daily  heparin (porcine), 5,000 Units, subcutaneous, q8h  insulin lispro, 0-10 Units, subcutaneous, q6h  levETIRAcetam, 500 mg, intravenous, q12h  pantoprazole, 40 mg, intravenous, BID  polyethylene glycol, 17 g, oral, Daily  potassium phosphate, 21 mmol, intravenous, Once  remdesivir, 100 mg, intravenous, q24h  thiamine, 100 mg, nasogastric tube, Daily    PRN:  PRN medications: [Held by provider] acetaminophen, albuterol, calcium gluconate, calcium gluconate, dextrose 10 % in water (D10W), dextrose, glucagon,  hydrALAZINE, labetaloL, magnesium sulfate, magnesium sulfate, naloxone, ondansetron **OR** ondansetron, oxyCODONE, oxyCODONE, oxygen, potassium chloride CR **OR** potassium chloride, potassium chloride CR **OR** potassium chloride, potassium chloride, potassium chloride, vancomycin     Continuous:  oxygen, , Last Rate: 4 L/min (12/08/23 0845)      Lab Results  Results from last 72 hours   Lab Units 12/10/23  0456 12/09/23  1729   WBC AUTO x10*3/uL 8.0 7.9   NRBC AUTO /100 WBCs 0.0 0.0   RBC AUTO x10*6/uL 2.72* 2.62*   HEMOGLOBIN g/dL 8.1* 7.8*   HEMATOCRIT % 24.4* 22.5*   MCV fL 90 86   MCH pg 29.8 29.8   MCHC g/dL 33.2 34.7   RDW % 15.4* 14.7*   PLATELETS AUTO x10*3/uL 178 182      Results from last 72 hours   Lab Units 12/10/23  0456 12/09/23  1729 12/09/23  0504 12/09/23  0154 12/08/23  1935   GLUCOSE mg/dL 290* 314*  --  229* 248*   SODIUM mmol/L 151* 150*  --  149* 149*   POTASSIUM mmol/L 3.9 3.4*  --  3.9 3.0*   CHLORIDE mmol/L 111* 110*  --  112* 110*   CO2 mmol/L 31 32  --  30 29   ANION GAP mmol/L 13 11  --  11 13   BUN mg/dL 29* 29*  --  25* 24*   CREATININE mg/dL 1.62* 1.61*  --  1.78* 1.71*   EGFR mL/min/1.73m*2 42* 42*  --  37* 39*   CALCIUM mg/dL 8.8 9.0  --  8.8 9.1   PHOSPHORUS mg/dL 1.8* 1.7*  --  1.6* 1.9*   ALBUMIN g/dL 3.9 4.2   < > 3.9 4.1   MAGNESIUM mg/dL 2.09  --   --   --  2.49*   POCT CALCIUM IONIZED (MMOL/L) IN BLOOD mmol/L 1.15  --   --  1.17  --     < > = values in this interval not displayed.          Imaging Results  XR chest 1 view   Final Result   1. Similar mild bibasilar atelectasis suggestion of small pleural   effusions.   2. Questionable interstitial edema.        Signed by: Kendall Calix 12/10/2023 10:08 AM   Dictation workstation:   [id number]      XR chest 1 view   Final Result   1.  Mild interval improvement in pulmonary interstitial edema.   2. Apparent minimal interval increase in trace right pleural effusion   and decrease in small left pleural effusion. This difference  may be   due to differences in patient positioning.   3. Medical devices as described above.        I personally reviewed the images/study and I agree with Kenzie Rasheed DO's (radiology resident) findings as stated. This study   was interpreted at [place], [place].        MACRO:   None        Signed by: Brandon Yuan 12/9/2023 3:40 PM   Dictation workstation:   [id number]      US renal complete   Final Result   1. Mild right pelvic fullness with otherwise unremarkable renal   ultrasound.   2. Incidental note of moderate volume abdominal ascites.        I personally reviewed the images/study, and I agree with the findings   as stated above. This study was interpreted at [place], [place].        MACRO:   None        Signed by: Bonifacio Zurita 12/7/2023 4:19 PM   Dictation workstation:   [id number]      XR chest 1 view   Final Result   1.  Slight improvement in right basilar aeration with continued   basilar edema/effusions. Correlate with fluid status.             Signed by: Rob Winter 12/7/2023 8:02 AM   Dictation workstation:   [id number]      Transthoracic Echo (TTE) Complete   Final Result      XR abdomen 1 view   Final Result   1.  Dobbhoff tube overlies the 2nd duodenum.        Signed by: Rob Winter 12/7/2023 8:01 AM   Dictation workstation:   [id number]      XR chest 1 view   Final Result   1. Interval development of probable right middle lobe atelectasis.        2. Mild interval increase in interstitial opacities in the left lung.        3. Probable trace bilateral pleural effusions.        4. Removal of enteric tube.        I personally reviewed the images/study and I agree with Dr. María Butler findings as stated. This study was interpreted at [place], [place]        MACRO:   None        Signed by: Brandon Yuan 12/6/2023 11:56 AM    Dictation workstation:   [id number]      CT head wo IV contrast   Final Result   Redemonstration of postsurgical changes of left frontoparietal   craniotomy and left subdural drain placement with interval reduction   in size of the previously noted mixed attenuation extra-axial fluid   collection overlying the left cerebral convexity when compared to   prior examination from 12/04/2023. Additionally, there has been   interval improvement in associated sulcal effacement, mass effect,   and midline shift.        I personally reviewed the images/study and I agree with the findings   as stated above by resident physician, Dr. Kedar Mitchell.        MACRO:   none        Signed by: Kristan Cobb 12/6/2023 6:04 AM   Dictation workstation:   [id number]      CT angio chest for pulmonary embolism   Final Result   1. No evidence of acute pulmonary embolism.   2. Moderate to large volume bilateral layering pleural effusions with   associated compressive atelectasis and diffuse body wall anasarca   along with smooth interlobular septal thickening is noted.   Additionally, large volume abdominal ascites is incidentally noted.   Correlation with patient's fluid status is recommended. Superimposed   pneumonia or aspiration pneumonitis not excluded.   3. Bilateral pulmonary arteries are dilated which may be sequela of   chronic pulmonary arterial hypertension.   4. Diffuse sclerotic lesions throughout the axial and appendicular   skeleton of varying sizes. 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 Correlate with any available outside   hospital imaging studies. Correlate with PSA levels. Updated findings   are communicated through epic secure chat message to the referring   provider at 12:35 p.m. on 12/06/2023.   5. Additional chronic findings as above.        I personally reviewed the images/study and I agree with the findings   as  stated above by resident physician, Dr. Kedar Mitchell. The   study was interpreted at [facility] in [facility].        Signed by: Gabriella Gee 12/6/2023 12:35 PM   Dictation workstation:   [id number]      XR abdomen 1 view   Final Result   1.  Enteric tube tip is in the right mainstem bronchus. Recommend   repositioning.   2. Nonobstructive bowel gas pattern.        I personally reviewed the images/study and I agree with the findings   as stated by resident physician Dr. Regino Quiñones . This study   was interpreted at [place], [place].        MACRO:   Regino Quiñones discussed the significance and urgency of this   critical finding by telephone with  Dr. Kiran on 12/5/2023 at   4:47 pm.  (**-RCF-**) Findings:  See findings.        Signed by: Viral Sommers 12/5/2023 4:50 PM   Dictation workstation:   [id number]      Vascular US lower extremity venous duplex bilateral   Final Result      Vascular US upper extremity venous duplex bilateral   Final Result      XR chest 1 view   Final Result   1. Diffuse and extensive pulmonary infiltrates in particular within   lower lobe segments.   2. Improved pulmonary aeration within lower lobe segments bilaterally   in comparison to the prior study.                  Signed by: Viral Sommers 12/5/2023 4:50 PM   Dictation workstation:   [id number]      XR abdomen 1 view   Final Result   1. The feeding tube is in satisfactory position                  I personally reviewed the images/study and I agree with the findings   as stated above by resident physician, Jerome Singh MD. This study   was interpreted at [place], [place].        MACRO:   None.        Signed by: Viral Sommers 12/5/2023 4:25 PM   Dictation workstation:   [id number]      XR abdomen 1 view   Final Result   1. The feeding tube appears positioned within  the region of the   gastric antrum             I personally reviewed the images/study and I agree with the findings   as stated above by resident physician, Dr. Kedar Mitchell. The   study was interpreted at [facility] in [facility].        MACRO:   none        Signed by: Viral Sommers 12/5/2023 11:30 AM   Dictation workstation:   [id number]      IR angiogram   Preliminary Result   Attempted left middle meningeal artery embolization aborted due to   truncated left middle meningeal artery with no suitable distal target   for embolization.        I was present for and/or performed the critical portions of the   procedure and immediately available throughout the entire procedure.   I personally reviewed the image(s)/study and interpretation. I agree   with the findings as stated. Performed and dictated at [facility].        MACRO:   None             Dictation workstation:   [id number]      CT head wo IV contrast   Final Result   Similar appearance of the head compared to prior examination with   unchanged appearance of subdural fluid surrounding the left frontal   subdural catheter and unchanged mixed attenuation subdural collection   along the left parietal, occipital, and temporal convexities.        I personally reviewed the images/study and I agree with the findings   as stated above by resident physician, Jerome Singh MD. This study   was interpreted at [facility],   [place], [place].             MACRO:   None.        Signed by: Syed Amezquita 12/4/2023 7:10 AM   Dictation workstation:   [id number]      XR chest 1 view   Final Result   1.  Interval increased bilateral patchy airspace opacities likely   represent alveolar edema with multifocal infectious process not   excluded.   2. Small bilateral pleural effusions with adjacent atelectasis.        I personally reviewed the images/study and I  agree with the findings   as stated above by resident physician, Jerome Singh MD. This study   was interpreted at [facility],   [place], [place].             MACRO:   None.        Signed by: Viral Sommers 12/5/2023 7:54 AM   Dictation workstation:   [id number]      CT head wo IV contrast   Final Result   Slightly increased subdural fluid surrounding the subdural catheter   in the left frontal region. This may be due to decreased   pneumocephalus.        Stable component of subdural mixed attenuation hemorrhage within the   left parieto-occipital and temporal region.        Signed by: Kerrie Magana 12/3/2023 9:52 AM   Dictation workstation:   [id number]      CT head wo IV contrast   Final Result   1. Postsurgical changes consistent with interval left frontal   craniotomy for re-evacuation of mixed density subdural hematoma   overlying the left cerebral convexity with interval reduction in size   of the subdural hematoma and improved mass effect, midline shift, and   sulcal effacement as described above.   2. No new transcortical infarction, intraparenchymal hemorrhage, or   herniation.        I personally reviewed the images/study and I agree with the findings   as stated above by resident physician, Dr. Kedar Mitchell. The   study was interpreted at [facility] in [facility].        Signed by: Orion Harris 12/1/2023 7:42 AM   Dictation workstation:   [id number]      CT head wo IV contrast   Final Result   1. Redemonstration of postoperative changes of left-sided craniotomy   with extra-axial mixed attenuation fluid and air collection measuring   approximately 9 mm in maximal thickness, previously measuring 7 mm.   There is superimposed mixed hyperdense and hypodense attenuation left   hemispheric subdural hematoma, grossly unchanged when compared to the   prior exam. There is effacement of the adjacent sulci and partial    effacement of the left lateral ventricle without significant changes   when compared to the prior exam. There is a proximally 8 mm   left-to-right midline shift, similar to prior.        I personally reviewed the images/study and I agree with the findings   as stated by resident physician Dr. Regino Quiñones . This study   was interpreted at [facility],   [place], [place].        MACRO:   None        Signed by: Sneha Pérez 11/30/2023 7:02 AM   Dictation workstation:   [id number]      CT head wo IV contrast   Final Result   Postoperative changes are again identified compatible with a recent   left sided craniotomy.        There is again evidence of scattered pneumocephalus.        Immediately deep to the craniotomy site, there is again evidence of   an extra-axial likely epidural mixed attenuation hemorrhagic fluid   and air collection measuring approximately 7 mm in thickness on both   the current and prior study.        There is again evidence of a superimposed mixed hyperdense and   hypodense attenuation left hemispheric subdural hematoma again   measuring approximately 21 mm in greatest thickness as seen on the   coronal reconstructed images adjacent to the left parietal lobe.   There is again evidence of mass effect upon the adjacent left   cerebral hemisphere with asymmetric effacement/partial effacement of   sulci of the left cerebral hemisphere as well as partial effacement   of the left lateral ventricle and 3rd ventricle. There is again   evidence of approximately 8 mm of bowing of the midline structures   from left to right.        The above findings are again noted be superimposed upon moderate   brain parenchymal volume loss.        Patchy nonspecific white matter changes are again noted within   cerebral hemispheres bilaterally which while nonspecific, given the   patient's age, likely represent sequelae of small-vessel ischemic   change.        MACRO:   None.         Signed by: Cassius Richardson 11/29/2023 8:51 AM   Dictation workstation:   [id number]      CT head wo IV contrast   Final Result   Evolving postoperative changes with increased size of the residual   left subdural hematoma and increased volume of high attenuation blood   products, attention on follow-up is recommended. Rightward midline   shift is similar to previous.        I personally reviewed the images/study and I agree with the findings   as stated above by resident physician, Dr. Kedar Mitchell. The   study was interpreted at [facility] in [facility].        Signed by: Fredrick Hemphill 11/29/2023 9:10 AM   Dictation workstation:   [id number]      XR chest 1 view   Final Result   Redemonstration patchy airspace opacities that are noted bilaterally,   favored to represent pulmonary alveolar edema. However, multifocal   pneumonia can have similar appearance. Small left layering effusion   with associated adjacent atelectasis.        I personally reviewed the images/study and I agree with the findings   as stated above by resident physician, Dr. Kedar Mitchell. The   study was interpreted at [facility] in [facility].        Signed by: Tushar Hartman 11/29/2023 8:36 AM   Dictation workstation:   [id number]      FL modified barium swallow study   Final Result   1. Please see detailed findings of modified barium swallow by speech   language pathologist.   2. No acute radiographic findings.        I personally reviewed the images/study and I agree with Dr. María Butler findings as stated. This study was interpreted at [place], [place]        MACRO:   None        Signed by: Rodney Kimball 12/7/2023 7:47 PM   Dictation workstation:   [id number]      XR chest 1 view   Final Result   1. Redemonstration of findings suggestive of pulmonary edema with   areas of patchy airspace  opacities throughout the right lung which   are nonspecific and may represent multifocal pneumonia. Blunting of   the bilateral costophrenic angles may represent small pleural   effusions versus atelectasis.        I personally reviewed the images/study and I agree with the findings   as stated by resident physician Dr. Regino Quiñones . This study   was interpreted at [place], [place].        MACRO:   None        Signed by: Melissa Martinezani 11/26/2023 8:59 AM   Dictation workstation:   [id number]      XR chest 1 view   Final Result   1. Slight interval worsening of bilateral lung aeration with findings   suggestive of pulmonary edema, however multifocal infection is not   excluded in the appropriate clinical setting. Delete   2. Small bilateral pleural effusions.        I personally reviewed the images/study and I agree with the findings   as stated above by resident physician, Jerome Singh MD. This study   was interpreted at [place], [place].             MACRO:   None.        Signed by: Brandon Yuan 11/25/2023 9:38 AM   Dictation workstation:   [id number]      XR chest 1 view   Final Result   1. Bibasilar atelectasis with or without trace bilateral pleural   effusions, left-greater-than-right. Superimposed   infectious/inflammatory infiltrate is not excluded, for example in   the setting of aspiration pneumonitis.        I personally reviewed the images/study and I agree with the resident   findings as stated by Yokasta Rubin MD. This study was interpreted at   [place], [place].        MACRO:   None        Signed by: Tushar Hartman 11/24/2023 8:28 AM   Dictation workstation:   [id number]      XR abdomen 1 view   Final Result   1. No enteric tube is seen within field of view and correlate   clinically.   2. Nonobstructive bowel gas pattern.   3. Visualized basilar  lungs demonstrate coarse airspace opacities   bilaterally with small pleural effusions not excluded. Correlate with   concern for underlying infectious process.        Signed by: Kendall Calix 11/23/2023 6:52 AM   Dictation workstation:   [id number]      CT head wo IV contrast   Final Result   1. Postsurgical changes consistent with interval left frontoparietal   craniotomy for left subdural hematoma evacuation and subdural drain   placement with interval reduction in size of the mixed density fluid   collection and improvement in mass effect, midline shift, and sulcal   effacement when compared to prior, same day CT of the head as   described in detail above.   2. No evidence of new hemorrhage or acute cortical infarction.        I personally reviewed the images/study and I agree with the findings   as stated above by resident physician, Dr. Kedar Mitchell. The   study was interpreted at [facility] in [facility].        Signed by: Sneha Pérez 11/22/2023 6:55 AM   Dictation workstation:   [id number]      CT head wo IV contrast   Final Result   1. Large predominantly hypodense left convexity subdural hematoma   with multiple septations measuring up to 2.6 cm at its greatest width   with significant associated mass effect, sulcal effacement, 10-11 mm   of rightward midline shift, and effacement of the left lateral   ventricle.   2. No evidence of additional hemorrhage or acute cortical infarct.        I personally reviewed the images/study and I agree with the findings   as stated above by resident physician, Dr. Kedar Mitchell. The   study was interpreted at [facility] in [facility].        Signed by: Shiv Magallanes 11/22/2023 12:51 AM   Dictation workstation:   [id number]      Bedside Midline Imaging    (Results Pending)         Assessment/Plan   83 y.o. man with CLL, anemia, DM, dementia, chronic indwelling ignacio 2/2 BPH, HTN  who presented on 11/22/23 for concerns of altered mental status. Found to have an acute large left SDH with midline shift. S/p evacuation on 11/22. CTH POC, 11/24 drain dc'd, Bcx +GPC, 11/25 febrile, remaining vitals stable, exam unchanged, repeat bcx negative.    Interval Events:   11/28 s/p midline   11/29 Lethargic, not FC, s/p CTH w/ increased L SDH w/ 8mm MLS s/p 1g Keppra load, CXR stable opacities, rCTH stable, s/p R side wd, s/p keppra load, rrCTH stable L SDH  11/30 S/p L crani for redo SDH evac, CTH POC with improved MLS  12/1 s/p extubation  12/3 CTH stable  12/4: patient was upgraded to NSU status for worsening neurologic exam, and increase O2 requirement. ABG 7.34/30/76. CTH stable with improved brain compression. CT-PE negative for pulmonary embolism. New b/l moderate pleural effusions s/p Lasix 20mg.  12/6: Improved ABG on CPAP. Poor UOP despite total 60mg IV Lasix.    12/10:   - Drowsier this AM, elevated pCO2 on ABG after no BPAP for past day  - CXR with improved edema    NEURO:  #SDH s/p evacuation x2  #MMA embolization on 11/30  Assessment:   Neurologically: Awake, EOSp, Pupils 5mm>3mm and brisk  Moves all extremities to command, AG in all extremities  L crani with drain c/d/I  EEG read with severe diffuse encephalopathy  Plan:  NSU  Q1H neuro checks  Drain per NSGY   Pain: acetaminophen, oxycodone, hydromorphone PRN  Sedation: None  Dc cvEEG  Keppra 500mg BID  PT/OT/SLP    CARDIOVASCULAR:  # HTN, HLD  #Intravascular hypovolemia  #c/f Heart failure  Assessment:   ST on tele  Likely intravascularly depleted given likely pre-renal LIN  S/p albumin 25g and bumex 1mg once dialy for 3 days  Plan:   Continue to monitor on telemetry  Goal SBP < 160  Continue atorvastatin  Hydralazine and Labetalol PRN  Echocardiogram: EF 65-70%, impaired diastolic function  No diuresis today    RESPIRATORY:  #Bilateral pleural effusions   #Intubated for airway protection during OR (resolved)  Assessment:   On 4L NC  S/p  Lasix 20mg and 40mg IV  CT-PE negative, but did show bilateral mod-large pleural effusions  AB.32/ -> 7.26//  Additional Lasix 60mg IV with poor response, S/p bumetanide 1mg IV once, net -ve >4L; replaced 1L NS over 4h overnight   Tachypnea improved following initiation of BPAP  CXR with improvement in interstitial edema  ABG 12/10: 7.47/  Plan:   Continuous pulse oximetry   O2 PRN to maintain SpO2 > 94%, wean as tolerated  Intermittent BPAP overnight as needed      RENAL/:  #BPH  #LIN, potential cardiorenal syndrome vs contrast nephropathy  #Hypernatremia  Assessment:   - FENa 10%  - Serum Na 151 (12/10  Results from last 72 hours   Lab Units 12/10/23  0456 23  1729   BUN mg/dL 29* 29*   CREATININE mg/dL 1.62* 1.61*   Plan:   Monitor with daily RFP  Chronic ignacio for BPH  Increase FWF to 350ml q6hr    FEN/GI:  #UGIB  #Dysphagia  Assessment:   Last BM Date: 12/10/23  Bloody emesis on floor per wife, no recent episodes  Pulled Feeding tube 12/10AM  Plan:   Monitor and replace electrolytes per protocol  Replace DHT  IVF: None  Diet: NPO, tube feeds when enteral access established  Bowel Regimen: Miralax PRN  Pantoprazole 40mg IV BID    ENDOCRINE:  #DM  Assessment:   Results from last 7 days   Lab Units 12/10/23  0811 12/10/23  0456 23  2002 23  1729 23  1609 23  1208 23  0808   POCT GLUCOSE mg/dL 265*  --  267*  --  330* 300* 315*   GLUCOSE mg/dL  --  290*  --  314*  --   --   --    Plan:    Accuchecks & ISS Q6H  Increase ISS to 10u protocol  Hypoglycemia protocol    HEMATOLOGY/ONCOLOGY:  #CLL  #c/f prostate cancer vs MM  Assessment:   Sclerotic lesions incidentally noted on CT PE  PSA 11.73  Results from last 7 days   Lab Units 12/10/23  0456 23  1729   HEMOGLOBIN g/dL 8.1* 7.8*   HEMATOCRIT % 24.4* 22.5*   PLATELETS AUTO x10*3/uL 178 182   CEA: 1.2, : 161.5, CA 19-9: 25.13  Plan:  Continue to monitor with daily CBC and Coag panel  Heme  consulted, recs appreciated  Oncology consulted with concern for malignant cause of sclerotic bony lesions, appreciate recs  SPEP, UPEP, and immunoglobulins to eval for possible multiple myeloma  MRI Pelvis and L-spine when able  Hold Ibrutinib, resume when NSGY is OK with that (heme recs 11/28)    INFECTIOUS DISEASE:  #concerns for aspiration PNA  Assessment:   Results from last 7 days   Lab Units 12/10/23  0456 12/09/23  1729   WBC AUTO x10*3/uL 8.0 7.9     Afebrile   Repeat BCx 12/4 pending  Repeat BCx 11/26 NGTD, MRSA negative, vanco d/c'd   BCx 11/24 with staph hominis  Ucx 11/24  neg  Procal 0.31 (12/6) -> 0.17 (12/8)  Febrile to 38.4 o/n 12/7-12/8  COVID positive  ID consulted  Plan:  Continue to monitor for s/sx of infection  Pan culture for temperature > 38.4 C  Zosyn (12/5-12/7) -> Vanc/cefepime (12/7 -)  Remdesivir 200mg day, then 100mg daily for total 5 day course (today day 2/5)  F/up Bcx 12/4, NGTD    MUSCULOSKELETAL:  No acute issues    SKIN:  No acute issues  Turns and skin care per NSU protocol    ACCESS:  PIV    PROPHYLAXIS:  DVT/VTE: SCDs, SQH    RESTRAINTS:  I agree with nursing assessment of the patient's need for restraints to protect the patient from injury and facilitate healing. The patient is unable to cooperate with the plan of care and at risk for disrupting critical therapy (i.e., removing medical devices, lines, tubes and/or dressings).  Please see order for specifics. Restraints can be removed when the patient is able to cooperate with plan of care and allow healing to occur, or the medical devices at risk are discontinued by the medical team.     Xavi Mcmullen MD  Neurology PGY-2  Neuroscience Intensive Care

## 2023-12-10 NOTE — PROGRESS NOTES
"Vancomycin Dosing by Pharmacy- FOLLOW UP    Haile Ayers is a 83 y.o. year old male who Pharmacy has been consulted for vancomycin dosing for pneumonia. Based on the patient's indication and renal status this patient is being dosed based on a goal trough/random level of 15-20.     Renal function is currently Declining.    Current vancomycin dose:  1000mg once    Most recent random level: 16.1 mcg/mL    Visit Vitals  /74   Pulse 101   Temp 37.2 °C (98.9 °F) (Temporal)   Resp 23        Lab Results   Component Value Date    CREATININE 1.62 (H) 12/10/2023    CREATININE 1.61 (H) 12/09/2023    CREATININE 1.78 (H) 12/09/2023    CREATININE 1.71 (H) 12/08/2023        Patient weight is No results found for: \"PTWEIGHT\"    Lab Results   Component Value Date    VANCORANDOM 13.4 12/09/2023    VANCORANDOM 12.1 12/08/2023    VANCORANDOM 18.2 12/07/2023        I/O last 3 completed shifts:  In: 2785 (36.5 mL/kg) [NG/GT:2785]  Out: 5145 (67.4 mL/kg) [Urine:5145 (1.9 mL/kg/hr)]  Weight: 76.3 kg     Lab Results   Component Value Date    PATIENTTEMP  12/06/2023      Comment:      NOTE: Patient Results are Not Corrected for Temperature    PATIENTTEMP  12/05/2023      Comment:      NOTE: Patient Results are Not Corrected for Temperature    PATIENTTEMP 37.0 12/05/2023        Urine Culture   Date/Time Value Ref Range Status   11/24/2023 05:31 AM No significant growth  Final     Blood Culture   Date/Time Value Ref Range Status   12/08/2023 05:11 PM No growth at 1 day  Preliminary     Gram Stain   Date/Time Value Ref Range Status   12/07/2023 09:49 AM   Final    Gram stain indicates specimen consists of lower respiratory tract secretions.   12/07/2023 09:49 AM No predominant organism  Final        Assessment/Plan    Within goal random/trough level - however level is 5 hours early. Will re-dose with 1250mg once and continue to dose off levels.  The next level will be obtained on 12/11. May be obtained sooner if clinically indicated. "   Will continue to monitor renal function daily while on vancomycin and order serum creatinine at least every 48 hours if not already ordered.  Follow for continued vancomycin needs, clinical response, and signs/symptoms of toxicity.       Meenu Pacheco, MckaylaD

## 2023-12-11 LAB
ALBUMIN SERPL BCP-MCNC: 3.6 G/DL (ref 3.4–5)
ALBUMIN SERPL BCP-MCNC: 3.7 G/DL (ref 3.4–5)
ANION GAP BLDA CALCULATED.4IONS-SCNC: 6 MMO/L (ref 10–25)
ANION GAP BLDA CALCULATED.4IONS-SCNC: 7 MMO/L (ref 10–25)
ANION GAP SERPL CALC-SCNC: 11 MMOL/L (ref 10–20)
ANION GAP SERPL CALC-SCNC: 15 MMOL/L (ref 10–20)
BACTERIA BLD CULT: NORMAL
BACTERIA BLD CULT: NORMAL
BASE EXCESS BLDA CALC-SCNC: 8.4 MMOL/L (ref -2–3)
BASE EXCESS BLDA CALC-SCNC: 9 MMOL/L (ref -2–3)
BASOPHILS # BLD AUTO: 0.02 X10*3/UL (ref 0–0.1)
BASOPHILS NFR BLD AUTO: 0.3 %
BODY TEMPERATURE: 37 DEGREES CELSIUS
BODY TEMPERATURE: ABNORMAL
BUN SERPL-MCNC: 30 MG/DL (ref 6–23)
BUN SERPL-MCNC: 31 MG/DL (ref 6–23)
CA-I BLD-SCNC: 1.19 MMOL/L (ref 1.1–1.33)
CA-I BLDA-SCNC: 1.15 MMOL/L (ref 1.1–1.33)
CA-I BLDA-SCNC: 1.2 MMOL/L (ref 1.1–1.33)
CALCIUM SERPL-MCNC: 8.6 MG/DL (ref 8.6–10.6)
CALCIUM SERPL-MCNC: 8.7 MG/DL (ref 8.6–10.6)
CHLORIDE BLDA-SCNC: 113 MMOL/L (ref 98–107)
CHLORIDE BLDA-SCNC: 114 MMOL/L (ref 98–107)
CHLORIDE SERPL-SCNC: 111 MMOL/L (ref 98–107)
CHLORIDE SERPL-SCNC: 112 MMOL/L (ref 98–107)
CO2 SERPL-SCNC: 28 MMOL/L (ref 21–32)
CO2 SERPL-SCNC: 32 MMOL/L (ref 21–32)
CREAT SERPL-MCNC: 1.49 MG/DL (ref 0.5–1.3)
CREAT SERPL-MCNC: 1.56 MG/DL (ref 0.5–1.3)
EOSINOPHIL # BLD AUTO: 0.15 X10*3/UL (ref 0–0.4)
EOSINOPHIL NFR BLD AUTO: 2.3 %
ERYTHROCYTE [DISTWIDTH] IN BLOOD BY AUTOMATED COUNT: 14.6 % (ref 11.5–14.5)
ERYTHROCYTE [DISTWIDTH] IN BLOOD BY AUTOMATED COUNT: 14.7 % (ref 11.5–14.5)
GFR SERPL CREATININE-BSD FRML MDRD: 44 ML/MIN/1.73M*2
GFR SERPL CREATININE-BSD FRML MDRD: 46 ML/MIN/1.73M*2
GLUCOSE BLD MANUAL STRIP-MCNC: 110 MG/DL (ref 74–99)
GLUCOSE BLD MANUAL STRIP-MCNC: 138 MG/DL (ref 74–99)
GLUCOSE BLD MANUAL STRIP-MCNC: 148 MG/DL (ref 74–99)
GLUCOSE BLD MANUAL STRIP-MCNC: 96 MG/DL (ref 74–99)
GLUCOSE BLD MANUAL STRIP-MCNC: 98 MG/DL (ref 74–99)
GLUCOSE BLDA-MCNC: 122 MG/DL (ref 74–99)
GLUCOSE BLDA-MCNC: 92 MG/DL (ref 74–99)
GLUCOSE SERPL-MCNC: 149 MG/DL (ref 74–99)
GLUCOSE SERPL-MCNC: 159 MG/DL (ref 74–99)
HCO3 BLDA-SCNC: 31.8 MMOL/L (ref 22–26)
HCO3 BLDA-SCNC: 33.5 MMOL/L (ref 22–26)
HCT VFR BLD AUTO: 22 % (ref 41–52)
HCT VFR BLD AUTO: 22.5 % (ref 41–52)
HCT VFR BLD EST: 23 % (ref 41–52)
HCT VFR BLD EST: 28 % (ref 41–52)
HGB BLD-MCNC: 7.3 G/DL (ref 13.5–17.5)
HGB BLD-MCNC: 7.7 G/DL (ref 13.5–17.5)
HGB BLDA-MCNC: 7.7 G/DL (ref 13.5–17.5)
HGB BLDA-MCNC: 9.3 G/DL (ref 13.5–17.5)
IMM GRANULOCYTES # BLD AUTO: 0.02 X10*3/UL (ref 0–0.5)
IMM GRANULOCYTES NFR BLD AUTO: 0.3 % (ref 0–0.9)
INHALED O2 CONCENTRATION: 28 %
INHALED O2 CONCENTRATION: 30 %
LACTATE BLDA-SCNC: 0.4 MMOL/L (ref 0.4–2)
LACTATE BLDA-SCNC: 0.4 MMOL/L (ref 0.4–2)
LYMPHOCYTES # BLD AUTO: 1.61 X10*3/UL (ref 0.8–3)
LYMPHOCYTES NFR BLD AUTO: 24.7 %
MAGNESIUM SERPL-MCNC: 2.04 MG/DL (ref 1.6–2.4)
MCH RBC QN AUTO: 29.2 PG (ref 26–34)
MCH RBC QN AUTO: 30 PG (ref 26–34)
MCHC RBC AUTO-ENTMCNC: 33.2 G/DL (ref 32–36)
MCHC RBC AUTO-ENTMCNC: 34.2 G/DL (ref 32–36)
MCV RBC AUTO: 88 FL (ref 80–100)
MCV RBC AUTO: 88 FL (ref 80–100)
MONOCYTES # BLD AUTO: 0.61 X10*3/UL (ref 0.05–0.8)
MONOCYTES NFR BLD AUTO: 9.4 %
NEUTROPHILS # BLD AUTO: 4.1 X10*3/UL (ref 1.6–5.5)
NEUTROPHILS NFR BLD AUTO: 63 %
NRBC BLD-RTO: 0 /100 WBCS (ref 0–0)
NRBC BLD-RTO: 0 /100 WBCS (ref 0–0)
OXYHGB MFR BLDA: 98.4 % (ref 94–98)
OXYHGB MFR BLDA: 98.5 % (ref 94–98)
PCO2 BLDA: 38 MM HG (ref 38–42)
PCO2 BLDA: 45 MM HG (ref 38–42)
PH BLDA: 7.48 PH (ref 7.38–7.42)
PH BLDA: 7.53 PH (ref 7.38–7.42)
PHOSPHATE SERPL-MCNC: 2.7 MG/DL (ref 2.5–4.9)
PHOSPHATE SERPL-MCNC: 2.7 MG/DL (ref 2.5–4.9)
PLATELET # BLD AUTO: 153 X10*3/UL (ref 150–450)
PLATELET # BLD AUTO: 163 X10*3/UL (ref 150–450)
PO2 BLDA: 107 MM HG (ref 85–95)
PO2 BLDA: 149 MM HG (ref 85–95)
POTASSIUM BLDA-SCNC: 3.7 MMOL/L (ref 3.5–5.3)
POTASSIUM BLDA-SCNC: 4 MMOL/L (ref 3.5–5.3)
POTASSIUM SERPL-SCNC: 3.7 MMOL/L (ref 3.5–5.3)
POTASSIUM SERPL-SCNC: 3.9 MMOL/L (ref 3.5–5.3)
RBC # BLD AUTO: 2.5 X10*6/UL (ref 4.5–5.9)
RBC # BLD AUTO: 2.57 X10*6/UL (ref 4.5–5.9)
SAO2 % BLDA: 100 % (ref 94–100)
SAO2 % BLDA: 100 % (ref 94–100)
SODIUM BLDA-SCNC: 148 MMOL/L (ref 136–145)
SODIUM BLDA-SCNC: 149 MMOL/L (ref 136–145)
SODIUM SERPL-SCNC: 150 MMOL/L (ref 136–145)
SODIUM SERPL-SCNC: 150 MMOL/L (ref 136–145)
SODIUM SERPL-SCNC: 151 MMOL/L (ref 136–145)
SODIUM SERPL-SCNC: 151 MMOL/L (ref 136–145)
VANCOMYCIN SERPL-MCNC: 19.2 UG/ML (ref 5–20)
WBC # BLD AUTO: 6.5 X10*3/UL (ref 4.4–11.3)
WBC # BLD AUTO: 6.7 X10*3/UL (ref 4.4–11.3)

## 2023-12-11 PROCEDURE — 2500000001 HC RX 250 WO HCPCS SELF ADMINISTERED DRUGS (ALT 637 FOR MEDICARE OP)

## 2023-12-11 PROCEDURE — 84295 ASSAY OF SERUM SODIUM: CPT | Performed by: STUDENT IN AN ORGANIZED HEALTH CARE EDUCATION/TRAINING PROGRAM

## 2023-12-11 PROCEDURE — 82330 ASSAY OF CALCIUM: CPT | Performed by: REGISTERED NURSE

## 2023-12-11 PROCEDURE — 2500000004 HC RX 250 GENERAL PHARMACY W/ HCPCS (ALT 636 FOR OP/ED)

## 2023-12-11 PROCEDURE — 2500000002 HC RX 250 W HCPCS SELF ADMINISTERED DRUGS (ALT 637 FOR MEDICARE OP, ALT 636 FOR OP/ED): Performed by: STUDENT IN AN ORGANIZED HEALTH CARE EDUCATION/TRAINING PROGRAM

## 2023-12-11 PROCEDURE — 85025 COMPLETE CBC W/AUTO DIFF WBC: CPT | Performed by: REGISTERED NURSE

## 2023-12-11 PROCEDURE — C9113 INJ PANTOPRAZOLE SODIUM, VIA: HCPCS

## 2023-12-11 PROCEDURE — 2500000004 HC RX 250 GENERAL PHARMACY W/ HCPCS (ALT 636 FOR OP/ED): Performed by: STUDENT IN AN ORGANIZED HEALTH CARE EDUCATION/TRAINING PROGRAM

## 2023-12-11 PROCEDURE — 94660 CPAP INITIATION&MGMT: CPT

## 2023-12-11 PROCEDURE — 2060000001 HC INTERMEDIATE ICU ROOM DAILY

## 2023-12-11 PROCEDURE — 96372 THER/PROPH/DIAG INJ SC/IM: CPT

## 2023-12-11 PROCEDURE — 84132 ASSAY OF SERUM POTASSIUM: CPT | Performed by: STUDENT IN AN ORGANIZED HEALTH CARE EDUCATION/TRAINING PROGRAM

## 2023-12-11 PROCEDURE — 2500000001 HC RX 250 WO HCPCS SELF ADMINISTERED DRUGS (ALT 637 FOR MEDICARE OP): Performed by: STUDENT IN AN ORGANIZED HEALTH CARE EDUCATION/TRAINING PROGRAM

## 2023-12-11 PROCEDURE — 2500000005 HC RX 250 GENERAL PHARMACY W/O HCPCS: Performed by: STUDENT IN AN ORGANIZED HEALTH CARE EDUCATION/TRAINING PROGRAM

## 2023-12-11 PROCEDURE — 2500000004 HC RX 250 GENERAL PHARMACY W/ HCPCS (ALT 636 FOR OP/ED): Performed by: REGISTERED NURSE

## 2023-12-11 PROCEDURE — 2500000005 HC RX 250 GENERAL PHARMACY W/O HCPCS

## 2023-12-11 PROCEDURE — 80202 ASSAY OF VANCOMYCIN: CPT | Performed by: REGISTERED NURSE

## 2023-12-11 PROCEDURE — 37799 UNLISTED PX VASCULAR SURGERY: CPT | Performed by: STUDENT IN AN ORGANIZED HEALTH CARE EDUCATION/TRAINING PROGRAM

## 2023-12-11 PROCEDURE — 84132 ASSAY OF SERUM POTASSIUM: CPT

## 2023-12-11 PROCEDURE — 85027 COMPLETE CBC AUTOMATED: CPT

## 2023-12-11 PROCEDURE — 97530 THERAPEUTIC ACTIVITIES: CPT | Mod: GO

## 2023-12-11 PROCEDURE — 1200000002 HC GENERAL ROOM WITH TELEMETRY DAILY

## 2023-12-11 PROCEDURE — 83735 ASSAY OF MAGNESIUM: CPT | Performed by: REGISTERED NURSE

## 2023-12-11 PROCEDURE — 37799 UNLISTED PX VASCULAR SURGERY: CPT | Performed by: REGISTERED NURSE

## 2023-12-11 PROCEDURE — 97535 SELF CARE MNGMENT TRAINING: CPT | Mod: GO

## 2023-12-11 PROCEDURE — 82947 ASSAY GLUCOSE BLOOD QUANT: CPT

## 2023-12-11 PROCEDURE — 80069 RENAL FUNCTION PANEL: CPT | Performed by: REGISTERED NURSE

## 2023-12-11 PROCEDURE — 36600 WITHDRAWAL OF ARTERIAL BLOOD: CPT

## 2023-12-11 RX ORDER — VANCOMYCIN HYDROCHLORIDE 750 MG/150ML
750 INJECTION, SOLUTION INTRAVENOUS ONCE
Status: COMPLETED | OUTPATIENT
Start: 2023-12-11 | End: 2023-12-11

## 2023-12-11 RX ORDER — INSULIN GLARGINE 100 [IU]/ML
5 INJECTION, SOLUTION SUBCUTANEOUS ONCE
Status: DISCONTINUED | OUTPATIENT
Start: 2023-12-11 | End: 2023-12-11

## 2023-12-11 RX ORDER — INSULIN GLARGINE 100 [IU]/ML
2.5 INJECTION, SOLUTION SUBCUTANEOUS ONCE
Status: COMPLETED | OUTPATIENT
Start: 2023-12-11 | End: 2023-12-11

## 2023-12-11 RX ORDER — VANCOMYCIN HYDROCHLORIDE 1 G/20ML
INJECTION, POWDER, LYOPHILIZED, FOR SOLUTION INTRAVENOUS DAILY PRN
Status: DISCONTINUED | OUTPATIENT
Start: 2023-12-11 | End: 2023-12-14

## 2023-12-11 RX ORDER — CEFEPIME 1 G/50ML
2 INJECTION, SOLUTION INTRAVENOUS EVERY 12 HOURS
Status: DISCONTINUED | OUTPATIENT
Start: 2023-12-11 | End: 2023-12-16

## 2023-12-11 RX ADMIN — HEPARIN SODIUM 5000 UNITS: 5000 INJECTION INTRAVENOUS; SUBCUTANEOUS at 10:34

## 2023-12-11 RX ADMIN — INSULIN GLARGINE 5 UNITS: 100 INJECTION, SOLUTION SUBCUTANEOUS at 20:12

## 2023-12-11 RX ADMIN — CEFEPIME 2 G: 1 INJECTION, SOLUTION INTRAVENOUS at 18:12

## 2023-12-11 RX ADMIN — REMDESIVIR 100 MG: 100 INJECTION, POWDER, LYOPHILIZED, FOR SOLUTION INTRAVENOUS at 10:34

## 2023-12-11 RX ADMIN — LEVETIRACETAM 500 MG: 5 INJECTION INTRAVENOUS at 16:49

## 2023-12-11 RX ADMIN — CEFEPIME 1 G: 1 INJECTION, SOLUTION INTRAVENOUS at 08:42

## 2023-12-11 RX ADMIN — Medication 4000 UNITS: at 08:43

## 2023-12-11 RX ADMIN — PANTOPRAZOLE SODIUM 40 MG: 40 INJECTION, POWDER, FOR SOLUTION INTRAVENOUS at 20:12

## 2023-12-11 RX ADMIN — Medication 2 L/MIN: at 03:45

## 2023-12-11 RX ADMIN — AMLODIPINE BESYLATE 10 MG: 10 TABLET ORAL at 08:43

## 2023-12-11 RX ADMIN — HYDRALAZINE HYDROCHLORIDE 10 MG: 20 INJECTION INTRAMUSCULAR; INTRAVENOUS at 06:24

## 2023-12-11 RX ADMIN — HEPARIN SODIUM 5000 UNITS: 5000 INJECTION INTRAVENOUS; SUBCUTANEOUS at 18:12

## 2023-12-11 RX ADMIN — PANTOPRAZOLE SODIUM 40 MG: 40 INJECTION, POWDER, FOR SOLUTION INTRAVENOUS at 08:42

## 2023-12-11 RX ADMIN — ATORVASTATIN CALCIUM 20 MG: 20 TABLET, FILM COATED ORAL at 08:43

## 2023-12-11 RX ADMIN — THIAMINE HCL TAB 100 MG 100 MG: 100 TAB at 08:43

## 2023-12-11 RX ADMIN — INSULIN GLARGINE 2.5 UNITS: 100 INJECTION, SOLUTION SUBCUTANEOUS at 04:41

## 2023-12-11 RX ADMIN — VANCOMYCIN HYDROCHLORIDE 750 MG: 750 INJECTION, SOLUTION INTRAVENOUS at 12:41

## 2023-12-11 RX ADMIN — POLYETHYLENE GLYCOL 3350 17 G: 17 POWDER, FOR SOLUTION ORAL at 08:42

## 2023-12-11 RX ADMIN — POTASSIUM CHLORIDE 40 MEQ: 1.5 POWDER, FOR SOLUTION ORAL at 06:24

## 2023-12-11 RX ADMIN — HEPARIN SODIUM 5000 UNITS: 5000 INJECTION INTRAVENOUS; SUBCUTANEOUS at 02:05

## 2023-12-11 RX ADMIN — LEVETIRACETAM 500 MG: 5 INJECTION INTRAVENOUS at 05:00

## 2023-12-11 ASSESSMENT — PAIN - FUNCTIONAL ASSESSMENT
PAIN_FUNCTIONAL_ASSESSMENT: 0-10
PAIN_FUNCTIONAL_ASSESSMENT: CPOT (CRITICAL CARE PAIN OBSERVATION TOOL)
PAIN_FUNCTIONAL_ASSESSMENT: CPOT (CRITICAL CARE PAIN OBSERVATION TOOL)

## 2023-12-11 ASSESSMENT — COGNITIVE AND FUNCTIONAL STATUS - GENERAL
PERSONAL GROOMING: TOTAL
DRESSING REGULAR UPPER BODY CLOTHING: TOTAL
TURNING FROM BACK TO SIDE WHILE IN FLAT BAD: TOTAL
WALKING IN HOSPITAL ROOM: TOTAL
DAILY ACTIVITIY SCORE: 6
DRESSING REGULAR LOWER BODY CLOTHING: TOTAL
TOILETING: TOTAL
HELP NEEDED FOR BATHING: TOTAL
DRESSING REGULAR UPPER BODY CLOTHING: TOTAL
HELP NEEDED FOR BATHING: TOTAL
DAILY ACTIVITIY SCORE: 7
MOVING FROM LYING ON BACK TO SITTING ON SIDE OF FLAT BED WITH BEDRAILS: TOTAL
EATING MEALS: TOTAL
MOVING TO AND FROM BED TO CHAIR: TOTAL
CLIMB 3 TO 5 STEPS WITH RAILING: TOTAL
TOILETING: TOTAL
MOBILITY SCORE: 6
DRESSING REGULAR LOWER BODY CLOTHING: TOTAL
EATING MEALS: TOTAL
STANDING UP FROM CHAIR USING ARMS: TOTAL
PERSONAL GROOMING: A LOT

## 2023-12-11 ASSESSMENT — PAIN SCALES - GENERAL
PAINLEVEL_OUTOF10: 0 - NO PAIN

## 2023-12-11 ASSESSMENT — ACTIVITIES OF DAILY LIVING (ADL): HOME_MANAGEMENT_TIME_ENTRY: 17

## 2023-12-11 NOTE — PROGRESS NOTES
"Haile Ayers is a 83 y.o. male on day 19 of admission presenting with Subdural hematoma (CMS/HCC).    Subjective   Patient with mild aggittation overnight but improved BLE exam. CPAP on overnight (PRN)       Objective     Physical Exam  EOSp  BUE spontaneous L>R  BLE spontaneous L>R    Last Recorded Vitals  Blood pressure 151/80, pulse 93, temperature 36.5 °C (97.7 °F), temperature source Temporal, resp. rate 19, height 1.727 m (5' 7.99\"), weight 68 kg (149 lb 14.6 oz), SpO2 99 %.  Intake/Output last 3 Shifts:  I/O last 3 completed shifts:  In: 2555 (37.6 mL/kg) [NG/GT:2405; IV Piggyback:150]  Out: 2225 (32.7 mL/kg) [Urine:2225 (0.9 mL/kg/hr)]  Weight: 68 kg     Relevant Results           This patient currently has cardiac telemetry ordered; if you would like to modify or discontinue the telemetry order, click here to go to the orders activity to modify/discontinue the order.                 Assessment/Plan   Principal Problem:    Subdural hematoma (CMS/HCC)  Active Problems:    HTN (hypertension)    Diabetes mellitus, type 2 (CMS/HCC)    CVA (cerebral vascular accident) (CMS/HCC)    83 y.o. male h/o CLL, anemia, DM, p/w AMS, found to have UTI, CTH w/ L large chronic SDH, 11/22 s/p L crani for SDH evac, CTH POC, 11/24 drain dc'd, Bcx +GPC, 11/25 febrile, remaining vitals stable, exam unchanged, repeat bcx negative     11/28 s/p midline   11/29 Lethargic, not FC, s/p CTH w/ increased L SDH w/ 8mm MLS s/p 1g Keppra load, CXR stable opacities, rCTH stable, s/p R side wd, s/p keppra load, rrCTH stable L SDH,  11/30 S/p L crani for redo SDH evac, CTH POC with improved MLS  12/1 s/p extubation  12/3 CTH stable  12/4 s/psmall L MMA w no distal branches, not embolized   12/5 DVT US x 4 RLE chornic changes no DVT, CTH stable, CT PE neg for PE, BL pleural effusions s/p 20 lasix  12/6 TTE EF 65-70%, SDD dc'd, eeg neg dc'd  12/8 covid positive  12/10 Lantus 5 started for glucose coverage     Improved exam overnight     PLAN:   "   RITCHIE , q4 hour neuro checks overnight  SBP<160  COVID positive-treating with Remdesivir  Med onc recs to regarding PET CT vs CT CAP (patient NPO, Glucose controlled for possible PET)  BIPAP as needed, am chest xray  PEG convo pending onc work up  Continue vanc, cefe  ID recs  Fever curve  Chronic ignacio  Heme recs-cont to hold ibrutinib   SLP re-eval  PTOT-rehab; needs re-eval, resume trickle tube feeds  SCD, SQH           Yael Whiting MD

## 2023-12-11 NOTE — CARE PLAN
Problem: Pain  Goal: My pain/discomfort is manageable  Outcome: Progressing     Problem: Safety  Goal: Patient will be injury free during hospitalization  Outcome: Progressing  Goal: I will remain free of falls  Outcome: Progressing     Problem: Daily Care  Goal: Daily care needs are met  Outcome: Progressing     Problem: Psychosocial Needs  Goal: Demonstrates ability to cope with hospitalization/illness  Outcome: Progressing  Goal: Collaborate with me, my family, and caregiver to identify my specific goals  Outcome: Progressing     Problem: General Stroke  Goal: Demonstrate improvement in neurological exam throughout the shift  Outcome: Progressing  Goal: Maintain BP within ordered limits throughout shift  Outcome: Progressing  Goal: Participate in treatment (ie., meds, therapy) throughout shift  Outcome: Progressing  Goal: No symptoms of aspiration throughout shift  Outcome: Progressing  Goal: No symptoms of hemorrhage throughout shift  Outcome: Progressing  Goal: Tolerate enteral feeding throughout shift  Outcome: Progressing  Goal: Decreased nausea/vomiting throughout shift  Outcome: Progressing  Goal: Controlled blood glucose throughout shift  Outcome: Progressing  Goal: Out of bed three times today  Outcome: Progressing     Problem: ICU Stroke  Goal: Maintain ICP within ordered limits throughout shift  Outcome: Progressing  Goal: Tolerate EVD clamping trial throughout shift  Outcome: Progressing  Goal: Tolerate ventilator weaning trial during shift  Outcome: Progressing  Goal: Maintain patent airway throughout shift  Outcome: Progressing  Goal: Achieve/maintain targeted sodium level throughout shift  Outcome: Progressing     Problem: Skin  Goal: Decreased wound size/increased tissue granulation at next dressing change  Outcome: Progressing  Goal: Participates in plan/prevention/treatment measures  Outcome: Progressing  Goal: Prevent/manage excess moisture  Outcome: Progressing  Goal: Prevent/minimize  sheer/friction injuries  Outcome: Progressing  Goal: Promote/optimize nutrition  Outcome: Progressing  Goal: Promote skin healing  Outcome: Progressing     Problem: Safety - Medical Restraint  Goal: Remains free of injury from restraints (Restraint for Interference with Medical Device)  Outcome: Progressing  Goal: Free from restraint(s) (Restraint for Interference with Medical Device)  Outcome: Progressing     Problem: Nutrition  Goal: Nutrition support goals are met within 48 hrs  Outcome: Progressing  Goal: Lab values WNL  Outcome: Progressing  Goal: Electrolytes WNL  Outcome: Progressing  Goal: Promote healing  Outcome: Progressing  Goal: Maintain stable weight  Outcome: Progressing  Goal: Reduce weight from edema/fluid  Outcome: Progressing     Problem: Diabetes  Goal: Increase stability of blood glucose readings by end of shift  Outcome: Progressing  Goal: Maintain electrolyte levels within acceptable range throughout shift  Outcome: Progressing  Goal: Maintain glucose levels >70mg/dl to <250mg/dl throughout shift  Outcome: Progressing  Goal: No changes in neurological exam by end of shift  Outcome: Progressing     Problem: Fall/Injury  Goal: Not fall by end of shift  Outcome: Progressing  Goal: Be free from injury by end of the shift  Outcome: Progressing  Goal: Verbalize understanding of personal risk factors for fall in the hospital  Outcome: Progressing     Problem: Chronic Conditions and Co-morbidities  Goal: Patient's chronic conditions and co-morbidity symptoms are monitored and maintained or improved  Outcome: Progressing   The patient's goals for the shift include      The clinical goals for the shift include improve venttillstion and remain neurologically intact

## 2023-12-11 NOTE — PROGRESS NOTES
"Vancomycin Dosing by Pharmacy- FOLLOW UP    Haile Ayers is a 83 y.o. year old male who Pharmacy has been consulted for vancomycin dosing for pneumonia. Based on the patient's indication and renal status this patient is being dosed based on a goal trough/random level of 15-20.     Renal function is currently improving. Slight improvement Was dosing by levels  will give one time dose of 750 mg X1 again today  may consider q24 hr if renal continues to improve    Current vancomycin dose: 750 mg Once today.    Most recent random level: 19.2 mcg/mL    Visit Vitals  /82   Pulse 96   Temp 36.5 °C (97.7 °F)   Resp 15        Lab Results   Component Value Date    CREATININE 1.56 (H) 12/11/2023    CREATININE 1.58 (H) 12/10/2023    CREATININE 1.62 (H) 12/10/2023    CREATININE 1.61 (H) 12/09/2023        Patient weight is No results found for: \"PTWEIGHT\"    No results found for: \"CULTURE\"     I/O last 3 completed shifts:  In: 2555 (37.6 mL/kg) [NG/GT:2405; IV Piggyback:150]  Out: 2225 (32.7 mL/kg) [Urine:2225 (0.9 mL/kg/hr)]  Weight: 68 kg   [unfilled]    Lab Results   Component Value Date    PATIENTTEMP  12/11/2023      Comment:      NOTE: Patient Results are Not Corrected for Temperature    PATIENTTEMP 37.0 12/11/2023    PATIENTTEMP  12/10/2023      Comment:      NOTE: Patient Results are Not Corrected for Temperature        Assessment/Plan    Within goal random/trough level  will redose with 750 mg X1 today and follow up tomorrow Am    This dosing regimen is predicted by [employer] to result in the following pharmacokinetic parameters:  <Regimen: 750 mg IV every 24 hours.  Start time: 09:55 on 12/11/2023  Exposure target: AUC24 (range)400-600 mg/L.hr   AUC24,ss: 532 mg/L.hr  Probability of AUC24 > 400: 100 %  Ctrough,ss: 18.8 mg/L  Probability of Ctrough,ss > 20: 25 %  Probability of nephrotoxicity (Lodise FILIBERTO 2009): 15 %      The next level will be obtained on 12/12 at AM draw. May be obtained sooner if clinically " indicated.   Will continue to monitor renal function daily while on vancomycin and order serum creatinine at least every 48 hours if not already ordered.  Follow for continued vancomycin needs, clinical response, and signs/symptoms of toxicity.       Mckayla LarsonD

## 2023-12-11 NOTE — PROGRESS NOTES
Occupational Therapy    Occupational Therapy Treatment    Name: Haile Ayers  MRN: [id number]  : 1940  Date: 23  Time Calculation  Start Time:   Stop Time: 2  Time Calculation (min): 40 min    Assessment:  Evaluation/Treatment Tolerance: Patient tolerated treatment well  Medical Staff Made Aware: Yes  End of Session Communication: Bedside nurse  End of Session Patient Position: Bed, 3 rail up, Alarm on  Plan:  Treatment Interventions: ADL retraining, Visual perceptual retraining, Functional transfer training, UE strengthening/ROM, Endurance training, Cognitive reorientation, Patient/family training, Neuromuscular reeducation, Fine motor coordination activities  OT Frequency: 4 times per week  OT Discharge Recommendations: Moderate intensity level of continued care  OT Recommended Transfer Status: Maximum assist  OT - OK to Discharge: Yes (Recommending High Intensity.)    Subjective   Previous Visit Info:  OT Last Visit  OT Received On: 23  General:  General  Family/Caregiver Present: No  Prior to Session Communication: Bedside nurse  Patient Position Received: Bed, 3 rail up, Alarm off, not on at start of session  General Comment: Pt tolerated session well, alert and cooperative. expressive and receptive language deficits limiting command following but able to participate with gestures and demo. Tolerated sitting EOB x 15 min with CGA-SBA.. Unable to stand d/t pt not understanding to initiate stand, completed 2 lateral scoots with Max A x 1.  Precautions:  Hearing/Visual Limitations: [place], vision WFL  Medical Precautions: Fall precautions  Precautions Comment: SBP < 160  Vitals:  Vital Signs  Heart Rate: 93 (94)  Heart Rate Source: Monitor  Resp: 22 (22)  SpO2: 95 % (99)  BP: 132/83 (during , post 137/79)  MAP (mmHg): 98 (post 98)  BP Location: Left arm  BP Method: Automatic  Pain Assessment:  Pain Assessment  Pain Score: 0 - No pain (pt denies pain, no signs/symptoms of pain noted  during session)     Objective   Activities of Daily Living: Grooming  Grooming Level of Assistance: Maximum assistance  Grooming Where Assessed: Bed level  Grooming Comments: Pt required [place] assist for initiation and follow through to completion with washing face with bath wipe using L UE d/t receptive language deficits.    LE Dressing  LE Dressing: Yes  Sock Level of Assistance: Dependent  LE Dressing Where Assessed: Bed level  LE Dressing Comments: Placed pt in figure four position in bed with HOB elevated to don socks with pt requiring Total A to don L sock despite [place] assist and cues for sequencing and demo. Pt able to don R sock with Max A with [place] assist and max cueing using L UE to pull sock fully over heel once therapist initiated task, threaded sock over foot, and provided forward chaining with pt's hand placement on sock to complete task. Pt participation limited by receptive language deficits and limited attention.       Bed Mobility/Transfers: Bed Mobility  Bed Mobility: Yes  Bed Mobility 1  Bed Mobility 1: Supine to sitting  Level of Assistance 1: Maximum assistance (x 1 assist)  Bed Mobility Comments 1: HOB elevated, drawsheet utilized, provided cues for initiation and sequencing steps for transitional technique. pt able to slightly assist in walking over LEs and using L UE to push toward midline  Bed Mobility 2  Bed Mobility  2: Sitting to supine  Level of Assistance 2: Dependent (x 1 assist)  Bed Mobility Comments 2: HOB flat, drawsheet utilized, cues for sequencing  Bed Mobility 3  Bed Mobility 3: Scooting  Level of Assistance 3: Dependent (x 1 assist)  Bed Mobility Comments 3: completed 2 lateral scoots while seated at EOB toward HOB with LE blocking and use of drawsheet under hips with max cues for sequencing, UE/LE placement.    Transfers  Transfer: No (attempted sit <> stand but pt unable to [id number] d/t receptive language deficits. Pt completed 2 lateral scoots toward HOB DEP x 1 assist with  use of drawsheet under hips.)    Sitting Balance:  Static Sitting Balance  Static Sitting-Level of Assistance:  (CGA-SBA)  Dynamic Sitting Balance  Dynamic Sitting-Comments:  (CGA)  Standing Balance:  Static Standing Balance  Static Standing-Level of Assistance:  (unable to stand this date)  Dynamic Standing Balance  Dynamic Standing-Comments: unable to stand this date    Therapy/Activity: Therapeutic Activity  Therapeutic Activity Performed: Yes  Therapeutic Activity 1: Pt placed with bed in chair position x 12 min with VSS during ADL tasks to complete grooming and LB dressing. Pt sat EOB x 15 min with CGA-SBA using B UE for support.          Outcome Measures:  [facility] Daily Activity  Putting on and taking off regular lower body clothing: Total  Bathing (including washing, rinsing, drying): Total  Putting on and taking off regular upper body clothing: Total  Toileting, which includes using toilet, bedpan or urinal: Total  Taking care of personal grooming such as brushing teeth: A lot  Eating Meals: Total  Daily Activity - Total Score: 7        Education Documentation  Body Mechanics, taught by Nkechi Patel OT at 12/11/2023  2:43 PM.  Learner: Patient  Readiness: Nonacceptance  Method: Explanation, Demonstration  Response: Needs Reinforcement  Comment: unable to understand d/t expressive and resepctive language deficits    Precautions, taught by Nkechi Patel OT at 12/11/2023  2:43 PM.  Learner: Patient  Readiness: Nonacceptance  Method: Explanation, Demonstration  Response: Needs Reinforcement  Comment: unable to understand d/t expressive and resepctive language deficits    ADL Training, taught by Nkechi Patel OT at 12/11/2023  2:43 PM.  Learner: Patient  Readiness: Nonacceptance  Method: Explanation, Demonstration  Response: Needs Reinforcement  Comment: unable to understand d/t expressive and resepctive language deficits    Education Comments  No comments found.      Goals:  Encounter Problems        Encounter Problems (Active)       ADLs       Patient will perform UB and LB bathing with CGA (Progressing)       Start:  11/22/23    Expected End:  12/25/23            Patient will complete upper body dressing with contact guard assist level of assistance donning all UE clothes while edge of bed  (Progressing)       Start:  11/22/23    Expected End:  12/25/23            Patient will complete lower body dressing with moderate assist level of assistance donning all LE clothes  with LRD while edge of bed and standing (Progressing)       Start:  11/22/23    Expected End:  12/25/23            Patient will complete daily grooming tasks brushing teeth, washing face/hair, and unsupported sitting with SBA and PRN adaptive equipment while edge of bed . (Progressing)       Start:  11/22/23    Expected End:  12/25/23            Patient will complete toileting including hygiene clothing management/hygiene with moderate assist level of assistance  (Progressing)       Start:  11/22/23    Expected End:  12/25/23               BALANCE       Pt will maintain dynamic sitting balance during ADL task with modified independent level of assistance in order to demonstrate decreased risk of falling and improved postural control. (Progressing)       Start:  11/22/23    Expected End:  12/25/23                       COGNITION/SAFETY       Patient will follow >85% simple commands to allow improved ADL performance with min verbal cueing.  (Progressing)       Start:  11/22/23    Expected End:  12/25/23            Patient will demonstrated orientation x 4 with min verbal cueing. (Progressing)       Start:  11/22/23    Expected End:  12/25/23       ORIENTATION         Patient will sequence a functional task including 2-3 steps with >85% accuracy with min verbal cueing.  (Progressing)       Start:  11/22/23    Expected End:  12/25/23            Patient will attend to functional task with 90% accuracy for >10 min with min verbal cueing.   (Progressing)       Start:  11/22/23    Expected End:  12/25/23                 TRANSFERS       Patient will perform bed mobility contact guard assist level of assistance and bed rails in order to improve safety and independence with mobility (Progressing)       Start:  11/22/23    Expected End:  12/25/23            Patient will complete functional transfer with least restrictive device with minimal assist  level of assistance. (Progressing)       Start:  11/22/23    Expected End:  12/25/23                       VISION       Patient will visually track to all visual fields 100% of the time while performing a functional task with min verbal cueing.  (Progressing)       Start:  11/22/23    Expected End:  12/25/23

## 2023-12-11 NOTE — PROGRESS NOTES
2000: Shift assessment complete per flow sheet     GRISEL Palmer MD is aware of neuro assessment.    See new orders     2345: Patient placed on BiPAP, Restraints are off and wife at bedside.   TF on hold during this time.     0345: BiPAP removed, patient is placed back on nasal cannula and back on soft wrist restraints.   TF ok to resume, awaiting nutrition from dietary.     0400: Reassessment complete per flow sheet      MAIKOL HILLS RN

## 2023-12-12 LAB
ALBUMIN MFR UR ELPH: 34.4 %
ALBUMIN SERPL BCP-MCNC: 3.4 G/DL (ref 3.4–5)
ALBUMIN SERPL BCP-MCNC: 3.6 G/DL (ref 3.4–5)
ALBUMIN: 3.9 G/DL (ref 3.4–5)
ALPHA 1 GLOBULIN: 0.4 G/DL (ref 0.2–0.6)
ALPHA 2 GLOBULIN: 0.7 G/DL (ref 0.4–1.1)
ALPHA1 GLOB MFR UR ELPH: 4.8 %
ALPHA2 GLOB MFR UR ELPH: 19.7 %
ANION GAP SERPL CALC-SCNC: 13 MMOL/L (ref 10–20)
ANION GAP SERPL CALC-SCNC: 14 MMOL/L (ref 10–20)
B-GLOBULIN MFR UR ELPH: 20.8 %
BACTERIA BLD CULT: NORMAL
BASOPHILS # BLD AUTO: 0.03 X10*3/UL (ref 0–0.1)
BASOPHILS NFR BLD AUTO: 0.6 %
BETA GLOBULIN: 0.4 G/DL (ref 0.5–1.2)
BUN SERPL-MCNC: 34 MG/DL (ref 6–23)
BUN SERPL-MCNC: 35 MG/DL (ref 6–23)
CA-I BLD-SCNC: 1.16 MMOL/L (ref 1.1–1.33)
CALCIUM SERPL-MCNC: 8.5 MG/DL (ref 8.6–10.6)
CALCIUM SERPL-MCNC: 8.5 MG/DL (ref 8.6–10.6)
CHLORIDE SERPL-SCNC: 111 MMOL/L (ref 98–107)
CHLORIDE SERPL-SCNC: 112 MMOL/L (ref 98–107)
CO2 SERPL-SCNC: 27 MMOL/L (ref 21–32)
CO2 SERPL-SCNC: 29 MMOL/L (ref 21–32)
CREAT SERPL-MCNC: 1.4 MG/DL (ref 0.5–1.3)
CREAT SERPL-MCNC: 1.41 MG/DL (ref 0.5–1.3)
EOSINOPHIL # BLD AUTO: 0.15 X10*3/UL (ref 0–0.4)
EOSINOPHIL NFR BLD AUTO: 2.8 %
ERYTHROCYTE [DISTWIDTH] IN BLOOD BY AUTOMATED COUNT: 14.6 % (ref 11.5–14.5)
ERYTHROCYTE [DISTWIDTH] IN BLOOD BY AUTOMATED COUNT: 14.8 % (ref 11.5–14.5)
GAMMA GLOB MFR UR ELPH: 20.3 %
GAMMA GLOBULIN: 0.8 G/DL (ref 0.5–1.4)
GFR SERPL CREATININE-BSD FRML MDRD: 49 ML/MIN/1.73M*2
GFR SERPL CREATININE-BSD FRML MDRD: 50 ML/MIN/1.73M*2
GLUCOSE BLD MANUAL STRIP-MCNC: 142 MG/DL (ref 74–99)
GLUCOSE BLD MANUAL STRIP-MCNC: 162 MG/DL (ref 74–99)
GLUCOSE BLD MANUAL STRIP-MCNC: 166 MG/DL (ref 74–99)
GLUCOSE BLD MANUAL STRIP-MCNC: 186 MG/DL (ref 74–99)
GLUCOSE BLD MANUAL STRIP-MCNC: 241 MG/DL (ref 74–99)
GLUCOSE SERPL-MCNC: 176 MG/DL (ref 74–99)
GLUCOSE SERPL-MCNC: 267 MG/DL (ref 74–99)
HCT VFR BLD AUTO: 22.6 % (ref 41–52)
HCT VFR BLD AUTO: 23.1 % (ref 41–52)
HGB BLD-MCNC: 7.3 G/DL (ref 13.5–17.5)
HGB BLD-MCNC: 7.9 G/DL (ref 13.5–17.5)
IMM GRANULOCYTES # BLD AUTO: 0.02 X10*3/UL (ref 0–0.5)
IMM GRANULOCYTES NFR BLD AUTO: 0.4 % (ref 0–0.9)
IMMUNOFIXATION COMMENT: ABNORMAL
IMMUNOFIXATION COMMENT: NORMAL
LYMPHOCYTES # BLD AUTO: 1.04 X10*3/UL (ref 0.8–3)
LYMPHOCYTES NFR BLD AUTO: 19.2 %
M-PROTEIN 1: 0.1 G/DL
MAGNESIUM SERPL-MCNC: 2.08 MG/DL (ref 1.6–2.4)
MCH RBC QN AUTO: 29.3 PG (ref 26–34)
MCH RBC QN AUTO: 29.4 PG (ref 26–34)
MCHC RBC AUTO-ENTMCNC: 32.3 G/DL (ref 32–36)
MCHC RBC AUTO-ENTMCNC: 34.2 G/DL (ref 32–36)
MCV RBC AUTO: 86 FL (ref 80–100)
MCV RBC AUTO: 91 FL (ref 80–100)
MONOCYTES # BLD AUTO: 0.56 X10*3/UL (ref 0.05–0.8)
MONOCYTES NFR BLD AUTO: 10.4 %
NEUTROPHILS # BLD AUTO: 3.61 X10*3/UL (ref 1.6–5.5)
NEUTROPHILS NFR BLD AUTO: 66.6 %
NRBC BLD-RTO: 0 /100 WBCS (ref 0–0)
NRBC BLD-RTO: 0 /100 WBCS (ref 0–0)
PATH REVIEW - SERUM IMMUNOFIXATION: ABNORMAL
PATH REVIEW - URINE IMMUNOFIXATION: NORMAL
PATH REVIEW-SERUM PROTEIN ELECTROPHORESIS: ABNORMAL
PATH REVIEW-URINE PROTEIN ELECTROPHORESIS: NORMAL
PHOSPHATE SERPL-MCNC: 2.7 MG/DL (ref 2.5–4.9)
PHOSPHATE SERPL-MCNC: 2.7 MG/DL (ref 2.5–4.9)
PLATELET # BLD AUTO: 158 X10*3/UL (ref 150–450)
PLATELET # BLD AUTO: 160 X10*3/UL (ref 150–450)
POTASSIUM SERPL-SCNC: 3.8 MMOL/L (ref 3.5–5.3)
POTASSIUM SERPL-SCNC: 4.1 MMOL/L (ref 3.5–5.3)
PROTEIN ELECTROPHORESIS COMMENT: ABNORMAL
RBC # BLD AUTO: 2.49 X10*6/UL (ref 4.5–5.9)
RBC # BLD AUTO: 2.69 X10*6/UL (ref 4.5–5.9)
SODIUM SERPL-SCNC: 149 MMOL/L (ref 136–145)
URINE ELECTROPHORESIS COMMENT: NORMAL
VANCOMYCIN SERPL-MCNC: 23.6 UG/ML (ref 5–20)
WBC # BLD AUTO: 5.2 X10*3/UL (ref 4.4–11.3)
WBC # BLD AUTO: 5.4 X10*3/UL (ref 4.4–11.3)

## 2023-12-12 PROCEDURE — 2500000001 HC RX 250 WO HCPCS SELF ADMINISTERED DRUGS (ALT 637 FOR MEDICARE OP)

## 2023-12-12 PROCEDURE — 37799 UNLISTED PX VASCULAR SURGERY: CPT

## 2023-12-12 PROCEDURE — 82330 ASSAY OF CALCIUM: CPT | Performed by: REGISTERED NURSE

## 2023-12-12 PROCEDURE — 85025 COMPLETE CBC W/AUTO DIFF WBC: CPT | Performed by: REGISTERED NURSE

## 2023-12-12 PROCEDURE — 92526 ORAL FUNCTION THERAPY: CPT | Mod: GN | Performed by: SPEECH-LANGUAGE PATHOLOGIST

## 2023-12-12 PROCEDURE — 96372 THER/PROPH/DIAG INJ SC/IM: CPT

## 2023-12-12 PROCEDURE — 82947 ASSAY GLUCOSE BLOOD QUANT: CPT

## 2023-12-12 PROCEDURE — C9113 INJ PANTOPRAZOLE SODIUM, VIA: HCPCS

## 2023-12-12 PROCEDURE — C9113 INJ PANTOPRAZOLE SODIUM, VIA: HCPCS | Performed by: STUDENT IN AN ORGANIZED HEALTH CARE EDUCATION/TRAINING PROGRAM

## 2023-12-12 PROCEDURE — 2500000001 HC RX 250 WO HCPCS SELF ADMINISTERED DRUGS (ALT 637 FOR MEDICARE OP): Performed by: STUDENT IN AN ORGANIZED HEALTH CARE EDUCATION/TRAINING PROGRAM

## 2023-12-12 PROCEDURE — 84295 ASSAY OF SERUM SODIUM: CPT

## 2023-12-12 PROCEDURE — 2500000004 HC RX 250 GENERAL PHARMACY W/ HCPCS (ALT 636 FOR OP/ED)

## 2023-12-12 PROCEDURE — 84100 ASSAY OF PHOSPHORUS: CPT | Performed by: REGISTERED NURSE

## 2023-12-12 PROCEDURE — 85027 COMPLETE CBC AUTOMATED: CPT

## 2023-12-12 PROCEDURE — 83735 ASSAY OF MAGNESIUM: CPT | Performed by: REGISTERED NURSE

## 2023-12-12 PROCEDURE — 2500000004 HC RX 250 GENERAL PHARMACY W/ HCPCS (ALT 636 FOR OP/ED): Performed by: STUDENT IN AN ORGANIZED HEALTH CARE EDUCATION/TRAINING PROGRAM

## 2023-12-12 PROCEDURE — 2500000005 HC RX 250 GENERAL PHARMACY W/O HCPCS: Performed by: STUDENT IN AN ORGANIZED HEALTH CARE EDUCATION/TRAINING PROGRAM

## 2023-12-12 PROCEDURE — 80202 ASSAY OF VANCOMYCIN: CPT | Performed by: REGISTERED NURSE

## 2023-12-12 PROCEDURE — 84295 ASSAY OF SERUM SODIUM: CPT | Performed by: STUDENT IN AN ORGANIZED HEALTH CARE EDUCATION/TRAINING PROGRAM

## 2023-12-12 PROCEDURE — 37799 UNLISTED PX VASCULAR SURGERY: CPT | Performed by: REGISTERED NURSE

## 2023-12-12 PROCEDURE — 1200000002 HC GENERAL ROOM WITH TELEMETRY DAILY

## 2023-12-12 PROCEDURE — 2500000001 HC RX 250 WO HCPCS SELF ADMINISTERED DRUGS (ALT 637 FOR MEDICARE OP): Performed by: REGISTERED NURSE

## 2023-12-12 RX ORDER — VANCOMYCIN HYDROCHLORIDE 750 MG/150ML
750 INJECTION, SOLUTION INTRAVENOUS ONCE
Status: COMPLETED | OUTPATIENT
Start: 2023-12-12 | End: 2023-12-12

## 2023-12-12 RX ADMIN — VANCOMYCIN HYDROCHLORIDE 750 MG: 750 INJECTION, SOLUTION INTRAVENOUS at 11:14

## 2023-12-12 RX ADMIN — HEPARIN SODIUM 5000 UNITS: 5000 INJECTION INTRAVENOUS; SUBCUTANEOUS at 08:07

## 2023-12-12 RX ADMIN — CEFEPIME 2 G: 1 INJECTION, SOLUTION INTRAVENOUS at 04:20

## 2023-12-12 RX ADMIN — POTASSIUM CHLORIDE 20 MEQ: 1.5 POWDER, FOR SOLUTION ORAL at 04:16

## 2023-12-12 RX ADMIN — INSULIN LISPRO 2 UNITS: 100 INJECTION, SOLUTION INTRAVENOUS; SUBCUTANEOUS at 12:00

## 2023-12-12 RX ADMIN — INSULIN LISPRO 4 UNITS: 100 INJECTION, SOLUTION INTRAVENOUS; SUBCUTANEOUS at 23:25

## 2023-12-12 RX ADMIN — THIAMINE HCL TAB 100 MG 100 MG: 100 TAB at 08:07

## 2023-12-12 RX ADMIN — LEVETIRACETAM 500 MG: 5 INJECTION INTRAVENOUS at 04:16

## 2023-12-12 RX ADMIN — PANTOPRAZOLE SODIUM 40 MG: 40 INJECTION, POWDER, FOR SOLUTION INTRAVENOUS at 08:07

## 2023-12-12 RX ADMIN — INSULIN LISPRO 2 UNITS: 100 INJECTION, SOLUTION INTRAVENOUS; SUBCUTANEOUS at 00:31

## 2023-12-12 RX ADMIN — AMLODIPINE BESYLATE 10 MG: 10 TABLET ORAL at 08:07

## 2023-12-12 RX ADMIN — POLYETHYLENE GLYCOL 3350 17 G: 17 POWDER, FOR SOLUTION ORAL at 08:08

## 2023-12-12 RX ADMIN — Medication 4000 UNITS: at 08:07

## 2023-12-12 RX ADMIN — HEPARIN SODIUM 5000 UNITS: 5000 INJECTION INTRAVENOUS; SUBCUTANEOUS at 17:40

## 2023-12-12 RX ADMIN — REMDESIVIR 100 MG: 100 INJECTION, POWDER, LYOPHILIZED, FOR SOLUTION INTRAVENOUS at 08:08

## 2023-12-12 RX ADMIN — LEVETIRACETAM 500 MG: 5 INJECTION INTRAVENOUS at 16:15

## 2023-12-12 RX ADMIN — BISACODYL 10 MG: 10 SUPPOSITORY RECTAL at 08:07

## 2023-12-12 RX ADMIN — HEPARIN SODIUM 5000 UNITS: 5000 INJECTION INTRAVENOUS; SUBCUTANEOUS at 00:31

## 2023-12-12 RX ADMIN — CEFEPIME 2 G: 1 INJECTION, SOLUTION INTRAVENOUS at 16:18

## 2023-12-12 RX ADMIN — ATORVASTATIN CALCIUM 20 MG: 20 TABLET, FILM COATED ORAL at 08:07

## 2023-12-12 RX ADMIN — INSULIN LISPRO 2 UNITS: 100 INJECTION, SOLUTION INTRAVENOUS; SUBCUTANEOUS at 05:21

## 2023-12-12 RX ADMIN — PANTOPRAZOLE SODIUM 40 MG: 40 INJECTION, POWDER, FOR SOLUTION INTRAVENOUS at 23:12

## 2023-12-12 RX ADMIN — INSULIN GLARGINE 5 UNITS: 100 INJECTION, SOLUTION SUBCUTANEOUS at 23:11

## 2023-12-12 ASSESSMENT — PAIN - FUNCTIONAL ASSESSMENT
PAIN_FUNCTIONAL_ASSESSMENT: CPOT (CRITICAL CARE PAIN OBSERVATION TOOL)
PAIN_FUNCTIONAL_ASSESSMENT: 0-10
PAIN_FUNCTIONAL_ASSESSMENT: CPOT (CRITICAL CARE PAIN OBSERVATION TOOL)
PAIN_FUNCTIONAL_ASSESSMENT: CPOT (CRITICAL CARE PAIN OBSERVATION TOOL)
PAIN_FUNCTIONAL_ASSESSMENT: 0-10
PAIN_FUNCTIONAL_ASSESSMENT: PAINAD (PAIN ASSESSMENT IN ADVANCED DEMENTIA SCALE)

## 2023-12-12 ASSESSMENT — COGNITIVE AND FUNCTIONAL STATUS - GENERAL
DRESSING REGULAR LOWER BODY CLOTHING: TOTAL
TOILETING: TOTAL
TURNING FROM BACK TO SIDE WHILE IN FLAT BAD: TOTAL
STANDING UP FROM CHAIR USING ARMS: A LOT
WALKING IN HOSPITAL ROOM: TOTAL
PERSONAL GROOMING: TOTAL
EATING MEALS: TOTAL
MOVING FROM LYING ON BACK TO SITTING ON SIDE OF FLAT BED WITH BEDRAILS: A LOT
DAILY ACTIVITIY SCORE: 6
MOVING TO AND FROM BED TO CHAIR: TOTAL
MOBILITY SCORE: 8
CLIMB 3 TO 5 STEPS WITH RAILING: TOTAL
HELP NEEDED FOR BATHING: TOTAL
DRESSING REGULAR UPPER BODY CLOTHING: TOTAL

## 2023-12-12 ASSESSMENT — PAIN SCALES - PAIN ASSESSMENT IN ADVANCED DEMENTIA (PAINAD)
FACIALEXPRESSION: SMILING OR INEXPRESSIVE
TOTALSCORE: 0
BREATHING: NORMAL
BODYLANGUAGE: RELAXED
CONSOLABILITY: NO NEED TO CONSOLE

## 2023-12-12 ASSESSMENT — PAIN SCALES - GENERAL
PAINLEVEL_OUTOF10: 0 - NO PAIN
PAINLEVEL_OUTOF10: 0 - NO PAIN

## 2023-12-12 NOTE — PROGRESS NOTES
Pharmacy to Dose Vancomycin:  Haile Ayers is a 83 y.o. male ordered pharmacy to dose vancomycin for pneumonia. Today is day 7 of therapy.  Goal: Trough 15-20mg/L  Results from last 7 days   Lab Units 12/12/23  0035 12/11/23  1654 12/11/23  0746 12/11/23  0005 12/10/23  1730 12/10/23  0456   BUN mg/dL 34* 31*  --  30*   < > 29*   CREATININE mg/dL 1.40* 1.49*  --  1.56*   < > 1.62*   WBC AUTO x10*3/uL 5.4 6.7  --  6.5   < > 8.0   VANCOMYCIN RM ug/mL 23.6*  --    < >  --   --   --    VANCOMYCIN TR ug/mL  --   --   --   --   --  16.1    < > = values in this interval not displayed.      Urine Culture   Date/Time Value Ref Range Status   12/08/2023 04:56 PM No growth  Final     Blood Culture   Date/Time Value Ref Range Status   12/08/2023 05:11 PM No growth at 3 days  Preliminary     Gram Stain   Date/Time Value Ref Range Status   12/07/2023 09:49 AM   Final    Gram stain indicates specimen consists of lower respiratory tract secretions.   12/07/2023 09:49 AM No predominant organism  Final      Renal function is improving. Level 23.6 drawn at 0035 (~12hrs post dose). Given that renal function continues to improve we can probably start Q24H dosing but I would like to assess 24hr clearance before doing so.    Plan:  Give vanco 750mg x 1 dose today.  Follow-up level ordered for 12/13 at 1000 unless clinically indicated sooner.  Pharmacy will continue daily vancomycin monitoring. Please contact the pharmacy at extension [phone] with any questions.    Thank you,  Randy Moore [facility]

## 2023-12-12 NOTE — PROGRESS NOTES
Haile Ayers is a 83 y.o. male on day 19 of admission presenting with Subdural hematoma (CMS/HCC).    Subjective   Interval History: 82yo male with hx of chronic urinary retention on chronic Ignacio ,  CLL ( well controlled with ibarutinib)  presented with altered mental status with subdural hematoma s/p evacution on 11/22 .      Last seen by ID on 11/26 for  fever that started postop day #1.   Based on clinical scenario and chest x-ray and urine study , they felt most likely source of fever would be aspiration pneumonia vs UTI. Treated for 10d of vac and cefepime (11/24-12/4) and fever resolved then.      ID rengaged 12/8 for recurred fever fevers of unclear origin. On 12/7 day had T 38.4C, restarted on vanc and cefe, Bcx ngtd, Resp Cx with normal throat darrin sent and ngtd, CT Chest with small bilat effusion, possible aspiration, Ucx ngtd, has ignacio, Workup show Covid+ started on remdesivir 12/9        Review of Systems    Objective   Range of Vitals (last 24 hours)  Heart Rate:  [80-96]   Temp:  [36.3 °C (97.3 °F)-36.7 °C (98.1 °F)]   Resp:  [15-24]   BP: (113-164)/(56-97)   SpO2:  [93 %-100 %]   Daily Weight  12/10/23 : 68 kg (149 lb 14.6 oz)    Body mass index is 22.8 kg/m².    Scheduled meds   amLODIPine, 10 mg, nasogastric tube, Daily  atorvastatin, 20 mg, oral, Daily  bisacodyl, 10 mg, rectal, Daily  cefepime, 2 g, intravenous, q12h  cholecalciferol, 4,000 Units, oral, Daily  heparin (porcine), 5,000 Units, subcutaneous, q8h  insulin glargine, 5 Units, subcutaneous, Nightly  insulin lispro, 0-10 Units, subcutaneous, q6h  levETIRAcetam, 500 mg, intravenous, q12h  pantoprazole, 40 mg, intravenous, BID  polyethylene glycol, 17 g, oral, Daily  remdesivir, 100 mg, intravenous, q24h  thiamine, 100 mg, nasogastric tube, Daily         Relevant Results  Labs  Results from last 72 hours   Lab Units 12/11/23  1654 12/11/23  0005 12/10/23  1730 12/10/23  0456 12/09/23  1729 12/09/23  0154   WBC AUTO x10*3/uL 6.7 6.5 8.4  "8.0   < > 8.1   HEMOGLOBIN g/dL 7.7* 7.3* 7.5* 8.1*   < > 7.7*   HEMATOCRIT % 22.5* 22.0* 22.3* 24.4*   < > 23.2*   PLATELETS AUTO x10*3/uL 153 163 172 178   < > 197   NEUTROS PCT AUTO %  --  63.0  --  71.3  --  73.9   LYMPHS PCT AUTO %  --  24.7  --  18.0  --  15.9   MONOS PCT AUTO %  --  9.4  --  8.2  --  8.5   EOS PCT AUTO %  --  2.3  --  1.9  --  1.1    < > = values in this interval not displayed.     Results from last 72 hours   Lab Units 12/11/23  1654 12/11/23  0746 12/11/23  0005 12/10/23  1730   SODIUM mmol/L 150*  150* 151* 151* 153*   POTASSIUM mmol/L 3.9  --  3.7 3.8   CHLORIDE mmol/L 111*  --  112* 113*   CO2 mmol/L 28  --  32 31   BUN mg/dL 31*  --  30* 30*   CREATININE mg/dL 1.49*  --  1.56* 1.58*   GLUCOSE mg/dL 149*  --  159* 132*   CALCIUM mg/dL 8.6  --  8.7 8.7   ANION GAP mmol/L 15  --  11 13   EGFR mL/min/1.73m*2 46*  --  44* 43*   PHOSPHORUS mg/dL 2.7  --  2.7 2.8     Results from last 72 hours   Lab Units 12/11/23  1654 12/11/23  0005 12/10/23  1730 12/09/23  1729 12/09/23  0504   ALK PHOS U/L  --   --   --   --  50   BILIRUBIN TOTAL mg/dL  --   --   --   --  0.5   BILIRUBIN DIRECT mg/dL  --   --   --   --  0.1   PROTEIN TOTAL g/dL  --   --   --   --  6.3*   ALT U/L  --   --   --   --  5*   AST U/L  --   --   --   --  7*   ALBUMIN g/dL 3.6 3.7 3.8   < > 4.0    < > = values in this interval not displayed.     Estimated Creatinine Clearance: 35.5 mL/min (A) (by C-G formula based on SCr of 1.49 mg/dL (H)).  No results found for: \"CRP\"  Microbiology  Susceptibility data from last 14 days.  Collected Specimen Info Organism   12/07/23 Fluid from SPUTUM Normal throat darrin     Imaging  12/87 renal US;   1. Mild right pelvic fullness with otherwise unremarkable renal   ultrasound.   2. Incidental note of moderate volume abdominal ascites.      12/7 Cxray   1.  Slight improvement in right basilar aeration with continued   basilar edema/effusions. Correlate with fluid status.      12/5 CT Chest   1. No " evidence of acute pulmonary embolism.   2. Moderate to large volume bilateral layering pleural effusions with   associated compressive atelectasis and diffuse body wall anasarca   along with smooth interlobular septal thickening is noted.   Additionally, large volume abdominal ascites is incidentally noted.   Correlation with patient's fluid status is recommended. Superimposed   pneumonia or aspiration pneumonitis not excluded.   3. Bilateral pulmonary arteries are dilated which may be sequela of   chronic pulmonary arterial hypertension.   4. Diffuse sclerotic lesions throughout the axial and appendicular   skeleton of varying sizes. 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 Correlate with any available outside   hospital imaging studies. Correlate with PSA levels. Updated findings   are communicated through epic secure chat message to the referring   provider at 12:35 p.m. on 12/06/2023.   5. Additional chronic findings as above.      Assessment/Plan   84yo male with hx of chronic urinary retention on chronic Ignacio ,  CLL ( reportely well controlled with ibarutinib)  presented with altered mental status with finding of  subdural hematoma s/p evacution on 11/22 . Has screws in place now in skull.     Last seen by ID on 11/26 for  fever that started postop day #1.   Based on clinical scenario and chest x-ray and urine study , they fel tmost likely source of fever would be aspiration pneumonia vs UTI. Treated for 10d of vac and cefepime (11/24-12/4) and fever resolved then.      ID rengaged 12/8 for recurred fever fevers of unclear origin. Restarted on vanc and cefe, & workup sent with Bcx ngtd, Resp Cx with normal throat darrin sent and ngtd, CT Chest with small bilat effusion, possible aspiration, Ucx ngtd, (has ignacio)  however Workup show Covid+ started on remdesivir 12/9        Fever now resolved. Will plan to treat for covid  and possible bacterial PNA as below.      Recommendations:  - Treat Covid per protocol    - Complete 7d of Vanc and Cefepime counting from start date 12/7/23- END DATE 12/13/2023., then stop.       Thank you for consult, ID has signed off     ID team A pager [phone].  For new consults, contact pager [phone].   Patient seen and staffed with ID attendant Dr Parson who agrees with plan as above  Bella Dang MD MPH

## 2023-12-12 NOTE — PROGRESS NOTES
"Haile Ayers is a 83 y.o. male on day 20 of admission presenting with Subdural hematoma (CMS/HCC).    Subjective   No acute events overnight       Objective     Physical Exam  EOSp  BUE spontaneous L>R  BLE spontaneous L>R    Last Recorded Vitals  Blood pressure 140/80, pulse 89, temperature 36.8 °C (98.2 °F), temperature source Temporal, resp. rate 23, height 1.727 m (5' 7.99\"), weight 68 kg (149 lb 14.6 oz), SpO2 98 %.  Intake/Output last 3 Shifts:  I/O last 3 completed shifts:  In: 2350 (34.6 mL/kg) [I.V.:150 (2.2 mL/kg); NG/GT:2050; IV Piggyback:150]  Out: 2521 (37.1 mL/kg) [Urine:2521 (1 mL/kg/hr)]  Weight: 68 kg     Relevant Results           This patient currently has cardiac telemetry ordered; if you would like to modify or discontinue the telemetry order, click here to go to the orders activity to modify/discontinue the order.                 Assessment/Plan   Principal Problem:    Subdural hematoma (CMS/HCC)  Active Problems:    HTN (hypertension)    Diabetes mellitus, type 2 (CMS/HCC)    CVA (cerebral vascular accident) (CMS/HCC)    83 y.o. male h/o CLL, anemia, DM, p/w AMS, found to have UTI, CTH w/ L large chronic SDH, 11/22 s/p L crani for SDH evac, CTH POC, 11/24 drain dc'd, Bcx +GPC, 11/25 febrile, remaining vitals stable, exam unchanged, repeat bcx negative     11/28 s/p midline   11/29 Lethargic, not FC, s/p CTH w/ increased L SDH w/ 8mm MLS s/p 1g Keppra load, CXR stable opacities, rCTH stable, s/p R side wd, s/p keppra load, rrCTH stable L SDH,  11/30 S/p L crani for redo SDH evac, CTH POC with improved MLS  12/1 s/p extubation  12/3 CTH stable  12/4 s/psmall L MMA w no distal branches, not embolized   12/5 DVT US x 4 RLE chornic changes no DVT, CTH stable, CT PE neg for PE, BL pleural effusions s/p 20 lasix  12/6 TTE EF 65-70%, SDD dc'd, eeg neg dc'd  12/8 covid positive  12/10 Lantus 5 started for glucose coverage     Improved exam overnight     PLAN:     RITCHIE , downgrade to tele this AM  COVID " positive-treating with Remdesivir  Med onc recs - CT CAP when LIN resolved  BIPAP as needed  Surg endo cs for PEG - wife okay with peg  ID recs- Continue vanc, cefe (!2/14)  Chronic ignacio  Heme recs-cont to hold ibrutinib   SLP re-eval  PTOT-SNF  SCD, SQH           Michelle Chong MD

## 2023-12-12 NOTE — PROGRESS NOTES
Speech-Language Pathology    Inpatient  Speech-Language Pathology Treatment     Patient Name: Haile Ayers  MRN: [id number]  Today's Date: 12/12/2023  Time Calculation  Start Time: 1038  Stop Time: 1057  Time Calculation (min): 19 min         SLP Assessment:  Pt now dx with COVID. Pt continues to be non-verbal and not following directions for SLP today.  Wouldn't open mouth for oral care. Accepted tsp of mildly thick liquids resulted in multiple swallows. Declined further PO trials although SLP continued to attempt x3. Pt closed mouth and turned away from SLP. Therapy discontinued.        Plan:  SLP Frequency: 2x per week  Duration: 3 weeks  SLP Discharge Recommendations: Continue skilled SLP services at the next level of care  Discussed POC: Patient, Caregiver/family, Nursing, Physician  Patient/Caregiver Agreeable: Yes  SLP - OK to Discharge: Yes      Goal:   Pt. will tolerate least restrictive diet without overt s/s of aspiration with 100% accuracy.      Subjective   Current Problem:  Dysphagia    Inpatient:  Education Documentation  Extensive education provided to pt re: current swallow function, recommendations/results and dysphagia POC, question comprehension.       Consultations/Referrals/Coordination of Services: N/A

## 2023-12-12 NOTE — CARE PLAN
The patient's goals for the shift include  gifty    The clinical goals for the shift include no increase in O2 by end of shift.     Problem: Pain  Goal: My pain/discomfort is manageable  Outcome: Progressing     Problem: Safety  Goal: Patient will be injury free during hospitalization  Outcome: Progressing  Goal: I will remain free of falls  Outcome: Progressing     Problem: Daily Care  Goal: Daily care needs are met  Outcome: Progressing     Problem: Psychosocial Needs  Goal: Demonstrates ability to cope with hospitalization/illness  Outcome: Progressing  Goal: Collaborate with me, my family, and caregiver to identify my specific goals  Outcome: Progressing     Problem: Discharge Barriers  Goal: My discharge needs are met  Outcome: Progressing     Problem: General Stroke  Goal: Demonstrate improvement in neurological exam throughout the shift  Outcome: Progressing  Goal: Maintain BP within ordered limits throughout shift  Outcome: Progressing  Goal: Participate in treatment (ie., meds, therapy) throughout shift  Outcome: Progressing  Goal: No symptoms of aspiration throughout shift  Outcome: Progressing  Goal: No symptoms of hemorrhage throughout shift  Outcome: Progressing  Goal: Tolerate enteral feeding throughout shift  Outcome: Progressing  Goal: Decreased nausea/vomiting throughout shift  Outcome: Progressing  Goal: Controlled blood glucose throughout shift  Outcome: Progressing  Goal: Out of bed three times today  Outcome: Progressing     Problem: ICU Stroke  Goal: Maintain ICP within ordered limits throughout shift  Outcome: Progressing  Goal: Tolerate EVD clamping trial throughout shift  Outcome: Progressing  Goal: Tolerate ventilator weaning trial during shift  Outcome: Progressing  Goal: Maintain patent airway throughout shift  Outcome: Progressing  Goal: Achieve/maintain targeted sodium level throughout shift  Outcome: Progressing     Problem: Skin  Goal: Decreased wound size/increased tissue granulation  at next dressing change  Outcome: Progressing  Goal: Participates in plan/prevention/treatment measures  Outcome: Progressing  Goal: Prevent/manage excess moisture  Outcome: Progressing  Goal: Prevent/minimize sheer/friction injuries  Outcome: Progressing  Goal: Promote/optimize nutrition  Outcome: Progressing  Goal: Promote skin healing  Outcome: Progressing     Problem: Safety - Medical Restraint  Goal: Remains free of injury from restraints (Restraint for Interference with Medical Device)  Outcome: Progressing  Goal: Free from restraint(s) (Restraint for Interference with Medical Device)  Outcome: Progressing     Problem: Nutrition  Goal: Nutrition support goals are met within 48 hrs  Outcome: Progressing  Goal: Lab values WNL  Outcome: Progressing  Goal: Electrolytes WNL  Outcome: Progressing  Goal: Promote healing  Outcome: Progressing  Goal: Maintain stable weight  Outcome: Progressing  Goal: Reduce weight from edema/fluid  Outcome: Progressing     Problem: Pain - Adult  Goal: Verbalizes/displays adequate comfort level or baseline comfort level  Outcome: Progressing     Problem: Safety - Adult  Goal: Free from fall injury  Outcome: Progressing     Problem: Discharge Planning  Goal: Discharge to home or other facility with appropriate resources  Outcome: Progressing     Problem: Chronic Conditions and Co-morbidities  Goal: Patient's chronic conditions and co-morbidity symptoms are monitored and maintained or improved  Outcome: Progressing     Problem: Fall/Injury  Goal: Not fall by end of shift  Outcome: Progressing  Goal: Be free from injury by end of the shift  Outcome: Progressing  Goal: Verbalize understanding of personal risk factors for fall in the hospital  Outcome: Progressing     Problem: Diabetes  Goal: Increase stability of blood glucose readings by end of shift  Outcome: Progressing  Goal: Maintain electrolyte levels within acceptable range throughout shift  Outcome: Progressing  Goal: Maintain  glucose levels >70mg/dl to <250mg/dl throughout shift  Outcome: Progressing  Goal: No changes in neurological exam by end of shift  Outcome: Progressing

## 2023-12-13 LAB
ABO GROUP (TYPE) IN BLOOD: NORMAL
ALBUMIN SERPL BCP-MCNC: 3.2 G/DL (ref 3.4–5)
ALBUMIN SERPL BCP-MCNC: 3.3 G/DL (ref 3.4–5)
ANION GAP SERPL CALC-SCNC: 10 MMOL/L (ref 10–20)
ANION GAP SERPL CALC-SCNC: 10 MMOL/L (ref 10–20)
ANTIBODY SCREEN: NORMAL
BUN SERPL-MCNC: 38 MG/DL (ref 6–23)
BUN SERPL-MCNC: 39 MG/DL (ref 6–23)
CALCIUM SERPL-MCNC: 8.4 MG/DL (ref 8.6–10.6)
CALCIUM SERPL-MCNC: 8.4 MG/DL (ref 8.6–10.6)
CHLORIDE SERPL-SCNC: 111 MMOL/L (ref 98–107)
CHLORIDE SERPL-SCNC: 111 MMOL/L (ref 98–107)
CO2 SERPL-SCNC: 30 MMOL/L (ref 21–32)
CO2 SERPL-SCNC: 31 MMOL/L (ref 21–32)
CREAT SERPL-MCNC: 1.45 MG/DL (ref 0.5–1.3)
CREAT SERPL-MCNC: 1.46 MG/DL (ref 0.5–1.3)
ERYTHROCYTE [DISTWIDTH] IN BLOOD BY AUTOMATED COUNT: 14.8 % (ref 11.5–14.5)
ERYTHROCYTE [DISTWIDTH] IN BLOOD BY AUTOMATED COUNT: 14.8 % (ref 11.5–14.5)
GFR SERPL CREATININE-BSD FRML MDRD: 47 ML/MIN/1.73M*2
GFR SERPL CREATININE-BSD FRML MDRD: 48 ML/MIN/1.73M*2
GLUCOSE BLD MANUAL STRIP-MCNC: 191 MG/DL (ref 74–99)
GLUCOSE BLD MANUAL STRIP-MCNC: 220 MG/DL (ref 74–99)
GLUCOSE BLD MANUAL STRIP-MCNC: 246 MG/DL (ref 74–99)
GLUCOSE SERPL-MCNC: 183 MG/DL (ref 74–99)
GLUCOSE SERPL-MCNC: 241 MG/DL (ref 74–99)
HCT VFR BLD AUTO: 21.5 % (ref 41–52)
HCT VFR BLD AUTO: 21.9 % (ref 41–52)
HGB BLD-MCNC: 6.8 G/DL (ref 13.5–17.5)
HGB BLD-MCNC: 7.2 G/DL (ref 13.5–17.5)
MCH RBC QN AUTO: 28.5 PG (ref 26–34)
MCH RBC QN AUTO: 29.9 PG (ref 26–34)
MCHC RBC AUTO-ENTMCNC: 31.6 G/DL (ref 32–36)
MCHC RBC AUTO-ENTMCNC: 32.9 G/DL (ref 32–36)
MCV RBC AUTO: 90 FL (ref 80–100)
MCV RBC AUTO: 91 FL (ref 80–100)
NRBC BLD-RTO: 0 /100 WBCS (ref 0–0)
NRBC BLD-RTO: 0 /100 WBCS (ref 0–0)
PHOSPHATE SERPL-MCNC: 2.4 MG/DL (ref 2.5–4.9)
PHOSPHATE SERPL-MCNC: 2.5 MG/DL (ref 2.5–4.9)
PLATELET # BLD AUTO: 160 X10*3/UL (ref 150–450)
PLATELET # BLD AUTO: 165 X10*3/UL (ref 150–450)
POTASSIUM SERPL-SCNC: 4.1 MMOL/L (ref 3.5–5.3)
POTASSIUM SERPL-SCNC: 4.1 MMOL/L (ref 3.5–5.3)
RBC # BLD AUTO: 2.39 X10*6/UL (ref 4.5–5.9)
RBC # BLD AUTO: 2.41 X10*6/UL (ref 4.5–5.9)
RH FACTOR (ANTIGEN D): NORMAL
SODIUM SERPL-SCNC: 147 MMOL/L (ref 136–145)
SODIUM SERPL-SCNC: 148 MMOL/L (ref 136–145)
VANCOMYCIN TROUGH SERPL-MCNC: 19.5 UG/ML (ref 5–20)
WBC # BLD AUTO: 4.9 X10*3/UL (ref 4.4–11.3)
WBC # BLD AUTO: 5.1 X10*3/UL (ref 4.4–11.3)

## 2023-12-13 PROCEDURE — 36430 TRANSFUSION BLD/BLD COMPNT: CPT

## 2023-12-13 PROCEDURE — 82947 ASSAY GLUCOSE BLOOD QUANT: CPT

## 2023-12-13 PROCEDURE — 2500000004 HC RX 250 GENERAL PHARMACY W/ HCPCS (ALT 636 FOR OP/ED): Performed by: STUDENT IN AN ORGANIZED HEALTH CARE EDUCATION/TRAINING PROGRAM

## 2023-12-13 PROCEDURE — 2500000001 HC RX 250 WO HCPCS SELF ADMINISTERED DRUGS (ALT 637 FOR MEDICARE OP)

## 2023-12-13 PROCEDURE — P9016 RBC LEUKOCYTES REDUCED: HCPCS

## 2023-12-13 PROCEDURE — 2500000001 HC RX 250 WO HCPCS SELF ADMINISTERED DRUGS (ALT 637 FOR MEDICARE OP): Performed by: STUDENT IN AN ORGANIZED HEALTH CARE EDUCATION/TRAINING PROGRAM

## 2023-12-13 PROCEDURE — 1200000002 HC GENERAL ROOM WITH TELEMETRY DAILY

## 2023-12-13 PROCEDURE — 36415 COLL VENOUS BLD VENIPUNCTURE: CPT | Performed by: STUDENT IN AN ORGANIZED HEALTH CARE EDUCATION/TRAINING PROGRAM

## 2023-12-13 PROCEDURE — 85027 COMPLETE CBC AUTOMATED: CPT | Performed by: STUDENT IN AN ORGANIZED HEALTH CARE EDUCATION/TRAINING PROGRAM

## 2023-12-13 PROCEDURE — 86920 COMPATIBILITY TEST SPIN: CPT

## 2023-12-13 PROCEDURE — 80202 ASSAY OF VANCOMYCIN: CPT | Performed by: STUDENT IN AN ORGANIZED HEALTH CARE EDUCATION/TRAINING PROGRAM

## 2023-12-13 PROCEDURE — 96372 THER/PROPH/DIAG INJ SC/IM: CPT | Performed by: STUDENT IN AN ORGANIZED HEALTH CARE EDUCATION/TRAINING PROGRAM

## 2023-12-13 PROCEDURE — 86901 BLOOD TYPING SEROLOGIC RH(D): CPT | Performed by: STUDENT IN AN ORGANIZED HEALTH CARE EDUCATION/TRAINING PROGRAM

## 2023-12-13 PROCEDURE — 80069 RENAL FUNCTION PANEL: CPT | Performed by: STUDENT IN AN ORGANIZED HEALTH CARE EDUCATION/TRAINING PROGRAM

## 2023-12-13 PROCEDURE — 97530 THERAPEUTIC ACTIVITIES: CPT | Mod: GP | Performed by: PHYSICAL THERAPIST

## 2023-12-13 PROCEDURE — 2500000004 HC RX 250 GENERAL PHARMACY W/ HCPCS (ALT 636 FOR OP/ED)

## 2023-12-13 PROCEDURE — 2500000005 HC RX 250 GENERAL PHARMACY W/O HCPCS: Performed by: STUDENT IN AN ORGANIZED HEALTH CARE EDUCATION/TRAINING PROGRAM

## 2023-12-13 PROCEDURE — C9113 INJ PANTOPRAZOLE SODIUM, VIA: HCPCS | Performed by: STUDENT IN AN ORGANIZED HEALTH CARE EDUCATION/TRAINING PROGRAM

## 2023-12-13 PROCEDURE — 97112 NEUROMUSCULAR REEDUCATION: CPT | Mod: GP | Performed by: PHYSICAL THERAPIST

## 2023-12-13 RX ORDER — VANCOMYCIN HYDROCHLORIDE 750 MG/150ML
750 INJECTION, SOLUTION INTRAVENOUS ONCE
Status: COMPLETED | OUTPATIENT
Start: 2023-12-13 | End: 2023-12-13

## 2023-12-13 RX ADMIN — HEPARIN SODIUM 5000 UNITS: 5000 INJECTION INTRAVENOUS; SUBCUTANEOUS at 18:02

## 2023-12-13 RX ADMIN — HEPARIN SODIUM 5000 UNITS: 5000 INJECTION INTRAVENOUS; SUBCUTANEOUS at 02:14

## 2023-12-13 RX ADMIN — POLYETHYLENE GLYCOL 3350 17 G: 17 POWDER, FOR SOLUTION ORAL at 09:40

## 2023-12-13 RX ADMIN — PANTOPRAZOLE SODIUM 40 MG: 40 INJECTION, POWDER, FOR SOLUTION INTRAVENOUS at 21:04

## 2023-12-13 RX ADMIN — INSULIN LISPRO 4 UNITS: 100 INJECTION, SOLUTION INTRAVENOUS; SUBCUTANEOUS at 18:03

## 2023-12-13 RX ADMIN — HEPARIN SODIUM 5000 UNITS: 5000 INJECTION INTRAVENOUS; SUBCUTANEOUS at 09:39

## 2023-12-13 RX ADMIN — PANTOPRAZOLE SODIUM 40 MG: 40 INJECTION, POWDER, FOR SOLUTION INTRAVENOUS at 09:40

## 2023-12-13 RX ADMIN — INSULIN GLARGINE 5 UNITS: 100 INJECTION, SOLUTION SUBCUTANEOUS at 21:04

## 2023-12-13 RX ADMIN — ATORVASTATIN CALCIUM 20 MG: 20 TABLET, FILM COATED ORAL at 09:40

## 2023-12-13 RX ADMIN — INSULIN LISPRO 4 UNITS: 100 INJECTION, SOLUTION INTRAVENOUS; SUBCUTANEOUS at 06:11

## 2023-12-13 RX ADMIN — BISACODYL 10 MG: 10 SUPPOSITORY RECTAL at 09:40

## 2023-12-13 RX ADMIN — VANCOMYCIN HYDROCHLORIDE 750 MG: 750 INJECTION, SOLUTION INTRAVENOUS at 16:07

## 2023-12-13 RX ADMIN — LEVETIRACETAM 500 MG: 5 INJECTION INTRAVENOUS at 18:03

## 2023-12-13 RX ADMIN — CEFEPIME 2 G: 1 INJECTION, SOLUTION INTRAVENOUS at 17:22

## 2023-12-13 RX ADMIN — THIAMINE HCL TAB 100 MG 100 MG: 100 TAB at 09:40

## 2023-12-13 RX ADMIN — LEVETIRACETAM 500 MG: 5 INJECTION INTRAVENOUS at 06:10

## 2023-12-13 RX ADMIN — Medication: at 09:40

## 2023-12-13 RX ADMIN — Medication 4000 UNITS: at 09:40

## 2023-12-13 RX ADMIN — AMLODIPINE BESYLATE 10 MG: 10 TABLET ORAL at 09:40

## 2023-12-13 RX ADMIN — REMDESIVIR 100 MG: 100 INJECTION, POWDER, LYOPHILIZED, FOR SOLUTION INTRAVENOUS at 09:40

## 2023-12-13 RX ADMIN — CEFEPIME 2 G: 1 INJECTION, SOLUTION INTRAVENOUS at 05:07

## 2023-12-13 RX ADMIN — INSULIN LISPRO 2 UNITS: 100 INJECTION, SOLUTION INTRAVENOUS; SUBCUTANEOUS at 12:25

## 2023-12-13 ASSESSMENT — COGNITIVE AND FUNCTIONAL STATUS - GENERAL
TURNING FROM BACK TO SIDE WHILE IN FLAT BAD: TOTAL
MOBILITY SCORE: 6
DRESSING REGULAR LOWER BODY CLOTHING: TOTAL
STANDING UP FROM CHAIR USING ARMS: TOTAL
DAILY ACTIVITIY SCORE: 6
HELP NEEDED FOR BATHING: TOTAL
PERSONAL GROOMING: TOTAL
PERSONAL GROOMING: TOTAL
MOVING FROM LYING ON BACK TO SITTING ON SIDE OF FLAT BED WITH BEDRAILS: TOTAL
EATING MEALS: TOTAL
DAILY ACTIVITIY SCORE: 6
HELP NEEDED FOR BATHING: TOTAL
STANDING UP FROM CHAIR USING ARMS: TOTAL
DRESSING REGULAR UPPER BODY CLOTHING: TOTAL
TURNING FROM BACK TO SIDE WHILE IN FLAT BAD: TOTAL
TURNING FROM BACK TO SIDE WHILE IN FLAT BAD: TOTAL
MOBILITY SCORE: 6
WALKING IN HOSPITAL ROOM: TOTAL
WALKING IN HOSPITAL ROOM: TOTAL
EATING MEALS: TOTAL
MOVING TO AND FROM BED TO CHAIR: TOTAL
CLIMB 3 TO 5 STEPS WITH RAILING: TOTAL
MOVING TO AND FROM BED TO CHAIR: TOTAL
DRESSING REGULAR UPPER BODY CLOTHING: TOTAL
TOILETING: TOTAL
CLIMB 3 TO 5 STEPS WITH RAILING: TOTAL
STANDING UP FROM CHAIR USING ARMS: TOTAL
MOVING TO AND FROM BED TO CHAIR: TOTAL
CLIMB 3 TO 5 STEPS WITH RAILING: TOTAL
MOVING FROM LYING ON BACK TO SITTING ON SIDE OF FLAT BED WITH BEDRAILS: TOTAL
TOILETING: TOTAL
MOBILITY SCORE: 6
WALKING IN HOSPITAL ROOM: TOTAL
DRESSING REGULAR LOWER BODY CLOTHING: TOTAL
MOVING FROM LYING ON BACK TO SITTING ON SIDE OF FLAT BED WITH BEDRAILS: TOTAL

## 2023-12-13 ASSESSMENT — PAIN SCALES - PAIN ASSESSMENT IN ADVANCED DEMENTIA (PAINAD)
TOTALSCORE: 0
FACIALEXPRESSION: SMILING OR INEXPRESSIVE
BREATHING: NORMAL
TOTALSCORE: REPOSITIONED
BODYLANGUAGE: RELAXED
CONSOLABILITY: NO NEED TO CONSOLE

## 2023-12-13 ASSESSMENT — PAIN - FUNCTIONAL ASSESSMENT: PAIN_FUNCTIONAL_ASSESSMENT: PAINAD (PAIN ASSESSMENT IN ADVANCED DEMENTIA SCALE)

## 2023-12-13 NOTE — PROGRESS NOTES
"Haile Ayers is a 83 y.o. male on day 20 of admission presenting with Subdural hematoma (CMS/HCC).    Subjective   ***       Objective     Physical Exam    Last Recorded Vitals  Blood pressure 143/80, pulse 84, temperature 36.9 °C (98.4 °F), resp. rate 22, height 1.727 m (5' 7.99\"), weight 68 kg (149 lb 14.6 oz), SpO2 100 %.  Intake/Output last 3 Shifts:  I/O last 3 completed shifts:  In: 4050 (59.6 mL/kg) [I.V.:150 (2.2 mL/kg); NG/GT:2600; IV Piggyback:1300]  Out: 2821 (41.5 mL/kg) [Urine:2821 (1.2 mL/kg/hr)]  Weight: 68 kg     Relevant Results  {If you would like to pull in Medications, type .meds     If you would like to pull in Lab results for the last 24 hours, type .[id number]    If you would like to pull in Imaging results, type .[id number] :99}    {Link to Stroke Scoring tools - Link :99}                       Assessment/Plan   Principal Problem:    Subdural hematoma (CMS/HCC)  Active Problems:    HTN (hypertension)    Diabetes mellitus, type 2 (CMS/HCC)    CVA (cerebral vascular accident) (CMS/HCC)    ***     {This patient does not have an ACP note on file for this encounter, please fill one out - Advance Care Planning Activity :99}      Xavi Cuadra RN      "

## 2023-12-13 NOTE — CARE PLAN
Problem: Pain  Goal: My pain/discomfort is manageable  Outcome: Progressing     Problem: General Stroke  Goal: No symptoms of aspiration throughout shift  Outcome: Progressing  Goal: No symptoms of hemorrhage throughout shift  Outcome: Progressing  Goal: Tolerate enteral feeding throughout shift  Outcome: Progressing  Goal: Controlled blood glucose throughout shift  Outcome: Progressing     Problem: Safety - Medical Restraint  Goal: Remains free of injury from restraints (Restraint for Interference with Medical Device)  Outcome: Progressing   The patient's goals for the shift include      The clinical goals for the shift include improve venttillstion and remain neurologically intact

## 2023-12-13 NOTE — PROGRESS NOTES
"Vancomycin Dosing by Pharmacy- FOLLOW UP    Haile Ayers is a 83 y.o. year old male who Pharmacy has been consulted for vancomycin dosing for pneumonia. Based on the patient's indication and renal status this patient is being dosed based on a goal trough/random level of 15-20.     Renal function is currently in LIN (Scr is 1.45 mg/dL today).      Currently we have been dosing by level.  Patient received 750 mg x 1 yesterday.    Most recent trough level: 19.5 mcg/mL (today)    Visit Vitals  /68   Pulse 86   Temp 37.1 °C (98.8 °F)   Resp 22        Lab Results   Component Value Date    CREATININE 1.45 (H) 12/13/2023    CREATININE 1.46 (H) 12/13/2023    CREATININE 1.41 (H) 12/12/2023    CREATININE 1.40 (H) 12/12/2023        Patient weight is No results found for: \"PTWEIGHT\"    Lab Results   Component Value Date    VANCORANDOM 23.6 (H) 12/12/2023    VANCORANDOM 19.2 12/11/2023    VANCORANDOM 13.4 12/09/2023        I/O last 3 completed shifts:  In: 4005 (58.9 mL/kg) [NG/GT:2705; IV Piggyback:1300]  Out: 2155 (31.7 mL/kg) [Urine:2155 (0.9 mL/kg/hr)]  Weight: 68 kg   [unfilled]    Lab Results   Component Value Date    PATIENTTEMP  12/11/2023      Comment:      NOTE: Patient Results are Not Corrected for Temperature    PATIENTTEMP 37.0 12/11/2023    PATIENTTEMP  12/10/2023      Comment:      NOTE: Patient Results are Not Corrected for Temperature        Urine Culture   Date/Time Value Ref Range Status   12/08/2023 04:56 PM No growth  Final     Blood Culture   Date/Time Value Ref Range Status   12/08/2023 05:11 PM No growth at 4 days -  FINAL REPORT  Final     Gram Stain   Date/Time Value Ref Range Status   12/07/2023 09:49 AM   Final    Gram stain indicates specimen consists of lower respiratory tract secretions.   12/07/2023 09:49 AM No predominant organism  Final        Assessment/Plan    Within goal random/trough level  Will give 750 mg x 1 again this afternoon.  The next level will be obtained on 12/14 at " 14:30. May be obtained sooner if clinically indicated.   Will continue to monitor renal function daily while on vancomycin and order serum creatinine at least every 48 hours if not already ordered.  Follow for continued vancomycin needs, clinical response, and signs/symptoms of toxicity.       Viral Briseno, PharmD, TORSTEN

## 2023-12-13 NOTE — PROGRESS NOTES
"Haile Ayers is a 83 y.o. male on day 21 of admission presenting with Subdural hematoma (CMS/HCC).    Subjective   No acute events overnight       Objective     Physical Exam  EOSp  BUE spontaneous L>R  BLE spontaneous L>R    Last Recorded Vitals  Blood pressure 147/76, pulse 88, temperature 37.1 °C (98.8 °F), resp. rate 22, height 1.727 m (5' 7.99\"), weight 68 kg (149 lb 14.6 oz), SpO2 97 %.  Intake/Output last 3 Shifts:  I/O last 3 completed shifts:  In: 3305 (48.6 mL/kg) [NG/GT:2005; IV Piggyback:1300]  Out: 2155 (31.7 mL/kg) [Urine:2155 (0.9 mL/kg/hr)]  Weight: 68 kg     Relevant Results                             Assessment/Plan   Principal Problem:    Subdural hematoma (CMS/HCC)  Active Problems:    HTN (hypertension)    Diabetes mellitus, type 2 (CMS/HCC)    CVA (cerebral vascular accident) (CMS/HCC)    83 y.o. male h/o CLL, anemia, DM, p/w AMS, found to have UTI, CTH w/ L large chronic SDH, 11/22 s/p L crani for SDH evac, CTH POC, 11/24 drain dc'd, Bcx +GPC, 11/25 febrile, remaining vitals stable, exam unchanged, repeat bcx negative     11/28 s/p midline   11/29 Lethargic, not FC, s/p CTH w/ increased L SDH w/ 8mm MLS s/p 1g Keppra load, CXR stable opacities, rCTH stable, s/p R side wd, s/p keppra load, rrCTH stable L SDH,  11/30 S/p L crani for redo SDH evac, CTH POC with improved MLS  12/1 s/p extubation  12/3 CTH stable  12/4 s/psmall L MMA w no distal branches, not embolized   12/5 DVT US x 4 RLE chornic changes no DVT, CTH stable, CT PE neg for PE, BL pleural effusions s/p 20 lasix  12/6 TTE EF 65-70%, SDD dc'd, eeg neg dc'd  12/8 covid positive  12/10 Lantus 5 started for glucose coverage     Improved exam overnight     PLAN:     telemetry  Med onc recs - CT CAP when LIN resolved  BIPAP as needed  PEG consult - wife okay with peg  ID recs- Continue vanc, cefe (!2/14)  Chronic ignacio  Heme recs-cont to hold ibrutinib   SLP re-eval  PTOT-SNF  SCD, SQH           Michelle Chong MD      "

## 2023-12-13 NOTE — PROGRESS NOTES
Physical Therapy    Physical Therapy Treatment    Patient Name: Haile Ayers  MRN: [id number]  Today's Date: 12/13/2023  Time Calculation  Start Time: 1541  Stop Time: 1606  Time Calculation (min): 25 min       Assessment/Plan   PT Assessment  PT Assessment Results: Decreased strength, Decreased range of motion, Decreased endurance, Impaired balance, Decreased mobility, Decreased cognition, Decreased coordination, Impaired hearing  Rehab Prognosis: Fair  Evaluation/Treatment Tolerance:  (pt not responsive today, very tired)  Medical Staff Made Aware: Yes (RN told MD who reports that pt is just tired)  Strengths: Support of Caregivers  Barriers to Participation:  (lethargy)  End of Session Communication: Bedside nurse  Assessment Comment: 83 year old male with acute on chronic SDH s/p craniotomy/evacuation, now +COVID and with sclerotic lesions suggestive of bone cancer. Today pt unresponsive, very sleepy. Recommend moderte intensity therapy upon DC.  End of Session Patient Position: Bed, 3 rail up, Alarm on (Right sidelying)  PT Plan  Inpatient/Swing Bed or Outpatient: Inpatient  PT Plan  Treatment/Interventions: Bed mobility, Transfer training, Balance training, Neuromuscular re-education, Strengthening, Endurance training, Range of motion, Therapeutic exercise, Therapeutic activity, Positioning, Postural re-education  PT Plan: Skilled PT  PT Frequency: 4 times per week  PT Discharge Recommendations: Moderate intensity level of continued care  Equipment Recommended upon Discharge:  (TBD)  PT Recommended Transfer Status:  (not yet able)  PT - OK to Discharge: Yes      General Visit Information:   PT  Visit  PT Received On: 12/13/23  General  Reason for Referral: Admitted 11/22 with aphasia/AMS; dx: acute on chronic SDH; 11/22 s/p craniotomy for SDH evacuation, sclerotic lesions suggesting bone cancer, +COVID  Past Medical History Relevant to Rehab: CLL, HTN, DM2, anemia, chronic ignacio (+UTI), Htn, dementia  Missed  Visit: No  Family/Caregiver Present: Yes  Caregiver Feedback: Wife present and very engaged, attempting to get pt to respond, track; she reports pt less responsive (RN called neurosurg who came to assess pt after session)  Co-Treatment:  (NA)  Prior to Session Communication: Bedside nurse (present and assisting)  Patient Position Received: Bed, 3 rail up, Alarm off, not on at start of session  Preferred Learning Style:  (unable to determine on this date)  General Comment: Pt eyes open not tracking, not following commands, not looking at wife; RN informed neurosurgery MD about possible change in status. PT worked with RN/wife to help pt roll, change him, scoot up. spontaneous movement observed briefly bilateral UEs and Left LE,not to command.    Subjective   Precautions:  Precautions  Hearing/Visual Limitations: [place], vision WFL  Medical Precautions: Fall precautions (DM, aphasia, anemia, restraints, O2, NG, midline, [place], SBP <160, dementia, bony lesions)  Precautions Comment: SBP < 160  Vital Signs:  Vital Signs  Heart Rate:  (supine end of session: /87 HR 89  O2 95% (pt sleeping))  BP Location: Left arm  BP Method: Automatic    Objective   Pain:  Pain Assessment  Pain Assessment: PAINAD (Pain Assessment in Advanced Dementia Scale)  Cognition:  Cognition  Overall Cognitive Status: Impaired  Arousal/Alertness:  (not responding to commands, eyes open but not tracking wive's voice at all; sleeping by end of session)  Orientation Level: Unable to assess  Cognition Comments: eyes open initiallynot tracking nor following commands; sleeping soundly by end of session despite repositioning  Attention: Exceptions to WFL  Processing Speed: No response    Treatments:       Bed Mobility  Bed Mobility: Yes  Bed Mobility 1  Bed Mobility 1: Rolling right, Rolling left  Level of Assistance 1: Dependent (+2)  Bed Mobility Comments 1: sleepy/not following commands  Bed Mobility 2  Bed Mobility  2: Scooting  Level of Assistance 2:  Dependent (+2)    Outcome Measures:  [facility] Basic Mobility  Turning from your back to your side while in a flat bed without using bedrails: Total  Moving from lying on your back to sitting on the side of a flat bed without using bedrails: Total  Moving to and from bed to chair (including a wheelchair): Total  Standing up from a chair using your arms (e.g. wheelchair or bedside chair): Total  To walk in hospital room: Total  Climbing 3-5 steps with railing: Total  Basic Mobility - Total Score: 6    Education Documentation  Mobility Training, taught by Joann Fuentes, PT at 12/13/2023  4:10 PM.  Learner: Family, Patient  Readiness: Acceptance  Method: Explanation  Response: No Evidence of Learning  Comment: rolling and positoining Right sidelying (pt lethargic or sleeping and wife present and engaged)    Education Comments  No comments found.        OP EDUCATION:       Encounter Problems       Encounter Problems (Active)       Balance       Patient will stand with UE support of LRD and </= MOD A x1 at least 3 min to improve balance required for self-care tasks.  (Not met)       Start:  11/22/23    Expected End:  12/06/23    Resolved:  12/07/23    Updated to: Patient will sit at edge of bed at least 15 min and perform dynamic reaching activities with </= MOD A x1 and no excessive sway or loss of balance.    Update reason: change in pt condition          Patient will sit at edge of bed at least 15 min and perform dynamic reaching activities with </= MOD A x1 and no excessive sway or loss of balance. (Not Progressing)       Start:  12/07/23    Expected End:  12/20/23                Patient will sit at edge of bed at least 15 min and perform dynamic reaching activities with </= MOD A x1 and no excessive sway or loss of balance. (Not Progressing)       Start:  12/07/23    Expected End:  12/20/23                   Mobility       Patient will ambulate at least 20 ft. with </= MOD A x1 and LRD to improve tolerance of  household distances.  (Not met)       Start:  11/22/23    Expected End:  12/20/23    Resolved:  12/07/23    Updated to: Patient will ambulate at least 5 ft. with </= MAX A x2 and LRD to improve tolerance of household distances.    Update reason: change in pt condition          Patient will perform bed mobility with </= MOD A x1 to reduce risk of developing decubitus ulcers.  (Progressing)       Start:  11/22/23    Expected End:  12/20/23            Patient will perform home exercise program as prescribed with cues as needed.   (Not met)       Start:  11/22/23    Expected End:  12/20/23    Resolved:  12/07/23    Updated to: BLE >/= 3+/5    Update reason: change in pt condition          Patient will ambulate at least 5 ft. with </= MAX A x2 and LRD to improve tolerance of household distances. (Not Progressing)       Start:  12/07/23    Expected End:  12/20/23                BLE >/= 3+/5 (Progressing)       Start:  12/07/23    Expected End:  12/20/23                Patient will ambulate at least 5 ft. with </= MAX A x2 and LRD to improve tolerance of household distances. (Not Progressing)       Start:  12/07/23    Expected End:  12/20/23                BLE >/= 3+/5       Start:  12/07/23    Expected End:  12/20/23                   Pain - Adult          Transfers       Patient will perform sit to stand and stand to sit transfers with </= MAX A x1 and LRD to increase functional strength.  (Not met)       Start:  11/22/23    Expected End:  12/06/23    Resolved:  12/07/23    Updated to: Patient will perform sit to stand and stand to sit transfers with </= MAX A x2 and LRD to increase functional strength.    Update reason: change in pt condition          Patient will perform sit to stand and stand to sit transfers with </= MAX A x2 and LRD to increase functional strength.       Start:  12/07/23    Expected End:  12/20/23

## 2023-12-14 LAB
ALBUMIN SERPL BCP-MCNC: 3.4 G/DL (ref 3.4–5)
ALBUMIN SERPL BCP-MCNC: 3.5 G/DL (ref 3.4–5)
ANION GAP SERPL CALC-SCNC: 10 MMOL/L (ref 10–20)
ANION GAP SERPL CALC-SCNC: 11 MMOL/L (ref 10–20)
B2 MICROGLOB SERPL-MCNC: 6 MG/L (ref 0.7–2.2)
BLOOD EXPIRATION DATE: NORMAL
BUN SERPL-MCNC: 42 MG/DL (ref 6–23)
BUN SERPL-MCNC: 43 MG/DL (ref 6–23)
CALCIUM SERPL-MCNC: 8.3 MG/DL (ref 8.6–10.6)
CALCIUM SERPL-MCNC: 8.9 MG/DL (ref 8.6–10.6)
CHLORIDE SERPL-SCNC: 111 MMOL/L (ref 98–107)
CHLORIDE SERPL-SCNC: 111 MMOL/L (ref 98–107)
CO2 SERPL-SCNC: 29 MMOL/L (ref 21–32)
CO2 SERPL-SCNC: 30 MMOL/L (ref 21–32)
CREAT SERPL-MCNC: 1.4 MG/DL (ref 0.5–1.3)
CREAT SERPL-MCNC: 1.49 MG/DL (ref 0.5–1.3)
DAT-POLYSPECIFIC: NORMAL
DISPENSE STATUS: NORMAL
ERYTHROCYTE [DISTWIDTH] IN BLOOD BY AUTOMATED COUNT: 14.1 % (ref 11.5–14.5)
ERYTHROCYTE [DISTWIDTH] IN BLOOD BY AUTOMATED COUNT: 14.4 % (ref 11.5–14.5)
ERYTHROCYTE [DISTWIDTH] IN BLOOD BY AUTOMATED COUNT: 14.5 % (ref 11.5–14.5)
FERRITIN SERPL-MCNC: 202 NG/ML (ref 20–300)
FOLATE SERPL-MCNC: 12.8 NG/ML
GFR SERPL CREATININE-BSD FRML MDRD: 46 ML/MIN/1.73M*2
GFR SERPL CREATININE-BSD FRML MDRD: 50 ML/MIN/1.73M*2
GLUCOSE BLD MANUAL STRIP-MCNC: 188 MG/DL (ref 74–99)
GLUCOSE BLD MANUAL STRIP-MCNC: 192 MG/DL (ref 74–99)
GLUCOSE BLD MANUAL STRIP-MCNC: 238 MG/DL (ref 74–99)
GLUCOSE BLD MANUAL STRIP-MCNC: 242 MG/DL (ref 74–99)
GLUCOSE SERPL-MCNC: 200 MG/DL (ref 74–99)
GLUCOSE SERPL-MCNC: 258 MG/DL (ref 74–99)
HCT VFR BLD AUTO: 19.5 % (ref 41–52)
HCT VFR BLD AUTO: 33.5 % (ref 41–52)
HCT VFR BLD AUTO: 33.5 % (ref 41–52)
HGB BLD-MCNC: 11.2 G/DL (ref 13.5–17.5)
HGB BLD-MCNC: 11.4 G/DL (ref 13.5–17.5)
HGB BLD-MCNC: 6.1 G/DL (ref 13.5–17.5)
HGB RETIC QN: 34 PG (ref 28–38)
IMMATURE RETIC FRACTION: 3.1 %
IRON SATN MFR SERPL: 31 % (ref 25–45)
IRON SERPL-MCNC: 53 UG/DL (ref 35–150)
LDH SERPL L TO P-CCNC: 160 U/L (ref 84–246)
MCH RBC QN AUTO: 29.2 PG (ref 26–34)
MCH RBC QN AUTO: 29.8 PG (ref 26–34)
MCH RBC QN AUTO: 30.6 PG (ref 26–34)
MCHC RBC AUTO-ENTMCNC: 31.3 G/DL (ref 32–36)
MCHC RBC AUTO-ENTMCNC: 33.4 G/DL (ref 32–36)
MCHC RBC AUTO-ENTMCNC: 34 G/DL (ref 32–36)
MCV RBC AUTO: 89 FL (ref 80–100)
MCV RBC AUTO: 90 FL (ref 80–100)
MCV RBC AUTO: 93 FL (ref 80–100)
NRBC BLD-RTO: 0 /100 WBCS (ref 0–0)
PHOSPHATE SERPL-MCNC: 2.5 MG/DL (ref 2.5–4.9)
PHOSPHATE SERPL-MCNC: 2.6 MG/DL (ref 2.5–4.9)
PLATELET # BLD AUTO: 153 X10*3/UL (ref 150–450)
PLATELET # BLD AUTO: 154 X10*3/UL (ref 150–450)
PLATELET # BLD AUTO: 179 X10*3/UL (ref 150–450)
POTASSIUM SERPL-SCNC: 4 MMOL/L (ref 3.5–5.3)
POTASSIUM SERPL-SCNC: 4 MMOL/L (ref 3.5–5.3)
PRODUCT BLOOD TYPE: 6200
PRODUCT CODE: NORMAL
RBC # BLD AUTO: 2.09 X10*6/UL (ref 4.5–5.9)
RBC # BLD AUTO: 3.73 X10*6/UL (ref 4.5–5.9)
RBC # BLD AUTO: 3.76 X10*6/UL (ref 4.5–5.9)
RETICS #: 0.04 X10*6/UL (ref 0.02–0.11)
RETICS/RBC NFR AUTO: 1.1 % (ref 0.5–2)
SODIUM SERPL-SCNC: 146 MMOL/L (ref 136–145)
SODIUM SERPL-SCNC: 148 MMOL/L (ref 136–145)
TIBC SERPL-MCNC: 173 UG/DL (ref 240–445)
UIBC SERPL-MCNC: 120 UG/DL (ref 110–370)
UNIT ABO: NORMAL
UNIT NUMBER: NORMAL
UNIT RH: NORMAL
UNIT VOLUME: 350
VANCOMYCIN SERPL-MCNC: 18.4 UG/ML (ref 5–20)
VIT B12 SERPL-MCNC: 640 PG/ML (ref 211–911)
WBC # BLD AUTO: 5.3 X10*3/UL (ref 4.4–11.3)
WBC # BLD AUTO: 5.5 X10*3/UL (ref 4.4–11.3)
WBC # BLD AUTO: 6 X10*3/UL (ref 4.4–11.3)
XM INTEP: NORMAL

## 2023-12-14 PROCEDURE — 83540 ASSAY OF IRON: CPT | Performed by: STUDENT IN AN ORGANIZED HEALTH CARE EDUCATION/TRAINING PROGRAM

## 2023-12-14 PROCEDURE — 83615 LACTATE (LD) (LDH) ENZYME: CPT

## 2023-12-14 PROCEDURE — 86880 COOMBS TEST DIRECT: CPT

## 2023-12-14 PROCEDURE — 2500000001 HC RX 250 WO HCPCS SELF ADMINISTERED DRUGS (ALT 637 FOR MEDICARE OP): Performed by: STUDENT IN AN ORGANIZED HEALTH CARE EDUCATION/TRAINING PROGRAM

## 2023-12-14 PROCEDURE — 85027 COMPLETE CBC AUTOMATED: CPT | Performed by: STUDENT IN AN ORGANIZED HEALTH CARE EDUCATION/TRAINING PROGRAM

## 2023-12-14 PROCEDURE — 1200000002 HC GENERAL ROOM WITH TELEMETRY DAILY

## 2023-12-14 PROCEDURE — 2500000004 HC RX 250 GENERAL PHARMACY W/ HCPCS (ALT 636 FOR OP/ED): Performed by: STUDENT IN AN ORGANIZED HEALTH CARE EDUCATION/TRAINING PROGRAM

## 2023-12-14 PROCEDURE — 83010 ASSAY OF HAPTOGLOBIN QUANT: CPT

## 2023-12-14 PROCEDURE — 96372 THER/PROPH/DIAG INJ SC/IM: CPT | Performed by: STUDENT IN AN ORGANIZED HEALTH CARE EDUCATION/TRAINING PROGRAM

## 2023-12-14 PROCEDURE — 82728 ASSAY OF FERRITIN: CPT | Performed by: STUDENT IN AN ORGANIZED HEALTH CARE EDUCATION/TRAINING PROGRAM

## 2023-12-14 PROCEDURE — 85045 AUTOMATED RETICULOCYTE COUNT: CPT

## 2023-12-14 PROCEDURE — C9113 INJ PANTOPRAZOLE SODIUM, VIA: HCPCS | Performed by: STUDENT IN AN ORGANIZED HEALTH CARE EDUCATION/TRAINING PROGRAM

## 2023-12-14 PROCEDURE — 80069 RENAL FUNCTION PANEL: CPT | Performed by: STUDENT IN AN ORGANIZED HEALTH CARE EDUCATION/TRAINING PROGRAM

## 2023-12-14 PROCEDURE — 92526 ORAL FUNCTION THERAPY: CPT | Mod: GN | Performed by: SPEECH-LANGUAGE PATHOLOGIST

## 2023-12-14 PROCEDURE — 36430 TRANSFUSION BLD/BLD COMPNT: CPT

## 2023-12-14 PROCEDURE — 80202 ASSAY OF VANCOMYCIN: CPT

## 2023-12-14 PROCEDURE — 99204 OFFICE O/P NEW MOD 45 MIN: CPT | Performed by: INTERNAL MEDICINE

## 2023-12-14 PROCEDURE — 82607 VITAMIN B-12: CPT | Performed by: STUDENT IN AN ORGANIZED HEALTH CARE EDUCATION/TRAINING PROGRAM

## 2023-12-14 PROCEDURE — P9016 RBC LEUKOCYTES REDUCED: HCPCS

## 2023-12-14 PROCEDURE — 82947 ASSAY GLUCOSE BLOOD QUANT: CPT

## 2023-12-14 PROCEDURE — 82746 ASSAY OF FOLIC ACID SERUM: CPT | Performed by: STUDENT IN AN ORGANIZED HEALTH CARE EDUCATION/TRAINING PROGRAM

## 2023-12-14 RX ORDER — OXYCODONE HYDROCHLORIDE 5 MG/1
5 TABLET ORAL ONCE
Status: COMPLETED | OUTPATIENT
Start: 2023-12-14 | End: 2023-12-14

## 2023-12-14 RX ADMIN — THIAMINE HCL TAB 100 MG 100 MG: 100 TAB at 09:59

## 2023-12-14 RX ADMIN — INSULIN LISPRO 4 UNITS: 100 INJECTION, SOLUTION INTRAVENOUS; SUBCUTANEOUS at 01:36

## 2023-12-14 RX ADMIN — INSULIN LISPRO 2 UNITS: 100 INJECTION, SOLUTION INTRAVENOUS; SUBCUTANEOUS at 18:23

## 2023-12-14 RX ADMIN — LEVETIRACETAM 500 MG: 5 INJECTION INTRAVENOUS at 17:00

## 2023-12-14 RX ADMIN — AMLODIPINE BESYLATE 10 MG: 10 TABLET ORAL at 09:59

## 2023-12-14 RX ADMIN — LEVETIRACETAM 500 MG: 5 INJECTION INTRAVENOUS at 04:53

## 2023-12-14 RX ADMIN — POLYETHYLENE GLYCOL 3350 17 G: 17 POWDER, FOR SOLUTION ORAL at 09:59

## 2023-12-14 RX ADMIN — INSULIN LISPRO 4 UNITS: 100 INJECTION, SOLUTION INTRAVENOUS; SUBCUTANEOUS at 11:43

## 2023-12-14 RX ADMIN — CEFEPIME 2 G: 1 INJECTION, SOLUTION INTRAVENOUS at 03:59

## 2023-12-14 RX ADMIN — PANTOPRAZOLE SODIUM 40 MG: 40 INJECTION, POWDER, FOR SOLUTION INTRAVENOUS at 20:24

## 2023-12-14 RX ADMIN — Medication 4000 UNITS: at 09:59

## 2023-12-14 RX ADMIN — HEPARIN SODIUM 5000 UNITS: 5000 INJECTION INTRAVENOUS; SUBCUTANEOUS at 17:00

## 2023-12-14 RX ADMIN — PANTOPRAZOLE SODIUM 40 MG: 40 INJECTION, POWDER, FOR SOLUTION INTRAVENOUS at 09:59

## 2023-12-14 RX ADMIN — HEPARIN SODIUM 5000 UNITS: 5000 INJECTION INTRAVENOUS; SUBCUTANEOUS at 10:03

## 2023-12-14 RX ADMIN — OXYCODONE HYDROCHLORIDE 5 MG: 5 TABLET ORAL at 05:31

## 2023-12-14 RX ADMIN — CEFEPIME 2 G: 1 INJECTION, SOLUTION INTRAVENOUS at 16:53

## 2023-12-14 RX ADMIN — ATORVASTATIN CALCIUM 20 MG: 20 TABLET, FILM COATED ORAL at 09:59

## 2023-12-14 RX ADMIN — INSULIN GLARGINE 5 UNITS: 100 INJECTION, SOLUTION SUBCUTANEOUS at 20:23

## 2023-12-14 RX ADMIN — HEPARIN SODIUM 5000 UNITS: 5000 INJECTION INTRAVENOUS; SUBCUTANEOUS at 01:23

## 2023-12-14 ASSESSMENT — COGNITIVE AND FUNCTIONAL STATUS - GENERAL
DAILY ACTIVITIY SCORE: 6
CLIMB 3 TO 5 STEPS WITH RAILING: TOTAL
DRESSING REGULAR LOWER BODY CLOTHING: TOTAL
STANDING UP FROM CHAIR USING ARMS: TOTAL
HELP NEEDED FOR BATHING: TOTAL
TURNING FROM BACK TO SIDE WHILE IN FLAT BAD: TOTAL
MOVING TO AND FROM BED TO CHAIR: TOTAL
PERSONAL GROOMING: TOTAL
WALKING IN HOSPITAL ROOM: TOTAL
MOVING FROM LYING ON BACK TO SITTING ON SIDE OF FLAT BED WITH BEDRAILS: TOTAL
TOILETING: TOTAL
MOBILITY SCORE: 6
EATING MEALS: TOTAL
DRESSING REGULAR UPPER BODY CLOTHING: TOTAL

## 2023-12-14 ASSESSMENT — PAIN SCALES - PAIN ASSESSMENT IN ADVANCED DEMENTIA (PAINAD)
BREATHING: OCCASIONAL LABORED BREATHING, SHORT PERIOD OF HYPERVENTILATION
CONSOLABILITY: NO NEED TO CONSOLE
TOTALSCORE: 4
BODYLANGUAGE: TENSE, DISTRESSED PACING, FIDGETING
FACIALEXPRESSION: FACIAL GRIMACING

## 2023-12-14 NOTE — PROGRESS NOTES
"Haile Ayers is a 83 y.o. male on day 22 of admission presenting with Subdural hematoma (CMS/HCC).    Subjective   No acute events overnight       Objective     Physical Exam  EOSp  BUE spontaneous L>R  BLE spontaneous L>R    Last Recorded Vitals  Blood pressure 157/79, pulse 89, temperature 37 °C (98.6 °F), temperature source Temporal, resp. rate (!) 28, height 1.727 m (5' 7.99\"), weight 68 kg (149 lb 14.6 oz), SpO2 96 %.  Intake/Output last 3 Shifts:  I/O last 3 completed shifts:  In: 4746 (69.8 mL/kg) [NG/GT:3846; IV Piggyback:900]  Out: 1555 (22.9 mL/kg) [Urine:1555 (0.6 mL/kg/hr)]  Weight: 68 kg     Relevant Results                             Assessment/Plan   Principal Problem:    Subdural hematoma (CMS/HCC)  Active Problems:    HTN (hypertension)    Diabetes mellitus, type 2 (CMS/HCC)    CVA (cerebral vascular accident) (CMS/HCC)    83 y.o. male h/o CLL, anemia, DM, p/w AMS, found to have UTI, CTH w/ L large chronic SDH, 11/22 s/p L crani for SDH evac, CTH POC, 11/24 drain dc'd, Bcx +GPC, 11/25 febrile, remaining vitals stable, exam unchanged, repeat bcx negative     11/28 s/p midline   11/29 Lethargic, not FC, s/p CTH w/ increased L SDH w/ 8mm MLS s/p 1g Keppra load, CXR stable opacities, rCTH stable, s/p R side wd, s/p keppra load, rrCTH stable L SDH,  11/30 S/p L crani for redo SDH evac, CTH POC with improved MLS  12/1 s/p extubation  12/3 CTH stable  12/4 s/psmall L MMA w no distal branches, not embolized   12/5 DVT US x 4 RLE chornic changes no DVT, CTH stable, CT PE neg for PE, BL pleural effusions s/p 20 lasix  12/6 TTE EF 65-70%, SDD dc'd, eeg neg dc'd  12/8 covid positive  12/10 Lantus 5 started for glucose coverage        PLAN:     telemetry  Med onc recs - CT CAP when LIN resolved  BIPAP as needed  GI recs-PEG 12/18  ID recs- Continue vanc, cefe (12/14)  Chronic ignacio  Heme recs-cont to hold ibrutinib   SLP re-eval  PTOT-SNF  SCD, SQH           Lupillo Vazquez MD      "

## 2023-12-14 NOTE — PROGRESS NOTES
Care Transitions Progress Note:  Plan per medical team: SDHematoma; covid; PT:OT  Team Members Present: NP: MD: TCC:SW  Status: inpatient  Payor: [employer] medicare  Barriers:none  Adod: 8 days

## 2023-12-14 NOTE — PROGRESS NOTES
Vancomycin Dosing by Pharmacy- Cessation of Therapy    Consult to pharmacy for vancomycin dosing has been discontinued by the prescriber, pharmacy will sign off at this time.    Please call pharmacy if there are further questions or re-enter a consult if vancomycin is resumed.     Torri Gonzalez, [facility]

## 2023-12-14 NOTE — CONSULTS
Reason For Consult  Decreasing hemoglobin    History Of Present Illness  Haile Ayers is a 83 y.o. male presenting with CLL (unknown CLL-IPI score; being followed by Dr. Claros at [facility]) on Ibrutinib who presented to the hospital on 11/22 with AMS 2/2 UTI also found to have L large chronic SDH with midline shift. He underwent L crani for SDH evacuation on 11/22 and re-evacuated on 11/30. Had worsening neurological exam on 12/4 and transferred to [facility]. CTA on 12/5 incidentally showed diffuse sclerotic lesions throughout axial and appendicular skeleton + ascities. PSA level was 11, increased from 6.35 in August 2023. Patient with M-protein spike of 0.1 g/dL on 12/8 and Ig Kappa of 8.30 mg/dL, Ig Lambda of 7.32 (Ratio 1.13). To be further worked up once clinically stabilized. Patient tested positive for COVID on 12/8/23.     Hemoglobin was stable at around 7-8 throughout his stay in the NSU. CBC on 12/13 PM showed Hgb of 6.8 and pt was given one unit of RBC. CBC showed further decrease of Hgb to 6.1. Pt was then given 2 units of blood, which raised his Hgb to 11.2.     Hematology consulted regarding his decreasing followed by unexpected rise in hemoglobin.    Patient was sleeping this afternoon. He was non-verbal and would not respond to commands.         Past Medical History  He has a past medical history of Anemia, Chronic indwelling Mckee catheter, CLL (chronic lymphocytic leukemia) (CMS/[facility]), Dementia (CMS/[facility]), DM II (diabetes mellitus, type II), controlled (CMS/[facility]), HTN (hypertension), Hyperlipidemia, and Leukemia (CMS/[facility]).    Surgical History  He has no past surgical history on file.     Social History  He reports that he has never smoked. He has never used smokeless tobacco. No history on file for alcohol use and drug use.    Family History  Family History   Family history unknown: Yes        Allergies  Januvia [sitagliptin]    Review of Systems  Pt non-verbal     Physical Exam  Physical Exam  Eyes:  "     Conjunctiva/sclera: Conjunctivae normal.   Skin:     General: Skin is warm and dry.      Capillary Refill: Capillary refill takes less than 2 seconds.      Coloration: Skin is not pale.   Neurological:      Mental Status: He is unresponsive.      Sensory: Sensory deficit present.         Last Recorded Vitals  Blood pressure 158/73, pulse 88, temperature 36.6 °C (97.9 °F), temperature source Temporal, resp. rate 25, height 1.727 m (5' 7.99\"), weight 68 kg (149 lb 14.6 oz), SpO2 96 %.    Relevant Results    Scheduled medications  amLODIPine, 10 mg, nasogastric tube, Daily  atorvastatin, 20 mg, oral, Daily  bisacodyl, 10 mg, rectal, Daily  cefepime, 2 g, intravenous, q12h  cholecalciferol, 4,000 Units, oral, Daily  heparin (porcine), 5,000 Units, subcutaneous, q8h  insulin glargine, 5 Units, subcutaneous, Nightly  insulin lispro, 0-10 Units, subcutaneous, q6h  levETIRAcetam, 500 mg, intravenous, q12h  pantoprazole, 40 mg, intravenous, BID  polyethylene glycol, 17 g, oral, Daily  thiamine, 100 mg, nasogastric tube, Daily      Continuous medications  oxygen, , Last Rate: 2 L/min (12/12/23 1713)      PRN medications  PRN medications: [Held by provider] acetaminophen, albuterol, calcium gluconate, calcium gluconate, dextrose 10 % in water (D10W), dextrose, glucagon, hydrALAZINE, labetaloL, magnesium sulfate, magnesium sulfate, naloxone, ondansetron **OR** ondansetron, oxygen, potassium chloride CR **OR** potassium chloride, potassium chloride CR **OR** potassium chloride, potassium chloride, potassium chloride     Results for orders placed or performed during the hospital encounter of 11/21/23 (from the past 24 hour(s))   Renal Function Panel   Result Value Ref Range    Glucose 258 (H) 74 - 99 mg/dL    Sodium 146 (H) 136 - 145 mmol/L    Potassium 4.0 3.5 - 5.3 mmol/L    Chloride 111 (H) 98 - 107 mmol/L    Bicarbonate 29 21 - 32 mmol/L    Anion Gap 10 10 - 20 mmol/L    Urea Nitrogen 42 (H) 6 - 23 mg/dL    Creatinine " 1.40 (H) 0.50 - 1.30 mg/dL    eGFR 50 (L) >60 mL/min/1.73m*2    Calcium 8.3 (L) 8.6 - 10.6 mg/dL    Phosphorus 2.6 2.5 - 4.9 mg/dL    Albumin 3.4 3.4 - 5.0 g/dL   CBC   Result Value Ref Range    WBC 5.5 4.4 - 11.3 x10*3/uL    nRBC 0.0 0.0 - 0.0 /100 WBCs    RBC 2.09 (L) 4.50 - 5.90 x10*6/uL    Hemoglobin 6.1 (LL) 13.5 - 17.5 g/dL    Hematocrit 19.5 (L) 41.0 - 52.0 %    MCV 93 80 - 100 fL    MCH 29.2 26.0 - 34.0 pg    MCHC 31.3 (L) 32.0 - 36.0 g/dL    RDW 14.5 11.5 - 14.5 %    Platelets 179 150 - 450 x10*3/uL   Ferritin   Result Value Ref Range    Ferritin 202 20 - 300 ng/mL   Iron and TIBC   Result Value Ref Range    Iron 53 35 - 150 ug/dL    UIBC 120 110 - 370 ug/dL    TIBC 173 (L) 240 - 445 ug/dL    % Saturation 31 25 - 45 %   POCT GLUCOSE   Result Value Ref Range    POCT Glucose 238 (H) 74 - 99 mg/dL   Prepare RBC: 2 Units   Result Value Ref Range    PRODUCT CODE U5882B38     Unit Number [id number]     Unit ABO A     Unit RH POS     XM INTEP COMP     Dispense Status TR     Blood Expiration Date January 03, 2024 23:59 EST     PRODUCT BLOOD TYPE 6200     UNIT VOLUME 350     PRODUCT CODE I2745L07     Unit Number [id number]     Unit ABO A     Unit RH POS     XM INTEP COMP     Dispense Status TR     Blood Expiration Date January 05, 2024 23:59 EST     PRODUCT BLOOD TYPE 6200     UNIT VOLUME 350    POCT GLUCOSE   Result Value Ref Range    POCT Glucose 192 (H) 74 - 99 mg/dL   Lactate dehydrogenase   Result Value Ref Range     84 - 246 U/L   Reticulocytes   Result Value Ref Range    Retic % 1.1 0.5 - 2.0 %    Retic Absolute 0.037 0.017 - 0.110 x10*6/uL    Reticulocyte Hemoglobin 34 28 - 38 pg    Immature Retic fraction 3.1 <=16.0 %   Direct Antiglobulin Test   Result Value Ref Range    GERALD-POLYSPECIFIC NEG    POCT GLUCOSE   Result Value Ref Range    POCT Glucose 242 (H) 74 - 99 mg/dL   CBC   Result Value Ref Range    WBC 5.3 4.4 - 11.3 x10*3/uL    nRBC 0.0 0.0 - 0.0 /100 WBCs    RBC 3.76 (L) 4.50 - 5.90  x10*6/uL    Hemoglobin 11.2 (L) 13.5 - 17.5 g/dL    Hematocrit 33.5 (L) 41.0 - 52.0 %    MCV 89 80 - 100 fL    MCH 29.8 26.0 - 34.0 pg    MCHC 33.4 32.0 - 36.0 g/dL    RDW 14.1 11.5 - 14.5 %    Platelets 154 150 - 450 x10*3/uL   Vancomycin   Result Value Ref Range    Vancomycin 18.4 5.0 - 20.0 ug/mL   CBC   Result Value Ref Range    WBC 6.0 4.4 - 11.3 x10*3/uL    nRBC 0.0 0.0 - 0.0 /100 WBCs    RBC 3.73 (L) 4.50 - 5.90 x10*6/uL    Hemoglobin 11.4 (L) 13.5 - 17.5 g/dL    Hematocrit 33.5 (L) 41.0 - 52.0 %    MCV 90 80 - 100 fL    MCH 30.6 26.0 - 34.0 pg    MCHC 34.0 32.0 - 36.0 g/dL    RDW 14.4 11.5 - 14.5 %    Platelets 153 150 - 450 x10*3/uL   Renal Function Panel   Result Value Ref Range    Glucose 200 (H) 74 - 99 mg/dL    Sodium 148 (H) 136 - 145 mmol/L    Potassium 4.0 3.5 - 5.3 mmol/L    Chloride 111 (H) 98 - 107 mmol/L    Bicarbonate 30 21 - 32 mmol/L    Anion Gap 11 10 - 20 mmol/L    Urea Nitrogen 43 (H) 6 - 23 mg/dL    Creatinine 1.49 (H) 0.50 - 1.30 mg/dL    eGFR 46 (L) >60 mL/min/1.73m*2    Calcium 8.9 8.6 - 10.6 mg/dL    Phosphorus 2.5 2.5 - 4.9 mg/dL    Albumin 3.5 3.4 - 5.0 g/dL   Vitamin B12   Result Value Ref Range    Vitamin B12 640 211 - 911 pg/mL   Folate   Result Value Ref Range    Folate, Serum 12.8 >5.0 ng/mL   POCT GLUCOSE   Result Value Ref Range    POCT Glucose 188 (H) 74 - 99 mg/dL         Assessment/Plan     Haile Ayers is a 83 y.o. male presenting with CLL on Ibrutinib who presented to the hospital on 11/22 with AMS 2/2 UTI also found to have L large chronic SDH with midline shift. Hemoglobin was stable at around 7-8 throughout his stay in the [facility]U. However, CBC on 12/13 PM showed Hgb of 6.8 and pt was given one unit of RBC. CBC showed further decrease of Hgb to 6.1. Pt was then given 2 units of blood, which raised his Hgb to 11.2.     Hematology consulted for decreasing Hgb followed by unexpected rise.    Highly suspect that there is an element of blood draw error, as Hgb increased from  6.1 to 11.2 with only 2 units of RBCs. Initial drop in Hgb could also be explained by blood draw error. Bleed in brain, GI, or elsewhere also possible. Hemolysis less likely given normal LDH. Plasma cell dyscrasia does not fit clinical picture as CT would show lytic lesions, not sclerotic lesions.     Recommendations:  - Ensure that there is no blood draw error.  - Continue to monitor Hgb with twice daily CBC.  - Follow-up on hemolysis, nutritional labs.  - Transfuse if Hgb <7 or platelets <10    Thank you for this consult. Hematology will sign off. Please re-consult if Hgb drops again.     Patient discussed with attending physician, Dr. Maldonado. Attending attestation to follow    Geo Marcum  MS3, [facility]

## 2023-12-14 NOTE — SIGNIFICANT EVENT
Rapid Response RN Note    Rapid response RN at bedside for RADAR score 6 due to the following VS: T 36.9 °Celsius; HR 94 ; RR 25; /71; SPO2 97%.     Reviewed above VS with bedside RN.  VS within patient's current trends.  No interventions by rapid response team indicated at this time.      Patient denied pain, shortness of breath, dizziness or lightheadedness.      Staff to page rapid response for any concerns or acute change in condition/VS.

## 2023-12-14 NOTE — PROGRESS NOTES
Speech-Language Pathology    Inpatient  Speech-Language Pathology Treatment     Patient Name: Haile Ayers  MRN: [id number]  Today's Date: 12/14/2023  Time Calculation  Start Time: 1100  Stop Time: 1112  Time Calculation (min): 12 min         Current Problem:   Dysphagia, speech/language deficits.        Impression:   Pt now dx with COVID. Pt continues to be non-verbal and not following directions for SLP today.  Mouth in open posture. Stringent oral care attempted, however pt clenching down on moisten swab. Trial of tsp of mildly thick liquids with min success as pt exhibited difficulty closing mouth around spoon. No attempt at manipulation of small bolus amount. SLP using oral swab removed bolus from mouth. No initiation of swallow attempted.       Consider GOC discussion vs long term means of nutrition/hydration with pt's family as pt not ready for any instrumental exam. SLP will continue to follow.        Plan:  SLP Frequency: 2x per week  Duration: 3 weeks  SLP Discharge Recommendations: Continue skilled SLP services at the next level of care  Discussed POC: Patient, Caregiver/family, Nursing, Physician  Patient/Caregiver Agreeable: Yes  SLP - OK to Discharge: Yes        Goal:   Pt. will tolerate least restrictive diet without overt s/s of aspiration with 100% accuracy.           Subjective   Current Problem:  Dysphagia     Inpatient:  Education Documentation  Extensive education provided to pt re: current swallow function, recommendations/results and dysphagia POC, question comprehension.         Consultations/Referrals/Coordination of Services: N/A

## 2023-12-15 LAB
ALBUMIN SERPL BCP-MCNC: 3.5 G/DL (ref 3.4–5)
ANION GAP SERPL CALC-SCNC: 14 MMOL/L (ref 10–20)
BUN SERPL-MCNC: 47 MG/DL (ref 6–23)
CALCIUM SERPL-MCNC: 8.6 MG/DL (ref 8.6–10.6)
CHLORIDE SERPL-SCNC: 112 MMOL/L (ref 98–107)
CO2 SERPL-SCNC: 27 MMOL/L (ref 21–32)
COLLECT DURATION TIME SPEC: 24 HRS
CREAT SERPL-MCNC: 1.5 MG/DL (ref 0.5–1.3)
ERYTHROCYTE [DISTWIDTH] IN BLOOD BY AUTOMATED COUNT: 14.7 % (ref 11.5–14.5)
GFR SERPL CREATININE-BSD FRML MDRD: 46 ML/MIN/1.73M*2
GLUCOSE BLD MANUAL STRIP-MCNC: 163 MG/DL (ref 74–99)
GLUCOSE BLD MANUAL STRIP-MCNC: 225 MG/DL (ref 74–99)
GLUCOSE BLD MANUAL STRIP-MCNC: 234 MG/DL (ref 74–99)
GLUCOSE BLD MANUAL STRIP-MCNC: 234 MG/DL (ref 74–99)
GLUCOSE SERPL-MCNC: 240 MG/DL (ref 74–99)
HAPTOGLOB SERPL-MCNC: 177 MG/DL (ref 37–246)
HCT VFR BLD AUTO: 35.3 % (ref 41–52)
HGB BLD-MCNC: 11.8 G/DL (ref 13.5–17.5)
MCH RBC QN AUTO: 30.5 PG (ref 26–34)
MCHC RBC AUTO-ENTMCNC: 33.4 G/DL (ref 32–36)
MCV RBC AUTO: 91 FL (ref 80–100)
NRBC BLD-RTO: 0 /100 WBCS (ref 0–0)
PHOSPHATE SERPL-MCNC: 2.5 MG/DL (ref 2.5–4.9)
PLATELET # BLD AUTO: 156 X10*3/UL (ref 150–450)
POTASSIUM SERPL-SCNC: 4.2 MMOL/L (ref 3.5–5.3)
PROT 24H UR-MCNC: 55 MG/DL (ref 5–25)
RBC # BLD AUTO: 3.87 X10*6/UL (ref 4.5–5.9)
SODIUM SERPL-SCNC: 149 MMOL/L (ref 136–145)
SPECIMEN VOL 24H UR: 2200 ML
TOTAL PROTEIN (MG/24HR) IN 24 HOUR URINE UPE: 1210 MG/24H
WBC # BLD AUTO: 7.2 X10*3/UL (ref 4.4–11.3)

## 2023-12-15 PROCEDURE — 2500000004 HC RX 250 GENERAL PHARMACY W/ HCPCS (ALT 636 FOR OP/ED): Performed by: STUDENT IN AN ORGANIZED HEALTH CARE EDUCATION/TRAINING PROGRAM

## 2023-12-15 PROCEDURE — 2500000001 HC RX 250 WO HCPCS SELF ADMINISTERED DRUGS (ALT 637 FOR MEDICARE OP): Performed by: STUDENT IN AN ORGANIZED HEALTH CARE EDUCATION/TRAINING PROGRAM

## 2023-12-15 PROCEDURE — 2500000001 HC RX 250 WO HCPCS SELF ADMINISTERED DRUGS (ALT 637 FOR MEDICARE OP)

## 2023-12-15 PROCEDURE — 84166 PROTEIN E-PHORESIS/URINE/CSF: CPT | Performed by: STUDENT IN AN ORGANIZED HEALTH CARE EDUCATION/TRAINING PROGRAM

## 2023-12-15 PROCEDURE — 86325 OTHER IMMUNOELECTROPHORESIS: CPT | Performed by: STUDENT IN AN ORGANIZED HEALTH CARE EDUCATION/TRAINING PROGRAM

## 2023-12-15 PROCEDURE — 85027 COMPLETE CBC AUTOMATED: CPT | Performed by: STUDENT IN AN ORGANIZED HEALTH CARE EDUCATION/TRAINING PROGRAM

## 2023-12-15 PROCEDURE — 84156 ASSAY OF PROTEIN URINE: CPT | Performed by: STUDENT IN AN ORGANIZED HEALTH CARE EDUCATION/TRAINING PROGRAM

## 2023-12-15 PROCEDURE — 97112 NEUROMUSCULAR REEDUCATION: CPT | Mod: GP | Performed by: PHYSICAL THERAPIST

## 2023-12-15 PROCEDURE — 82947 ASSAY GLUCOSE BLOOD QUANT: CPT

## 2023-12-15 PROCEDURE — 80069 RENAL FUNCTION PANEL: CPT | Performed by: STUDENT IN AN ORGANIZED HEALTH CARE EDUCATION/TRAINING PROGRAM

## 2023-12-15 PROCEDURE — C9113 INJ PANTOPRAZOLE SODIUM, VIA: HCPCS | Performed by: STUDENT IN AN ORGANIZED HEALTH CARE EDUCATION/TRAINING PROGRAM

## 2023-12-15 PROCEDURE — 86335 IMMUNFIX E-PHORSIS/URINE/CSF: CPT | Performed by: STUDENT IN AN ORGANIZED HEALTH CARE EDUCATION/TRAINING PROGRAM

## 2023-12-15 PROCEDURE — 1200000002 HC GENERAL ROOM WITH TELEMETRY DAILY

## 2023-12-15 PROCEDURE — 96372 THER/PROPH/DIAG INJ SC/IM: CPT | Performed by: STUDENT IN AN ORGANIZED HEALTH CARE EDUCATION/TRAINING PROGRAM

## 2023-12-15 RX ADMIN — INSULIN LISPRO 4 UNITS: 100 INJECTION, SOLUTION INTRAVENOUS; SUBCUTANEOUS at 05:43

## 2023-12-15 RX ADMIN — Medication: at 08:29

## 2023-12-15 RX ADMIN — INSULIN GLARGINE 5 UNITS: 100 INJECTION, SOLUTION SUBCUTANEOUS at 21:29

## 2023-12-15 RX ADMIN — PANTOPRAZOLE SODIUM 40 MG: 40 INJECTION, POWDER, FOR SOLUTION INTRAVENOUS at 08:28

## 2023-12-15 RX ADMIN — POLYETHYLENE GLYCOL 3350 17 G: 17 POWDER, FOR SOLUTION ORAL at 08:27

## 2023-12-15 RX ADMIN — LABETALOL HYDROCHLORIDE 10 MG: 5 INJECTION INTRAVENOUS at 08:28

## 2023-12-15 RX ADMIN — CEFEPIME 2 G: 1 INJECTION, SOLUTION INTRAVENOUS at 16:33

## 2023-12-15 RX ADMIN — CEFEPIME 2 G: 1 INJECTION, SOLUTION INTRAVENOUS at 04:10

## 2023-12-15 RX ADMIN — INSULIN LISPRO 4 UNITS: 100 INJECTION, SOLUTION INTRAVENOUS; SUBCUTANEOUS at 18:32

## 2023-12-15 RX ADMIN — INSULIN LISPRO 4 UNITS: 100 INJECTION, SOLUTION INTRAVENOUS; SUBCUTANEOUS at 12:24

## 2023-12-15 RX ADMIN — Medication 4000 UNITS: at 08:28

## 2023-12-15 RX ADMIN — HEPARIN SODIUM 5000 UNITS: 5000 INJECTION INTRAVENOUS; SUBCUTANEOUS at 10:53

## 2023-12-15 RX ADMIN — AMLODIPINE BESYLATE 10 MG: 10 TABLET ORAL at 08:28

## 2023-12-15 RX ADMIN — BISACODYL 10 MG: 10 SUPPOSITORY RECTAL at 08:29

## 2023-12-15 RX ADMIN — PANTOPRAZOLE SODIUM 40 MG: 40 INJECTION, POWDER, FOR SOLUTION INTRAVENOUS at 21:30

## 2023-12-15 RX ADMIN — HEPARIN SODIUM 5000 UNITS: 5000 INJECTION INTRAVENOUS; SUBCUTANEOUS at 02:41

## 2023-12-15 RX ADMIN — LEVETIRACETAM 500 MG: 5 INJECTION INTRAVENOUS at 05:29

## 2023-12-15 RX ADMIN — HEPARIN SODIUM 5000 UNITS: 5000 INJECTION INTRAVENOUS; SUBCUTANEOUS at 18:19

## 2023-12-15 RX ADMIN — THIAMINE HCL TAB 100 MG 100 MG: 100 TAB at 08:29

## 2023-12-15 RX ADMIN — ATORVASTATIN CALCIUM 20 MG: 20 TABLET, FILM COATED ORAL at 09:00

## 2023-12-15 RX ADMIN — LEVETIRACETAM 500 MG: 5 INJECTION INTRAVENOUS at 16:33

## 2023-12-15 ASSESSMENT — COGNITIVE AND FUNCTIONAL STATUS - GENERAL
STANDING UP FROM CHAIR USING ARMS: TOTAL
PERSONAL GROOMING: TOTAL
HELP NEEDED FOR BATHING: TOTAL
WALKING IN HOSPITAL ROOM: TOTAL
TURNING FROM BACK TO SIDE WHILE IN FLAT BAD: TOTAL
DAILY ACTIVITIY SCORE: 6
DRESSING REGULAR LOWER BODY CLOTHING: TOTAL
EATING MEALS: TOTAL
MOBILITY SCORE: 6
CLIMB 3 TO 5 STEPS WITH RAILING: TOTAL
DAILY ACTIVITIY SCORE: 6
MOBILITY SCORE: 6
MOVING TO AND FROM BED TO CHAIR: TOTAL
MOBILITY SCORE: 6
CLIMB 3 TO 5 STEPS WITH RAILING: TOTAL
HELP NEEDED FOR BATHING: TOTAL
MOVING FROM LYING ON BACK TO SITTING ON SIDE OF FLAT BED WITH BEDRAILS: TOTAL
MOVING TO AND FROM BED TO CHAIR: TOTAL
DRESSING REGULAR UPPER BODY CLOTHING: TOTAL
DRESSING REGULAR UPPER BODY CLOTHING: TOTAL
STANDING UP FROM CHAIR USING ARMS: TOTAL
DRESSING REGULAR LOWER BODY CLOTHING: TOTAL
EATING MEALS: TOTAL
STANDING UP FROM CHAIR USING ARMS: TOTAL
TURNING FROM BACK TO SIDE WHILE IN FLAT BAD: TOTAL
TURNING FROM BACK TO SIDE WHILE IN FLAT BAD: TOTAL
MOVING FROM LYING ON BACK TO SITTING ON SIDE OF FLAT BED WITH BEDRAILS: TOTAL
TOILETING: TOTAL
MOVING FROM LYING ON BACK TO SITTING ON SIDE OF FLAT BED WITH BEDRAILS: TOTAL
CLIMB 3 TO 5 STEPS WITH RAILING: TOTAL
WALKING IN HOSPITAL ROOM: TOTAL
PERSONAL GROOMING: TOTAL
WALKING IN HOSPITAL ROOM: TOTAL
MOVING TO AND FROM BED TO CHAIR: TOTAL
TOILETING: TOTAL

## 2023-12-15 ASSESSMENT — PAIN SCALES - PAIN ASSESSMENT IN ADVANCED DEMENTIA (PAINAD)
BREATHING: NORMAL
BODYLANGUAGE: RELAXED
CONSOLABILITY: NO NEED TO CONSOLE
PAINAD SCORE: 0
TOTALSCORE: REPOSITIONED
FACIALEXPRESSION: SMILING OR INEXPRESSIVE

## 2023-12-15 ASSESSMENT — PAIN SCALES - GENERAL: PAINLEVEL_OUTOF10: 0 - NO PAIN

## 2023-12-15 ASSESSMENT — PAIN SCALES - WONG BAKER: WONGBAKER_NUMERICALRESPONSE: NO HURT

## 2023-12-15 ASSESSMENT — PAIN - FUNCTIONAL ASSESSMENT: PAIN_FUNCTIONAL_ASSESSMENT: PAINAD (PAIN ASSESSMENT IN ADVANCED DEMENTIA SCALE)

## 2023-12-15 NOTE — CARE PLAN
The patient's goals for the shift include      The clinical goals for the shift include Pt will remain free from injury during shift    Over the shift, the patient did not make progress toward the following goals. Barriers to progression include altered mental status, unable to follow commands or comprehend safety education. Recommendations to address these barriers include frequent reorientation.

## 2023-12-15 NOTE — PROGRESS NOTES
"Haile Ayers is a 83 y.o. male on day 23 of admission presenting with Subdural hematoma (CMS/HCC).    Subjective   NAEON       Objective     Physical Exam  EOSp  BUE spontaneous L>R  BLE spontaneous L>R    Last Recorded Vitals  Blood pressure 177/83, pulse 97, temperature 36.6 °C (97.9 °F), resp. rate 22, height 1.727 m (5' 7.99\"), weight 68 kg (149 lb 14.6 oz), SpO2 96 %.  Intake/Output last 3 Shifts:  I/O last 3 completed shifts:  In: 5096 (74.9 mL/kg) [Blood:1091; NG/GT:3905; IV Piggyback:100]  Out: 2300 (33.8 mL/kg) [Urine:2300 (0.9 mL/kg/hr)]  Weight: 68 kg     Relevant Results                             Assessment/Plan   Principal Problem:    Subdural hematoma (CMS/HCC)  Active Problems:    HTN (hypertension)    Diabetes mellitus, type 2 (CMS/HCC)    CVA (cerebral vascular accident) (CMS/HCC)    83 y.o. male h/o CLL, anemia, DM, p/w AMS, found to have UTI, CTH w/ L large chronic SDH, 11/22 s/p L crani for SDH evac, CTH POC, 11/24 drain dc'd, Bcx +GPC, 11/25 febrile, remaining vitals stable, exam unchanged, repeat bcx negative     11/28 s/p midline   11/29 Lethargic, not FC, s/p CTH w/ increased L SDH w/ 8mm MLS s/p 1g Keppra load, CXR stable opacities, rCTH stable, s/p R side wd, s/p keppra load, rrCTH stable L SDH,  11/30 S/p L crani for redo SDH evac, CTH POC with improved MLS  12/1 s/p extubation  12/3 CTH stable  12/4 s/psmall L MMA w no distal branches, not embolized   12/5 DVT US x 4 RLE chornic changes no DVT, CTH stable, CT PE neg for PE, BL pleural effusions s/p 20 lasix  12/6 TTE EF 65-70%, SDD dc'd, eeg neg dc'd  12/8 covid positive  12/10 Lantus 5 started for glucose coverage  12/15 sutures dc'd     PLAN:     telemetry  Med onc recs - CT CAP when LIN resolved  BIPAP as needed  GI recs-PEG 12/18  ID recs- Continue vanc, cefe (12/14)  Chronic ignacio  Heme recs-cont to hold ibrutinib   SLP re-eval  PTOT-SNF  SCD, SQH             Ignacio Huber MD      "

## 2023-12-15 NOTE — PROGRESS NOTES
12/15/23        Transitional Care Coordination Progress Note:   Patient discussed during interdisciplinary rounds.   Team members present: RN HELEN CARR MD   Plan per Medical/Surgical team: SDHematoma; covid; PT:OT   Discharge disposition:   Status-Inpatient   Payer- Payor: [employer] MEDICARE / Plan: AET MEDICARE VALUE PLAN / Product Type: *No Product type* /    Potential Barriers: None   ADOD: 12/18/2023

## 2023-12-15 NOTE — PROGRESS NOTES
Physical Therapy    Physical Therapy Evaluation & Treatment    Patient Name: Haile Ayers  MRN: [id number]  Today's Date: 12/15/2023   Time Calculation  Start Time: 1419  Stop Time: 1437  Time Calculation (min): 18 min    Assessment/Plan   PT Assessment  PT Assessment Results: Decreased strength, Decreased range of motion, Decreased endurance, Impaired balance, Decreased mobility, Decreased cognition, Decreased coordination, Impaired hearing, Impaired tone  Rehab Prognosis: Fair  Evaluation/Treatment Tolerance:  (pt too lethargic to participate)  Medical Staff Made Aware: Yes (RN)  Strengths: Support of Caregivers  Barriers to Participation:  (too lethargic to participate, not responding to stimuli)  End of Session Communication: Bedside nurse  Assessment Comment: 83 year old male with acute on chronic SDH s/p craniotomy/evacuation, now +COVID. Today pt unresponsive, very sleepy. Recommend moderate intensity therapy upon DC.  End of Session Patient Position: Bed, 3 rail up, Alarm on   IP OR SWING BED PT PLAN  Inpatient or Swing Bed: Inpatient  PT Plan  Treatment/Interventions: Bed mobility, Balance training, Neuromuscular re-education, Strengthening, Endurance training, Range of motion, Therapeutic exercise, Therapeutic activity, Home exercise program, Positioning, Postural re-education  PT Plan: Skilled PT  PT Frequency: 3 times per week  PT Discharge Recommendations: Moderate intensity level of continued care  Equipment Recommended upon Discharge:  (TBD)  PT Recommended Transfer Status:  (unable)  PT - OK to Discharge: Yes      Subjective     General Visit Information:  General  Reason for Referral: Admitted 11/22 with aphasia/AMS; dx: acute on chronic SDH; 11/22 s/p craniotomy for SDH evacuation, sclerotic lesions suggesting bone cancer, +COVID  Past Medical History Relevant to Rehab: CLL, HTN, DM2, anemia, chronic ignacio (+UTI), Htn, dementia  Missed Visit: No  Family/Caregiver Present: No  Co-Treatment Reason:  n/a  Prior to Session Communication: Bedside nurse  Patient Position Received: Bed, 3 rail up, Alarm on  Preferred Learning Style: visual, verbal  General Comment: Pt supine sleeping entire session, did not open eyes, not following commands, spontaneously movements Left UE/LE, able to sit totally supported.  Home Living:     Prior Level of Function:     Precautions:  Precautions  Hearing/Visual Limitations: [place]  Medical Precautions: Fall precautions (DM, aphasia, confusion, restraints, anemia, O2, NG, SBP <160, dementia)  Vital Signs:       Objective   Pain:  Pain Assessment  Pain Assessment: PAINAD (Pain Assessment in Advanced Dementia Scale)  Cognition:  Cognition  Overall Cognitive Status: Impaired  Arousal/Alertness: Unresponsive to stimuli  Orientation Level: Unable to assess  Following Commands:  (pt not following any commands)    General Assessments:                  Activity Tolerance  Endurance: Tolerates less than 10 min exercise with changes in vital signs    Postural Control  Postural Control: Impaired  Head Control: head flexed in supported sitting  Trunk Control: max A 1-2 static sit, Left UE placed, Right UE not assisting when placed  Righting Reactions: delayed (R > L)  Protective Responses: not present Right, delayed on Left  Posture Comment: kyphosis, leaning posterior and to right, forward head posture.            Functional Assessments:  Bed Mobility  Bed Mobility: Yes  Bed Mobility 1  Bed Mobility 1: Rolling right, Rolling left  Level of Assistance 1: Dependent (+2)  Bed Mobility 2  Bed Mobility  2: Supine to sitting, Sitting to supine  Level of Assistance 2: Dependent (+2)  Bed Mobility Comments 2: HOB elevated 65 degrees, use of drawsheet  Bed Mobility 3  Bed Mobility 3: Scooting  Level of Assistance 3: Dependent (+2)    Transfers  Transfer: No    Ambulation/Gait Training  Ambulation/Gait Training Performed: No  Extremity/Trunk Assessments:  RUE AROM (degrees)  RUE AROM Comment: WFL  RUE  Strength  RUE Overall Strength:  (no spontaneous movement, no movement elicited with stim/commands)  LUE AROM (degrees)  LUE AROM Comment: WFL  LUE Strength  LUE Overall Strength:  (spontaneously lifting Left UE towards face and across body purposely, not to command)  Strength RLE  RLE Overall Strength:  (no movement elicited nor observed)  Strength LLE  LLE Overall Strength:  (spontaneously and purposely moveing Left LE into knee flexion, hip abduction)  Treatments:  Balance/Neuromuscular Re-Education  Balance/Neuromuscular Re-Education Activity Performed: Yes  Balance/Neuromuscular Re-Education Activity 1: facilitation of rolling right and coming up to sit  Balance/Neuromuscular Re-Education Activity 2: midline sitting with attempt to elicit head and midline trunk control (pt sleeping in sitting supported)  Outcome Measures:  [facility] Basic Mobility  Turning from your back to your side while in a flat bed without using bedrails: Total  Moving from lying on your back to sitting on the side of a flat bed without using bedrails: Total  Moving to and from bed to chair (including a wheelchair): Total  Standing up from a chair using your arms (e.g. wheelchair or bedside chair): Total  To walk in hospital room: Total  Climbing 3-5 steps with railing: Total  Basic Mobility - Total Score: 6    Encounter Problems       Encounter Problems (Active)       Balance       Patient will stand with UE support of LRD and </= MOD A x1 at least 3 min to improve balance required for self-care tasks.  (Not met)       Start:  11/22/23    Expected End:  12/06/23    Resolved:  12/07/23    Updated to: Patient will sit at edge of bed at least 15 min and perform dynamic reaching activities with </= MOD A x1 and no excessive sway or loss of balance.    Update reason: change in pt condition          Patient will sit at edge of bed at least 15 min and perform dynamic reaching activities with </= MOD A x1 and no excessive sway or loss of balance.  (Not met)       Start:  12/07/23    Expected End:  12/20/23    Resolved:  12/15/23    Updated to: static sit at edge of bed at least 15 min with UE support and CGA no LOB    Update reason: duplicate goal             Patient will sit at edge of bed at least 15 min and perform dynamic reaching activities with </= MOD A x1 and no excessive sway or loss of balance. (Not met)       Start:  12/07/23    Expected End:  12/29/23    Resolved:  12/15/23    Updated to: sit at edge of bed at least 15 min and perform dynamic reaching activities with </= MOD A x1 and no excessive sway or loss of balance.    Update reason: time for update             static sit at edge of bed at least 15 min with UE support and CGA no LOB (Progressing)       Start:  12/15/23    Expected End:  12/29/23                sit at edge of bed at least 15 min and perform dynamic reaching activities with </= MOD A x1 and no excessive sway or loss of balance. (Not Progressing)       Start:  12/15/23    Expected End:  12/29/23                   Mobility       Patient will ambulate at least 20 ft. with </= MOD A x1 and LRD to improve tolerance of household distances.  (Not met)       Start:  11/22/23    Expected End:  12/20/23    Resolved:  12/07/23    Updated to: Patient will ambulate at least 5 ft. with </= MAX A x2 and LRD to improve tolerance of household distances.    Update reason: change in pt condition          Patient will perform bed mobility with </= MOD A x1 to reduce risk of developing decubitus ulcers.  (Not met)       Start:  11/22/23    Expected End:  12/29/23    Resolved:  12/15/23    Updated to: roll with min/mod A using rail; sidely to/from sit (HOB elevated 50 degrees, use of rail) with min A    Update reason: time for update         Patient will perform home exercise program as prescribed with cues as needed.   (Not met)       Start:  11/22/23    Expected End:  12/20/23    Resolved:  12/07/23    Updated to: BLE >/= 3+/5    Update reason:  change in pt condition          Patient will ambulate at least 5 ft. with </= MAX A x2 and LRD to improve tolerance of household distances. (Not met)       Start:  12/07/23    Expected End:  12/20/23    Resolved:  12/15/23    Updated to: pt will follow >80% of simple 1 step commands without additional cues    Update reason: not progressing             BLE >/= 3+/5 (Not met)       Start:  12/07/23    Expected End:  12/29/23    Resolved:  12/15/23    Updated to: bilateral hip/knee flexion and quads >3    Update reason: time for update             Patient will ambulate at least 5 ft. with </= MAX A x2 and LRD to improve tolerance of household distances. (Not met)       Start:  12/07/23    Expected End:  12/20/23    Resolved:  12/15/23    Updated to: pt will follow >80% of simple 1 step commands without additional cueing    Update reason: time for update             pt will follow >80% of simple 1 step commands without additional cues (Not Progressing)       Start:  12/15/23    Expected End:  12/29/23                roll with min/mod A using rail; sidely to/from sit (HOB elevated 50 degrees, use of rail) with min A (Progressing)       Start:  12/15/23    Expected End:  12/29/23                bilateral hip/knee flexion and quads >3 (Not Progressing)       Start:  12/15/23    Expected End:  12/29/23                   Pain - Adult          Transfers       Patient will perform sit to stand and stand to sit transfers with </= MAX A x1 and LRD to increase functional strength.  (Not met)       Start:  11/22/23    Expected End:  12/06/23    Resolved:  12/07/23    Updated to: Patient will perform sit to stand and stand to sit transfers with </= MAX A x2 and LRD to increase functional strength.    Update reason: change in pt condition          Patient will perform sit to stand and stand to sit transfers with </= MAX A x2 and LRD to increase functional strength. (Not Progressing)       Start:  12/07/23    Expected End:  12/20/23                        Education Documentation  Mobility Training, taught by Joann Fuentes PT at 12/15/2023  3:07 PM.  Learner: Patient  Readiness: Acceptance  Method: Demonstration, Explanation  Response: No Evidence of Learning  Comment: PT purpose, reoriented    Education Comments  No comments found.

## 2023-12-15 NOTE — SIGNIFICANT EVENT
12/15/23 0000   Non-Invasive Ventilation   Mode - Non-Invasive   (pt has on bilateral restraints)

## 2023-12-16 ENCOUNTER — APPOINTMENT (OUTPATIENT)
Dept: RADIOLOGY | Facility: HOSPITAL | Age: 83
DRG: 025 | End: 2023-12-16
Payer: MEDICARE

## 2023-12-16 LAB
ALBUMIN SERPL BCP-MCNC: 3.5 G/DL (ref 3.4–5)
AMMONIA PLAS-SCNC: 32 UMOL/L (ref 16–53)
ANION GAP BLDA CALCULATED.4IONS-SCNC: 6 MMO/L (ref 10–25)
ANION GAP BLDA CALCULATED.4IONS-SCNC: 8 MMO/L (ref 10–25)
ANION GAP SERPL CALC-SCNC: 12 MMOL/L (ref 10–20)
APPEARANCE UR: ABNORMAL
BASE EXCESS BLDA CALC-SCNC: 5 MMOL/L (ref -2–3)
BASE EXCESS BLDA CALC-SCNC: 6.3 MMOL/L (ref -2–3)
BILIRUB UR STRIP.AUTO-MCNC: NEGATIVE MG/DL
BODY TEMPERATURE: 37 DEGREES CELSIUS
BODY TEMPERATURE: 37 DEGREES CELSIUS
BUN SERPL-MCNC: 49 MG/DL (ref 6–23)
CA-I BLDA-SCNC: 1.22 MMOL/L (ref 1.1–1.33)
CA-I BLDA-SCNC: 1.23 MMOL/L (ref 1.1–1.33)
CALCIUM SERPL-MCNC: 8.9 MG/DL (ref 8.6–10.6)
CHLORIDE BLDA-SCNC: 113 MMOL/L (ref 98–107)
CHLORIDE BLDA-SCNC: 114 MMOL/L (ref 98–107)
CHLORIDE SERPL-SCNC: 114 MMOL/L (ref 98–107)
CO2 SERPL-SCNC: 28 MMOL/L (ref 21–32)
COLOR UR: YELLOW
CREAT SERPL-MCNC: 1.4 MG/DL (ref 0.5–1.3)
ERYTHROCYTE [DISTWIDTH] IN BLOOD BY AUTOMATED COUNT: 14.6 % (ref 11.5–14.5)
GFR SERPL CREATININE-BSD FRML MDRD: 50 ML/MIN/1.73M*2
GLUCOSE BLD MANUAL STRIP-MCNC: 218 MG/DL (ref 74–99)
GLUCOSE BLD MANUAL STRIP-MCNC: 220 MG/DL (ref 74–99)
GLUCOSE BLD MANUAL STRIP-MCNC: 235 MG/DL (ref 74–99)
GLUCOSE BLD MANUAL STRIP-MCNC: 258 MG/DL (ref 74–99)
GLUCOSE BLD MANUAL STRIP-MCNC: 266 MG/DL (ref 74–99)
GLUCOSE BLDA-MCNC: 209 MG/DL (ref 74–99)
GLUCOSE BLDA-MCNC: 246 MG/DL (ref 74–99)
GLUCOSE SERPL-MCNC: 204 MG/DL (ref 74–99)
GLUCOSE UR STRIP.AUTO-MCNC: ABNORMAL MG/DL
HCO3 BLDA-SCNC: 29.9 MMOL/L (ref 22–26)
HCO3 BLDA-SCNC: 30.6 MMOL/L (ref 22–26)
HCT VFR BLD AUTO: 35.9 % (ref 41–52)
HCT VFR BLD EST: 38 % (ref 41–52)
HCT VFR BLD EST: 38 % (ref 41–52)
HGB BLD-MCNC: 11.9 G/DL (ref 13.5–17.5)
HGB BLDA-MCNC: 12.7 G/DL (ref 13.5–17.5)
HGB BLDA-MCNC: 12.7 G/DL (ref 13.5–17.5)
INHALED O2 CONCENTRATION: 91 %
INHALED O2 CONCENTRATION: 97 %
KETONES UR STRIP.AUTO-MCNC: NEGATIVE MG/DL
LACTATE BLDA-SCNC: 0.8 MMOL/L (ref 0.4–2)
LACTATE BLDA-SCNC: 1.1 MMOL/L (ref 0.4–2)
LEUKOCYTE ESTERASE UR QL STRIP.AUTO: ABNORMAL
MCH RBC QN AUTO: 29.9 PG (ref 26–34)
MCHC RBC AUTO-ENTMCNC: 33.1 G/DL (ref 32–36)
MCV RBC AUTO: 90 FL (ref 80–100)
MUCOUS THREADS #/AREA URNS AUTO: ABNORMAL /LPF
NITRITE UR QL STRIP.AUTO: NEGATIVE
NRBC BLD-RTO: 0 /100 WBCS (ref 0–0)
OXYHGB MFR BLDA: 96.8 % (ref 94–98)
OXYHGB MFR BLDA: 97 % (ref 94–98)
PCO2 BLDA: 42 MM HG (ref 38–42)
PCO2 BLDA: 44 MM HG (ref 38–42)
PH BLDA: 7.44 PH (ref 7.38–7.42)
PH BLDA: 7.47 PH (ref 7.38–7.42)
PH UR STRIP.AUTO: 5 [PH]
PHOSPHATE SERPL-MCNC: 2.2 MG/DL (ref 2.5–4.9)
PLATELET # BLD AUTO: 161 X10*3/UL (ref 150–450)
PO2 BLDA: 93 MM HG (ref 85–95)
PO2 BLDA: 96 MM HG (ref 85–95)
POTASSIUM BLDA-SCNC: 4.1 MMOL/L (ref 3.5–5.3)
POTASSIUM BLDA-SCNC: 4.1 MMOL/L (ref 3.5–5.3)
POTASSIUM SERPL-SCNC: 4.2 MMOL/L (ref 3.5–5.3)
PROT UR STRIP.AUTO-MCNC: ABNORMAL MG/DL
RBC # BLD AUTO: 3.98 X10*6/UL (ref 4.5–5.9)
RBC # UR STRIP.AUTO: ABNORMAL /UL
RBC #/AREA URNS AUTO: >20 /HPF
SAO2 % BLDA: 99 % (ref 94–100)
SAO2 % BLDA: 99 % (ref 94–100)
SODIUM BLDA-SCNC: 146 MMOL/L (ref 136–145)
SODIUM BLDA-SCNC: 147 MMOL/L (ref 136–145)
SODIUM SERPL-SCNC: 150 MMOL/L (ref 136–145)
SP GR UR STRIP.AUTO: 1.01
T4 FREE SERPL-MCNC: 0.85 NG/DL (ref 0.78–1.48)
THYROPEROXIDASE AB SERPL-ACNC: <28 IU/ML
TSH SERPL-ACNC: 10.43 MIU/L (ref 0.44–3.98)
UROBILINOGEN UR STRIP.AUTO-MCNC: <2 MG/DL
VIT B12 SERPL-MCNC: 616 PG/ML (ref 211–911)
WBC # BLD AUTO: 8.4 X10*3/UL (ref 4.4–11.3)
WBC #/AREA URNS AUTO: >50 /HPF
WBC CLUMPS #/AREA URNS AUTO: ABNORMAL /HPF

## 2023-12-16 PROCEDURE — 86376 MICROSOMAL ANTIBODY EACH: CPT | Performed by: STUDENT IN AN ORGANIZED HEALTH CARE EDUCATION/TRAINING PROGRAM

## 2023-12-16 PROCEDURE — 2500000001 HC RX 250 WO HCPCS SELF ADMINISTERED DRUGS (ALT 637 FOR MEDICARE OP): Performed by: STUDENT IN AN ORGANIZED HEALTH CARE EDUCATION/TRAINING PROGRAM

## 2023-12-16 PROCEDURE — 84132 ASSAY OF SERUM POTASSIUM: CPT | Performed by: STUDENT IN AN ORGANIZED HEALTH CARE EDUCATION/TRAINING PROGRAM

## 2023-12-16 PROCEDURE — 96372 THER/PROPH/DIAG INJ SC/IM: CPT | Performed by: STUDENT IN AN ORGANIZED HEALTH CARE EDUCATION/TRAINING PROGRAM

## 2023-12-16 PROCEDURE — 82947 ASSAY GLUCOSE BLOOD QUANT: CPT

## 2023-12-16 PROCEDURE — 84443 ASSAY THYROID STIM HORMONE: CPT | Performed by: STUDENT IN AN ORGANIZED HEALTH CARE EDUCATION/TRAINING PROGRAM

## 2023-12-16 PROCEDURE — 82140 ASSAY OF AMMONIA: CPT | Performed by: STUDENT IN AN ORGANIZED HEALTH CARE EDUCATION/TRAINING PROGRAM

## 2023-12-16 PROCEDURE — 99222 1ST HOSP IP/OBS MODERATE 55: CPT

## 2023-12-16 PROCEDURE — 2500000004 HC RX 250 GENERAL PHARMACY W/ HCPCS (ALT 636 FOR OP/ED): Performed by: STUDENT IN AN ORGANIZED HEALTH CARE EDUCATION/TRAINING PROGRAM

## 2023-12-16 PROCEDURE — 80069 RENAL FUNCTION PANEL: CPT

## 2023-12-16 PROCEDURE — 2060000001 HC INTERMEDIATE ICU ROOM DAILY

## 2023-12-16 PROCEDURE — 82607 VITAMIN B-12: CPT | Performed by: STUDENT IN AN ORGANIZED HEALTH CARE EDUCATION/TRAINING PROGRAM

## 2023-12-16 PROCEDURE — 85027 COMPLETE CBC AUTOMATED: CPT

## 2023-12-16 PROCEDURE — 81003 URINALYSIS AUTO W/O SCOPE: CPT | Performed by: STUDENT IN AN ORGANIZED HEALTH CARE EDUCATION/TRAINING PROGRAM

## 2023-12-16 PROCEDURE — C9113 INJ PANTOPRAZOLE SODIUM, VIA: HCPCS | Performed by: STUDENT IN AN ORGANIZED HEALTH CARE EDUCATION/TRAINING PROGRAM

## 2023-12-16 PROCEDURE — 84439 ASSAY OF FREE THYROXINE: CPT | Performed by: STUDENT IN AN ORGANIZED HEALTH CARE EDUCATION/TRAINING PROGRAM

## 2023-12-16 PROCEDURE — 71045 X-RAY EXAM CHEST 1 VIEW: CPT | Performed by: STUDENT IN AN ORGANIZED HEALTH CARE EDUCATION/TRAINING PROGRAM

## 2023-12-16 PROCEDURE — 71045 X-RAY EXAM CHEST 1 VIEW: CPT

## 2023-12-16 RX ORDER — CEFTRIAXONE 1 G/50ML
1 INJECTION, SOLUTION INTRAVENOUS EVERY 24 HOURS
Status: DISCONTINUED | OUTPATIENT
Start: 2023-12-16 | End: 2023-12-18

## 2023-12-16 RX ORDER — LEVOTHYROXINE SODIUM 50 UG/1
50 TABLET ORAL DAILY
Status: DISCONTINUED | OUTPATIENT
Start: 2023-12-17 | End: 2023-12-17

## 2023-12-16 RX ORDER — FUROSEMIDE 10 MG/ML
40 INJECTION INTRAMUSCULAR; INTRAVENOUS ONCE
Status: COMPLETED | OUTPATIENT
Start: 2023-12-16 | End: 2023-12-16

## 2023-12-16 RX ADMIN — INSULIN LISPRO 4 UNITS: 100 INJECTION, SOLUTION INTRAVENOUS; SUBCUTANEOUS at 00:43

## 2023-12-16 RX ADMIN — BISACODYL 10 MG: 10 SUPPOSITORY RECTAL at 08:37

## 2023-12-16 RX ADMIN — LEVETIRACETAM 500 MG: 5 INJECTION INTRAVENOUS at 17:50

## 2023-12-16 RX ADMIN — LABETALOL HYDROCHLORIDE 10 MG: 5 INJECTION INTRAVENOUS at 09:15

## 2023-12-16 RX ADMIN — Medication 4000 UNITS: at 08:37

## 2023-12-16 RX ADMIN — INSULIN LISPRO 6 UNITS: 100 INJECTION, SOLUTION INTRAVENOUS; SUBCUTANEOUS at 18:02

## 2023-12-16 RX ADMIN — THIAMINE HCL TAB 100 MG 100 MG: 100 TAB at 08:37

## 2023-12-16 RX ADMIN — AMLODIPINE BESYLATE 10 MG: 10 TABLET ORAL at 08:37

## 2023-12-16 RX ADMIN — HEPARIN SODIUM 5000 UNITS: 5000 INJECTION INTRAVENOUS; SUBCUTANEOUS at 09:02

## 2023-12-16 RX ADMIN — PANTOPRAZOLE SODIUM 40 MG: 40 INJECTION, POWDER, FOR SOLUTION INTRAVENOUS at 23:00

## 2023-12-16 RX ADMIN — FUROSEMIDE 40 MG: 10 INJECTION, SOLUTION INTRAVENOUS at 08:38

## 2023-12-16 RX ADMIN — ATORVASTATIN CALCIUM 20 MG: 20 TABLET, FILM COATED ORAL at 08:37

## 2023-12-16 RX ADMIN — HEPARIN SODIUM 5000 UNITS: 5000 INJECTION INTRAVENOUS; SUBCUTANEOUS at 17:50

## 2023-12-16 RX ADMIN — POLYETHYLENE GLYCOL 3350 17 G: 17 POWDER, FOR SOLUTION ORAL at 08:38

## 2023-12-16 RX ADMIN — PANTOPRAZOLE SODIUM 40 MG: 40 INJECTION, POWDER, FOR SOLUTION INTRAVENOUS at 08:37

## 2023-12-16 RX ADMIN — CEFTRIAXONE SODIUM 1 G: 1 INJECTION, SOLUTION INTRAVENOUS at 08:38

## 2023-12-16 RX ADMIN — HEPARIN SODIUM 5000 UNITS: 5000 INJECTION INTRAVENOUS; SUBCUTANEOUS at 00:43

## 2023-12-16 RX ADMIN — CEFEPIME 2 G: 1 INJECTION, SOLUTION INTRAVENOUS at 04:35

## 2023-12-16 RX ADMIN — HYDRALAZINE HYDROCHLORIDE 10 MG: 20 INJECTION INTRAMUSCULAR; INTRAVENOUS at 04:34

## 2023-12-16 RX ADMIN — LEVETIRACETAM 500 MG: 5 INJECTION INTRAVENOUS at 05:31

## 2023-12-16 RX ADMIN — INSULIN LISPRO 4 UNITS: 100 INJECTION, SOLUTION INTRAVENOUS; SUBCUTANEOUS at 12:17

## 2023-12-16 RX ADMIN — INSULIN LISPRO 4 UNITS: 100 INJECTION, SOLUTION INTRAVENOUS; SUBCUTANEOUS at 06:37

## 2023-12-16 ASSESSMENT — PAIN SCALES - PAIN ASSESSMENT IN ADVANCED DEMENTIA (PAINAD)
CONSOLABILITY: NO NEED TO CONSOLE
TOTALSCORE: REPOSITIONED;MD NOTIFIED (COMMENT)
FACIALEXPRESSION: SMILING OR INEXPRESSIVE
TOTALSCORE: REPOSITIONED;EMOTIONAL SUPPORT;REST
BODYLANGUAGE: RELAXED
BREATHING: NORMAL
BREATHING: OCCASIONAL LABORED BREATHING, SHORT PERIOD OF HYPERVENTILATION
TOTALSCORE: 1
FACIALEXPRESSION: SMILING OR INEXPRESSIVE
BODYLANGUAGE: RELAXED
TOTALSCORE: 0
CONSOLABILITY: NO NEED TO CONSOLE

## 2023-12-16 ASSESSMENT — COGNITIVE AND FUNCTIONAL STATUS - GENERAL
DRESSING REGULAR UPPER BODY CLOTHING: TOTAL
PERSONAL GROOMING: TOTAL
MOVING TO AND FROM BED TO CHAIR: TOTAL
HELP NEEDED FOR BATHING: TOTAL
MOBILITY SCORE: 6
TOILETING: TOTAL
DRESSING REGULAR LOWER BODY CLOTHING: TOTAL
DAILY ACTIVITIY SCORE: 6
TURNING FROM BACK TO SIDE WHILE IN FLAT BAD: TOTAL
MOVING FROM LYING ON BACK TO SITTING ON SIDE OF FLAT BED WITH BEDRAILS: TOTAL
CLIMB 3 TO 5 STEPS WITH RAILING: TOTAL
EATING MEALS: TOTAL
WALKING IN HOSPITAL ROOM: TOTAL
STANDING UP FROM CHAIR USING ARMS: TOTAL

## 2023-12-16 ASSESSMENT — PAIN - FUNCTIONAL ASSESSMENT
PAIN_FUNCTIONAL_ASSESSMENT: PAINAD (PAIN ASSESSMENT IN ADVANCED DEMENTIA SCALE)
PAIN_FUNCTIONAL_ASSESSMENT: PAINAD (PAIN ASSESSMENT IN ADVANCED DEMENTIA SCALE)

## 2023-12-16 NOTE — PROGRESS NOTES
"Haile Ayers is a 83 y.o. male on day 24 of admission presenting with Subdural hematoma (CMS/HCC).    Subjective   NAEON         Objective     Physical Exam  ECNS  grimacing  Spontaneous x4 (L>R)    Last Recorded Vitals  Blood pressure 134/79, pulse 90, temperature 36.2 °C (97.2 °F), temperature source Temporal, resp. rate 18, height 1.727 m (5' 7.99\"), weight 68 kg (149 lb 14.6 oz), SpO2 95 %.  Intake/Output last 3 Shifts:  I/O last 3 completed shifts:  In: 3604 (53 mL/kg) [Blood:350; NG/GT:3254]  Out: 1600 (23.5 mL/kg) [Urine:1600 (0.7 mL/kg/hr)]  Weight: 68 kg     Relevant Results                             Assessment/Plan   Principal Problem:    Subdural hematoma (CMS/HCC)  Active Problems:    HTN (hypertension)    Diabetes mellitus, type 2 (CMS/HCC)    CVA (cerebral vascular accident) (CMS/HCC)    Pt is a 82 yo M w/ h/o CLL, anemia, DM, p/w AMS, found to have UTI, CTH w/ L large chronic SDH    11/22 s/p L crani for SDH evac, CTH POC  11/24 drain dc'd  11/26 EEG neg, dc'd, CXR R lung patchy opacities  11/28 s/p midline  11/29 lethargic, not FC, CTH incr L SDH w 8mm MLS, s/p1g keppra load, CXR stable opacities, rCTH stable, s/p R side wd, s/p keppra load, rrCTH stable L SDH  11/30 s/p redo R crani for SDH evac, CTH good evac, decr MLS  12/1 s/p extubation  12/3 CTH evolving brood products, stable MLS  12/4 CTH stable, s/p angio small L MMA w/ no distal branches, not embolized  12/5 DVT USx4 RLE chronic changes no DVT, CTH stable, CT PE neg for PE, diffuse sclerotic lesions c/f mets, BL pleural effusions, s/p 60 lasix  12/6 s/p SDD dc'd, EEG neg, dc'd, s/p 1mg Bumex, TTE EF 65-70%  12/13 hgb 6.8, s/p 1U pRBC  12/14 CBC hgb 6.1, s/p 2U pRBC  12/15 sutures dc'd    PLAN:     Telemetry  Encephalopathy labs  UA, CXR  Med onc recs - CT CAP when LIN resolved  BIPAP as needed  GI recs-PEG 12/18  ID recs  Chronic ignacio  Heme recs-cont to hold ibrutinib   SLP recs  PTOT-SNF  SCD, SQH             Ignacio Huber MD      "

## 2023-12-16 NOTE — CARE PLAN
Problem: Pain  Goal: My pain/discomfort is manageable  Outcome: Progressing     Problem: Safety  Goal: Patient will be injury free during hospitalization  Outcome: Progressing  Goal: I will remain free of falls  Outcome: Progressing     Problem: Daily Care  Goal: Daily care needs are met  Outcome: Progressing     Problem: Psychosocial Needs  Goal: Demonstrates ability to cope with hospitalization/illness  Outcome: Progressing  Goal: Collaborate with me, my family, and caregiver to identify my specific goals  Outcome: Progressing     Problem: Discharge Barriers  Goal: My discharge needs are met  Outcome: Progressing     Problem: General Stroke  Goal: Demonstrate improvement in neurological exam throughout the shift  Outcome: Progressing  Goal: Maintain BP within ordered limits throughout shift  Outcome: Progressing  Goal: Participate in treatment (ie., meds, therapy) throughout shift  Outcome: Progressing  Goal: No symptoms of aspiration throughout shift  Outcome: Progressing  Goal: No symptoms of hemorrhage throughout shift  Outcome: Progressing  Goal: Tolerate enteral feeding throughout shift  Outcome: Progressing  Goal: Decreased nausea/vomiting throughout shift  Outcome: Progressing  Goal: Controlled blood glucose throughout shift  Outcome: Progressing  Goal: Out of bed three times today  Outcome: Progressing     Problem: ICU Stroke  Goal: Maintain ICP within ordered limits throughout shift  Outcome: Progressing  Goal: Tolerate EVD clamping trial throughout shift  Outcome: Progressing  Goal: Tolerate ventilator weaning trial during shift  Outcome: Progressing  Goal: Maintain patent airway throughout shift  Outcome: Progressing  Goal: Achieve/maintain targeted sodium level throughout shift  Outcome: Progressing     Problem: Skin  Goal: Decreased wound size/increased tissue granulation at next dressing change  Outcome: Progressing  Goal: Participates in plan/prevention/treatment measures  Outcome:  Progressing  Goal: Prevent/manage excess moisture  Outcome: Progressing  Goal: Prevent/minimize sheer/friction injuries  Outcome: Progressing  Goal: Promote/optimize nutrition  Outcome: Progressing  Goal: Promote skin healing  Outcome: Progressing     Problem: Safety - Medical Restraint  Goal: Remains free of injury from restraints (Restraint for Interference with Medical Device)  Outcome: Progressing  Goal: Free from restraint(s) (Restraint for Interference with Medical Device)  Outcome: Progressing     Problem: Nutrition  Goal: Nutrition support goals are met within 48 hrs  Outcome: Progressing  Goal: Lab values WNL  Outcome: Progressing  Goal: Electrolytes WNL  Outcome: Progressing  Goal: Promote healing  Outcome: Progressing  Goal: Maintain stable weight  Outcome: Progressing  Goal: Reduce weight from edema/fluid  Outcome: Progressing     Problem: Pain - Adult  Goal: Verbalizes/displays adequate comfort level or baseline comfort level  Outcome: Progressing     Problem: Safety - Adult  Goal: Free from fall injury  Outcome: Progressing     Problem: Discharge Planning  Goal: Discharge to home or other facility with appropriate resources  Outcome: Progressing     Problem: Chronic Conditions and Co-morbidities  Goal: Patient's chronic conditions and co-morbidity symptoms are monitored and maintained or improved  Outcome: Progressing     Problem: Fall/Injury  Goal: Not fall by end of shift  Outcome: Progressing  Goal: Be free from injury by end of the shift  Outcome: Progressing  Goal: Verbalize understanding of personal risk factors for fall in the hospital  Outcome: Progressing     Problem: Diabetes  Goal: Increase stability of blood glucose readings by end of shift  Outcome: Progressing  Goal: Maintain electrolyte levels within acceptable range throughout shift  Outcome: Progressing  Goal: Maintain glucose levels >70mg/dl to <250mg/dl throughout shift  Outcome: Progressing  Goal: No changes in neurological exam by  end of shift  Outcome: Progressing     The patient's goals for the shift include n/a    The clinical goals for the shift include fall prevention, BP <160    Over the shift, the patient did not make progress toward the following goals. Barriers to progression include BP above. Recommendations to address these barriers include PRN BP meds.

## 2023-12-16 NOTE — CONSULTS
Haile Ayers is a 83 y.o. patient with PMHx of HTN , DM type 2, hx of CVA, hx of anemia,  CLL (unknown CLL-IPI score; being followed by Dr. Claros at [facility]) on Ibrutinib who presented to the hospital on 11/22 with AMS 2/2 UTI also found to have L large chronic SDH with midline shift. He underwent L crani for SDH evacuation on 11/22 and re-evacuated on 11/30, extubated on 12/1.  Had worsening neurological exam on 12/4 and transferred to NSU. CTA on 12/5 incidentally showed diffuse sclerotic lesions throughout axial and appendicular skeleton + ascities.  Patient with continued worsening mental status, endocrinology team consulted for elevated TSH as part of worsening encephalopathy.    She was seen and examined today with his wife at his bedside.  Patient unable to provide history, per wife patient has never been diagnosed with hypothyroidism.    Wife was tearful most of the time during encounter presents history taking was limited.      Patient was in mild distress, tachypneic, on nasal cannula, with Dobbhoff tube in, obeying only his wife's commands.    Past Medical History:   Diagnosis Date    Anemia     Chronic indwelling Mckee catheter     CLL (chronic lymphocytic leukemia) (CMS/HCC)     Dementia (CMS/HCC)     DM II (diabetes mellitus, type II), controlled (CMS/HCC)     HTN (hypertension)     Hyperlipidemia     Leukemia (CMS/HCC)       History reviewed. No pertinent surgical history.     Social History     Socioeconomic History    Marital status:      Spouse name: None    Number of children: None    Years of education: None    Highest education level: None   Occupational History    None   Tobacco Use    Smoking status: Never    Smokeless tobacco: Never   Substance and Sexual Activity    Alcohol use: None    Drug use: None    Sexual activity: None   Other Topics Concern    None   Social History Narrative    None     Social Determinants of Health     Financial Resource Strain: Low Risk  (11/26/2023)     Overall Financial Resource Strain (CARDIA)     Difficulty of Paying Living Expenses: Not very hard   Food Insecurity: No Food Insecurity (11/24/2023)    Hunger Vital Sign     Worried About Running Out of Food in the Last Year: Never true     Ran Out of Food in the Last Year: Never true   Transportation Needs: No Transportation Needs (11/26/2023)    PRAPARE - Transportation     Lack of Transportation (Medical): No     Lack of Transportation (Non-Medical): No   Physical Activity: Not on file   Stress: Not on file   Social Connections: Not on file   Intimate Partner Violence: Not At Risk (11/24/2023)    Humiliation, Afraid, Rape, and Kick questionnaire     Fear of Current or Ex-Partner: No     Emotionally Abused: No     Physically Abused: No     Sexually Abused: No   Housing Stability: Low Risk  (11/26/2023)    Housing Stability Vital Sign     Unable to Pay for Housing in the Last Year: No     Number of Places Lived in the Last Year: 1     Unstable Housing in the Last Year: No        Allergies   Allergen Reactions    Januvia [Sitagliptin] Unknown        No current facility-administered medications on file prior to encounter.     Current Outpatient Medications on File Prior to Encounter   Medication Sig Dispense Refill    acyclovir (Zovirax) 400 mg tablet Take 1 tablet (400 mg) by mouth 2 times a day.      amLODIPine (Norvasc) 5 mg tablet Take 1 tablet (5 mg) by mouth once daily.      atorvastatin (Lipitor) 20 mg tablet Take 1 tablet (20 mg) by mouth once daily.      cholecalciferol (Vitamin D3) 50 MCG (2000 UT) tablet Take 1 tablet (50 mcg) by mouth once daily.      clotrimazole-betamethasone (Lotrisone) cream Apply 1 Application topically 2 times a day.      ibrutinib (Imbruvica) 140 mg capsule Take 3 capsules (420 mg total) by mouth once daily.      lisinopril 10 mg tablet Take 1 tablet (10 mg) by mouth once daily.      metFORMIN (Glucophage) 500 mg tablet Take 1 tablet (500 mg) by mouth 2 times a day with meals.   "        Family History   Family history unknown: Yes      Visit Vitals  BP (!) 143/93   Pulse 103   Temp 36.3 °C (97.3 °F) (Temporal)   Resp 20   Ht 1.727 m (5' 7.99\")   Wt 68 kg (149 lb 14.6 oz)   SpO2 97%   BMI 22.80 kg/m²   Smoking Status Never   BSA 1.81 m²        PE:  General: Patient is not responsive (however he is able to obey his wife's command to a certain degree)  HEENT: Oral secretions, nasal cannula, Dobbhoff tube in place.  Chest: Tachypneic, some heard crackling likely from upper airways, not tachycardic  Abdomen: Flat  Extremities: No edema  Skin:     General: Skin is warm and dry.      Capillary Refill: Capillary refill takes less than 2 seconds.      Coloration: Skin is not pale.   Neurological:      Mental Status: He is unresponsive.      Sensory: Sensory deficit present.     Results for orders placed or performed during the hospital encounter of 11/21/23 (from the past 24 hour(s))   POCT GLUCOSE   Result Value Ref Range    POCT Glucose 220 (H) 74 - 99 mg/dL   CBC   Result Value Ref Range    WBC 8.4 4.4 - 11.3 x10*3/uL    nRBC 0.0 0.0 - 0.0 /100 WBCs    RBC 3.98 (L) 4.50 - 5.90 x10*6/uL    Hemoglobin 11.9 (L) 13.5 - 17.5 g/dL    Hematocrit 35.9 (L) 41.0 - 52.0 %    MCV 90 80 - 100 fL    MCH 29.9 26.0 - 34.0 pg    MCHC 33.1 32.0 - 36.0 g/dL    RDW 14.6 (H) 11.5 - 14.5 %    Platelets 161 150 - 450 x10*3/uL   Renal Function Panel   Result Value Ref Range    Glucose 204 (H) 74 - 99 mg/dL    Sodium 150 (H) 136 - 145 mmol/L    Potassium 4.2 3.5 - 5.3 mmol/L    Chloride 114 (H) 98 - 107 mmol/L    Bicarbonate 28 21 - 32 mmol/L    Anion Gap 12 10 - 20 mmol/L    Urea Nitrogen 49 (H) 6 - 23 mg/dL    Creatinine 1.40 (H) 0.50 - 1.30 mg/dL    eGFR 50 (L) >60 mL/min/1.73m*2    Calcium 8.9 8.6 - 10.6 mg/dL    Phosphorus 2.2 (L) 2.5 - 4.9 mg/dL    Albumin 3.5 3.4 - 5.0 g/dL   Ammonia   Result Value Ref Range    Ammonia 32 16 - 53 umol/L   TSH   Result Value Ref Range    Thyroid Stimulating Hormone 10.43 (H) 0.44 - " 3.98 mIU/L   Vitamin B12   Result Value Ref Range    Vitamin B12 616 211 - 911 pg/mL   Thyroid Peroxidase Antibody   Result Value Ref Range    Thyroid Peroxidase (TPO) Antibody <28 <=60 IU/mL   T4, free   Result Value Ref Range    Thyroxine, Free 0.85 0.78 - 1.48 ng/dL   Urinalysis with Reflex Microscopic   Result Value Ref Range    Color, Urine Yellow Straw, Yellow    Appearance, Urine Hazy (N) Clear    Specific Gravity, Urine 1.014 1.005 - 1.035    pH, Urine 5.0 5.0, 5.5, 6.0, 6.5, 7.0, 7.5, 8.0    Protein, Urine 100 (2+) (N) NEGATIVE mg/dL    Glucose, Urine 50 (1+) (A) NEGATIVE mg/dL    Blood, Urine MODERATE (2+) (A) NEGATIVE    Ketones, Urine NEGATIVE NEGATIVE mg/dL    Bilirubin, Urine NEGATIVE NEGATIVE    Urobilinogen, Urine <2.0 <2.0 mg/dL    Nitrite, Urine NEGATIVE NEGATIVE    Leukocyte Esterase, Urine LARGE (3+) (A) NEGATIVE   Microscopic Only, Urine   Result Value Ref Range    WBC, Urine >50 (A) 1-5, NONE /HPF    WBC Clumps, Urine MANY Reference range not established. /HPF    RBC, Urine >20 (A) NONE, 1-2, 3-5 /HPF    Mucus, Urine 1+ Reference range not established. /LPF   POCT GLUCOSE   Result Value Ref Range    POCT Glucose 218 (H) 74 - 99 mg/dL   Blood Gas Arterial Full Panel   Result Value Ref Range    POCT pH, Arterial 7.47 (H) 7.38 - 7.42 pH    POCT pCO2, Arterial 42 38 - 42 mm Hg    POCT pO2, Arterial 96 (H) 85 - 95 mm Hg    POCT SO2, Arterial 99 94 - 100 %    POCT Oxy Hemoglobin, Arterial 97.0 94.0 - 98.0 %    POCT Hematocrit Calculated, Arterial 38.0 (L) 41.0 - 52.0 %    POCT Sodium, Arterial 146 (H) 136 - 145 mmol/L    POCT Potassium, Arterial 4.1 3.5 - 5.3 mmol/L    POCT Chloride, Arterial 114 (H) 98 - 107 mmol/L    POCT Ionized Calcium, Arterial 1.22 1.10 - 1.33 mmol/L    POCT Glucose, Arterial 209 (H) 74 - 99 mg/dL    POCT Lactate, Arterial 1.1 0.4 - 2.0 mmol/L    POCT Base Excess, Arterial 6.3 (H) -2.0 - 3.0 mmol/L    POCT HCO3 Calculated, Arterial 30.6 (H) 22.0 - 26.0 mmol/L    POCT  Hemoglobin, Arterial 12.7 (L) 13.5 - 17.5 g/dL    POCT Anion Gap, Arterial 6 (L) 10 - 25 mmo/L    Patient Temperature 37.0 degrees Celsius    FiO2 97 %   POCT GLUCOSE   Result Value Ref Range    POCT Glucose 235 (H) 74 - 99 mg/dL   Blood Gas Arterial Full Panel   Result Value Ref Range    POCT pH, Arterial 7.44 (H) 7.38 - 7.42 pH    POCT pCO2, Arterial 44 (H) 38 - 42 mm Hg    POCT pO2, Arterial 93 85 - 95 mm Hg    POCT SO2, Arterial 99 94 - 100 %    POCT Oxy Hemoglobin, Arterial 96.8 94.0 - 98.0 %    POCT Hematocrit Calculated, Arterial 38.0 (L) 41.0 - 52.0 %    POCT Sodium, Arterial 147 (H) 136 - 145 mmol/L    POCT Potassium, Arterial 4.1 3.5 - 5.3 mmol/L    POCT Chloride, Arterial 113 (H) 98 - 107 mmol/L    POCT Ionized Calcium, Arterial 1.23 1.10 - 1.33 mmol/L    POCT Glucose, Arterial 246 (H) 74 - 99 mg/dL    POCT Lactate, Arterial 0.8 0.4 - 2.0 mmol/L    POCT Base Excess, Arterial 5.0 (H) -2.0 - 3.0 mmol/L    POCT HCO3 Calculated, Arterial 29.9 (H) 22.0 - 26.0 mmol/L    POCT Hemoglobin, Arterial 12.7 (L) 13.5 - 17.5 g/dL    POCT Anion Gap, Arterial 8 (L) 10 - 25 mmo/L    Patient Temperature 37.0 degrees Celsius    FiO2 91 %   POCT GLUCOSE   Result Value Ref Range    POCT Glucose 258 (H) 74 - 99 mg/dL        Assessment/plan:  --------------------  Haile Ayers is a 83 y.o. patient with PMHx of HTN , DM type 2, hx of CVA, hx of anemia,  CLL (unknown CLL-IPI score; being followed by Dr. Claros at [facility]) on Ibrutinib who presented to the hospital on 11/22 with AMS 2/2 UTI also found to have L large chronic SDH with midline shift. He underwent L crani for SDH evacuation on 11/22 and re-evacuated on 11/30, extubated on 12/1.  Had worsening neurological exam on 12/4 and transferred to NSU. CTA on 12/5 incidentally showed diffuse sclerotic lesions throughout axial and appendicular skeleton + ascities.  Patient with continued worsening mental status, endocrinology team consulted for elevated TSH as part of  worsening encephalopathy, workup showing worsening lung disease and LIN, patient planned for PEG on 12/18.     History taking was very limited given the patient's condition, even with his wife at his bedside.    -12/16  TSH 10.4, Ft4: 0.85, Anti-TPO: neg    Patient likely has hypothyroidism versus non-thyroidal illness.  However unlikely that his mild hypothyroidism is the etiology of his worsening mental status.    Given current worsening clinical status, would recommend:  -Starting levothyroxine 50 mcg 1 tablet orally daily in the morning, while holding tube feeds to hour before and 1 hour after the dose for better absorption.  -Repeat TFTs on 12/22/2022     Plan communicated to primary team earlier during the day via secure chat.  Patient seen and examined.  Case discussed with Dr. Sommers who agrees with the management plan.

## 2023-12-17 ENCOUNTER — APPOINTMENT (OUTPATIENT)
Dept: RADIOLOGY | Facility: HOSPITAL | Age: 83
DRG: 025 | End: 2023-12-17
Payer: MEDICARE

## 2023-12-17 LAB
ALBUMIN SERPL BCP-MCNC: 3.6 G/DL (ref 3.4–5)
ALBUMIN SERPL BCP-MCNC: 3.8 G/DL (ref 3.4–5)
ANION GAP BLDA CALCULATED.4IONS-SCNC: 6 MMO/L (ref 10–25)
ANION GAP BLDA CALCULATED.4IONS-SCNC: 7 MMO/L (ref 10–25)
ANION GAP SERPL CALC-SCNC: 14 MMOL/L (ref 10–20)
ANION GAP SERPL CALC-SCNC: 16 MMOL/L (ref 10–20)
BASE EXCESS BLDA CALC-SCNC: 7.6 MMOL/L (ref -2–3)
BASE EXCESS BLDA CALC-SCNC: 8.5 MMOL/L (ref -2–3)
BODY TEMPERATURE: 37 DEGREES CELSIUS
BODY TEMPERATURE: 37 DEGREES CELSIUS
BUN SERPL-MCNC: 62 MG/DL (ref 6–23)
BUN SERPL-MCNC: 67 MG/DL (ref 6–23)
CA-I BLDA-SCNC: 1.23 MMOL/L (ref 1.1–1.33)
CA-I BLDA-SCNC: 1.24 MMOL/L (ref 1.1–1.33)
CALCIUM SERPL-MCNC: 9.1 MG/DL (ref 8.6–10.6)
CALCIUM SERPL-MCNC: 9.6 MG/DL (ref 8.6–10.6)
CHLORIDE BLDA-SCNC: 116 MMOL/L (ref 98–107)
CHLORIDE BLDA-SCNC: 116 MMOL/L (ref 98–107)
CHLORIDE SERPL-SCNC: 114 MMOL/L (ref 98–107)
CHLORIDE SERPL-SCNC: 114 MMOL/L (ref 98–107)
CHLORIDE UR-SCNC: 20 MMOL/L
CHLORIDE/CREATININE (MMOL/G) IN URINE: 21 MMOL/G CREAT (ref 23–275)
CO2 SERPL-SCNC: 29 MMOL/L (ref 21–32)
CO2 SERPL-SCNC: 29 MMOL/L (ref 21–32)
CREAT SERPL-MCNC: 1.88 MG/DL (ref 0.5–1.3)
CREAT SERPL-MCNC: 1.96 MG/DL (ref 0.5–1.3)
CREAT UR-MCNC: 96.9 MG/DL (ref 20–370)
ERYTHROCYTE [DISTWIDTH] IN BLOOD BY AUTOMATED COUNT: 15 % (ref 11.5–14.5)
ERYTHROCYTE [DISTWIDTH] IN BLOOD BY AUTOMATED COUNT: 15 % (ref 11.5–14.5)
GFR SERPL CREATININE-BSD FRML MDRD: 33 ML/MIN/1.73M*2
GFR SERPL CREATININE-BSD FRML MDRD: 35 ML/MIN/1.73M*2
GLUCOSE BLD MANUAL STRIP-MCNC: 243 MG/DL (ref 74–99)
GLUCOSE BLD MANUAL STRIP-MCNC: 284 MG/DL (ref 74–99)
GLUCOSE BLD MANUAL STRIP-MCNC: 299 MG/DL (ref 74–99)
GLUCOSE BLDA-MCNC: 274 MG/DL (ref 74–99)
GLUCOSE BLDA-MCNC: 308 MG/DL (ref 74–99)
GLUCOSE SERPL-MCNC: 304 MG/DL (ref 74–99)
GLUCOSE SERPL-MCNC: 313 MG/DL (ref 74–99)
HCO3 BLDA-SCNC: 32 MMOL/L (ref 22–26)
HCO3 BLDA-SCNC: 32.8 MMOL/L (ref 22–26)
HCT VFR BLD AUTO: 37.7 % (ref 41–52)
HCT VFR BLD AUTO: 38.6 % (ref 41–52)
HCT VFR BLD EST: 38 % (ref 41–52)
HCT VFR BLD EST: 39 % (ref 41–52)
HGB BLD-MCNC: 12 G/DL (ref 13.5–17.5)
HGB BLD-MCNC: 12.5 G/DL (ref 13.5–17.5)
HGB BLDA-MCNC: 12.8 G/DL (ref 13.5–17.5)
HGB BLDA-MCNC: 13.1 G/DL (ref 13.5–17.5)
INHALED O2 CONCENTRATION: 21 %
INHALED O2 CONCENTRATION: 60 %
LACTATE BLDA-SCNC: 0.9 MMOL/L (ref 0.4–2)
LACTATE BLDA-SCNC: 1.4 MMOL/L (ref 0.4–2)
MCH RBC QN AUTO: 29.6 PG (ref 26–34)
MCH RBC QN AUTO: 30.5 PG (ref 26–34)
MCHC RBC AUTO-ENTMCNC: 31.8 G/DL (ref 32–36)
MCHC RBC AUTO-ENTMCNC: 32.4 G/DL (ref 32–36)
MCV RBC AUTO: 93 FL (ref 80–100)
MCV RBC AUTO: 94 FL (ref 80–100)
NRBC BLD-RTO: 0 /100 WBCS (ref 0–0)
NRBC BLD-RTO: 0 /100 WBCS (ref 0–0)
OSMOLALITY SERPL: 348 MOSM/KG (ref 280–300)
OSMOLALITY UR: 428 MOSM/KG (ref 200–1200)
OXYHGB MFR BLDA: 92.5 % (ref 94–98)
OXYHGB MFR BLDA: 97.3 % (ref 94–98)
PCO2 BLDA: 43 MM HG (ref 38–42)
PCO2 BLDA: 43 MM HG (ref 38–42)
PH BLDA: 7.48 PH (ref 7.38–7.42)
PH BLDA: 7.49 PH (ref 7.38–7.42)
PHOSPHATE SERPL-MCNC: 1.9 MG/DL (ref 2.5–4.9)
PHOSPHATE SERPL-MCNC: 2.1 MG/DL (ref 2.5–4.9)
PLATELET # BLD AUTO: 162 X10*3/UL (ref 150–450)
PLATELET # BLD AUTO: 164 X10*3/UL (ref 150–450)
PO2 BLDA: 214 MM HG (ref 85–95)
PO2 BLDA: 67 MM HG (ref 85–95)
POTASSIUM BLDA-SCNC: 4.4 MMOL/L (ref 3.5–5.3)
POTASSIUM BLDA-SCNC: 4.4 MMOL/L (ref 3.5–5.3)
POTASSIUM SERPL-SCNC: 4.2 MMOL/L (ref 3.5–5.3)
POTASSIUM SERPL-SCNC: 4.5 MMOL/L (ref 3.5–5.3)
POTASSIUM UR-SCNC: 57 MMOL/L
POTASSIUM/CREAT UR-RTO: 59 MMOL/G CREAT
PROCALCITONIN SERPL-MCNC: 0.24 NG/ML
RBC # BLD AUTO: 4.06 X10*6/UL (ref 4.5–5.9)
RBC # BLD AUTO: 4.1 X10*6/UL (ref 4.5–5.9)
SAO2 % BLDA: 100 % (ref 94–100)
SAO2 % BLDA: 95 % (ref 94–100)
SODIUM BLDA-SCNC: 150 MMOL/L (ref 136–145)
SODIUM BLDA-SCNC: 151 MMOL/L (ref 136–145)
SODIUM SERPL-SCNC: 153 MMOL/L (ref 136–145)
SODIUM SERPL-SCNC: 154 MMOL/L (ref 136–145)
SODIUM SERPL-SCNC: 154 MMOL/L (ref 136–145)
SODIUM UR-SCNC: 23 MMOL/L
SODIUM/CREAT UR-RTO: 24 MMOL/G CREAT
WBC # BLD AUTO: 10.2 X10*3/UL (ref 4.4–11.3)
WBC # BLD AUTO: 9.8 X10*3/UL (ref 4.4–11.3)

## 2023-12-17 PROCEDURE — 2500000004 HC RX 250 GENERAL PHARMACY W/ HCPCS (ALT 636 FOR OP/ED): Performed by: STUDENT IN AN ORGANIZED HEALTH CARE EDUCATION/TRAINING PROGRAM

## 2023-12-17 PROCEDURE — 71045 X-RAY EXAM CHEST 1 VIEW: CPT

## 2023-12-17 PROCEDURE — 84295 ASSAY OF SERUM SODIUM: CPT

## 2023-12-17 PROCEDURE — 2500000001 HC RX 250 WO HCPCS SELF ADMINISTERED DRUGS (ALT 637 FOR MEDICARE OP): Performed by: STUDENT IN AN ORGANIZED HEALTH CARE EDUCATION/TRAINING PROGRAM

## 2023-12-17 PROCEDURE — 85027 COMPLETE CBC AUTOMATED: CPT | Performed by: STUDENT IN AN ORGANIZED HEALTH CARE EDUCATION/TRAINING PROGRAM

## 2023-12-17 PROCEDURE — 36415 COLL VENOUS BLD VENIPUNCTURE: CPT

## 2023-12-17 PROCEDURE — 85027 COMPLETE CBC AUTOMATED: CPT

## 2023-12-17 PROCEDURE — 84132 ASSAY OF SERUM POTASSIUM: CPT | Performed by: STUDENT IN AN ORGANIZED HEALTH CARE EDUCATION/TRAINING PROGRAM

## 2023-12-17 PROCEDURE — 71045 X-RAY EXAM CHEST 1 VIEW: CPT | Performed by: RADIOLOGY

## 2023-12-17 PROCEDURE — C9113 INJ PANTOPRAZOLE SODIUM, VIA: HCPCS | Performed by: STUDENT IN AN ORGANIZED HEALTH CARE EDUCATION/TRAINING PROGRAM

## 2023-12-17 PROCEDURE — 37799 UNLISTED PX VASCULAR SURGERY: CPT

## 2023-12-17 PROCEDURE — 99233 SBSQ HOSP IP/OBS HIGH 50: CPT

## 2023-12-17 PROCEDURE — 80069 RENAL FUNCTION PANEL: CPT | Performed by: STUDENT IN AN ORGANIZED HEALTH CARE EDUCATION/TRAINING PROGRAM

## 2023-12-17 PROCEDURE — 2020000001 HC ICU ROOM DAILY

## 2023-12-17 PROCEDURE — 84145 PROCALCITONIN (PCT): CPT | Performed by: STUDENT IN AN ORGANIZED HEALTH CARE EDUCATION/TRAINING PROGRAM

## 2023-12-17 PROCEDURE — 2500000004 HC RX 250 GENERAL PHARMACY W/ HCPCS (ALT 636 FOR OP/ED)

## 2023-12-17 PROCEDURE — 2500000005 HC RX 250 GENERAL PHARMACY W/O HCPCS: Performed by: STUDENT IN AN ORGANIZED HEALTH CARE EDUCATION/TRAINING PROGRAM

## 2023-12-17 PROCEDURE — 70450 CT HEAD/BRAIN W/O DYE: CPT

## 2023-12-17 PROCEDURE — 96372 THER/PROPH/DIAG INJ SC/IM: CPT | Performed by: STUDENT IN AN ORGANIZED HEALTH CARE EDUCATION/TRAINING PROGRAM

## 2023-12-17 PROCEDURE — 84520 ASSAY OF UREA NITROGEN: CPT

## 2023-12-17 PROCEDURE — 84300 ASSAY OF URINE SODIUM: CPT

## 2023-12-17 PROCEDURE — 2500000002 HC RX 250 W HCPCS SELF ADMINISTERED DRUGS (ALT 637 FOR MEDICARE OP, ALT 636 FOR OP/ED)

## 2023-12-17 PROCEDURE — 82947 ASSAY GLUCOSE BLOOD QUANT: CPT

## 2023-12-17 PROCEDURE — 83935 ASSAY OF URINE OSMOLALITY: CPT

## 2023-12-17 PROCEDURE — 36600 WITHDRAWAL OF ARTERIAL BLOOD: CPT

## 2023-12-17 PROCEDURE — 83930 ASSAY OF BLOOD OSMOLALITY: CPT

## 2023-12-17 RX ORDER — SODIUM CHLORIDE 450 MG/100ML
50 INJECTION, SOLUTION INTRAVENOUS CONTINUOUS
Status: DISCONTINUED | OUTPATIENT
Start: 2023-12-17 | End: 2023-12-21

## 2023-12-17 RX ORDER — LEVOTHYROXINE SODIUM 25 UG/1
25 TABLET ORAL DAILY
Status: DISCONTINUED | OUTPATIENT
Start: 2023-12-18 | End: 2023-12-24

## 2023-12-17 RX ORDER — INSULIN GLARGINE 100 [IU]/ML
8 INJECTION, SOLUTION SUBCUTANEOUS NIGHTLY
Status: DISCONTINUED | OUTPATIENT
Start: 2023-12-17 | End: 2023-12-18

## 2023-12-17 RX ORDER — INSULIN GLARGINE 100 [IU]/ML
80 INJECTION, SOLUTION SUBCUTANEOUS EVERY 24 HOURS
Status: DISCONTINUED | OUTPATIENT
Start: 2023-12-17 | End: 2023-12-17

## 2023-12-17 RX ORDER — SODIUM CHLORIDE 450 MG/100ML
150 INJECTION, SOLUTION INTRAVENOUS CONTINUOUS
Status: DISCONTINUED | OUTPATIENT
Start: 2023-12-17 | End: 2023-12-18

## 2023-12-17 RX ADMIN — BISACODYL 10 MG: 10 SUPPOSITORY RECTAL at 08:29

## 2023-12-17 RX ADMIN — PANTOPRAZOLE SODIUM 40 MG: 40 INJECTION, POWDER, FOR SOLUTION INTRAVENOUS at 21:45

## 2023-12-17 RX ADMIN — PANTOPRAZOLE SODIUM 40 MG: 40 INJECTION, POWDER, FOR SOLUTION INTRAVENOUS at 08:28

## 2023-12-17 RX ADMIN — THIAMINE HCL TAB 100 MG 100 MG: 100 TAB at 08:28

## 2023-12-17 RX ADMIN — Medication: at 14:15

## 2023-12-17 RX ADMIN — HEPARIN SODIUM 5000 UNITS: 5000 INJECTION INTRAVENOUS; SUBCUTANEOUS at 17:45

## 2023-12-17 RX ADMIN — LEVETIRACETAM 500 MG: 5 INJECTION INTRAVENOUS at 17:45

## 2023-12-17 RX ADMIN — INSULIN LISPRO 6 UNITS: 100 INJECTION, SOLUTION INTRAVENOUS; SUBCUTANEOUS at 00:27

## 2023-12-17 RX ADMIN — Medication 4000 UNITS: at 08:29

## 2023-12-17 RX ADMIN — ATORVASTATIN CALCIUM 20 MG: 20 TABLET, FILM COATED ORAL at 08:28

## 2023-12-17 RX ADMIN — INSULIN LISPRO 6 UNITS: 100 INJECTION, SOLUTION INTRAVENOUS; SUBCUTANEOUS at 11:40

## 2023-12-17 RX ADMIN — INSULIN GLARGINE 8 UNITS: 100 INJECTION, SOLUTION SUBCUTANEOUS at 22:35

## 2023-12-17 RX ADMIN — LEVETIRACETAM 500 MG: 5 INJECTION INTRAVENOUS at 05:41

## 2023-12-17 RX ADMIN — AMLODIPINE BESYLATE 10 MG: 10 TABLET ORAL at 08:29

## 2023-12-17 RX ADMIN — INSULIN LISPRO 6 UNITS: 100 INJECTION, SOLUTION INTRAVENOUS; SUBCUTANEOUS at 06:20

## 2023-12-17 RX ADMIN — HEPARIN SODIUM 5000 UNITS: 5000 INJECTION INTRAVENOUS; SUBCUTANEOUS at 08:29

## 2023-12-17 RX ADMIN — HEPARIN SODIUM 5000 UNITS: 5000 INJECTION INTRAVENOUS; SUBCUTANEOUS at 02:14

## 2023-12-17 RX ADMIN — LEVOTHYROXINE SODIUM 50 MCG: 50 TABLET ORAL at 05:41

## 2023-12-17 RX ADMIN — SODIUM CHLORIDE 150 ML/HR: 4.5 INJECTION, SOLUTION INTRAVENOUS at 22:32

## 2023-12-17 RX ADMIN — CEFTRIAXONE SODIUM 1 G: 1 INJECTION, SOLUTION INTRAVENOUS at 05:49

## 2023-12-17 RX ADMIN — POLYETHYLENE GLYCOL 3350 17 G: 17 POWDER, FOR SOLUTION ORAL at 08:28

## 2023-12-17 RX ADMIN — SODIUM CHLORIDE 150 ML/HR: 4.5 INJECTION, SOLUTION INTRAVENOUS at 16:59

## 2023-12-17 ASSESSMENT — COGNITIVE AND FUNCTIONAL STATUS - GENERAL
MOVING TO AND FROM BED TO CHAIR: TOTAL
MOBILITY SCORE: 6
DRESSING REGULAR UPPER BODY CLOTHING: TOTAL
CLIMB 3 TO 5 STEPS WITH RAILING: TOTAL
DRESSING REGULAR LOWER BODY CLOTHING: TOTAL
TOILETING: TOTAL
HELP NEEDED FOR BATHING: TOTAL
WALKING IN HOSPITAL ROOM: TOTAL
DAILY ACTIVITIY SCORE: 6
EATING MEALS: TOTAL
STANDING UP FROM CHAIR USING ARMS: TOTAL
PERSONAL GROOMING: TOTAL
MOVING FROM LYING ON BACK TO SITTING ON SIDE OF FLAT BED WITH BEDRAILS: TOTAL
TURNING FROM BACK TO SIDE WHILE IN FLAT BAD: TOTAL

## 2023-12-17 ASSESSMENT — PAIN SCALES - PAIN ASSESSMENT IN ADVANCED DEMENTIA (PAINAD)
BODYLANGUAGE: RELAXED
FACIALEXPRESSION: SMILING OR INEXPRESSIVE
CONSOLABILITY: NO NEED TO CONSOLE
FACIALEXPRESSION: SMILING OR INEXPRESSIVE
BREATHING: OCCASIONAL LABORED BREATHING, SHORT PERIOD OF HYPERVENTILATION
FACIALEXPRESSION: SMILING OR INEXPRESSIVE
BREATHING: NORMAL
BODYLANGUAGE: RELAXED
TOTALSCORE: 1
TOTALSCORE: 1
TOTALSCORE: 0
BREATHING: OCCASIONAL LABORED BREATHING, SHORT PERIOD OF HYPERVENTILATION
TOTALSCORE: REPOSITIONED;REST
CONSOLABILITY: NO NEED TO CONSOLE
FACIALEXPRESSION: SMILING OR INEXPRESSIVE
BODYLANGUAGE: RELAXED
CONSOLABILITY: NO NEED TO CONSOLE
BREATHING: OCCASIONAL LABORED BREATHING, SHORT PERIOD OF HYPERVENTILATION
BREATHING: NORMAL
TOTALSCORE: 1
BODYLANGUAGE: RELAXED
CONSOLABILITY: NO NEED TO CONSOLE
BODYLANGUAGE: RELAXED
CONSOLABILITY: NO NEED TO CONSOLE
FACIALEXPRESSION: SMILING OR INEXPRESSIVE
TOTALSCORE: REPOSITIONED
TOTALSCORE: REPOSITIONED
TOTALSCORE: 0
TOTALSCORE: REPOSITIONED

## 2023-12-17 ASSESSMENT — PAIN - FUNCTIONAL ASSESSMENT
PAIN_FUNCTIONAL_ASSESSMENT: CPOT (CRITICAL CARE PAIN OBSERVATION TOOL)
PAIN_FUNCTIONAL_ASSESSMENT: PAINAD (PAIN ASSESSMENT IN ADVANCED DEMENTIA SCALE)
PAIN_FUNCTIONAL_ASSESSMENT: PAINAD (PAIN ASSESSMENT IN ADVANCED DEMENTIA SCALE)
PAIN_FUNCTIONAL_ASSESSMENT: CPOT (CRITICAL CARE PAIN OBSERVATION TOOL)

## 2023-12-17 ASSESSMENT — PAIN SCALES - GENERAL: PAINLEVEL_OUTOF10: 1

## 2023-12-17 NOTE — PROGRESS NOTES
"Haile Ayers is a 83 y.o. male on day 25 of admission presenting with Subdural hematoma (CMS/HCC).    Subjective   Patient was not seen today.  Only chart was reviewed.    Afternoon update:  ---------------------  Patient transferred to the neuro surgery ICU, after he was found to be more tachypneic in a.m., requiring BiPAP.       Objective   Not done today    Last Recorded Vitals  Blood pressure 177/90, pulse (!) 111, temperature 36.3 °C (97.3 °F), temperature source Temporal, resp. rate (!) 30, height 1.727 m (5' 7.99\"), weight 68 kg (149 lb 14.6 oz), SpO2 99 %.  Intake/Output last 3 Shifts:  I/O last 3 completed shifts:  In: 2965 (43.6 mL/kg) [NG/GT:2815; IV Piggyback:150]  Out: 2900 (42.6 mL/kg) [Urine:2900 (1.2 mL/kg/hr)]  Weight: 68 kg     Relevant Results  Results from last 7 days   Lab Units 12/17/23  1239 12/17/23  1137 12/17/23  0556 12/17/23  0549 12/16/23  2352 12/16/23  1748 12/16/23  1215 12/16/23  0620 12/16/23  0507 12/15/23  1214 12/15/23  1122 12/14/23  1812 12/14/23  1638   POCT GLUCOSE mg/dL  --  299* 284*  --  266* 258* 235*   < >  --    < >  --    < >  --    GLUCOSE mg/dL 304*  --   --  313*  --   --   --   --  204*  --  240*  --  200*    < > = values in this interval not displayed.       Results for orders placed or performed during the hospital encounter of 11/21/23 (from the past 24 hour(s))   Blood Gas Arterial Full Panel   Result Value Ref Range    POCT pH, Arterial 7.44 (H) 7.38 - 7.42 pH    POCT pCO2, Arterial 44 (H) 38 - 42 mm Hg    POCT pO2, Arterial 93 85 - 95 mm Hg    POCT SO2, Arterial 99 94 - 100 %    POCT Oxy Hemoglobin, Arterial 96.8 94.0 - 98.0 %    POCT Hematocrit Calculated, Arterial 38.0 (L) 41.0 - 52.0 %    POCT Sodium, Arterial 147 (H) 136 - 145 mmol/L    POCT Potassium, Arterial 4.1 3.5 - 5.3 mmol/L    POCT Chloride, Arterial 113 (H) 98 - 107 mmol/L    POCT Ionized Calcium, Arterial 1.23 1.10 - 1.33 mmol/L    POCT Glucose, Arterial 246 (H) 74 - 99 mg/dL    POCT Lactate, " Arterial 0.8 0.4 - 2.0 mmol/L    POCT Base Excess, Arterial 5.0 (H) -2.0 - 3.0 mmol/L    POCT HCO3 Calculated, Arterial 29.9 (H) 22.0 - 26.0 mmol/L    POCT Hemoglobin, Arterial 12.7 (L) 13.5 - 17.5 g/dL    POCT Anion Gap, Arterial 8 (L) 10 - 25 mmo/L    Patient Temperature 37.0 degrees Celsius    FiO2 91 %   POCT GLUCOSE   Result Value Ref Range    POCT Glucose 258 (H) 74 - 99 mg/dL   POCT GLUCOSE   Result Value Ref Range    POCT Glucose 266 (H) 74 - 99 mg/dL   CBC   Result Value Ref Range    WBC 9.8 4.4 - 11.3 x10*3/uL    nRBC 0.0 0.0 - 0.0 /100 WBCs    RBC 4.06 (L) 4.50 - 5.90 x10*6/uL    Hemoglobin 12.0 (L) 13.5 - 17.5 g/dL    Hematocrit 37.7 (L) 41.0 - 52.0 %    MCV 93 80 - 100 fL    MCH 29.6 26.0 - 34.0 pg    MCHC 31.8 (L) 32.0 - 36.0 g/dL    RDW 15.0 (H) 11.5 - 14.5 %    Platelets 162 150 - 450 x10*3/uL   Renal Function Panel   Result Value Ref Range    Glucose 313 (H) 74 - 99 mg/dL    Sodium 154 (H) 136 - 145 mmol/L    Potassium 4.5 3.5 - 5.3 mmol/L    Chloride 114 (H) 98 - 107 mmol/L    Bicarbonate 29 21 - 32 mmol/L    Anion Gap 16 10 - 20 mmol/L    Urea Nitrogen 62 (H) 6 - 23 mg/dL    Creatinine 1.88 (H) 0.50 - 1.30 mg/dL    eGFR 35 (L) >60 mL/min/1.73m*2    Calcium 9.1 8.6 - 10.6 mg/dL    Phosphorus 2.1 (L) 2.5 - 4.9 mg/dL    Albumin 3.6 3.4 - 5.0 g/dL   POCT GLUCOSE   Result Value Ref Range    POCT Glucose 284 (H) 74 - 99 mg/dL   POCT GLUCOSE   Result Value Ref Range    POCT Glucose 299 (H) 74 - 99 mg/dL   CBC   Result Value Ref Range    WBC 10.2 4.4 - 11.3 x10*3/uL    nRBC 0.0 0.0 - 0.0 /100 WBCs    RBC 4.10 (L) 4.50 - 5.90 x10*6/uL    Hemoglobin 12.5 (L) 13.5 - 17.5 g/dL    Hematocrit 38.6 (L) 41.0 - 52.0 %    MCV 94 80 - 100 fL    MCH 30.5 26.0 - 34.0 pg    MCHC 32.4 32.0 - 36.0 g/dL    RDW 15.0 (H) 11.5 - 14.5 %    Platelets 164 150 - 450 x10*3/uL   Renal function panel   Result Value Ref Range    Glucose 304 (H) 74 - 99 mg/dL    Sodium 153 (H) 136 - 145 mmol/L    Potassium 4.2 3.5 - 5.3 mmol/L     Chloride 114 (H) 98 - 107 mmol/L    Bicarbonate 29 21 - 32 mmol/L    Anion Gap 14 10 - 20 mmol/L    Urea Nitrogen 67 (H) 6 - 23 mg/dL    Creatinine 1.96 (H) 0.50 - 1.30 mg/dL    eGFR 33 (L) >60 mL/min/1.73m*2    Calcium 9.6 8.6 - 10.6 mg/dL    Phosphorus 1.9 (L) 2.5 - 4.9 mg/dL    Albumin 3.8 3.4 - 5.0 g/dL   Procalcitonin   Result Value Ref Range    Procalcitonin 0.24 (H) <=0.07 ng/mL   Blood Gas Arterial Full Panel   Result Value Ref Range    POCT pH, Arterial 7.48 (H) 7.38 - 7.42 pH    POCT pCO2, Arterial 43 (H) 38 - 42 mm Hg    POCT pO2, Arterial 67 (L) 85 - 95 mm Hg    POCT SO2, Arterial 95 94 - 100 %    POCT Oxy Hemoglobin, Arterial 92.5 (L) 94.0 - 98.0 %    POCT Hematocrit Calculated, Arterial 39.0 (L) 41.0 - 52.0 %    POCT Sodium, Arterial 151 (H) 136 - 145 mmol/L    POCT Potassium, Arterial 4.4 3.5 - 5.3 mmol/L    POCT Chloride, Arterial 116 (H) 98 - 107 mmol/L    POCT Ionized Calcium, Arterial 1.24 1.10 - 1.33 mmol/L    POCT Glucose, Arterial 308 (H) 74 - 99 mg/dL    POCT Lactate, Arterial 1.4 0.4 - 2.0 mmol/L    POCT Base Excess, Arterial 7.6 (H) -2.0 - 3.0 mmol/L    POCT HCO3 Calculated, Arterial 32.0 (H) 22.0 - 26.0 mmol/L    POCT Hemoglobin, Arterial 13.1 (L) 13.5 - 17.5 g/dL    POCT Anion Gap, Arterial 7 (L) 10 - 25 mmo/L    Patient Temperature 37.0 degrees Celsius    FiO2 21 %   Blood Gas Arterial Full Panel   Result Value Ref Range    POCT pH, Arterial 7.49 (H) 7.38 - 7.42 pH    POCT pCO2, Arterial 43 (H) 38 - 42 mm Hg    POCT pO2, Arterial 214 (H) 85 - 95 mm Hg    POCT SO2, Arterial 100 94 - 100 %    POCT Oxy Hemoglobin, Arterial 97.3 94.0 - 98.0 %    POCT Hematocrit Calculated, Arterial 38.0 (L) 41.0 - 52.0 %    POCT Sodium, Arterial 150 (H) 136 - 145 mmol/L    POCT Potassium, Arterial 4.4 3.5 - 5.3 mmol/L    POCT Chloride, Arterial 116 (H) 98 - 107 mmol/L    POCT Ionized Calcium, Arterial 1.23 1.10 - 1.33 mmol/L    POCT Glucose, Arterial 274 (H) 74 - 99 mg/dL    POCT Lactate, Arterial 0.9 0.4 -  2.0 mmol/L    POCT Base Excess, Arterial 8.5 (H) -2.0 - 3.0 mmol/L    POCT HCO3 Calculated, Arterial 32.8 (H) 22.0 - 26.0 mmol/L    POCT Hemoglobin, Arterial 12.8 (L) 13.5 - 17.5 g/dL    POCT Anion Gap, Arterial 6 (L) 10 - 25 mmo/L    Patient Temperature 37.0 degrees Celsius    FiO2 60 %             Assessment/Plan   Principal Problem:    Subdural hematoma (CMS/HCC)  Active Problems:    HTN (hypertension)    Diabetes mellitus, type 2 (CMS/HCC)    CVA (cerebral vascular accident) (CMS/HCC)      Haile Ayers is a 83 y.o. patient with PMHx of HTN , DM type 2, hx of CVA, hx of anemia,  CLL (unknown CLL-IPI score; being followed by Dr. Claros at [facility]) on Ibrutinib who presented to the hospital on 11/22 with AMS 2/2 UTI also found to have L large chronic SDH with midline shift. He underwent L crani for SDH evacuation on 11/22 and re-evacuated on 11/30, extubated on 12/1.  Had worsening neurological exam on 12/4 and transferred to NSU. CTA on 12/5 incidentally showed diffuse sclerotic lesions throughout axial and appendicular skeleton + ascities.  Tested positive for COVID on 12/8.  Patient with continued worsening mental status, endocrinology team consulted for elevated TSH as part of worsening encephalopathy, workup showing worsening lung disease, hypernatremia and LIN, patient was initially planned for PEG on 12/18, currently transferred to the NSU for worsening respiratory status, on CTX     History taking was very limited given the patient's condition, even with his wife at his bedside.    Hypothyroidism:  -------------------  -12/16  TSH 10.4, Ft4: 0.85, Anti-TPO: neg     Patient likely has hypothyroidism versus non-thyroidal illness.  However unlikely that his mild hypothyroidism is the etiology of his worsening mental status.  Given current worsening clinical status, would recommend:  -Continue with levothyroxine 50 mcg 1 tablet orally daily in the morning, while holding tube feeds to hour before and 1  hour after the dose for better absorption.  -Repeat TFTs on 12/22/2022    Type 2 diabetes:  --------------------  Not enough data in chart, no A1c?  At home on metformin 500 mg 1 tablet orally twice daily per med rec upon admission.  Patient hyperglycemic, showing some insulin resistance  -Would hold on rechecking A1c given recent blood transfusion  -Goal blood glucose below 200 while in ICU  -Resume glargine, 8 units subcutaneously daily as of today (if tube feeds will be paused for the rest of the day), 10 units subcutaneously daily as of today (increase tube feeds will be resumed)  -Regular insulin sliding scale #2 [2 units for every 50 above 150] every 6 hours, while NPO or on tube feeds  -Accucheck q6 ,while NPO or on tube feeds  -Hypoglycemia protocol      Moderate hypernatremia:  -------------------------------  Likely hypovolemic, in the setting of dehydration, osmotic diuresis with hyperglycemia  Per chart, there was a concern for heart failure, for which patient received 3 days of diuresis with Bumex  Started more than 7 days ago, worsening over the past few days with LIN, patient taking Keppra.  Free water deficit plus insensible losses about 3 L.  -Would aim for goal of sodium: 145 in the next 24 hours, avoid overcorrection, especially in the setting of recent intracranial procedure and subdural hematoma  -Would recommend holding free water flushes for now  -Start IV fluids: 1/2 NS at a rate of 150 mL/h  -Kindly check urine lytes, urine Osm, serum Osm prior to IV fluid initiation  -Repeat RFP with urine electrolytes 6 hours from the start of IV fluids.    -Endocrinology team will continue to follow.     Plan communicated to primary team earlier during the day via secure chat.  Case discussed with Dr. Sommers who agrees with the management plan.

## 2023-12-17 NOTE — SIGNIFICANT EVENT
12/17/23 1257   Onset Documentation   Rapid Response Initiated By Radar auto page   Location/Room [facility] [place]   Pager Time [phone]   Arrival Time 1257   Event End Time 1305   Level II Called No   Primary Reason for Call Radar auto page     Rapid Response Note    Radar auto-page received for a radar score of 6 with the following vital signs: 36.3, 106, 35, 146/72, 93%.  Vital signs were reviewed with RN who confirmed that tachypnea is unchanged.  RN concerned about patient's respiratory status.  Rapid Response encouraged frequent suctioning and mouth care.  Neurosurgery team updated by primary RN. RN to contact Rapid Response with future concerns or clinical decompensation.

## 2023-12-17 NOTE — PROGRESS NOTES
"Haile Ayers is a 83 y.o. male on day 25 of admission presenting with Subdural hematoma (CMS/HCC).    Subjective   Patient with increased suctioning requirement overnight         Objective     Physical Exam  ECNS  grimacing  Spontaneous x4 (L>R)    Last Recorded Vitals  Blood pressure 133/69, pulse 102, temperature 36.3 °C (97.3 °F), temperature source Temporal, resp. rate (!) 27, height 1.727 m (5' 7.99\"), weight 68 kg (149 lb 14.6 oz), SpO2 96 %.  Intake/Output last 3 Shifts:  I/O last 3 completed shifts:  In: 2965 (43.6 mL/kg) [NG/GT:2815; IV Piggyback:150]  Out: 2900 (42.6 mL/kg) [Urine:2900 (1.2 mL/kg/hr)]  Weight: 68 kg     Relevant Results                             Assessment/Plan   Principal Problem:    Subdural hematoma (CMS/HCC)  Active Problems:    HTN (hypertension)    Diabetes mellitus, type 2 (CMS/HCC)    CVA (cerebral vascular accident) (CMS/HCC)    Pt is a 84 yo M w/ h/o CLL, anemia, DM, p/w AMS, found to have UTI, CTH w/ L large chronic SDH    11/22 s/p L crani for SDH evac, CTH POC  11/24 drain dc'd  11/26 EEG neg, dc'd, CXR R lung patchy opacities  11/28 s/p midline  11/29 lethargic, not FC, CTH incr L SDH w 8mm MLS, s/p1g keppra load, CXR stable opacities, rCTH stable, s/p R side wd, s/p keppra load, rrCTH stable L SDH  11/30 s/p redo R crani for SDH evac, CTH good evac, decr MLS  12/1 s/p extubation  12/3 CTH evolving brood products, stable MLS  12/4 CTH stable, s/p angio small L MMA w/ no distal branches, not embolized  12/5 DVT USx4 RLE chronic changes no DVT, CTH stable, CT PE neg for PE, diffuse sclerotic lesions c/f mets, BL pleural effusions, s/p 60 lasix  12/6 s/p SDD dc'd, EEG neg, dc'd, s/p 1mg Bumex, TTE EF 65-70%  12/13 hgb 6.8, s/p 1U pRBC  12/14 CBC hgb 6.1, s/p 2U pRBC  12/15 sutures dc'd    PLAN:     RITCHIE, q2 suctioning  Goals of care discussion with family today, palliative care consulted appreciate recs  Med onc recs - CT CAP when LIN resolved  BIPAP as needed  GI recs-will re " engage re PEG 12/18   ID recs  Chronic ignacio  Continue CTX (12/23)  Heme recs-cont to hold ibrutinib   SLP recs  PTOT-SNF  SCD, SQH             Michelle Chong MD

## 2023-12-17 NOTE — PROGRESS NOTES
Subjective   83 y.o. male with a history of CLL, anemia, and DM presented 11/22/23 with altered mental status.  CTH showed a large chronic SDH.  Patient was taken for a L craniotomy for SDH evacuation.     Post-operative course as follows:  11/24: Surgical drain discontinued  11/26: EEG remained negative and discontinued  11/28: Midline placed for access  11/29: Patient became more lethargic, CTH showed increased L SDH with 8 mm MLS.  Patient loaded with Keppra.  Repeat CTH stable.    11/30: Patient returned to OR for a right cranitomy for SDH evacuation due to worsening exam.    12/01: Patient extubated  12/03: CTH stable  12/04: rCTH stable.   12/05: Patient began vomiting, exam worsened.  CTH stable.   CT PE showed diffuse sclerotic lesions concerning for mets; bilateral pleural effusions; Negative for PE; U/S x 4 negative for DVT.   Patient diuresed with improvement.  12/06: SDD discontinued.  EEG negative and discontinued.  TTE with EF 65-70%;  12/07: Sputum culture: NG; Blood cultures: NG; UA negative.  12/08: Covid Positive.  Surgical endoscopy consulted for PEG and planning for 12/18/23 due to Covid status.  12/13: Patient Hgb 6.8 and was transfused 1 unit PRBC.    12/14: Hgb 6.1 and patient transfused 2 units PRBC.  Hematology consulted.  Repeat CBC 11.2.  B12, LDH, haptoglobin and reticulocytes all unremarkable.  Hgb 6.1 thought to likely be artifact.    Interval Events: pt hypoxic with labored breathing, upgraded to NSU for BiPAP +/- intubation.     Objective   Vitals 24 hour ranges:  Heart Rate:  []   Resp:  [18-35]   BP: (117-166)/(62-96)   SpO2:  [88 %-98 %]       Intake/Output for last 24 hours:    Intake/Output Summary (Last 24 hours) at 12/17/2023 1405  Last data filed at 12/17/2023 1200  Gross per 24 hour   Intake 660 ml   Output 1320 ml   Net -660 ml      Vent settings: BiPAP 18/5, FiO2 60%       Physical Exam:  NEURO:  ECNS, PERRL 3>2mm, VOR intact, R side flaccid, L side  spontaneous  CV:  RRR on telemetry, NSR  RESP:  Labored breathing  Oxygen: BiPAP 18/5 FiO2 60%  :  catheter in place  GI:  Abdomen NT/ND, soft  SKIN:  Intact    Medications  Scheduled:  amLODIPine, 10 mg, nasogastric tube, Daily  atorvastatin, 20 mg, oral, Daily  bisacodyl, 10 mg, rectal, Daily  cefTRIAXone, 1 g, intravenous, q24h  cholecalciferol, 4,000 Units, oral, Daily  heparin (porcine), 5,000 Units, subcutaneous, q8h  insulin glargine, 5 Units, subcutaneous, Nightly  insulin lispro, 0-10 Units, subcutaneous, q6h  levETIRAcetam, 500 mg, intravenous, q12h  levothyroxine, 50 mcg, oral, Daily  pantoprazole, 40 mg, intravenous, BID  polyethylene glycol, 17 g, oral, Daily  thiamine, 100 mg, nasogastric tube, Daily    PRN:  PRN medications: [Held by provider] acetaminophen, albuterol, calcium gluconate, calcium gluconate, dextrose 10 % in water (D10W), dextrose, glucagon, hydrALAZINE, labetaloL, magnesium sulfate, magnesium sulfate, naloxone, ondansetron **OR** ondansetron, oxygen, oxygen, potassium chloride CR **OR** potassium chloride, potassium chloride CR **OR** potassium chloride, potassium chloride, potassium chloride     Continuous:  oxygen, , Last Rate: 2 L/min (12/12/23 1713)      Lab Results  Results for orders placed or performed during the hospital encounter of 11/21/23 (from the past 24 hour(s))   Blood Gas Arterial Full Panel   Result Value Ref Range    POCT pH, Arterial 7.44 (H) 7.38 - 7.42 pH    POCT pCO2, Arterial 44 (H) 38 - 42 mm Hg    POCT pO2, Arterial 93 85 - 95 mm Hg    POCT SO2, Arterial 99 94 - 100 %    POCT Oxy Hemoglobin, Arterial 96.8 94.0 - 98.0 %    POCT Hematocrit Calculated, Arterial 38.0 (L) 41.0 - 52.0 %    POCT Sodium, Arterial 147 (H) 136 - 145 mmol/L    POCT Potassium, Arterial 4.1 3.5 - 5.3 mmol/L    POCT Chloride, Arterial 113 (H) 98 - 107 mmol/L    POCT Ionized Calcium, Arterial 1.23 1.10 - 1.33 mmol/L    POCT Glucose, Arterial 246 (H) 74 - 99 mg/dL    POCT Lactate, Arterial  0.8 0.4 - 2.0 mmol/L    POCT Base Excess, Arterial 5.0 (H) -2.0 - 3.0 mmol/L    POCT HCO3 Calculated, Arterial 29.9 (H) 22.0 - 26.0 mmol/L    POCT Hemoglobin, Arterial 12.7 (L) 13.5 - 17.5 g/dL    POCT Anion Gap, Arterial 8 (L) 10 - 25 mmo/L    Patient Temperature 37.0 degrees Celsius    FiO2 91 %   POCT GLUCOSE   Result Value Ref Range    POCT Glucose 258 (H) 74 - 99 mg/dL   POCT GLUCOSE   Result Value Ref Range    POCT Glucose 266 (H) 74 - 99 mg/dL   CBC   Result Value Ref Range    WBC 9.8 4.4 - 11.3 x10*3/uL    nRBC 0.0 0.0 - 0.0 /100 WBCs    RBC 4.06 (L) 4.50 - 5.90 x10*6/uL    Hemoglobin 12.0 (L) 13.5 - 17.5 g/dL    Hematocrit 37.7 (L) 41.0 - 52.0 %    MCV 93 80 - 100 fL    MCH 29.6 26.0 - 34.0 pg    MCHC 31.8 (L) 32.0 - 36.0 g/dL    RDW 15.0 (H) 11.5 - 14.5 %    Platelets 162 150 - 450 x10*3/uL   Renal Function Panel   Result Value Ref Range    Glucose 313 (H) 74 - 99 mg/dL    Sodium 154 (H) 136 - 145 mmol/L    Potassium 4.5 3.5 - 5.3 mmol/L    Chloride 114 (H) 98 - 107 mmol/L    Bicarbonate 29 21 - 32 mmol/L    Anion Gap 16 10 - 20 mmol/L    Urea Nitrogen 62 (H) 6 - 23 mg/dL    Creatinine 1.88 (H) 0.50 - 1.30 mg/dL    eGFR 35 (L) >60 mL/min/1.73m*2    Calcium 9.1 8.6 - 10.6 mg/dL    Phosphorus 2.1 (L) 2.5 - 4.9 mg/dL    Albumin 3.6 3.4 - 5.0 g/dL   POCT GLUCOSE   Result Value Ref Range    POCT Glucose 284 (H) 74 - 99 mg/dL   POCT GLUCOSE   Result Value Ref Range    POCT Glucose 299 (H) 74 - 99 mg/dL   CBC   Result Value Ref Range    WBC 10.2 4.4 - 11.3 x10*3/uL    nRBC 0.0 0.0 - 0.0 /100 WBCs    RBC 4.10 (L) 4.50 - 5.90 x10*6/uL    Hemoglobin 12.5 (L) 13.5 - 17.5 g/dL    Hematocrit 38.6 (L) 41.0 - 52.0 %    MCV 94 80 - 100 fL    MCH 30.5 26.0 - 34.0 pg    MCHC 32.4 32.0 - 36.0 g/dL    RDW 15.0 (H) 11.5 - 14.5 %    Platelets 164 150 - 450 x10*3/uL   Renal function panel   Result Value Ref Range    Glucose 304 (H) 74 - 99 mg/dL    Sodium 153 (H) 136 - 145 mmol/L    Potassium 4.2 3.5 - 5.3 mmol/L    Chloride 114  (H) 98 - 107 mmol/L    Bicarbonate 29 21 - 32 mmol/L    Anion Gap 14 10 - 20 mmol/L    Urea Nitrogen 67 (H) 6 - 23 mg/dL    Creatinine 1.96 (H) 0.50 - 1.30 mg/dL    eGFR 33 (L) >60 mL/min/1.73m*2    Calcium 9.6 8.6 - 10.6 mg/dL    Phosphorus 1.9 (L) 2.5 - 4.9 mg/dL    Albumin 3.8 3.4 - 5.0 g/dL   Blood Gas Arterial Full Panel   Result Value Ref Range    POCT pH, Arterial 7.48 (H) 7.38 - 7.42 pH    POCT pCO2, Arterial 43 (H) 38 - 42 mm Hg    POCT pO2, Arterial 67 (L) 85 - 95 mm Hg    POCT SO2, Arterial 95 94 - 100 %    POCT Oxy Hemoglobin, Arterial 92.5 (L) 94.0 - 98.0 %    POCT Hematocrit Calculated, Arterial 39.0 (L) 41.0 - 52.0 %    POCT Sodium, Arterial 151 (H) 136 - 145 mmol/L    POCT Potassium, Arterial 4.4 3.5 - 5.3 mmol/L    POCT Chloride, Arterial 116 (H) 98 - 107 mmol/L    POCT Ionized Calcium, Arterial 1.24 1.10 - 1.33 mmol/L    POCT Glucose, Arterial 308 (H) 74 - 99 mg/dL    POCT Lactate, Arterial 1.4 0.4 - 2.0 mmol/L    POCT Base Excess, Arterial 7.6 (H) -2.0 - 3.0 mmol/L    POCT HCO3 Calculated, Arterial 32.0 (H) 22.0 - 26.0 mmol/L    POCT Hemoglobin, Arterial 13.1 (L) 13.5 - 17.5 g/dL    POCT Anion Gap, Arterial 7 (L) 10 - 25 mmo/L    Patient Temperature 37.0 degrees Celsius    FiO2 21 %         Imaging Results  XR chest 1 view   Final Result   1.  Interval worsening of patchy bilateral basilar predominant   airspace opacities, which are favored to represent worsening   multifocal pneumonia.   2. Similar small bilateral pleural effusions.        I personally reviewed the images/study and I agree with the findings   as stated above by resident physician, Jerome Singh MD. This study   was interpreted at [place], [place].             MACRO:   None.        Signed by: Kendall Calix 12/16/2023 8:44 AM   Dictation workstation:   [id number]      XR abdomen 1 view   Final Result   1.  Enteric tube tip overlying expected location of 2nd portion of   duodenum.   2.  Nonobstructive bowel gas pattern.        Signed by: Kendall Calix 12/10/2023 5:02 PM   Dictation workstation:   [id number]      XR chest 1 view   Final Result   1. Similar mild bibasilar atelectasis suggestion of small pleural   effusions.   2. Questionable interstitial edema.        Signed by: Kendall Calix 12/10/2023 10:08 AM   Dictation workstation:   [id number]      XR chest 1 view   Final Result   1.  Mild interval improvement in pulmonary interstitial edema.   2. Apparent minimal interval increase in trace right pleural effusion   and decrease in small left pleural effusion. This difference may be   due to differences in patient positioning.   3. Medical devices as described above.        I personally reviewed the images/study and I agree with Kenzie Rasheed DO's (radiology resident) findings as stated. This study   was interpreted at [place], [place].        MACRO:   None        Signed by: Brandon Yuan 12/9/2023 3:40 PM   Dictation workstation:   [id number]      US renal complete   Final Result   1. Mild right pelvic fullness with otherwise unremarkable renal   ultrasound.   2. Incidental note of moderate volume abdominal ascites.        I personally reviewed the images/study, and I agree with the findings   as stated above. This study was interpreted at [place], [place].        MACRO:   None        Signed by: Bonifacio Zurita 12/7/2023 4:19 PM   Dictation workstation:   [id number]      XR chest 1 view   Final Result   1.  Slight improvement in right basilar aeration with continued   basilar edema/effusions. Correlate with fluid status.             Signed by: Rob Winter 12/7/2023 8:02 AM   Dictation workstation:   [id number]      Transthoracic Echo (TTE) Complete   Final Result      XR abdomen 1 view   Final Result   1.  Dobbhoff tube overlies the 2nd duodenum.        Signed by: Rob Winter  12/7/2023 8:01 AM   Dictation workstation:   [id number]      XR chest 1 view   Final Result   1. Interval development of probable right middle lobe atelectasis.        2. Mild interval increase in interstitial opacities in the left lung.        3. Probable trace bilateral pleural effusions.        4. Removal of enteric tube.        I personally reviewed the images/study and I agree with Dr. María Butler findings as stated. This study was interpreted at [place], [place]        MACRO:   None        Signed by: Brandon Yuan 12/6/2023 11:56 AM   Dictation workstation:   [id number]      CT head wo IV contrast   Final Result   Redemonstration of postsurgical changes of left frontoparietal   craniotomy and left subdural drain placement with interval reduction   in size of the previously noted mixed attenuation extra-axial fluid   collection overlying the left cerebral convexity when compared to   prior examination from 12/04/2023. Additionally, there has been   interval improvement in associated sulcal effacement, mass effect,   and midline shift.        I personally reviewed the images/study and I agree with the findings   as stated above by resident physician, Dr. Kedar Mitchell.        MACRO:   none        Signed by: Kristan Cobb 12/6/2023 6:04 AM   Dictation workstation:   [id number]      CT angio chest for pulmonary embolism   Final Result   1. No evidence of acute pulmonary embolism.   2. Moderate to large volume bilateral layering pleural effusions with   associated compressive atelectasis and diffuse body wall anasarca   along with smooth interlobular septal thickening is noted.   Additionally, large volume abdominal ascites is incidentally noted.   Correlation with patient's fluid status is recommended. Superimposed   pneumonia or aspiration pneumonitis not excluded.   3. Bilateral pulmonary arteries are dilated which may be sequela of   chronic  pulmonary arterial hypertension.   4. Diffuse sclerotic lesions throughout the axial and appendicular   skeleton of varying sizes. 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 Correlate with any available outside   hospital imaging studies. Correlate with PSA levels. Updated findings   are communicated through epic secure chat message to the referring   provider at 12:35 p.m. on 12/06/2023.   5. Additional chronic findings as above.        I personally reviewed the images/study and I agree with the findings   as stated above by resident physician, Dr. Kedar Mitchell. The   study was interpreted at [facility] in [facility].        Signed by: Gabriella Gee 12/6/2023 12:35 PM   Dictation workstation:   [id number]      XR abdomen 1 view   Final Result   1.  Enteric tube tip is in the right mainstem bronchus. Recommend   repositioning.   2. Nonobstructive bowel gas pattern.        I personally reviewed the images/study and I agree with the findings   as stated by resident physician Dr. Regino Quiñones . This study   was interpreted at [facility],   [place], [place].        MACRO:   Regino Quiñones discussed the significance and urgency of this   critical finding by telephone with  Dr. Kiran on 12/5/2023 at   4:47 pm.  (**-RCF-**) Findings:  See findings.        Signed by: Viral Sommers 12/5/2023 4:50 PM   Dictation workstation:   [id number]      Vascular US lower extremity venous duplex bilateral   Final Result      Vascular US upper extremity venous duplex bilateral   Final Result      XR chest 1 view   Final Result   1. Diffuse and extensive pulmonary infiltrates in particular within   lower lobe segments.   2. Improved pulmonary aeration within lower lobe segments bilaterally   in comparison to the prior study.                  Signed  by: Viral Sommers 12/5/2023 4:50 PM   Dictation workstation:   [id number]      XR abdomen 1 view   Final Result   1. The feeding tube is in satisfactory position                  I personally reviewed the images/study and I agree with the findings   as stated above by resident physician, Jerome Singh MD. This study   was interpreted at [facility],   [place], [place].        MACRO:   None.        Signed by: Viral Sommers 12/5/2023 4:25 PM   Dictation workstation:   [id number]      XR abdomen 1 view   Final Result   1. The feeding tube appears positioned within the region of the   gastric antrum             I personally reviewed the images/study and I agree with the findings   as stated above by resident physician, Dr. Kedar Mitchell. The   study was interpreted at [facility] in [facility].        MACRO:   none        Signed by: Viral Sommers 12/5/2023 11:30 AM   Dictation workstation:   [id number]      IR angiogram   Final Result   Poor distal filling of the left middle meningeal artery. No   embolization was performed.        I was present for and/or performed the critical portions of the   procedure and immediately available throughout the entire procedure.   I personally reviewed the image(s)/study and interpretation. I agree   with the findings as stated. Performed and dictated at [facility].        MACRO:   None        Signed by: Uche Fernandez 12/11/2023 5:32 PM   Dictation workstation:   [id number]      CT head wo IV contrast   Final Result   Similar appearance of the head compared to prior examination with   unchanged appearance of subdural fluid surrounding the left frontal   subdural catheter and unchanged mixed attenuation subdural collection   along the left parietal, occipital, and temporal convexities.        I personally reviewed the images/study and I agree with the findings   as  stated above by resident physician, Jerome Singh MD. This study   was interpreted at [place], [place].             MACRO:   None.        Signed by: Syed Amezquita 12/4/2023 7:10 AM   Dictation workstation:   [id number]      XR chest 1 view   Final Result   1.  Interval increased bilateral patchy airspace opacities likely   represent alveolar edema with multifocal infectious process not   excluded.   2. Small bilateral pleural effusions with adjacent atelectasis.        I personally reviewed the images/study and I agree with the findings   as stated above by resident physician, Jerome Singh MD. This study   was interpreted at [place], [place].             MACRO:   None.        Signed by: Viral Sommers 12/5/2023 7:54 AM   Dictation workstation:   [id number]      CT head wo IV contrast   Final Result   Slightly increased subdural fluid surrounding the subdural catheter   in the left frontal region. This may be due to decreased   pneumocephalus.        Stable component of subdural mixed attenuation hemorrhage within the   left parieto-occipital and temporal region.        Signed by: Kerrie Magana 12/3/2023 9:52 AM   Dictation workstation:   [id number]      CT head wo IV contrast   Final Result   1. Postsurgical changes consistent with interval left frontal   craniotomy for re-evacuation of mixed density subdural hematoma   overlying the left cerebral convexity with interval reduction in size   of the subdural hematoma and improved mass effect, midline shift, and   sulcal effacement as described above.   2. No new transcortical infarction, intraparenchymal hemorrhage, or   herniation.        I personally reviewed the images/study and I agree with the findings   as stated above by resident physician, Dr. Kedar Mitchell. The   study was interpreted at [facility] in [facility].         Signed by: Orion Harris 12/1/2023 7:42 AM   Dictation workstation:   [id number]      CT head wo IV contrast   Final Result   1. Redemonstration of postoperative changes of left-sided craniotomy   with extra-axial mixed attenuation fluid and air collection measuring   approximately 9 mm in maximal thickness, previously measuring 7 mm.   There is superimposed mixed hyperdense and hypodense attenuation left   hemispheric subdural hematoma, grossly unchanged when compared to the   prior exam. There is effacement of the adjacent sulci and partial   effacement of the left lateral ventricle without significant changes   when compared to the prior exam. There is a proximally 8 mm   left-to-right midline shift, similar to prior.        I personally reviewed the images/study and I agree with the findings   as stated by resident physician Dr. Regino Quiñones . This study   was interpreted at [place], [place].        MACRO:   None        Signed by: Sneha Pérez 11/30/2023 7:02 AM   Dictation workstation:   [id number]      CT head wo IV contrast   Final Result   Postoperative changes are again identified compatible with a recent   left sided craniotomy.        There is again evidence of scattered pneumocephalus.        Immediately deep to the craniotomy site, there is again evidence of   an extra-axial likely epidural mixed attenuation hemorrhagic fluid   and air collection measuring approximately 7 mm in thickness on both   the current and prior study.        There is again evidence of a superimposed mixed hyperdense and   hypodense attenuation left hemispheric subdural hematoma again   measuring approximately 21 mm in greatest thickness as seen on the   coronal reconstructed images adjacent to the left parietal lobe.   There is again evidence of mass effect upon the adjacent left   cerebral hemisphere with asymmetric effacement/partial effacement of   sulci of the  left cerebral hemisphere as well as partial effacement   of the left lateral ventricle and 3rd ventricle. There is again   evidence of approximately 8 mm of bowing of the midline structures   from left to right.        The above findings are again noted be superimposed upon moderate   brain parenchymal volume loss.        Patchy nonspecific white matter changes are again noted within   cerebral hemispheres bilaterally which while nonspecific, given the   patient's age, likely represent sequelae of small-vessel ischemic   change.        MACRO:   None.        Signed by: Cassius Richardson 11/29/2023 8:51 AM   Dictation workstation:   [id number]      CT head wo IV contrast   Final Result   Evolving postoperative changes with increased size of the residual   left subdural hematoma and increased volume of high attenuation blood   products, attention on follow-up is recommended. Rightward midline   shift is similar to previous.        I personally reviewed the images/study and I agree with the findings   as stated above by resident physician, Dr. Kedar Mitchell. The   study was interpreted at [facility] in [facility].        Signed by: Fredrick Hemphill 11/29/2023 9:10 AM   Dictation workstation:   [id number]      XR chest 1 view   Final Result   Redemonstration patchy airspace opacities that are noted bilaterally,   favored to represent pulmonary alveolar edema. However, multifocal   pneumonia can have similar appearance. Small left layering effusion   with associated adjacent atelectasis.        I personally reviewed the images/study and I agree with the findings   as stated above by resident physician, Dr. Kedar Mitchell. The   study was interpreted at [facility] in [facility].        Signed by: Tushar Hartman 11/29/2023 8:36 AM   Dictation workstation:   [id number]      FL modified barium swallow study   Final Result   1. Please see  detailed findings of modified barium swallow by speech   language pathologist.   2. No acute radiographic findings.        I personally reviewed the images/study and I agree with Dr. María Butler findings as stated. This study was interpreted at [place], [place]        MACRO:   None        Signed by: Rodney Kimball 12/7/2023 7:47 PM   Dictation workstation:   [id number]      XR chest 1 view   Final Result   1. Redemonstration of findings suggestive of pulmonary edema with   areas of patchy airspace opacities throughout the right lung which   are nonspecific and may represent multifocal pneumonia. Blunting of   the bilateral costophrenic angles may represent small pleural   effusions versus atelectasis.        I personally reviewed the images/study and I agree with the findings   as stated by resident physician Dr. Regino Quiñones . This study   was interpreted at [place], [place].        MACRO:   None        Signed by: Melissa Castro 11/26/2023 8:59 AM   Dictation workstation:   [id number]      XR chest 1 view   Final Result   1. Slight interval worsening of bilateral lung aeration with findings   suggestive of pulmonary edema, however multifocal infection is not   excluded in the appropriate clinical setting. Delete   2. Small bilateral pleural effusions.        I personally reviewed the images/study and I agree with the findings   as stated above by resident physician, Jerome Singh MD. This study   was interpreted at [place], [place].             MACRO:   None.        Signed by: Brandon Yuan 11/25/2023 9:38 AM   Dictation workstation:   [id number]      XR chest 1 view   Final Result   1. Bibasilar atelectasis with or without trace bilateral pleural   effusions, left-greater-than-right. Superimposed   infectious/inflammatory infiltrate is not excluded, for  example in   the setting of aspiration pneumonitis.        I personally reviewed the images/study and I agree with the resident   findings as stated by Yokasta Rubin MD. This study was interpreted at   [facility], [place], [place].        MACRO:   None        Signed by: Tushar Hartman 11/24/2023 8:28 AM   Dictation workstation:   [id number]      XR abdomen 1 view   Final Result   1. No enteric tube is seen within field of view and correlate   clinically.   2. Nonobstructive bowel gas pattern.   3. Visualized basilar lungs demonstrate coarse airspace opacities   bilaterally with small pleural effusions not excluded. Correlate with   concern for underlying infectious process.        Signed by: Kendall Calix 11/23/2023 6:52 AM   Dictation workstation:   [id number]      CT head wo IV contrast   Final Result   1. Postsurgical changes consistent with interval left frontoparietal   craniotomy for left subdural hematoma evacuation and subdural drain   placement with interval reduction in size of the mixed density fluid   collection and improvement in mass effect, midline shift, and sulcal   effacement when compared to prior, same day CT of the head as   described in detail above.   2. No evidence of new hemorrhage or acute cortical infarction.        I personally reviewed the images/study and I agree with the findings   as stated above by resident physician, Dr. Kedar Mitchell. The   study was interpreted at [facility] in [facility].        Signed by: Sneha Pérez 11/22/2023 6:55 AM   Dictation workstation:   [id number]      CT head wo IV contrast   Final Result   1. Large predominantly hypodense left convexity subdural hematoma   with multiple septations measuring up to 2.6 cm at its greatest width   with significant associated mass effect, sulcal effacement, 10-11 mm   of rightward midline shift, and effacement of the left lateral   ventricle.    2. No evidence of additional hemorrhage or acute cortical infarct.        I personally reviewed the images/study and I agree with the findings   as stated above by resident physician, Dr. Kedar Mitchell. The   study was interpreted at [facility] in [facility].        Signed by: Shiv Magallanes 11/22/2023 12:51 AM   Dictation workstation:   [id number]      Bedside Midline Imaging    (Results Pending)   XR chest 1 view    (Results Pending)         Assessment/Plan   Pt is a 84 yo M w/ h/o CLL, anemia, DM, p/w AMS, found to have UTI, CTH w/ L large chronic SDH    11/22 s/p L crani for SDH evac, CTH POC  11/24 drain dc'd  11/26 EEG neg, dc'd, CXR R lung patchy opacities  11/28 s/p midline  11/29 lethargic, not FC, CTH incr L SDH w 8mm MLS, s/p1g keppra load, CXR stable opacities, rCTH stable, s/p R side wd, s/p keppra load, rrCTH stable L SDH  11/30 s/p redo R crani for SDH evac, CTH good evac, decr MLS  12/1 s/p extubation  12/3 CTH evolving brood products, stable MLS  12/4 CTH stable, s/p angio small L MMA w/ no distal branches, not embolized  12/5 DVT USx4 RLE chronic changes no DVT, CTH stable, CT PE neg for PE, diffuse sclerotic lesions c/f mets, BL pleural effusions, s/p 60 lasix  12/6 s/p SDD dc'd, EEG neg, dc'd, s/p 1mg Bumex, TTE EF 65-70%  12/13 hgb 6.8, s/p 1U pRBC  12/14 CBC hgb 6.1, s/p 2U pRBC  12/15 sutures dc'd    NEURO:  #SDH s/p evacuation x2  #MMA embolization on 11/30  Assessment:   Neurologically: ECNS, PERRL 3>2mm, VOR intact, R side flaccid, L side spontaneous  Plan:  NSU  Q1H neuro checks  Appreciate palliative recs  Pain: acetaminophen, oxycodone, hydromorphone PRN  Sedation: None  Keppra 500mg BID  PT/OT/SLP    CARDIOVASCULAR:  # HTN, HLD  #Intravascular hypovolemia  #c/f Heart failure  Assessment:   ST on tele  Echocardiogram: EF 65-70%, impaired diastolic function  Plan:   Continue to monitor on telemetry  Continue  atorvastatin    RESPIRATORY:  #Bilateral pleural effusions   #Worsening hypoxia  #COVID pneumonia 12/8  Assessment:   ABG 12/17: 7.48/43/67 > repeat 7.49/43/214  12/17 on BiPAP 18/5 FiO2 60%  Required diuresis with albumin and bumex in NSU 12/4  S/p intubation and extubation 12/1, required BiPAP 12/6 12/5 CT-PE negative, but did show bilateral mod-large pleural effusions  CXR 12/16: Interval worsening of patchy bilateral basilar predominant airspace opacities, which are favored to represent worsening multifocal pneumonia. Similar small bilateral pleural effusions.   Plan:   Continuous pulse oximetry   O2 PRN to maintain SpO2 > 94%, wean as tolerated  Continuous BiPAP +/- intubation  Repeat CXR    RENAL/:  #BPH  #LIN, potential cardiorenal syndrome vs contrast nephropathy  #Hypernatremia  Assessment:   - FENa 10%  - Serum Na 151 (12/10  Results from last 72 hours   Lab Units 12/17/23  1239 12/17/23  0549   BUN mg/dL 67* 62*   CREATININE mg/dL 1.96* 1.88*   Plan:   Monitor with daily RFP  Chronic ignacio for BPH  1/2 NS at 150ml/hr per endocrinology      FEN/GI:  #UGIB  #Dysphagia  Assessment:   Last BM Date: 12/14/23  Bloody emesis on floor per wife, no recent episodes  Pulled Feeding tube 12/10AM  Plan:   Monitor and replace electrolytes per protocol  Replace DHT  IVF: None  Diet: NPO, tube feeds when off bipap   Bowel Regimen: Miralax PRN  Pantoprazole 40mg IV BID    ENDOCRINE:  #DM  #Hypothyroidism  Assessment:  Results from last 7 days   Lab Units 12/17/23  1239 12/17/23  1137 12/17/23  0556 12/17/23  0549 12/16/23  2352 12/16/23  1748 12/16/23  1215 12/16/23  0620   POCT GLUCOSE mg/dL  --  299* 284*  --  266* 258* 235* 218*   GLUCOSE mg/dL 304*  --   --  313*  --   --   --   --    Plan:    Accuchecks & ISS Q6H  Hypoglycemia protocol  C/w levothyroxine 50mcg  Lantus 8 units daily if TF held, 10 units if TF resumed  Appreciate endocrinology recs    HEMATOLOGY:  #CLL  #c/f prostate cancer vs MM  Assessment:    Sclerotic lesions incidentally noted on CT PE  PSA 11.73  Results from last 7 days   Lab Units 12/17/23  1239 12/17/23  0549   HEMOGLOBIN g/dL 12.5* 12.0*   HEMATOCRIT % 38.6* 37.7*   PLATELETS AUTO x10*3/uL 164 162   CEA: 1.2, : 161.5, CA 19-9: 25.13  Plan:  Continue to monitor with daily CBC and Coag panel  Heme consulted, recs appreciated  Oncology consulted with concern for malignant cause of sclerotic bony lesions, appreciate recs  SPEP, UPEP, and immunoglobulins to eval for possible multiple myeloma  MRI Pelvis and L-spine when able  Hold Ibrutinib, resume when NSGY is OK with that (heme recs 11/28)    INFECTIOUS DISEASE:  #concerns for aspiration PNA  Assessment:   Results from last 7 days   Lab Units 12/17/23  1239 12/17/23  0549   WBC AUTO x10*3/uL 10.2 9.8     No data recorded.   Afebrile   Repeat BCx 12/4 pending  Repeat BCx 11/26 NGTD, MRSA negative, vanco d/c'd   BCx 11/24 with staph hominis  Ucx 11/24  neg  Procal 0.31 (12/6) -> 0.17 (12/8)  Febrile to 38.4 o/n 12/7-12/8  COVID positive 12/8 s/p remdesivir 12/8-13  S/p Zosyn (12/5-12/7) -> Vanc/cefepime (12/7-14); ceftriaxone 12/16-  Plan:  Continue to monitor for s/sx of infection  Pan culture for temperature > 38.4 C  C/w ceftriaxone 1g daily  Fu cx    MUSCULOSKELETAL:  No acute issues    SKIN:  No acute issues  Turns and skin care per NSU protocol    ACCESS:  PIV    PROPHYLAXIS:  DVT/VTE: SCDs, SQ heparin     RESTRAINTS:  I agree with nursing assessment of the patient's need for restraints to protect the patient from injury and facilitate healing. The patient is unable to cooperate with the plan of care and at risk for disrupting critical therapy (i.e., removing medical devices, lines, tubes and/or dressings).  Please see order for specifics. Restraints can be removed when the patient is able to cooperate with plan of care and allow healing to occur, or the medical devices at risk are discontinued by the medical team.     Kervin Altamirano,  MD  Neuroscience Intensive Care

## 2023-12-17 NOTE — CARE PLAN
Problem: Pain  Goal: My pain/discomfort is manageable  Outcome: Progressing     Problem: Safety  Goal: Patient will be injury free during hospitalization  Outcome: Progressing  Goal: I will remain free of falls  Outcome: Progressing     Problem: Daily Care  Goal: Daily care needs are met  Outcome: Progressing     Problem: Psychosocial Needs  Goal: Demonstrates ability to cope with hospitalization/illness  Outcome: Progressing  Goal: Collaborate with me, my family, and caregiver to identify my specific goals  Outcome: Progressing     Problem: Discharge Barriers  Goal: My discharge needs are met  Outcome: Progressing     Problem: Skin  Goal: Decreased wound size/increased tissue granulation at next dressing change  Outcome: Progressing  Goal: Participates in plan/prevention/treatment measures  Outcome: Progressing  Goal: Prevent/manage excess moisture  Outcome: Progressing  Goal: Prevent/minimize sheer/friction injuries  Outcome: Progressing  Goal: Promote/optimize nutrition  Outcome: Progressing  Goal: Promote skin healing  Outcome: Progressing     Problem: Safety - Medical Restraint  Goal: Remains free of injury from restraints (Restraint for Interference with Medical Device)  Outcome: Progressing  Goal: Free from restraint(s) (Restraint for Interference with Medical Device)  Outcome: Progressing     Problem: Nutrition  Goal: Nutrition support goals are met within 48 hrs  Outcome: Progressing  Goal: Lab values WNL  Outcome: Progressing  Goal: Electrolytes WNL  Outcome: Progressing  Goal: Promote healing  Outcome: Progressing  Goal: Maintain stable weight  Outcome: Progressing  Goal: Reduce weight from edema/fluid  Outcome: Progressing     Problem: Pain - Adult  Goal: Verbalizes/displays adequate comfort level or baseline comfort level  Outcome: Progressing     Problem: Safety - Adult  Goal: Free from fall injury  Outcome: Progressing     Problem: Fall/Injury  Goal: Not fall by end of shift  Outcome:  Progressing  Goal: Be free from injury by end of the shift  Outcome: Progressing  Goal: Verbalize understanding of personal risk factors for fall in the hospital  Outcome: Progressing     Problem: Diabetes  Goal: Increase stability of blood glucose readings by end of shift  Outcome: Progressing  Goal: Maintain electrolyte levels within acceptable range throughout shift  Outcome: Progressing  Goal: Maintain glucose levels >70mg/dl to <250mg/dl throughout shift  Outcome: Progressing  Goal: No changes in neurological exam by end of shift  Outcome: Progressing   The patient's goals for the shift include

## 2023-12-18 LAB
ALBUMIN SERPL BCP-MCNC: 3.3 G/DL (ref 3.4–5)
ALBUMIN SERPL BCP-MCNC: 3.4 G/DL (ref 3.4–5)
ALBUMIN SERPL BCP-MCNC: 3.5 G/DL (ref 3.4–5)
ALBUMIN SERPL BCP-MCNC: 3.7 G/DL (ref 3.4–5)
ALBUMIN SERPL BCP-MCNC: 3.8 G/DL (ref 3.4–5)
ANION GAP BLDA CALCULATED.4IONS-SCNC: 5 MMO/L (ref 10–25)
ANION GAP SERPL CALC-SCNC: 13 MMOL/L (ref 10–20)
ANION GAP SERPL CALC-SCNC: 15 MMOL/L (ref 10–20)
ANION GAP SERPL CALC-SCNC: 18 MMOL/L (ref 10–20)
BASE EXCESS BLDA CALC-SCNC: 6 MMOL/L (ref -2–3)
BASOPHILS # BLD AUTO: 0.02 X10*3/UL (ref 0–0.1)
BASOPHILS # BLD AUTO: 0.02 X10*3/UL (ref 0–0.1)
BASOPHILS NFR BLD AUTO: 0.2 %
BASOPHILS NFR BLD AUTO: 0.2 %
BODY TEMPERATURE: ABNORMAL
BUN SERPL-MCNC: 71 MG/DL (ref 6–23)
BUN SERPL-MCNC: 76 MG/DL (ref 6–23)
BUN SERPL-MCNC: 77 MG/DL (ref 6–23)
BUN SERPL-MCNC: 78 MG/DL (ref 6–23)
BUN SERPL-MCNC: 79 MG/DL (ref 6–23)
CA-I BLD-SCNC: 1.17 MMOL/L (ref 1.1–1.33)
CA-I BLD-SCNC: 1.2 MMOL/L (ref 1.1–1.33)
CA-I BLDA-SCNC: 1.24 MMOL/L (ref 1.1–1.33)
CALCIUM SERPL-MCNC: 8.6 MG/DL (ref 8.6–10.6)
CALCIUM SERPL-MCNC: 8.8 MG/DL (ref 8.6–10.6)
CALCIUM SERPL-MCNC: 9 MG/DL (ref 8.6–10.6)
CALCIUM SERPL-MCNC: 9.3 MG/DL (ref 8.6–10.6)
CALCIUM SERPL-MCNC: 9.5 MG/DL (ref 8.6–10.6)
CARDIAC TROPONIN I PNL SERPL HS: 17 NG/L (ref 0–53)
CHLORIDE BLDA-SCNC: 115 MMOL/L (ref 98–107)
CHLORIDE SERPL-SCNC: 112 MMOL/L (ref 98–107)
CHLORIDE SERPL-SCNC: 114 MMOL/L (ref 98–107)
CHLORIDE SERPL-SCNC: 115 MMOL/L (ref 98–107)
CHLORIDE UR-SCNC: 26 MMOL/L
CHLORIDE/CREATININE (MMOL/G) IN URINE: 30 MMOL/G CREAT (ref 23–275)
CO2 SERPL-SCNC: 26 MMOL/L (ref 21–32)
CO2 SERPL-SCNC: 28 MMOL/L (ref 21–32)
CO2 SERPL-SCNC: 30 MMOL/L (ref 21–32)
CREAT SERPL-MCNC: 2.04 MG/DL (ref 0.5–1.3)
CREAT SERPL-MCNC: 2.45 MG/DL (ref 0.5–1.3)
CREAT SERPL-MCNC: 2.52 MG/DL (ref 0.5–1.3)
CREAT SERPL-MCNC: 2.56 MG/DL (ref 0.5–1.3)
CREAT SERPL-MCNC: 2.6 MG/DL (ref 0.5–1.3)
CREAT UR-MCNC: 85.6 MG/DL (ref 20–370)
EOSINOPHIL # BLD AUTO: 0.17 X10*3/UL (ref 0–0.4)
EOSINOPHIL # BLD AUTO: 0.26 X10*3/UL (ref 0–0.4)
EOSINOPHIL NFR BLD AUTO: 1.8 %
EOSINOPHIL NFR BLD AUTO: 3 %
ERYTHROCYTE [DISTWIDTH] IN BLOOD BY AUTOMATED COUNT: 14.6 % (ref 11.5–14.5)
ERYTHROCYTE [DISTWIDTH] IN BLOOD BY AUTOMATED COUNT: 14.8 % (ref 11.5–14.5)
ERYTHROCYTE [DISTWIDTH] IN BLOOD BY AUTOMATED COUNT: 15.2 % (ref 11.5–14.5)
GFR SERPL CREATININE-BSD FRML MDRD: 24 ML/MIN/1.73M*2
GFR SERPL CREATININE-BSD FRML MDRD: 25 ML/MIN/1.73M*2
GFR SERPL CREATININE-BSD FRML MDRD: 32 ML/MIN/1.73M*2
GLUCOSE BLD MANUAL STRIP-MCNC: 132 MG/DL (ref 74–99)
GLUCOSE BLD MANUAL STRIP-MCNC: 227 MG/DL (ref 74–99)
GLUCOSE BLD MANUAL STRIP-MCNC: 236 MG/DL (ref 74–99)
GLUCOSE BLD MANUAL STRIP-MCNC: 243 MG/DL (ref 74–99)
GLUCOSE BLDA-MCNC: 135 MG/DL (ref 74–99)
GLUCOSE SERPL-MCNC: 145 MG/DL (ref 74–99)
GLUCOSE SERPL-MCNC: 221 MG/DL (ref 74–99)
GLUCOSE SERPL-MCNC: 243 MG/DL (ref 74–99)
GLUCOSE SERPL-MCNC: 250 MG/DL (ref 74–99)
GLUCOSE SERPL-MCNC: 257 MG/DL (ref 74–99)
HCO3 BLDA-SCNC: 31.2 MMOL/L (ref 22–26)
HCT VFR BLD AUTO: 32.4 % (ref 41–52)
HCT VFR BLD AUTO: 35 % (ref 41–52)
HCT VFR BLD AUTO: 37 % (ref 41–52)
HCT VFR BLD EST: 46 % (ref 41–52)
HGB BLD-MCNC: 10.6 G/DL (ref 13.5–17.5)
HGB BLD-MCNC: 10.9 G/DL (ref 13.5–17.5)
HGB BLD-MCNC: 11.8 G/DL (ref 13.5–17.5)
HGB BLDA-MCNC: 15.4 G/DL (ref 13.5–17.5)
IMM GRANULOCYTES # BLD AUTO: 0.01 X10*3/UL (ref 0–0.5)
IMM GRANULOCYTES # BLD AUTO: 0.03 X10*3/UL (ref 0–0.5)
IMM GRANULOCYTES NFR BLD AUTO: 0.1 % (ref 0–0.9)
IMM GRANULOCYTES NFR BLD AUTO: 0.3 % (ref 0–0.9)
LACTATE BLDA-SCNC: 0.8 MMOL/L (ref 0.4–2)
LYMPHOCYTES # BLD AUTO: 1.77 X10*3/UL (ref 0.8–3)
LYMPHOCYTES # BLD AUTO: 2.07 X10*3/UL (ref 0.8–3)
LYMPHOCYTES NFR BLD AUTO: 20.3 %
LYMPHOCYTES NFR BLD AUTO: 21.8 %
MAGNESIUM SERPL-MCNC: 2.32 MG/DL (ref 1.6–2.4)
MAGNESIUM SERPL-MCNC: 2.44 MG/DL (ref 1.6–2.4)
MCH RBC QN AUTO: 29.4 PG (ref 26–34)
MCH RBC QN AUTO: 29.5 PG (ref 26–34)
MCH RBC QN AUTO: 30.4 PG (ref 26–34)
MCHC RBC AUTO-ENTMCNC: 31.1 G/DL (ref 32–36)
MCHC RBC AUTO-ENTMCNC: 31.9 G/DL (ref 32–36)
MCHC RBC AUTO-ENTMCNC: 32.7 G/DL (ref 32–36)
MCV RBC AUTO: 90 FL (ref 80–100)
MCV RBC AUTO: 95 FL (ref 80–100)
MCV RBC AUTO: 95 FL (ref 80–100)
MONOCYTES # BLD AUTO: 1.11 X10*3/UL (ref 0.05–0.8)
MONOCYTES # BLD AUTO: 1.24 X10*3/UL (ref 0.05–0.8)
MONOCYTES NFR BLD AUTO: 12.7 %
MONOCYTES NFR BLD AUTO: 13.1 %
NEUTROPHILS # BLD AUTO: 5.54 X10*3/UL (ref 1.6–5.5)
NEUTROPHILS # BLD AUTO: 5.96 X10*3/UL (ref 1.6–5.5)
NEUTROPHILS NFR BLD AUTO: 62.8 %
NEUTROPHILS NFR BLD AUTO: 63.7 %
NRBC BLD-RTO: 0 /100 WBCS (ref 0–0)
OSMOLALITY SERPL: 341 MOSM/KG (ref 280–300)
OSMOLALITY SERPL: 347 MOSM/KG (ref 280–300)
OSMOLALITY SERPL: 351 MOSM/KG (ref 280–300)
OSMOLALITY SERPL: 351 MOSM/KG (ref 280–300)
OSMOLALITY UR: 418 MOSM/KG (ref 200–1200)
OXYHGB MFR BLDA: 97.6 % (ref 94–98)
PCO2 BLDA: 46 MM HG (ref 38–42)
PH BLDA: 7.44 PH (ref 7.38–7.42)
PHOSPHATE SERPL-MCNC: 2.6 MG/DL (ref 2.5–4.9)
PHOSPHATE SERPL-MCNC: 3.2 MG/DL (ref 2.5–4.9)
PHOSPHATE SERPL-MCNC: 3.4 MG/DL (ref 2.5–4.9)
PHOSPHATE SERPL-MCNC: 3.4 MG/DL (ref 2.5–4.9)
PHOSPHATE SERPL-MCNC: 3.6 MG/DL (ref 2.5–4.9)
PLATELET # BLD AUTO: 150 X10*3/UL (ref 150–450)
PLATELET # BLD AUTO: 150 X10*3/UL (ref 150–450)
PLATELET # BLD AUTO: 170 X10*3/UL (ref 150–450)
PO2 BLDA: 179 MM HG (ref 85–95)
POTASSIUM BLDA-SCNC: 4.3 MMOL/L (ref 3.5–5.3)
POTASSIUM SERPL-SCNC: 4.2 MMOL/L (ref 3.5–5.3)
POTASSIUM SERPL-SCNC: 4.2 MMOL/L (ref 3.5–5.3)
POTASSIUM SERPL-SCNC: 4.3 MMOL/L (ref 3.5–5.3)
POTASSIUM SERPL-SCNC: 4.5 MMOL/L (ref 3.5–5.3)
POTASSIUM SERPL-SCNC: 4.5 MMOL/L (ref 3.5–5.3)
POTASSIUM UR-SCNC: 56 MMOL/L
POTASSIUM/CREAT UR-RTO: 65 MMOL/G CREAT
RBC # BLD AUTO: 3.6 X10*6/UL (ref 4.5–5.9)
RBC # BLD AUTO: 3.69 X10*6/UL (ref 4.5–5.9)
RBC # BLD AUTO: 3.88 X10*6/UL (ref 4.5–5.9)
SAO2 % BLDA: 100 % (ref 94–100)
SODIUM BLDA-SCNC: 147 MMOL/L (ref 136–145)
SODIUM SERPL-SCNC: 149 MMOL/L (ref 136–145)
SODIUM SERPL-SCNC: 153 MMOL/L (ref 136–145)
SODIUM SERPL-SCNC: 153 MMOL/L (ref 136–145)
SODIUM SERPL-SCNC: 154 MMOL/L (ref 136–145)
SODIUM SERPL-SCNC: 154 MMOL/L (ref 136–145)
SODIUM UR-SCNC: 29 MMOL/L
SODIUM/CREAT UR-RTO: 34 MMOL/G CREAT
WBC # BLD AUTO: 8.1 X10*3/UL (ref 4.4–11.3)
WBC # BLD AUTO: 8.7 X10*3/UL (ref 4.4–11.3)
WBC # BLD AUTO: 9.5 X10*3/UL (ref 4.4–11.3)

## 2023-12-18 PROCEDURE — 83930 ASSAY OF BLOOD OSMOLALITY: CPT

## 2023-12-18 PROCEDURE — 99497 ADVNCD CARE PLAN 30 MIN: CPT

## 2023-12-18 PROCEDURE — C9113 INJ PANTOPRAZOLE SODIUM, VIA: HCPCS | Performed by: STUDENT IN AN ORGANIZED HEALTH CARE EDUCATION/TRAINING PROGRAM

## 2023-12-18 PROCEDURE — 2500000004 HC RX 250 GENERAL PHARMACY W/ HCPCS (ALT 636 FOR OP/ED): Performed by: STUDENT IN AN ORGANIZED HEALTH CARE EDUCATION/TRAINING PROGRAM

## 2023-12-18 PROCEDURE — 82436 ASSAY OF URINE CHLORIDE: CPT

## 2023-12-18 PROCEDURE — 84484 ASSAY OF TROPONIN QUANT: CPT

## 2023-12-18 PROCEDURE — 37799 UNLISTED PX VASCULAR SURGERY: CPT

## 2023-12-18 PROCEDURE — 84132 ASSAY OF SERUM POTASSIUM: CPT

## 2023-12-18 PROCEDURE — 82947 ASSAY GLUCOSE BLOOD QUANT: CPT

## 2023-12-18 PROCEDURE — 2500000004 HC RX 250 GENERAL PHARMACY W/ HCPCS (ALT 636 FOR OP/ED)

## 2023-12-18 PROCEDURE — 83735 ASSAY OF MAGNESIUM: CPT

## 2023-12-18 PROCEDURE — 85025 COMPLETE CBC W/AUTO DIFF WBC: CPT

## 2023-12-18 PROCEDURE — 80069 RENAL FUNCTION PANEL: CPT

## 2023-12-18 PROCEDURE — 85027 COMPLETE CBC AUTOMATED: CPT

## 2023-12-18 PROCEDURE — 82330 ASSAY OF CALCIUM: CPT

## 2023-12-18 PROCEDURE — 99233 SBSQ HOSP IP/OBS HIGH 50: CPT | Performed by: STUDENT IN AN ORGANIZED HEALTH CARE EDUCATION/TRAINING PROGRAM

## 2023-12-18 PROCEDURE — 96372 THER/PROPH/DIAG INJ SC/IM: CPT | Performed by: STUDENT IN AN ORGANIZED HEALTH CARE EDUCATION/TRAINING PROGRAM

## 2023-12-18 PROCEDURE — 2020000001 HC ICU ROOM DAILY

## 2023-12-18 PROCEDURE — 99223 1ST HOSP IP/OBS HIGH 75: CPT

## 2023-12-18 PROCEDURE — 99291 CRITICAL CARE FIRST HOUR: CPT | Performed by: STUDENT IN AN ORGANIZED HEALTH CARE EDUCATION/TRAINING PROGRAM

## 2023-12-18 PROCEDURE — 83935 ASSAY OF URINE OSMOLALITY: CPT

## 2023-12-18 PROCEDURE — 94660 CPAP INITIATION&MGMT: CPT

## 2023-12-18 PROCEDURE — 2500000001 HC RX 250 WO HCPCS SELF ADMINISTERED DRUGS (ALT 637 FOR MEDICARE OP): Performed by: STUDENT IN AN ORGANIZED HEALTH CARE EDUCATION/TRAINING PROGRAM

## 2023-12-18 PROCEDURE — 99498 ADVNCD CARE PLAN ADDL 30 MIN: CPT

## 2023-12-18 PROCEDURE — 70450 CT HEAD/BRAIN W/O DYE: CPT | Performed by: RADIOLOGY

## 2023-12-18 PROCEDURE — 2500000002 HC RX 250 W HCPCS SELF ADMINISTERED DRUGS (ALT 637 FOR MEDICARE OP, ALT 636 FOR OP/ED)

## 2023-12-18 RX ORDER — INSULIN GLARGINE 100 [IU]/ML
14 INJECTION, SOLUTION SUBCUTANEOUS NIGHTLY
Status: DISCONTINUED | OUTPATIENT
Start: 2023-12-18 | End: 2023-12-19

## 2023-12-18 RX ORDER — CEFTRIAXONE 1 G/50ML
1 INJECTION, SOLUTION INTRAVENOUS EVERY 24 HOURS
Status: DISPENSED | OUTPATIENT
Start: 2023-12-19 | End: 2023-12-24

## 2023-12-18 RX ORDER — INSULIN GLARGINE 100 [IU]/ML
10 INJECTION, SOLUTION SUBCUTANEOUS NIGHTLY
Status: DISCONTINUED | OUTPATIENT
Start: 2023-12-18 | End: 2023-12-18

## 2023-12-18 RX ORDER — SODIUM CHLORIDE, SODIUM LACTATE, POTASSIUM CHLORIDE, CALCIUM CHLORIDE 600; 310; 30; 20 MG/100ML; MG/100ML; MG/100ML; MG/100ML
75 INJECTION, SOLUTION INTRAVENOUS CONTINUOUS
Status: DISCONTINUED | OUTPATIENT
Start: 2023-12-18 | End: 2023-12-18

## 2023-12-18 RX ADMIN — SODIUM CHLORIDE 150 ML/HR: 4.5 INJECTION, SOLUTION INTRAVENOUS at 04:53

## 2023-12-18 RX ADMIN — SODIUM CHLORIDE, POTASSIUM CHLORIDE, SODIUM LACTATE AND CALCIUM CHLORIDE 75 ML/HR: 600; 310; 30; 20 INJECTION, SOLUTION INTRAVENOUS at 13:33

## 2023-12-18 RX ADMIN — PANTOPRAZOLE SODIUM 40 MG: 40 INJECTION, POWDER, FOR SOLUTION INTRAVENOUS at 08:22

## 2023-12-18 RX ADMIN — SODIUM CHLORIDE 150 ML/HR: 4.5 INJECTION, SOLUTION INTRAVENOUS at 18:06

## 2023-12-18 RX ADMIN — CEFTRIAXONE SODIUM 1 G: 1 INJECTION, SOLUTION INTRAVENOUS at 06:06

## 2023-12-18 RX ADMIN — LEVETIRACETAM 500 MG: 5 INJECTION INTRAVENOUS at 18:06

## 2023-12-18 RX ADMIN — INSULIN GLARGINE 14 UNITS: 100 INJECTION, SOLUTION SUBCUTANEOUS at 20:01

## 2023-12-18 RX ADMIN — ATORVASTATIN CALCIUM 20 MG: 20 TABLET, FILM COATED ORAL at 08:22

## 2023-12-18 RX ADMIN — POLYETHYLENE GLYCOL 3350 17 G: 17 POWDER, FOR SOLUTION ORAL at 08:22

## 2023-12-18 RX ADMIN — AMLODIPINE BESYLATE 10 MG: 10 TABLET ORAL at 08:22

## 2023-12-18 RX ADMIN — HEPARIN SODIUM 5000 UNITS: 5000 INJECTION INTRAVENOUS; SUBCUTANEOUS at 18:06

## 2023-12-18 RX ADMIN — LEVETIRACETAM 500 MG: 5 INJECTION INTRAVENOUS at 04:53

## 2023-12-18 RX ADMIN — BISACODYL 10 MG: 10 SUPPOSITORY RECTAL at 08:22

## 2023-12-18 RX ADMIN — INSULIN HUMAN 4 UNITS: 100 INJECTION, SOLUTION PARENTERAL at 10:23

## 2023-12-18 RX ADMIN — HEPARIN SODIUM 5000 UNITS: 5000 INJECTION INTRAVENOUS; SUBCUTANEOUS at 10:22

## 2023-12-18 RX ADMIN — THIAMINE HCL TAB 100 MG 100 MG: 100 TAB at 08:22

## 2023-12-18 RX ADMIN — INSULIN HUMAN 4 UNITS: 100 INJECTION, SOLUTION PARENTERAL at 13:34

## 2023-12-18 RX ADMIN — HEPARIN SODIUM 5000 UNITS: 5000 INJECTION INTRAVENOUS; SUBCUTANEOUS at 02:51

## 2023-12-18 RX ADMIN — PANTOPRAZOLE SODIUM 40 MG: 40 INJECTION, POWDER, FOR SOLUTION INTRAVENOUS at 20:01

## 2023-12-18 RX ADMIN — Medication 4000 UNITS: at 08:22

## 2023-12-18 ASSESSMENT — PAIN SCALES - PAIN ASSESSMENT IN ADVANCED DEMENTIA (PAINAD)
FACIALEXPRESSION: SMILING OR INEXPRESSIVE
BREATHING: OCCASIONAL LABORED BREATHING, SHORT PERIOD OF HYPERVENTILATION
BODYLANGUAGE: TENSE, DISTRESSED PACING, FIDGETING
TOTALSCORE: 3
CONSOLABILITY: DISTRACTED OR REASSURED BY VOICE/TOUCH

## 2023-12-18 ASSESSMENT — PAIN SCALES - GENERAL
PAINLEVEL_OUTOF10: 3
PAINLEVEL_OUTOF10: 3

## 2023-12-18 ASSESSMENT — PAIN - FUNCTIONAL ASSESSMENT
PAIN_FUNCTIONAL_ASSESSMENT: CPOT (CRITICAL CARE PAIN OBSERVATION TOOL)

## 2023-12-18 NOTE — PROGRESS NOTES
"Haile Ayers is a 83 y.o. male on day 26 of admission presenting with Subdural hematoma (CMS/HCC).    Subjective   Patient was seen in the ICU.  Exam was not performed due to his COVID status.  Appears volume depleted.  Objective   Exam was not performed due to his COVID status.  Appears volume depleted.    Last Recorded Vitals  Blood pressure 159/81, pulse 98, temperature 36.1 °C (97 °F), resp. rate 24, height 1.727 m (5' 7.99\"), weight 63.5 kg (139 lb 15.9 oz), SpO2 99 %.  Intake/Output last 3 Shifts:  I/O last 3 completed shifts:  In: 2262.5 (35.6 mL/kg) [I.V.:1952.5 (30.7 mL/kg); NG/GT:110; IV Piggyback:200]  Out: 1130 (17.8 mL/kg) [Urine:1130 (0.5 mL/kg/hr)]  Weight: 63.5 kg     Relevant Results  Results from last 7 days   Lab Units 12/18/23  0834 12/18/23  0607 12/18/23  0538 12/18/23  0017 12/17/23  2324 12/17/23  1239 12/17/23  1137 12/17/23  0556 12/17/23  0549 12/16/23  0620 12/16/23  0507   POCT GLUCOSE mg/dL 243*  --  227*  --  243*  --  299* 284*  --    < >  --    GLUCOSE mg/dL  --  257*  --  250*  --  304*  --   --  313*  --  204*    < > = values in this interval not displayed.     Results from last 7 days   Lab Units 12/18/23  1214   SODIUM mmol/L 153*   POTASSIUM mmol/L 4.2   CHLORIDE mmol/L 114*   CO2 mmol/L 28   BUN mg/dL 79*   CREATININE mg/dL 2.56*   CALCIUM mg/dL 8.8   GLUCOSE mg/dL 221*       Assessment/Plan   Principal Problem:    Subdural hematoma (CMS/HCC)  Active Problems:    HTN (hypertension)    Diabetes mellitus, type 2 (CMS/HCC)    CVA (cerebral vascular accident) (CMS/HCC)      Haile Ayers is a 83 y.o. patient with PMHx of HTN , DM type 2, hx of CVA, hx of anemia,  CLL (unknown CLL-IPI score; being followed by Dr. Claros at [facility]) on Ibrutinib who presented to the hospital on 11/22 with AMS 2/2 UTI also found to have L large chronic SDH with midline shift. He underwent L crani for SDH evacuation on 11/22 and re-evacuated on 11/30, extubated on 12/1.  Had worsening " neurological exam on 12/4 and transferred to NSU. CTA on 12/5 incidentally showed diffuse sclerotic lesions throughout axial and appendicular skeleton + ascities.  Tested positive for COVID on 12/8.  Patient with continued worsening mental status, endocrinology team consulted for elevated TSH as part of worsening encephalopathy, workup showing worsening lung disease, hypernatremia and LIN, patient was initially planned for PEG on 12/18, currently transferred to the NSU for worsening respiratory status, on CTX     History taking was very limited given the patient's condition, even with his wife at his bedside.    Hypothyroidism:  -------------------  -12/16  TSH 10.4, Ft4: 0.85, Anti-TPO: neg     Patient likely has hypothyroidism vs non-thyroidal illness.  However unlikely that his mild hypothyroidism is the etiology of his worsening mental status.  Given current worsening clinical status, would recommend:    -Continue with levothyroxine 25 mcg daily while holding tube feeds to 1 hour before and 1 hour after  -Repeat TFTs on 12/22/2022    Type 2 diabetes:  --------------------  Not enough data in chart, no A1c?  At home on metformin 500 mg 1 tablet orally twice daily per med rec upon admission.  Patient hyperglycemic, showing some insulin resistance  -Would hold on rechecking A1c given recent blood transfusion  -Goal blood glucose below 200 while in ICU  -Increase glargine to 14 units every 24 hours  -Increase sliding scale regular insulin to #3 every 6 hours  [3 units for every 50 above 150] every 6 hours, while NPO or on tube feeds  -Accucheck q6 ,while NPO or on tube feeds  -Hypoglycemia protocol      Moderate hypernatremia:  -------------------------------  -Likely isovolume depletion especially given his rising BUN and creatinine.  With that being said patient is at risk of both nephrogenic and central diabetes insipidus given his LIN and his recent subdural hematoma status postevacuation x 2.    -His urine is  moderately concentrated indicating possible partial DI  -Free water deficit plus insensible losses about 3 L.  -Would aim for goal of sodium: 145 in the next 24 hours, avoid overcorrection, especially in the setting of recent intracranial procedure and subdural hematoma  --Please restart 1/2 NS at a rate of 150 mL/h  --Agree with  every 4 hours  --Recommend obtaining RFP, serum osmolality and urine osmolality every 8 hours       Plan communicated to primary team     Case discussed with Dr. Sommers who agrees with the management plan.

## 2023-12-18 NOTE — PROGRESS NOTES
Communication Note    Patient Name: Haile Ayers  MRN: [id number]  Today's Date: 12/18/2023     Discipline: Occupational Therapy      Missed Visit: Yes  Missed Visit Reason:  (Pt with increased O2 demands, GOC with family today per MD. Will defer OT at this time and reattempt as appropriate/able.)      12/18/23 at 8:38 AM   Nkechi Patel OT   Rehab Office: [phone]

## 2023-12-18 NOTE — PROGRESS NOTES
"Haile Ayers is a 83 y.o. male on day 26 of admission presenting with Subdural hematoma (CMS/HCC).    Subjective   Patient doing better on bipap overnight.    Objective     Physical Exam  Eyes opened to noxious stim  grimacing  Spontaneous x4 (L>R)    Last Recorded Vitals  Blood pressure 159/81, pulse 98, temperature 36.1 °C (97 °F), resp. rate 24, height 1.727 m (5' 7.99\"), weight 63.5 kg (139 lb 15.9 oz), SpO2 99 %.  Intake/Output last 3 Shifts:  I/O last 3 completed shifts:  In: 2262.5 (35.6 mL/kg) [I.V.:1952.5 (30.7 mL/kg); NG/GT:110; IV Piggyback:200]  Out: 1130 (17.8 mL/kg) [Urine:1130 (0.5 mL/kg/hr)]  Weight: 63.5 kg     Relevant Results  Assessment/Plan   Principal Problem:    Subdural hematoma (CMS/HCC)  Active Problems:    HTN (hypertension)    Diabetes mellitus, type 2 (CMS/HCC)    CVA (cerebral vascular accident) (CMS/HCC)    Pt is a 84 yo M w/ h/o CLL, anemia, DM, p/w AMS, found to have UTI, CTH w/ L large chronic SDH    11/22 s/p L crani for SDH evac, CTH POC  11/24 drain dc'd  11/26 EEG neg, dc'd, CXR R lung patchy opacities  11/28 s/p midline  11/29 lethargic, not FC, CTH incr L SDH w 8mm MLS, s/p1g keppra load, CXR stable opacities, rCTH stable, s/p R side wd, s/p keppra load, rrCTH stable L SDH  11/30 s/p redo R crani for SDH evac, CTH good evac, decr MLS  12/1 s/p extubation  12/3 CTH evolving brood products, stable MLS  12/4 CTH stable, s/p angio small L MMA w/ no distal branches, not embolized  12/5 DVT USx4 RLE chronic changes no DVT, CTH stable, CT PE neg for PE, diffuse sclerotic lesions c/f mets, BL pleural effusions, s/p 60 lasix  12/6 s/p SDD dc'd, EEG neg, dc'd, s/p 1mg Bumex, TTE EF 65-70%  12/13 hgb 6.8, s/p 1U pRBC  12/14 CBC hgb 6.1, s/p 2U pRBC  12/15 sutures dc'd  12/17 patient transferred to NSU due to respiratory failure, stabilized on biPAP, CTH improved L SDH.     PLAN:     NSU  Continue biPAP as needed  Goals of care discussion with family   palliative care consulted " appreciate recs  Med onc recs - CT CAP when LIN resolved  BIPAP as needed  GI recs-will re engage re PEG after respiratory status stabilized and GOC done  ID recs  Chronic ignacio  Continue CTX (12/23)  Heme recs-cont to hold ibrutinib   SLP recs  PTOT-SNF  SCD, SQH             Tracy Phillips MD

## 2023-12-18 NOTE — CONSULTS
SUPPORTIVE AND PALLIATIVE ONCOLOGY CONSULT    Inpatient consult to [facility] Adult Supportive Oncology  Consult performed by: Alejandra Mckeon, APRN-CNP  Consult ordered by: Og Tran MD      SERVICE DATE: 12/18/2023      PALLIATIVE MEDICINE OUTPATIENT PROVIDER:  None  CURRENT ATTENDING PROVIDER: Og Tran MD     Medical Oncologist: No care team member to display   Radiation Oncologist: No care team member to display  Primary Physician: No primary care provider on file.  None    REASON FOR CONSULT/CHIEF CONSULT COMPLAINT: goals of care discussion    Subjective   HISTORY OF PRESENT ILLNESS: Haile Ayers is a 83 y.o. patient with PMHx of HTN , DM type 2, hx of CVA, hx of anemia,  CLL (unknown CLL-IPI score; being followed by Dr. 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 He underwent L crani for SDH evacuation on 11/22 and re-evacuated on 11/30, extubated on 12/1.  Had worsening neurological exam on 12/4 and transferred to NSU. CTA on 12/5 incidentally showed diffuse sclerotic lesions throughout axial and appendicular skeleton + ascities.  Tested positive for COVID on 12/8.  Patient with continued worsening mental status,workup showing worsening lung disease, hypernatremia and LIN, patient was initially planned for PEG on 12/18, currently transferred to the NSU for worsening respiratory status, on CTX.. Admitted 11/21/2023 for further evaluation and management of AMS. Course complicated by + COVID, continued worsening of AMS and lung disease.. Supportive and Palliative Oncology is consulted for goals of care discussion.      12/5- CTA angio chest showed diffuse sclerotic lesions throughout the axial and appendicular skeleton of varying sizes. I t also demonstrated moderate size ascites as well as pleural effusion. PSA level is 11. Admitted 11/21/2023 for further evaluation and management of AMS  and SDH. Course complicated by worsening neurologic and respiratory status as well as + for COVID on 12/8/23. Supportive and Palliative Oncology is consulted for Introduction to Supportive and Palliative Oncology Services and goals of care discussion.     Patient had a hospitalization last August for GI bleed. He was found on imaging to have moderate to large ascites as well as bone lesions. Bone scan was obtained which demonstrated heterogenous radiotracer uptake in the right posterior ribs with demonstration abnormal focus of mild increase in radiotracer uptake at right posteroinferior rib/cage/ right posterior 10th rib likely corresponding to sclerotic lesion in the CT, compatible with metastatic osseous disease. Other sclerotic lesions on CT did not show increased tracer uptake on the bone scan, which might be related to the small size or correspond to sclerotic lesions from prior lymphomatous disease. PSA was measured at that time and was 6.35.     Pt receives his oncology care at [facility] with Dr. Claros; on Ibrutinib for CLL.  Oncology consulted this admission     Pain Assessment:  Pt not responsive, appears relaxed and comfortable.     Opioid Use  Past 24 h prn opioid use: none  Total 24h OME use:  0     OARRS/PDMP reviewed no aberrant behavior noted.    Opioid Use  Past 24 h prn opioid use: none  Total 24h OME use:  0    OARRS/PDMP reviewed no aberrant behavior noted.    Symptom Assessment:  Unable to obtain secondary to unresponsiveness    Information obtained from: chart review, interview of family, and discussion with primary team  ______________________________________________________________________     Oncology History    No history exists.       Past Medical History:   Diagnosis Date    Anemia     Chronic indwelling Mckee catheter     CLL (chronic lymphocytic leukemia) (CMS/HCC)     Dementia (CMS/HCC)     DM II (diabetes mellitus, type II), controlled (CMS/HCC)     HTN (hypertension)     Hyperlipidemia   "   Leukemia (CMS/HCC)      History reviewed. No pertinent surgical history.  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 He has never used smokeless tobacco.    Latter day and Importance of Latter day:  Taoist  Role of lara in daily life/Role of spirituality in health care decision-making: wife states tht their lara is very important and that she trusts that God will let her know when its \"time\"; adds that she prays for  to get better but also not to suffer.     REVIEW OF SYSTEMS:  Review of systems negative unless noted in HPI.       Objective       Lab Results   Component Value Date    WBC 8.7 2023    HGB 10.9 (L) 2023    HCT 35.0 (L) 2023    MCV 95 2023     2023      Lab Results   Component Value Date    GLUCOSE 221 (H) 2023    CALCIUM 8.8 2023     (H) 2023    K 4.2 2023    CO2 28 2023     (H) 2023    BUN 79 (H) 2023    CREATININE 2.56 (H) 2023     Lab Results   Component Value Date    ALT 5 (L) 2023    AST 7 (L) 2023    ALKPHOS 50 2023    BILITOT 0.5 2023     Estimated Creatinine Clearance: 19.3 mL/min (A) (by C-G formula based on SCr of 2.56 mg/dL (H)).     Current Outpatient Medications   Medication Instructions    acyclovir (ZOVIRAX) 400 mg, oral, 2 times daily    amLODIPine (NORVASC) 5 mg, oral, Daily    atorvastatin (LIPITOR) 20 mg, oral, Daily    cholecalciferol (VITAMIN D3) 50 mcg, oral, Daily    " clotrimazole-betamethasone (Lotrisone) cream 1 Application, Topical, 2 times daily    ibrutinib (Imbruvica) 140 mg capsule 3 capsules, oral, Daily    lisinopril 10 mg, oral, Daily    metFORMIN (GLUCOPHAGE) 500 mg, oral, 2 times daily with meals     Scheduled medications   amLODIPine, 10 mg, nasogastric tube, Daily  atorvastatin, 20 mg, oral, Daily  bisacodyl, 10 mg, rectal, Daily  [START ON 12/19/2023] cefTRIAXone, 1 g, intravenous, q24h  cholecalciferol, 4,000 Units, oral, Daily  heparin (porcine), 5,000 Units, subcutaneous, q8h  insulin glargine, 14 Units, subcutaneous, Nightly  insulin regular, 0-15 Units, subcutaneous, TID with meals  levETIRAcetam, 500 mg, intravenous, q12h  levothyroxine, 25 mcg, oral, Daily  pantoprazole, 40 mg, intravenous, BID  polyethylene glycol, 17 g, oral, Daily  thiamine, 100 mg, nasogastric tube, Daily      Continuous medications  oxygen, , Last Rate: 2 L/min (12/12/23 1713)  sodium chloride, 150 mL/hr, Last Rate: 150 mL/hr (12/18/23 0600)      PRN medications  PRN medications: acetaminophen, albuterol, calcium gluconate, calcium gluconate, dextrose 10 % in water (D10W), dextrose, glucagon, hydrALAZINE, labetaloL, magnesium sulfate, magnesium sulfate, naloxone, ondansetron **OR** ondansetron, oxygen, oxygen, potassium chloride CR **OR** potassium chloride, potassium chloride CR **OR** potassium chloride, potassium chloride, potassium chloride     Allergies:   Allergies   Allergen Reactions    Januvia [Sitagliptin] Unknown                PHYSICAL EXAMINATION:  Vital Signs:   Vital signs reviewed  Vitals:    12/18/23 1300   BP: 132/74   Pulse: 91   Resp: 18   Temp:    SpO2: 96%     Pain Score: 3     Physical Exam  Constitutional:       Comments: Unresponsive to auditory, tactile, and pain stimuli (sternal rub); frequent moving of left upper extremity noted; appears to be comfortable and in NAD   HENT:      Head:      Comments: Surgical incision on scalp KOKO; dry and intact with minimal  dried blood; no exudate, erythema, or purulent drainage  Cardiovascular:      Rate and Rhythm: Normal rate and regular rhythm.      Pulses: Normal pulses.   Pulmonary:      Effort: Pulmonary effort is normal.      Comments: 5 L high flow O2 per nasal cannula  Skin:     General: Skin is warm and dry.      Capillary Refill: Capillary refill takes less than 2 seconds.   Neurological:      Sensory: Sensory deficit present.      Comments: Non-purposeful movement of all extremities; Left upper extremity greater than right-frequently reaching for dobhoff; does turn head towards wife's voice when she calls his name w/minimal eye opening but does not respond in this manner consistently.   Non-responsive to pain stimuli (sternal rub); does not follow verbal commands     Met with wife at bedside; offered to call daughter to have on phone for consult but pt states she did not want to do so as her daughter was working. Long discussion over recent events from admission to this point; wife able to summarize this hospitalization and verbalize she is aware that he has declined significantly. Wife states she is holding on to hope that he will get better as he did have some improvement neurologically earlier this admission and prays that will happen again. She feels that because she has not been told by primary or any other provider team that there is no hope of him improving that it is reasonable to hold on to hope. Wife prefers to receive honest, straightforward information regarding prognostication. She understands that no one knows for certain what will happen as far as recovery but she would like to have an honest professional opinion.      ASSESSMENT/PLAN:  lokesh Ayers is a 83 y.o. patient with PMHx of HTN , DM type 2, hx of CVA, hx of anemia,  CLL (unknown CLL-IPI score; being followed by Dr. Claros at [facility]) on Ibrutinib who presented to the hospital on 11/22 with AMS 2/2 UTI also found to have L large chronic SDH  with midline shift. Supportive oncology consulted for goals of care.      Nausea:  At risk for nausea without vomiting related to  artificial nutrition, brain pathology    Home regimen:  none  well-controlled  Continue Ondansetron 4 mg IVP q 8 hrs prn    Constipation  At risk for constipation related to opioids, currently not constipated  Usual bowel pattern: every day  Home regimen: none  LBM 12/19/23  Miralax 17 g daily via enteral tube prn  Senna 1-2 tabs via enteral tube bid prn  Continue Bisacodyl suppository per rectum daily    Altered nutrition related to  altered mental status , enteral feeding  Nutrition following  Home regimen:  none  Feedings per nutrition    Medical Decision Making/Goals of Care/Advance Care Planning:  Patient's current clinical condition, including diagnosis, prognosis, and management plan, and goals of care were discussed with wife at bedside  Life limiting disease:  Left SDH with midline shift, Craniotomy x 2 and associated complications, + covid, worsening LIN, and worsening AMS  Family: Supportive wife Chanelle, daughter Sue who lives in [place]  Performance status: Major  limitations due to  SDH  Joys/meaning/strength:  Per wife, pt finds great eduardo in lara, family, and working with his hands  Understanding of health: Demonstrates good prognostic understanding of disease process, understands plan for obtaining further discussion with neurosurgery regarding prognosis; wife states that she prioritizes his quality of life and does want him to suffer but remains hopeful of a recovery. Open to changing code status to DNR/DNI once she obtains prognostication from neuro. She stated that he isn't living a proper quality life being bed bound, not being able to eat or communicate.    Information: Requests open, straightforward communication  Medical update:awaiting  further information regarding prognosis from neuro  Prognosis:poor based upon functional status  Goals:  quality of life and  treatment to address acute issues if risk not greater than benefit  Worries and fears now and future: death and not recovering    Minimum acceptable outcome/QOL:  wife wishes for pt to be able to communicate, engage in therapy to get OOB, and to be able to eat and enjoy life  Code status discussion:  wishes to remain FULL code until further discussion with Neurosurgery    Advance Directives  Existence of Advance Directives:No  Decision maker: Surrogate decision maker is wife Chanelle [phone], 2) daughter Caroline [phone]  Code Status: Full code    Introduction to Supportive and Palliative Oncology:  Spoke with pt's wife at bedside  Introduced the role and philosophy of Supportive and Palliative oncology in the evaluation and management of symptoms during cancer treatment  Palliative care was introduced as a service for patients with serious illness to help with symptoms, assist with goals of care conversations, navigate complex decision making, improve quality of life for patients, and provide support both patients and families.  Patient seemed to appreciate the extra layer of support.     Supportive Interventions: declined at this time    Disposition:  Please start the process of having prior authorization with meds to beds deliver medications to patient prior to discharge via [facility] pharmacy. Prescriptions will need to be sent 48-72 hours prior to discharge so that a prior authorization can be completed.     Discharge date: unknown pending  ongoing goals of care discussions  Will assess if patient needs an appointment with Outpatient Supportive Oncology as appropriate      Signature and billing:  Thank you for allowing us to participate in the care of this patient. Recommendations will be communicated back to the consulting service by way of shared electronic medical record or face-to-face.    Medical complexity was high level due to due to complexity of problems, extensive data review, and high risk of  management/treatment.    Time Statement: Total face to face time spent on advance care planning was 90 minutes with 30 minutes spent in counseling, including the explanation.     DATA   Diagnostic tests and information reviewed for today's visit:  Conversation with primary team, Most recent labs and imaging results, Medications       Some elements copied from H&P note on 11/22/23, the elements have been updated and all reflect current decision making from today, 12/18/2023.    Plan of Care discussed with: Family/Significant Other: wife Chanelle and primary team    Thank you for asking Supportive and Palliative Oncology to assist with care of this patient.  We will continue to follow peripherally.  Please contact us for additional questions or concerns.      SIGNATURE: RICKY Blancas-CNP  PAGER/CONTACT:  Contact information:  Supportive and Palliative Oncology  Monday-Friday 8 AM-5 PM  Epic Secure chat or pager [phone].  After hours and weekends:  pager [phone]

## 2023-12-18 NOTE — PROGRESS NOTES
Physical Therapy                 Therapy Communication Note    Patient Name: Haile Ayers  MRN: [id number]  Today's Date: 12/18/2023     Discipline: Physical Therapy    Missed Visit Reason: Missed Visit Reason:  (Pt pending GOC discussion. Plan to hold PT at this time.)    Missed Time: Attempt

## 2023-12-18 NOTE — PROGRESS NOTES
Subjective  NAEON.     Objective   Vitals 24 hour ranges:  Heart Rate:  []   Temp:  [35.9 °C (96.6 °F)-37.5 °C (99.5 °F)]   Resp:  [14-36]   BP: (109-177)/(63-90)   Weight:  [63.5 kg (139 lb 15.9 oz)]   SpO2:  [95 %-100 %]         BP 140s-150s.    Intake/Output for last 24 hours:    Intake/Output Summary (Last 24 hours) at 12/18/2023 1430  Last data filed at 12/18/2023 1333  Gross per 24 hour   Intake 3052.5 ml   Output 750 ml   Net 2302.5 ml        FiO2 (%):  [40 %] 40 %  S RR:  [14] 14    Physical Exam:  NEURO:  EONS, PERRL 3>2mm, VOR intact, R side some AG, L side spontaneous  CV:  RRR on telemetry, NSR  RESP:  Labored breathing  Oxygen: HFNC  :  Catheter in place  GI:  Abdomen NT/ND, soft  SKIN:  Intact      Medications  Scheduled:  amLODIPine, 10 mg, nasogastric tube, Daily  atorvastatin, 20 mg, oral, Daily  bisacodyl, 10 mg, rectal, Daily  [START ON 12/19/2023] cefTRIAXone, 1 g, intravenous, q24h  cholecalciferol, 4,000 Units, oral, Daily  heparin (porcine), 5,000 Units, subcutaneous, q8h  insulin glargine, 10 Units, subcutaneous, Nightly  insulin regular, 0-10 Units, subcutaneous, q6h  levETIRAcetam, 500 mg, intravenous, q12h  levothyroxine, 25 mcg, oral, Daily  pantoprazole, 40 mg, intravenous, BID  polyethylene glycol, 17 g, oral, Daily  thiamine, 100 mg, nasogastric tube, Daily    PRN:  PRN medications: acetaminophen, albuterol, calcium gluconate, calcium gluconate, dextrose 10 % in water (D10W), dextrose, glucagon, hydrALAZINE, labetaloL, magnesium sulfate, magnesium sulfate, naloxone, ondansetron **OR** ondansetron, oxygen, oxygen, potassium chloride CR **OR** potassium chloride, potassium chloride CR **OR** potassium chloride, potassium chloride, potassium chloride     Continuous:  lactated Ringer's, 75 mL/hr, Last Rate: 75 mL/hr (12/18/23 1333)  oxygen, , Last Rate: 2 L/min (12/12/23 1713)  sodium chloride, 150 mL/hr, Last Rate: 150 mL/hr (12/18/23 0600)      FiO2 (%):  [40 %] 40 %  S RR:  [14]  14        Labs:  -300s  CBC: 8.4, Hb 11.9, plt 161  Na 153, Has been ~147-150s)  Cr 2.5 (Trend: 2.4<1.9<1.4<1.5)  BUN: 78 (B/l30s)              I/O last 3 completed shifts:  In: 2262.5 (35.6 mL/kg) [I.V.:1952.5 (30.7 mL/kg); NG/GT:110; IV Piggyback:200]  Out: 1130 (17.8 mL/kg) [Urine:1130 (0.5 mL/kg/hr)]  Weight: 63.5 kg   I/O this shift:  In: 1050 [I.V.:1050]  Out: 290 [Urine:290]       Intake/Output Summary (Last 24 hours) at 12/18/2023 1430  Last data filed at 12/18/2023 1333  Gross per 24 hour   Intake 3052.5 ml   Output 750 ml   Net 2302.5 ml            Assessment/Plan   Pt is a 84 yo M w/ h/o CLL, anemia, DM, p/w AMS, found to have UTI, CTH w/ L large chronic SDH, pt s/p x2 Crani for hematoma evacuation.     11/22 s/p L crani for SDH evac, CTH POC  11/29 lethargic, not FC, CTH incr L SDH w 8mm MLS, s/p1g keppra load, CXR stable opacities, rCTH stable, s/p R side wd, s/p keppra load, rrCTH stable L SDH  11/30 s/p redo R crani for SDH evac, CTH good evac, decr MLS  12/1 s/p extubation  12/4 s/p angio small L MMA w/ no distal branches, not embolized  12/5 DVT USx4 RLE chronic changes no DVT, CTH stable, CT PE neg for PE, diffuse sclerotic lesions c/f mets, BL pleural effusions, s/p 60 lasix  12/6  s/p 1mg Bumex, TTE EF 65-70%  12/13 hgb 6.8, s/p 1U pRBC  12/14 CBC hgb 6.1, s/p 2U pRBC, improved.  12/17 NSU upgraded for worsening hypoxia, started on CTX for UTI, worsening LIN, CTH stable.      NEURO:  #SDH s/p evacuation x2  #MMA embolization on 12/4  Assessment:   Neurologically: ECNS, PERRL 3>2mm, VOR intact, R side AG, L side spontaneous  Plan:  NSU  Q1H neuro checks  Appreciate palliative recs  Pain: acetaminophen, oxycodone, hydromorphone PRN  Sedation: None  Keppra 500mg BID  PT/OT/SLP  Palliative care consulted, Continuing GOC discussion with family    CARDIOVASCULAR:  # HTN, HLD  #Intravascular hypovolemia  #c/f Heart failure  Assessment:   ST on tele  Echocardiogram: EF 65-70%, impaired diastolic  function  Plan:   BP Goal Normotension  Continue to monitor on telemetry  Continue atorvastatin  C/w Amlodipine 10mg      RESPIRATORY:  #Bilateral pleural effusions   #Worsening hypoxia  #multifocal penumonia,  #COVID pneumonia 12/8 completed treatment.  Assessment:   ABG 12/17: 7.48/43/67 > repeat 7.49/43/214  12/17 on BiPAP 18/5 FiO2 60%  S/p intubation and extubation 12/1, required BiPAP 12/6 12/5 CT-PE negative, but did show bilateral mod-large pleural effusions  CXR 12/16: Interval worsening of patchy bilateral basilar predominant airspace opacities, which are favored to represent worsening multifocal pneumonia. Similar small bilateral pleural effusions.   CXR 12/17: there are patchy bilateral   basilar predominant airspace opacities, consistent multifocal   pneumonia and small bilateral pleural effusions.   Plan:   Continuous pulse oximetry   O2 PRN to maintain SpO2 > 94%, wean as tolerated  Continuous BiPAP, weaned to 10L HFNC 12/18  PRN BiPAP at night, consider PRN BiPAP overnight  Continue to wean as tolerated.      RENAL/:  #BPH  #Oliguric LIN, likely prerenal  #Hypernatremia, hyperosmolarity ~350  Assessment:   - FENa 10%  - Serum Na 151 (12/10)  Results from last 72 hours   Lab Units 12/18/23  1214 12/18/23  0607 12/18/23  0017 12/17/23  1705 12/17/23  1239   BUN mg/dL 79* 78* 76* 71* 67*   CREATININE mg/dL 2.56* 2.52* 2.45* 2.04* 1.96*   Plan:   Monitor with daily RFP  Chronic ignacio for BPH  Monitor U output  1/2 NS at 150ml/hr per endocrinology, also starting  ccq4h.  Q8h Serum and urine osm per Encdo      FEN/GI:  #UGIB  #Dysphagia  Assessment:   Last BM Date: 12/14/23  Bloody emesis on floor per wife, no recent episodes  Plan:   Monitor and replace electrolytes per protocol  IVF: as above  Diet: NPO, start trickle feeds, FWF as above  Bowel Regimen: Miralax PRN, Dulculax PRN  Pantoprazole 40mg IV BID  Will get GI involved for PEG tube pending C  discussion.      ENDOCRINE:  #DM  #Hypothyroidism  Assessment:  Results from last 7 days   Lab Units 23  1214 23  1145 23  0834 23  0607 23  0538 23  0017 23  2324 23  1239 23  1137 23  0556   POCT GLUCOSE mg/dL  --  236* 243*  --  227*  --  243*  --  299* 284*   GLUCOSE mg/dL 221*  --   --  257*  --    < >  --    < >  --   --     < > = values in this interval not displayed.   Plan:    Accuchecks & ISS Q6H  Hypoglycemia protocol  C/w levothyroxine 50mcg  Lantus up to 14U, inc Regular SSS to #3 q6h  Appreciate endocrinology recs  Repeat TFTs on 2022         HEMATOLOGY:  #CLL  #c/f prostate cancer vs MM  #Anemia, s/p Transfusion x2, Now Hb Stable  Assessment:   Sclerotic lesions incidentally noted on CT PE  PSA 11.73  Results from last 7 days   Lab Units 23  1339 23  0017 23  1239 23  0549 23  0507   HEMOGLOBIN g/dL 10.9* 11.8* 12.5* 12.0* 11.9*   HEMATOCRIT % 35.0* 37.0* 38.6* 37.7* 35.9*   PLATELETS AUTO x10*3/uL 150 170 164 162 161   CEA: 1.2, : 161.5, CA 19-9: 25.13  Plan:  Continue to monitor with daily CBC  Heme consulted, recs appreciated  Oncology consulted with concern for malignant cause of sclerotic bony lesions, appreciate recs  SPEP, UPEP, and immunoglobulins to eval for possible multiple myeloma  MRI Pelvis and L-spine when able  Hold Ibrutinib, resume when NSGY is OK with that (heme recs )    INFECTIOUS DISEASE:  #concerns for aspiration PNA vs UTI  Assessment:   Results from last 7 days   Lab Units 23  1339 23  0017   WBC AUTO x10*3/uL 8.7 9.5     Temp (24hrs), Av.6 °C (97.9 °F), Min:35.9 °C (96.6 °F), Max:37.5 °C (99.5 °F)    BCx  with staph hominis  Procal 0.31 () -> 0.17 () > 0.24 ()  COVID positive  s/p remdesivir -  S/p Zosyn (-) -> Vanc/cefepime (-); ceftriaxone -  UA  c/f UTI, no culture obtained.   Plan:  Continue to  monitor for s/sx of infection  Pan culture for temperature > 38.4 C  C/w ceftriaxone 1g daily until 12/23  Repeat Bcx NGTD.  Confirm with infection control regarding Covid Precautions as pt immunocompromised.    MUSCULOSKELETAL:  No acute issues    SKIN:  No acute issues  Turns and skin care per NSU protocol    ACCESS:  PIV    PROPHYLAXIS:  DVT/VTE: SCDs, SQ heparin       Code: Full code    RESTRAINTS:  I agree with nursing assessment of the patient's need for restraints to protect the patient from injury and facilitate healing. The patient is unable to cooperate with the plan of care and at risk for disrupting critical therapy (i.e., removing medical devices, lines, tubes and/or dressings).  Please see order for specifics. Restraints can be removed when the patient is able to cooperate with plan of care and allow healing to occur, or the medical devices at risk are discontinued by the medical team.     Nathen Barahona MD  Neuroscience Intensive Care    Attending Attestation:     Neuro - SDH s/p evacuation x 2 with continued AMS with recent CTH with stable hemorrhage   Cardiac - hemodynamically stable   Respiratory - high flow NC (improving - was on bipap)    Renal - Hypernatremia with LIN likely pre-renal with decreased UOP   GI - TF held because of bipap   Endocrine - following for hypothyroidism and hypernatremia (likely secondary to NPO status for declining respiratory status)   Palliative - recs pending, continued GOC with family   ID - on antibiotics CTX for chronic UTI (12/23)    Code status - full code, pending family discussion     Naomi Faria MD    of Neurosurgery   [facility] [place]   [facility] ICU   Office: [phone]  Fax: [phone]

## 2023-12-19 ENCOUNTER — APPOINTMENT (OUTPATIENT)
Dept: CARDIOLOGY | Facility: HOSPITAL | Age: 83
DRG: 025 | End: 2023-12-19
Payer: MEDICARE

## 2023-12-19 ENCOUNTER — APPOINTMENT (OUTPATIENT)
Dept: RADIOLOGY | Facility: HOSPITAL | Age: 83
DRG: 025 | End: 2023-12-19
Payer: MEDICARE

## 2023-12-19 LAB
ALBUMIN MFR UR ELPH: 26.6 %
ALBUMIN SERPL BCP-MCNC: 3.2 G/DL (ref 3.4–5)
ALPHA1 GLOB MFR UR ELPH: 13.1 %
ALPHA2 GLOB MFR UR ELPH: 19.8 %
ANION GAP SERPL CALC-SCNC: 15 MMOL/L (ref 10–20)
B-GLOBULIN MFR UR ELPH: 19.9 %
BASOPHILS # BLD AUTO: 0.02 X10*3/UL (ref 0–0.1)
BASOPHILS NFR BLD AUTO: 0.3 %
BUN SERPL-MCNC: 80 MG/DL (ref 6–23)
CALCIUM SERPL-MCNC: 8.4 MG/DL (ref 8.6–10.6)
CHLORIDE SERPL-SCNC: 114 MMOL/L (ref 98–107)
CO2 SERPL-SCNC: 25 MMOL/L (ref 21–32)
CREAT SERPL-MCNC: 2.82 MG/DL (ref 0.5–1.3)
EOSINOPHIL # BLD AUTO: 0.41 X10*3/UL (ref 0–0.4)
EOSINOPHIL NFR BLD AUTO: 5.6 %
ERYTHROCYTE [DISTWIDTH] IN BLOOD BY AUTOMATED COUNT: 14.6 % (ref 11.5–14.5)
GAMMA GLOB MFR UR ELPH: 20.6 %
GFR SERPL CREATININE-BSD FRML MDRD: 21 ML/MIN/1.73M*2
GLUCOSE BLD MANUAL STRIP-MCNC: 130 MG/DL (ref 74–99)
GLUCOSE BLD MANUAL STRIP-MCNC: 183 MG/DL (ref 74–99)
GLUCOSE BLD MANUAL STRIP-MCNC: 69 MG/DL (ref 74–99)
GLUCOSE BLD MANUAL STRIP-MCNC: 98 MG/DL (ref 74–99)
GLUCOSE SERPL-MCNC: 81 MG/DL (ref 74–99)
HCT VFR BLD AUTO: 33.4 % (ref 41–52)
HGB BLD-MCNC: 10.4 G/DL (ref 13.5–17.5)
IMM GRANULOCYTES # BLD AUTO: 0.02 X10*3/UL (ref 0–0.5)
IMM GRANULOCYTES NFR BLD AUTO: 0.3 % (ref 0–0.9)
IMMUNOFIXATION COMMENT: NORMAL
LYMPHOCYTES # BLD AUTO: 1.3 X10*3/UL (ref 0.8–3)
LYMPHOCYTES NFR BLD AUTO: 17.8 %
MCH RBC QN AUTO: 29.3 PG (ref 26–34)
MCHC RBC AUTO-ENTMCNC: 31.1 G/DL (ref 32–36)
MCV RBC AUTO: 94 FL (ref 80–100)
MONOCYTES # BLD AUTO: 0.92 X10*3/UL (ref 0.05–0.8)
MONOCYTES NFR BLD AUTO: 12.6 %
NEUTROPHILS # BLD AUTO: 4.65 X10*3/UL (ref 1.6–5.5)
NEUTROPHILS NFR BLD AUTO: 63.4 %
NRBC BLD-RTO: 0 /100 WBCS (ref 0–0)
OSMOLALITY SERPL: 333 MOSM/KG (ref 280–300)
OSMOLALITY SERPL: 336 MOSM/KG (ref 280–300)
OSMOLALITY SERPL: 347 MOSM/KG (ref 280–300)
OSMOLALITY UR: 396 MOSM/KG (ref 200–1200)
OSMOLALITY UR: 414 MOSM/KG (ref 200–1200)
PATH REVIEW - URINE IMMUNOFIXATION: NORMAL
PATH REVIEW-URINE PROTEIN ELECTROPHORESIS: NORMAL
PHOSPHATE SERPL-MCNC: 3.5 MG/DL (ref 2.5–4.9)
PLATELET # BLD AUTO: 155 X10*3/UL (ref 150–450)
POTASSIUM SERPL-SCNC: 4.3 MMOL/L (ref 3.5–5.3)
RBC # BLD AUTO: 3.55 X10*6/UL (ref 4.5–5.9)
SODIUM SERPL-SCNC: 150 MMOL/L (ref 136–145)
URINE ELECTROPHORESIS COMMENT: NORMAL
WBC # BLD AUTO: 7.3 X10*3/UL (ref 4.4–11.3)

## 2023-12-19 PROCEDURE — 2500000001 HC RX 250 WO HCPCS SELF ADMINISTERED DRUGS (ALT 637 FOR MEDICARE OP): Performed by: STUDENT IN AN ORGANIZED HEALTH CARE EDUCATION/TRAINING PROGRAM

## 2023-12-19 PROCEDURE — 82947 ASSAY GLUCOSE BLOOD QUANT: CPT

## 2023-12-19 PROCEDURE — 99233 SBSQ HOSP IP/OBS HIGH 50: CPT | Performed by: STUDENT IN AN ORGANIZED HEALTH CARE EDUCATION/TRAINING PROGRAM

## 2023-12-19 PROCEDURE — 2500000004 HC RX 250 GENERAL PHARMACY W/ HCPCS (ALT 636 FOR OP/ED)

## 2023-12-19 PROCEDURE — 83935 ASSAY OF URINE OSMOLALITY: CPT

## 2023-12-19 PROCEDURE — 93010 ELECTROCARDIOGRAM REPORT: CPT | Performed by: STUDENT IN AN ORGANIZED HEALTH CARE EDUCATION/TRAINING PROGRAM

## 2023-12-19 PROCEDURE — C9113 INJ PANTOPRAZOLE SODIUM, VIA: HCPCS | Performed by: STUDENT IN AN ORGANIZED HEALTH CARE EDUCATION/TRAINING PROGRAM

## 2023-12-19 PROCEDURE — 2500000004 HC RX 250 GENERAL PHARMACY W/ HCPCS (ALT 636 FOR OP/ED): Performed by: STUDENT IN AN ORGANIZED HEALTH CARE EDUCATION/TRAINING PROGRAM

## 2023-12-19 PROCEDURE — 99291 CRITICAL CARE FIRST HOUR: CPT | Performed by: STUDENT IN AN ORGANIZED HEALTH CARE EDUCATION/TRAINING PROGRAM

## 2023-12-19 PROCEDURE — 97535 SELF CARE MNGMENT TRAINING: CPT | Mod: GO

## 2023-12-19 PROCEDURE — 83930 ASSAY OF BLOOD OSMOLALITY: CPT

## 2023-12-19 PROCEDURE — 97530 THERAPEUTIC ACTIVITIES: CPT | Mod: GO

## 2023-12-19 PROCEDURE — 2020000001 HC ICU ROOM DAILY

## 2023-12-19 PROCEDURE — 96372 THER/PROPH/DIAG INJ SC/IM: CPT | Performed by: STUDENT IN AN ORGANIZED HEALTH CARE EDUCATION/TRAINING PROGRAM

## 2023-12-19 PROCEDURE — 71045 X-RAY EXAM CHEST 1 VIEW: CPT

## 2023-12-19 PROCEDURE — 2500000001 HC RX 250 WO HCPCS SELF ADMINISTERED DRUGS (ALT 637 FOR MEDICARE OP)

## 2023-12-19 PROCEDURE — 71045 X-RAY EXAM CHEST 1 VIEW: CPT | Performed by: RADIOLOGY

## 2023-12-19 PROCEDURE — 93005 ELECTROCARDIOGRAM TRACING: CPT

## 2023-12-19 PROCEDURE — 37799 UNLISTED PX VASCULAR SURGERY: CPT

## 2023-12-19 PROCEDURE — 85025 COMPLETE CBC W/AUTO DIFF WBC: CPT

## 2023-12-19 PROCEDURE — 80069 RENAL FUNCTION PANEL: CPT

## 2023-12-19 RX ORDER — INSULIN GLARGINE 100 [IU]/ML
10 INJECTION, SOLUTION SUBCUTANEOUS NIGHTLY
Status: DISCONTINUED | OUTPATIENT
Start: 2023-12-19 | End: 2023-12-29

## 2023-12-19 RX ADMIN — HEPARIN SODIUM 5000 UNITS: 5000 INJECTION INTRAVENOUS; SUBCUTANEOUS at 18:33

## 2023-12-19 RX ADMIN — Medication 4000 UNITS: at 08:12

## 2023-12-19 RX ADMIN — PANTOPRAZOLE SODIUM 40 MG: 40 INJECTION, POWDER, FOR SOLUTION INTRAVENOUS at 21:08

## 2023-12-19 RX ADMIN — LEVOTHYROXINE SODIUM 25 MCG: 25 TABLET ORAL at 06:10

## 2023-12-19 RX ADMIN — LEVETIRACETAM 500 MG: 5 INJECTION INTRAVENOUS at 04:37

## 2023-12-19 RX ADMIN — ATORVASTATIN CALCIUM 20 MG: 20 TABLET, FILM COATED ORAL at 08:12

## 2023-12-19 RX ADMIN — HEPARIN SODIUM 5000 UNITS: 5000 INJECTION INTRAVENOUS; SUBCUTANEOUS at 09:51

## 2023-12-19 RX ADMIN — CEFTRIAXONE SODIUM 1 G: 1 INJECTION, SOLUTION INTRAVENOUS at 06:09

## 2023-12-19 RX ADMIN — ACETAMINOPHEN 650 MG: 325 TABLET ORAL at 08:12

## 2023-12-19 RX ADMIN — SODIUM CHLORIDE 50 ML/HR: 4.5 INJECTION, SOLUTION INTRAVENOUS at 19:56

## 2023-12-19 RX ADMIN — INSULIN GLARGINE 10 UNITS: 100 INJECTION, SOLUTION SUBCUTANEOUS at 21:08

## 2023-12-19 RX ADMIN — SODIUM CHLORIDE 150 ML/HR: 4.5 INJECTION, SOLUTION INTRAVENOUS at 08:21

## 2023-12-19 RX ADMIN — HEPARIN SODIUM 5000 UNITS: 5000 INJECTION INTRAVENOUS; SUBCUTANEOUS at 01:02

## 2023-12-19 RX ADMIN — LEVETIRACETAM 500 MG: 5 INJECTION INTRAVENOUS at 16:09

## 2023-12-19 RX ADMIN — AMLODIPINE BESYLATE 10 MG: 10 TABLET ORAL at 08:12

## 2023-12-19 RX ADMIN — PANTOPRAZOLE SODIUM 40 MG: 40 INJECTION, POWDER, FOR SOLUTION INTRAVENOUS at 08:12

## 2023-12-19 RX ADMIN — THIAMINE HCL TAB 100 MG 100 MG: 100 TAB at 08:12

## 2023-12-19 RX ADMIN — SODIUM CHLORIDE 150 ML/HR: 4.5 INJECTION, SOLUTION INTRAVENOUS at 00:32

## 2023-12-19 ASSESSMENT — COGNITIVE AND FUNCTIONAL STATUS - GENERAL
TOILETING: TOTAL
DRESSING REGULAR UPPER BODY CLOTHING: TOTAL
PERSONAL GROOMING: TOTAL
DRESSING REGULAR LOWER BODY CLOTHING: TOTAL
HELP NEEDED FOR BATHING: TOTAL
EATING MEALS: TOTAL
DAILY ACTIVITIY SCORE: 6

## 2023-12-19 ASSESSMENT — ACTIVITIES OF DAILY LIVING (ADL): HOME_MANAGEMENT_TIME_ENTRY: 13

## 2023-12-19 NOTE — PROGRESS NOTES
"Haile Ayers is a 83 y.o. male on day 27 of admission presenting with Subdural hematoma (CMS/HCC).    Subjective   Patient was seen and examined.  Unresponsive to verbal stimuli.  Appears ill.  Hypoglycemic this a.m. to 69 tube feeds rate was increased to 20     Objective   Constitutional: Elderly frail  male, ill-appearing, positive  Skin/Hair: dry skin.  HEENT: Eyes closed, Dobbhoff tube in place  Cardiovascular: Rapid heart rate  Respiratory: On nasal cannula  Abdomen: Distended  Psych : Unable to assess      Last Recorded Vitals  Blood pressure 133/68, pulse 92, temperature 36.7 °C (98.1 °F), temperature source Temporal, resp. rate (!) 29, height 1.727 m (5' 7.99\"), weight 63.5 kg (139 lb 15.9 oz), SpO2 97 %.  Intake/Output last 3 Shifts:  I/O last 3 completed shifts:  In: 4341.3 (68.4 mL/kg) [I.V.:3561.3 (56.1 mL/kg); NG/GT:430; IV Piggyback:350]  Out: 1230 (19.4 mL/kg) [Urine:1230 (0.5 mL/kg/hr)]  Weight: 63.5 kg     Relevant Results  Results from last 7 days   Lab Units 12/19/23  1123 12/19/23  0843 12/19/23  0828 12/18/23  1954 12/18/23  1941 12/18/23  1214 12/18/23  1145 12/18/23  0834 12/18/23  0607 12/18/23  0538 12/18/23  0017   POCT GLUCOSE mg/dL 98  --  69* 132*  --   --  236* 243*  --    < >  --    GLUCOSE mg/dL  --  81  --   --  145* 221*  --   --  257*  --  250*    < > = values in this interval not displayed.     Lab Results   Component Value Date     (H) 12/19/2023     (H) 12/18/2023     (H) 12/18/2023     (H) 12/18/2023     (H) 12/18/2023     (H) 12/17/2023     (H) 12/17/2023     (H) 12/17/2023     (H) 12/17/2023     (H) 12/16/2023       Assessment/Plan   Principal Problem:    Subdural hematoma (CMS/HCC)  Active Problems:    HTN (hypertension)    Diabetes mellitus, type 2 (CMS/HCC)    CVA (cerebral vascular accident) (CMS/HCC)    Haile Ayers is a 83 y.o. patient with PMHx of HTN , DM type 2, hx of CVA, hx of anemia,  CLL " (unknown CLL-IPI score; being followed by Dr. Claros at [facility]) on Ibrutinib who presented to the hospital on 11/22 with AMS 2/2 UTI also found to have L large chronic SDH with midline shift. He underwent L crani for SDH evacuation on 11/22 and re-evacuated on 11/30, extubated on 12/1.  Had worsening neurological exam on 12/4 and transferred to NSU. CTA on 12/5 incidentally showed diffuse sclerotic lesions throughout axial and appendicular skeleton + ascities.  Tested positive for COVID on 12/8.  Patient with continued worsening mental status, endocrinology team consulted for elevated TSH as part of worsening encephalopathy, workup showing worsening lung disease, hypernatremia and LIN, patient was initially planned for PEG on 12/18, currently transferred to the NSU for worsening respiratory status     History taking was very limited given the patient's condition, even with his wife at his bedside.     Hypothyroidism:  -------------------  -12/16  TSH 10.4, Ft4: 0.85, Anti-TPO: neg     Patient likely has hypothyroidism vs non-thyroidal illness.  However unlikely that his mild hypothyroidism is the etiology of his worsening mental status.  Given current worsening clinical status, would recommend:     -Continue with levothyroxine 25 mcg daily while holding tube feeds to 1 hour before and 1 hour after  -Repeat TFTs on 12/22/2022     Type 2 diabetes:  --------------------  Not enough data in chart, no A1c?  At home on metformin 500 mg 1 tablet orally twice daily per med rec upon admission.  Patient hyperglycemic, showing some insulin resistance  -Would hold on rechecking A1c given recent blood transfusion  -Goal blood glucose below 200 while in ICU  -Decrease glargine to 10 units every 24 hours  -Decrease sliding scale regular insulin to #1 every 6 hours  [1 units for every 50 above 150] every 6 hours, while NPO or on tube feeds given severe LIN  -Accucheck q6 ,while NPO or on tube feeds  -Hypoglycemia protocol         Moderate hypernatremia:  -------------------------------  -Likely isovolume depletion especially given his rising BUN and creatinine.  With that being said patient is at risk of both nephrogenic and central diabetes insipidus given his LIN and his recent subdural hematoma status postevacuation x 2.    -His urine is moderately concentrated indicating possible partial DI  -Would aim for goal of sodium: 145 in the next 24 hours, avoid overcorrection, especially in the setting of recent intracranial procedure and subdural hematoma  -Sodium trended down mildly, urine output over the last 24 hours was 980.  Total input was 2338 patient is net +1358.  Per primary team plan is to continue 1/2 NS and to increase free water flushes however patient is volume depleted and would aim to at least admit her 3 L of free water would therefore recommend the following:  --1/2 NS at a rate of 50 mL/h  --Increase  every 4 hours  --RFP, serum osmolality and urine osmolality every 8 hours        Plan communicated to primary team      Case discussed with Dr. Sommers who agrees with the management plan.

## 2023-12-19 NOTE — PROGRESS NOTES
Subjective  NAEON. Weaning Down O2 to 5L NC now.   NSG had discussion with wife and pt was switched to DNR/DNI    Tele: x3 NSVT episode, max was 11 beats       Objective   Vitals 24 hour ranges:  Heart Rate:  []   Temp:  [36.1 °C (97 °F)-36.9 °C (98.4 °F)]   Resp:  [16-32]   BP: (124-159)/()   SpO2:  [91 %-100 %]         BP 140s-150s.    Intake/Output for last 24 hours:    Intake/Output Summary (Last 24 hours) at 12/19/2023 0726  Last data filed at 12/19/2023 0609  Gross per 24 hour   Intake 2338.75 ml   Output 980 ml   Net 1358.75 ml            Physical Exam:  NEURO:  Drowsy, EOV, PERRL 3>2mm, VOR intact, minimally tracks, does not follow command R side minimal (U>L) AG, L side spontaneous AG  CV:  RRR on telemetry, NSR  RESP:  Labored breathing  Oxygen: HFNC  :  Catheter in place  GI:  Abdomen NT/ND, soft  SKIN:  Intact      Medications  Scheduled:  amLODIPine, 10 mg, nasogastric tube, Daily  atorvastatin, 20 mg, oral, Daily  bisacodyl, 10 mg, rectal, Daily  cefTRIAXone, 1 g, intravenous, q24h  cholecalciferol, 4,000 Units, oral, Daily  heparin (porcine), 5,000 Units, subcutaneous, q8h  insulin glargine, 14 Units, subcutaneous, Nightly  insulin regular, 0-15 Units, subcutaneous, TID with meals  levETIRAcetam, 500 mg, intravenous, q12h  levothyroxine, 25 mcg, oral, Daily  pantoprazole, 40 mg, intravenous, BID  polyethylene glycol, 17 g, oral, Daily  thiamine, 100 mg, nasogastric tube, Daily    PRN:  PRN medications: acetaminophen, albuterol, calcium gluconate, calcium gluconate, dextrose 10 % in water (D10W), dextrose, glucagon, hydrALAZINE, labetaloL, magnesium sulfate, magnesium sulfate, naloxone, ondansetron **OR** ondansetron, oxygen, potassium chloride CR **OR** potassium chloride, potassium chloride CR **OR** potassium chloride, potassium chloride, potassium chloride     Continuous:  oxygen, , Last Rate: 2 L/min (12/12/23 2683)  sodium chloride, 150 mL/hr, Last Rate: 150 mL/hr (12/19/23  0032)      I/O last 3 completed shifts:  In: 4341.3 (68.4 mL/kg) [I.V.:3561.3 (56.1 mL/kg); NG/GT:430; IV Piggyback:350]  Out: 1230 (19.4 mL/kg) [Urine:1230 (0.5 mL/kg/hr)]  Weight: 63.5 kg   No intake/output data recorded.       Intake/Output Summary (Last 24 hours) at 12/19/2023 0726  Last data filed at 12/19/2023 0609  Gross per 24 hour   Intake 2338.75 ml   Output 980 ml   Net 1358.75 ml            Assessment/Plan   Pt is a 82 yo M w/ h/o CLL, anemia, DM, p/w AMS, found to have UTI, CTH w/ L large chronic SDH, pt s/p x2 Crani for hematoma evacuation.     11/22 s/p L crani for SDH evac, CTH POC  11/29 lethargic, not FC, CTH incr L SDH w 8mm MLS, s/p1g keppra load, CXR stable opacities, rCTH stable, s/p R side wd, s/p keppra load, rrCTH stable L SDH  11/30 s/p redo R crani for SDH evac, CTH good evac, decr MLS  12/1 s/p extubation  12/4 s/p angio small L MMA w/ no distal branches, not embolized  12/5 DVT USx4 RLE chronic changes no DVT, CTH stable, CT PE neg for PE, diffuse sclerotic lesions c/f mets, BL pleural effusions, s/p 60 lasix  12/6  s/p 1mg Bumex, TTE EF 65-70%  12/13 hgb 6.8, s/p 1U pRBC  12/14 CBC hgb 6.1, s/p 2U pRBC, improved.  12/17 NSU upgraded for worsening hypoxia, started on CTX for UTI, worsening LIN, CTH stable.      NEURO:  #SDH s/p evacuation x2  #MMA embolization on 12/4  #Encephalopahty, likely multifactorial  Assessment:   Neurologically: EOV, PERRL 3>2mm, VOR intact, minimally tracks, does not follow command R side minimal (U>L) AG, L side spontaneous AG. Minimal improvement compared to prior day  Plan:  NSU  Q1H neuro checks  Pain: acetaminophen PRN  Sedation: None  Keppra 500mg BID  PT/OT/SLP  Palliative care consulted, Continuing GOC discussion with family    CARDIOVASCULAR:  # HTN, HLD  #Intravascular hypovolemia  Assessment:   NSVT x3 episodes overinght  Echocardiogram: EF 65-70%, impaired diastolic function  Plan:   BP Goal Normotension  Continue to monitor on telemetry  Continue  atorvastatin  C/w Amlodipine 10mg  Correct electrolytes K>4, Mag >2  Will get EKG      RESPIRATORY:  #Bilateral pleural effusions   #Worsening hypoxia  #multifocal penumonia,  #COVID pneumonia  completed treatment.  Assessment:    CT-PE negative, but did show bilateral mod-large pleural effusions  CXR : slight worsening bibasilar opacities L>R. Small bilateral PE.   Off BiPAP, on 2LNC now.   AB/19 7.44/46/179 from 7.49/43/214  Plan:   Continuous pulse oximetry   O2 PRN to maintain SpO2 > 94%, wean as tolerated  PRN BiPAP at night  Continue to wean O2 as tolerated.        RENAL/:  #BPH  #Oliguric LIN, likely prerenal  #Hypernatremia, hyperosmolarity ~350  Assessment:   - Serum Na improvin from 153  - Serum Osm: 347 > 341 > 347  Results from last 72 hours   Lab Units 23  1941 23  1214 23  0607 23  0017 23  1705   BUN mg/dL 77* 79* 78* 76* 71*   CREATININE mg/dL 2.60* 2.56* 2.52* 2.45* 2.04*   Plan:   Monitor with daily RFP  Chronic ignacio for BPH  Monitor U output. Improved from yesterday (980 from 680)  1/2 NS at 150ml/hr per endocrinology, also starting  ccq4h.  Q8h Serum and urine osm per Endo.  Will go down on 1/2 NS to 75cc given concern of pulmonary edema/PE      FEN/GI:  #Dysphagia  Assessment:   Last BM Date: 23  Plan:   Monitor and replace electrolytes per protocol  IVF: as above  SLP last seen , rec for NP{O and consideration of long term nutrition.  Diet: NPO, Starting TF diet, goal 40cc/hr  Bowel Regimen: Miralax Daily, Dulculax Daily  Pantoprazole 40mg IV BID  Will get GI involved for PEG tube pending [facility] discussion.       ENDOCRINE:  #DM  #Hypothyroidism  Assessment:  Results from last 7 days   Lab Units 23  1954 23  1941 23  1214 23  1145 23  0834 23  0607 23  0538 23  0017 23  2324 23  1239 23  1137   POCT GLUCOSE mg/dL 132*  --   --  236* 243*  --  227*  --  243*   --  299*   GLUCOSE mg/dL  --  145* 221*  --   --    < >  --    < >  --    < >  --     < > = values in this interval not displayed.   Plan:    Accuchecks & ISS Q6H  Hypoglycemia protocol  C/w levothyroxine 50mcg  Lantus up to 14U, Regular SSS to # q6h  Appreciate endocrinology recs  Repeat TFTs on 2022         HEMATOLOGY:  #CLL  #Anemia, s/p Transfusion x2, Now Hb Stable  Assessment:   Sclerotic lesions incidentally noted on CT PE  PSA 11.73, CEA: 1.2, : 161.5, CA 19-9: 25.13  Results from last 7 days   Lab Units 23  0038 23  1941 23  1339 23  0017 23  1239   HEMOGLOBIN g/dL 10.4* 10.6* 10.9* 11.8* 12.5*   HEMATOCRIT % 33.4* 32.4* 35.0* 37.0* 38.6*   PLATELETS AUTO x10*3/uL 155 150 150 170 164     Plan:  Continue to monitor with daily CBC  Hold Ibrutinib, resume when NSGY is OK with that (heme recs )    INFECTIOUS DISEASE:  #concerns for aspiration PNA vs UTI  Assessment:   Results from last 7 days   Lab Units 23  0038 23   WBC AUTO x10*3/uL 7.3 8.1     Temp (24hrs), Av.6 °C (97.8 °F), Min:36.1 °C (97 °F), Max:36.9 °C (98.4 °F)    BCx  with staph hominis  Procal 0.31 () -> 0.17 () > 0.24 ()  COVID positive  s/p remdesivir -  S/p Zosyn (-) -> Vanc/cefepime (-); ceftriaxone -  UA  c/f UTI, no culture obtained.   Plan:  Continue to monitor for s/sx of infection  Pan culture for temperature > 38.4 C  C/w ceftriaxone 1g daily until       MUSCULOSKELETAL:  No acute issues    SKIN:  No acute issues  Turns and skin care per NSU protocol    ACCESS:  PIV    PROPHYLAXIS:  DVT/VTE: SCDs, SQ heparin       Code: DNR/DNI    RESTRAINTS:  I agree with nursing assessment of the patient's need for restraints to protect the patient from injury and facilitate healing. The patient is unable to cooperate with the plan of care and at risk for disrupting critical therapy (i.e., removing medical devices, lines, tubes  and/or dressings).  Please see order for specifics. Restraints can be removed when the patient is able to cooperate with plan of care and allow healing to occur, or the medical devices at risk are discontinued by the medical team.     Nathen Barahona MD  Neuroscience Intensive Care  =======================================================  Attending Attestation:      Neuro - SDH s/p evacuation x 2 with continued AMS with recent CTH with stable hemorrhage   Cardiac - hemodynamically stable   Respiratory - improving respiratory status (on 2L NC)    Renal - worsening LIN   GI - TF tolerating (will call for PEG)   Endocrine - following for hypothyroidism and hypernatremia (likely secondary to NPO status for declining respiratory status)   Palliative - appreciate recs  ID - on antibiotics CTX for chronic UTI (12/23)     Code status - DNR/DNI     Naomi Faria MD    of Neurosurgery   [facility] [place]   [facility] Neuroscience ICU   Office: [phone]  Fax: [phone]

## 2023-12-19 NOTE — PROGRESS NOTES
Occupational Therapy    OT Treatment    Patient Name: Haile Ayers  MRN: [id number]  Today's Date: 12/19/2023  Time Calculation  Start Time: 1040  Stop Time: 1103  Time Calculation (min): 23 min         Assessment:        Plan:  Treatment Interventions: ADL retraining, Visual perceptual retraining, Functional transfer training, UE strengthening/ROM, Endurance training, Cognitive reorientation, Patient/family training, Neuromuscular reeducation, Fine motor coordination activities  OT Frequency: 4 times per week  OT Discharge Recommendations: Moderate intensity level of continued care  OT Recommended Transfer Status: Maximum assist  OT - OK to Discharge: Yes (Recommending High Intensity.)  Treatment Interventions: ADL retraining, Visual perceptual retraining, Functional transfer training, UE strengthening/ROM, Endurance training, Cognitive reorientation, Patient/family training, Neuromuscular reeducation, Fine motor coordination activities    Subjective   Previous Visit Info:  OT Last Visit  OT Received On: 12/19/23  General:  General  Family/Caregiver Present: No  Prior to Session Communication: Bedside nurse  Patient Position Received: Bed, 3 rail up, Alarm off, not on at start of session  General Comment: Pt with minimal arousal during session despite max cues and stimulation. Pt placed in bed in chair position and completed anterior lean x 2 trials dependently with no changes in arousal. DEP for grooming and dressing tasks. No command following and minimal changes in arousal throughout session, RN aware.  Precautions:  Hearing/Visual Limitations: [place]  Medical Precautions: Fall precautions, Oxygen therapy device and L/min  Precautions Comment: BP goal normotension  Vital Signs:  Vital Signs  Heart Rate: 93 (post 90)  Resp: (!) 29 (post 26)  SpO2: 96 % (post 97)  BP: 127/63 (post 124/60)  MAP (mmHg): 81 (post 79)  Pain:  Pain Assessment  Pain Assessment: 0-10  Pain Score:  (no signs/symptoms of pain noted during  session)    Objective    Cognition:  Cognition  Overall Cognitive Status: Impaired  Arousal/Alertness: Unresponsive to stimuli  Orientation Level:  (does not make any attempts to respond to orientation questions)  Following Commands:  (no command following)  Cognition Comments: minimal changes in arousal with anterior lean and position changes.  Coordination:     Activities of Daily Living: Grooming  Grooming Level of Assistance: Dependent  Grooming Where Assessed: Bed level  Grooming Comments: DEp with [place] assist using L UE to wash face and complete oral hygiene management with Yankauer and Yankauer brush. No changes in arousal with activities.    LE Dressing  LE Dressing: Yes  Sock Level of Assistance: Dependent  LE Dressing Where Assessed: Bed level  LE Dressing Comments: Therapist DEP donned socks this date.  Functional Standing Tolerance:     Bed Mobility/Transfers: Bed Mobility  Bed Mobility: Yes  Bed Mobility 1  Bed Mobility 1: Scooting (toward HOB)  Level of Assistance 1: Dependent (x 2 assist)  Bed Mobility Comments 1: use of drawsheet  Bed Mobility 2  Bed Mobility  2: Rolling right  Level of Assistance 2: Dependent  Bed Mobility Comments 2: use of drawsheet    Therapy/Activity: Therapeutic Activity  Therapeutic Activity Performed: Yes  Therapeutic Activity 1: pt placed in bed in chair position x 15 min with no changes in arousal. Completed grooming tasks to attempt to increase arousal with minimal changes in eye opening. DEP performed anterior weight-shift with drawsheet x 2 trials with poor head control and no righting reactions noted by pt. Also, no changein arousal with position changes in bed. VSS throughout session.    Vision:  Vision Comments: Eyes closed throughout session, with manual eye opening by therapist, pt with minmal blink to threat and no visual tracking, sustained eye opening 1x for ~ 10 seconds throughout session with max stimulation. RN made aware.    Outcome Measures:[facility] Daily  Activity  Putting on and taking off regular lower body clothing: Total  Bathing (including washing, rinsing, drying): Total  Putting on and taking off regular upper body clothing: Total  Toileting, which includes using toilet, bedpan or urinal: Total  Taking care of personal grooming such as brushing teeth: Total  Eating Meals: Total  Daily Activity - Total Score: 6        Education Documentation  Body Mechanics, taught by Nkechi Patel OT at 12/19/2023 12:26 PM.  Learner: Patient  Readiness: Nonacceptance  Method: Explanation, Demonstration  Response: Needs Reinforcement    Precautions, taught by Nkechi Patel OT at 12/19/2023 12:26 PM.  Learner: Patient  Readiness: Nonacceptance  Method: Explanation, Demonstration  Response: Needs Reinforcement    ADL Training, taught by Nkechi Patel OT at 12/19/2023 12:26 PM.  Learner: Patient  Readiness: Nonacceptance  Method: Explanation, Demonstration  Response: Needs Reinforcement    Education Comments  No comments found.        OP EDUCATION:       Goals:  Encounter Problems       Encounter Problems (Active)       ADLs       Patient will perform UB and LB bathing with CGA (Progressing)       Start:  11/22/23    Expected End:  12/25/23            Patient will complete upper body dressing with contact guard assist level of assistance donning all UE clothes while edge of bed  (Progressing)       Start:  11/22/23    Expected End:  12/25/23            Patient will complete lower body dressing with moderate assist level of assistance donning all LE clothes  with LRD while edge of bed and standing (Progressing)       Start:  11/22/23    Expected End:  12/25/23            Patient will complete daily grooming tasks brushing teeth, washing face/hair, and unsupported sitting with SBA and PRN adaptive equipment while edge of bed . (Progressing)       Start:  11/22/23    Expected End:  12/25/23            Patient will complete toileting including hygiene clothing  management/hygiene with moderate assist level of assistance  (Progressing)       Start:  11/22/23    Expected End:  12/25/23               BALANCE       Pt will maintain dynamic sitting balance during ADL task with modified independent level of assistance in order to demonstrate decreased risk of falling and improved postural control. (Progressing)       Start:  11/22/23    Expected End:  12/25/23                           COGNITION/SAFETY       Patient will follow >85% simple commands to allow improved ADL performance with min verbal cueing.  (Progressing)       Start:  11/22/23    Expected End:  12/25/23            Patient will demonstrated orientation x 4 with min verbal cueing. (Progressing)       Start:  11/22/23    Expected End:  12/25/23       ORIENTATION         Patient will sequence a functional task including 2-3 steps with >85% accuracy with min verbal cueing.  (Progressing)       Start:  11/22/23    Expected End:  12/25/23            Patient will attend to functional task with 90% accuracy for >10 min with min verbal cueing.  (Progressing)       Start:  11/22/23    Expected End:  12/25/23                       TRANSFERS       Patient will perform bed mobility contact guard assist level of assistance and bed rails in order to improve safety and independence with mobility (Progressing)       Start:  11/22/23    Expected End:  12/25/23            Patient will complete functional transfer with least restrictive device with minimal assist  level of assistance. (Progressing)       Start:  11/22/23    Expected End:  12/25/23                       VISION       Patient will visually track to all visual fields 100% of the time while performing a functional task with min verbal cueing.  (Progressing)       Start:  11/22/23    Expected End:  12/25/23

## 2023-12-19 NOTE — SIGNIFICANT EVENT
Neurosurgery updated recs: CODE STATUS    Had an extensive discussion with pt's wife, Chanelle regarding patient's current medical status and prognosis.  Given his advanced age, recent cranial bleeding requiring 2 surgeries, recent COVID infection requiring intensive pulmonary resuscitation, and with the fact that the patient used to have a very active life,  patient's wife would like to change his CODE STATUS to DNR/DNI.  She understands that this means that if his heart stops beating or he develops respiratory distress we would not perform chest compression or endotracheal intubation for mechanical ventilation.

## 2023-12-19 NOTE — CARE PLAN
Problem: Pain  Goal: My pain/discomfort is manageable  Outcome: Progressing     Problem: Safety  Goal: Patient will be injury free during hospitalization  Outcome: Progressing  Goal: I will remain free of falls  Outcome: Progressing     Problem: Daily Care  Goal: Daily care needs are met  Outcome: Progressing     Problem: Psychosocial Needs  Goal: Demonstrates ability to cope with hospitalization/illness  Outcome: Progressing  Goal: Collaborate with me, my family, and caregiver to identify my specific goals  Outcome: Progressing     Problem: Discharge Barriers  Goal: My discharge needs are met  Outcome: Progressing     Problem: General Stroke  Goal: Demonstrate improvement in neurological exam throughout the shift  Outcome: Progressing  Goal: Maintain BP within ordered limits throughout shift  Outcome: Progressing  Goal: Participate in treatment (ie., meds, therapy) throughout shift  Outcome: Progressing  Goal: No symptoms of aspiration throughout shift  Outcome: Progressing  Goal: No symptoms of hemorrhage throughout shift  Outcome: Progressing  Goal: Tolerate enteral feeding throughout shift  Outcome: Progressing  Goal: Decreased nausea/vomiting throughout shift  Outcome: Progressing  Goal: Controlled blood glucose throughout shift  Outcome: Progressing  Goal: Out of bed three times today  Outcome: Progressing     Problem: ICU Stroke  Goal: Maintain ICP within ordered limits throughout shift  Outcome: Progressing  Goal: Tolerate EVD clamping trial throughout shift  Outcome: Progressing  Goal: Tolerate ventilator weaning trial during shift  Outcome: Progressing  Goal: Maintain patent airway throughout shift  Outcome: Progressing  Goal: Achieve/maintain targeted sodium level throughout shift  Outcome: Progressing     Problem: Skin  Goal: Decreased wound size/increased tissue granulation at next dressing change  Outcome: Progressing  Goal: Participates in plan/prevention/treatment measures  Outcome:  Progressing  Goal: Prevent/manage excess moisture  Outcome: Progressing  Goal: Prevent/minimize sheer/friction injuries  Outcome: Progressing  Goal: Promote/optimize nutrition  Outcome: Progressing  Goal: Promote skin healing  Outcome: Progressing     Problem: Safety - Medical Restraint  Goal: Remains free of injury from restraints (Restraint for Interference with Medical Device)  Outcome: Progressing  Goal: Free from restraint(s) (Restraint for Interference with Medical Device)  Outcome: Progressing     Problem: Nutrition  Goal: Nutrition support goals are met within 48 hrs  Outcome: Progressing  Goal: Lab values WNL  Outcome: Progressing  Goal: Electrolytes WNL  Outcome: Progressing  Goal: Promote healing  Outcome: Progressing  Goal: Maintain stable weight  Outcome: Progressing  Goal: Reduce weight from edema/fluid  Outcome: Progressing     Problem: Pain - Adult  Goal: Verbalizes/displays adequate comfort level or baseline comfort level  Outcome: Progressing     Problem: Safety - Adult  Goal: Free from fall injury  Outcome: Progressing     Problem: Discharge Planning  Goal: Discharge to home or other facility with appropriate resources  Outcome: Progressing     Problem: Chronic Conditions and Co-morbidities  Goal: Patient's chronic conditions and co-morbidity symptoms are monitored and maintained or improved  Outcome: Progressing     Problem: Fall/Injury  Goal: Not fall by end of shift  Outcome: Progressing  Goal: Be free from injury by end of the shift  Outcome: Progressing  Goal: Verbalize understanding of personal risk factors for fall in the hospital  Outcome: Progressing     Problem: Diabetes  Goal: Increase stability of blood glucose readings by end of shift  Outcome: Progressing  Goal: Maintain electrolyte levels within acceptable range throughout shift  Outcome: Progressing  Goal: Maintain glucose levels >70mg/dl to <250mg/dl throughout shift  Outcome: Progressing  Goal: No changes in neurological exam by  end of shift  Outcome: Progressing   The patient's goals for the shift include      The clinical goals for the shift include fall prevention, blood pressure >160

## 2023-12-19 NOTE — CONSULTS
"Nutrition Follow Up Assessment:   Nutrition Assessment    Reason for Assessment: Length of Stay, Provider Consult Order    Patient is a 83-year-old male initially presenting with SDH. Patient's hospital course complicated by COVID-19+, worsening encephalopathy, worsening lung disease, hypernatremia, LIN, CTA 12/5 with diffuse sclerotic lesions throughout axial and appendicular skeleton, and now UTI.     Since previous Nutrition Therapy Follow-Up (12/13/2023):  - 12/17/2023: patient transferred to ICU with worsening hypoxia; patient with worsening LIN,  started on CTX for UTI  - 12/18/2023: patient's code status changed to DNR/DNI, patient planned to have PEG placement, but rescheduled due to COVID 19+    RDN consulted for Enteral Assessment + Recommendation, visit performed as Nutrition Therapy Follow-Up as patient was seen 12/13/2023. Patient currently receiving Isosource 1.5 @ 20 mL/hour via small-bore feeding tube on NC.     Nutrition History:  Energy Intake: Fair 50-75 %  Food and Nutrient History: poor documentation in Flowsheets, patient is currently receiving 20 mL/hour Isosource 1.5 (50% of goal).  Vitamin/Herbal Supplement Use: Thiamin, Vitamin D  Food Allergies/Intolerances:   NKFA  GI Symptoms:  none noted  Oral Problems:  swallowing difficulty (need for enteral access)     Anthropometrics:  Height: 172.7 cm (5' 7.99\")   Weight: 63.5 kg (139 lb 15.9 oz)   BMI (Calculated): 21.29  IBW/kg (Dietitian Calculated): 70 kg  Percent of IBW: 97 %     Weight History:   11/24/2023: 69.8 kg  12/1/2023: 69.8 kg  12/4/2023: 74.2 kg  12/13/2023: 68.0 kg  12/19/2023: 63.5 kg   Weight Change %:  Weight History / % Weight Change: 6.6% weight loss x 6 days  Significant Weight Loss: Yes  Interpretation of Weight Loss: >2% in 1 week    Nutrition Focused Physical Exam Findings:  defer: patient resting.   Edema:  Edema Location: non-pitting  Physical Findings:  Hair: Negative  Mouth: Negative  Skin: Negative    Nutrition " Significant Labs:  CBC Trend:   Results from last 7 days   Lab Units 12/19/23  0038 12/18/23  1941 12/18/23  1339 12/18/23  0017   WBC AUTO x10*3/uL 7.3 8.1 8.7 9.5   RBC AUTO x10*6/uL 3.55* 3.60* 3.69* 3.88*   HEMOGLOBIN g/dL 10.4* 10.6* 10.9* 11.8*   HEMATOCRIT % 33.4* 32.4* 35.0* 37.0*   MCV fL 94 90 95 95   PLATELETS AUTO x10*3/uL 155 150 150 170   BMP Trend:   Results from last 7 days   Lab Units 12/19/23  0843 12/18/23  1941 12/18/23  1214 12/18/23  0607   GLUCOSE mg/dL 81 145* 221* 257*   CALCIUM mg/dL 8.4* 8.6 8.8 9.3   SODIUM mmol/L 150* 149* 153* 153*   POTASSIUM mmol/L 4.3 4.2 4.2 4.3   CO2 mmol/L 25 28 28 28   CHLORIDE mmol/L 114* 112* 114* 114*   BUN mg/dL 80* 77* 79* 78*   CREATININE mg/dL 2.82* 2.60* 2.56* 2.52*   BG POCT trend:   Results from last 7 days   Lab Units 12/19/23  0828 12/18/23 1954 12/18/23  1145 12/18/23  0834 12/18/23  0538   POCT GLUCOSE mg/dL 69* 132* 236* 243* 227*   Renal Lab Trend:   Results from last 7 days   Lab Units 12/19/23  0843 12/18/23  1941 12/18/23  1214 12/18/23  0607   POTASSIUM mmol/L 4.3 4.2 4.2 4.3   PHOSPHORUS mg/dL 3.5 3.4 3.4 3.6   SODIUM mmol/L 150* 149* 153* 153*   MAGNESIUM mg/dL  --  2.32  --   --    EGFR mL/min/1.73m*2 21* 24* 24* 24*   BUN mg/dL 80* 77* 79* 78*   CREATININE mg/dL 2.82* 2.60* 2.56* 2.52*      Nutrition Specific Medications:  Scheduled Medications  amLODIPine, 10 mg, nasogastric tube, Daily  atorvastatin, 20 mg, oral, Daily  bisacodyl, 10 mg, rectal, Daily  cefTRIAXone, 1 g, intravenous, q24h  cholecalciferol, 4,000 Units, oral, Daily  heparin (porcine), 5,000 Units, subcutaneous, q8h  insulin glargine, 14 Units, subcutaneous, Nightly  insulin regular, 0-15 Units, subcutaneous, TID with meals  levETIRAcetam, 500 mg, intravenous, q12h  levothyroxine, 25 mcg, oral, Daily  pantoprazole, 40 mg, intravenous, BID  polyethylene glycol, 17 g, oral, Daily  thiamine, 100 mg, nasogastric tube, Daily    Continuous Medications  oxygen, , Last Rate: 2  L/min (12/12/23 1713)  sodium chloride, 150 mL/hr, Last Rate: 150 mL/hr (12/19/23 1100)    I/O:   Last BM Date: 12/19/23; Stool Appearance: Soft (12/19/23 0300)    Dietary Orders (From admission, onward)       Start     Ordered    12/19/23 1105  Enteral feeding with NPO Isosource 1.5; 60; 200; Water; Every 4 hours  Diet effective now        Comments: Increase TF regimen by 10cc/hr every 6-8 hours as tolerated until goal of 60cc/hr.  Hold TF for1 hour pre and post synthroid administration   Question Answer Comment   Tube feeding formula: Isosource 1.5    Tube feeding continuous rate (mL/hr): 60    Tube feeding flush (mL): 200    Flush type: Water    Flush frequency: Every 4 hours        12/19/23 1105                  Estimated Needs:   Total Energy Estimated Needs (calories): 2659-8481 calories/day  Method for Estimating Needs: 25-30 calories/kg actual body weight   Total Protein Estimated Needs (g):  g/day  Method for Estimating Needs: 1.2-1.4 g/kg actual body weight  Total Fluid Estimated Needs (mL): 1 mL/calorie or per team     Nutrition Diagnosis   Malnutrition Diagnosis  Patient has Malnutrition Diagnosis:  (intake not thoroughly documented, patient likely with low threshhold given significant weight loss and previous poor enteral infusion, will continue to monitor)    Nutrition Diagnosis  Patient has Nutrition Diagnosis: Yes  Diagnosis Status (1): Ongoing  Nutrition Diagnosis 1: Increased nutrient needs  Related to (1): increased metabolic demand  As Evidenced by (1): s/p re-do L-crani  Additional Assessment Information (1): patient remains with increased needs due to ongoing complicated hospital course.  Additional Nutrition Diagnosis: Diagnosis 2  Diagnosis Status (2): Ongoing  Nutrition Diagnosis 2: Inadequate enteral nutrition infusion  Related to (2): NPO status  As Evidenced by (2): patient no longer NPO, but intake not thoroughly documented, TF currently only running @ 20 mL/hour.     Nutrition  Interventions/Recommendations         Nutrition Prescription:  Individualized Nutrition Prescription Provided for : Increase Isosource 1.5 by 10 mL/hour q8-12h until goal rate of 60 mL/hour x 22 hours is reached.  Increase Isosource 1.5 by 10 mL/hour q6-8h until goal rate of 60 mL/hour x 22 hours is reached.  Hold TF 1 hour pre- and post Synthroid dosage.   Provides: 1320 mL, 1980 calories, 90 g of protein, 1008 mL H2O  FWF to team.  Please document intake in Flowsheets to track trend and nutrition status.  Please obtain daily weights.   Can discontinue Thiamin as patient has been receiving since 12/7/2023.         Nutrition Interventions:   Food and/or Nutrient Delivery Interventions  Interventions: Enteral intake  Goal: > 75% of needs    Coordination of Nutrition Care by a Nutrition Professional  Collaboration and Referral of Nutrition Care: Collaboration by nutrition professional with other nutrition professionals, Collaboration by nutrition professional with other providers    Nutrition Education:   not applicable, patient did not rouse to visit.      Nutrition Monitoring and Evaluation   Food/Nutrient Related History Monitoring  Monitoring and Evaluation Plan: Enteral and parenteral nutrition intake  Criteria: > 75% of needs, TF tolerance    Body Composition/Growth/Weight History  Criteria: maintain    Biochemical Data, Medical Tests and Procedures  Electrolyte and Renal Panel: Magnesium, Phosphorus, Potassium, Sodium          Time Spent/Follow-up Reminder:   Time Spent (min): 90 minutes  Last Date of Nutrition Visit: 12/19/23  Nutrition Follow-Up Needed?: Dietitian to reassess per policy

## 2023-12-19 NOTE — PROGRESS NOTES
"Haile Ayers is a 83 y.o. male on day 27 of admission presenting with Subdural hematoma (CMS/HCC).    Subjective   Code status was changed to DNR/DNI by wife last night       Objective     Physical Exam  Eyes opened to noxious stim  grimacing  Spontaneous x4 (L>R)  On 7L HF NC      Last Recorded Vitals  Blood pressure 127/63, pulse 94, temperature 37 °C (98.6 °F), temperature source Temporal, resp. rate 24, height 1.727 m (5' 7.99\"), weight 63.5 kg (139 lb 15.9 oz), SpO2 95 %.  Intake/Output last 3 Shifts:  I/O last 3 completed shifts:  In: 4341.3 (68.4 mL/kg) [I.V.:3561.3 (56.1 mL/kg); NG/GT:430; IV Piggyback:350]  Out: 1230 (19.4 mL/kg) [Urine:1230 (0.5 mL/kg/hr)]  Weight: 63.5 kg     Relevant Results           This patient currently has cardiac telemetry ordered; if you would like to modify or discontinue the telemetry order, click here to go to the orders activity to modify/discontinue the order.                 Assessment/Plan   Principal Problem:    Subdural hematoma (CMS/HCC)  Active Problems:    HTN (hypertension)    Diabetes mellitus, type 2 (CMS/HCC)    CVA (cerebral vascular accident) (CMS/HCC)    Pt is a 82 yo M w/ h/o CLL, anemia, DM, p/w AMS, found to have UTI, CTH w/ L large chronic SDH     11/22 s/p L crani for SDH evac, CTH POC  11/24 drain dc'd  11/26 EEG neg, dc'd, CXR R lung patchy opacities  11/28 s/p midline  11/29 lethargic, not FC, CTH incr L SDH w 8mm MLS, s/p1g keppra load, CXR stable opacities, rCTH stable, s/p R side wd, s/p keppra load, rrCTH stable L SDH  11/30 s/p redo R crani for SDH evac, CTH good evac, decr MLS  12/1 s/p extubation  12/3 CTH evolving brood products, stable MLS  12/4 CTH stable, s/p angio small L MMA w/ no distal branches, not embolized  12/5 DVT USx4 RLE chronic changes no DVT, CTH stable, CT PE neg for PE, diffuse sclerotic lesions c/f mets, BL pleural effusions, s/p 60 lasix  12/6 s/p SDD dc'd, EEG neg, dc'd, s/p 1mg Bumex, TTE EF 65-70%  12/13 hgb 6.8, s/p 1U " pRBC  12/14 CBC hgb 6.1, s/p 2U pRBC  12/15 sutures dc'd  12/17 patient transferred to NSU due to respiratory failure, stabilized on biPAP, CTH improved L SDH.      PLAN:    DNR/DNI  NSU  Continue biPAP as needed  Goals of care discussion with family   palliative care consulted appreciate recs  Med onc recs - CT CAP when LIN resolved  BIPAP as needed  GI recs-will re engage re PEG after respiratory status stabilized and GOC done  ID recs  Chronic ignacio  Continue CTX (12/23)  Heme recs-cont to hold ibrutinib   SLP recs  PTOT-SNF  SCD, SQH             Ignacio Huber MD

## 2023-12-20 ENCOUNTER — APPOINTMENT (OUTPATIENT)
Dept: RADIOLOGY | Facility: HOSPITAL | Age: 83
DRG: 025 | End: 2023-12-20
Payer: MEDICARE

## 2023-12-20 LAB
ALBUMIN SERPL BCP-MCNC: 3 G/DL (ref 3.4–5)
ALBUMIN SERPL BCP-MCNC: 3 G/DL (ref 3.4–5)
ANION GAP BLDA CALCULATED.4IONS-SCNC: 7 MMO/L (ref 10–25)
ANION GAP BLDA CALCULATED.4IONS-SCNC: 8 MMO/L (ref 10–25)
ANION GAP BLDA CALCULATED.4IONS-SCNC: 8 MMO/L (ref 10–25)
ANION GAP SERPL CALC-SCNC: 13 MMOL/L (ref 10–20)
ANION GAP SERPL CALC-SCNC: 16 MMOL/L (ref 10–20)
BASE EXCESS BLDA CALC-SCNC: 1 MMOL/L (ref -2–3)
BASE EXCESS BLDA CALC-SCNC: 2.3 MMOL/L (ref -2–3)
BASE EXCESS BLDA CALC-SCNC: 3.5 MMOL/L (ref -2–3)
BASOPHILS # BLD AUTO: 0.02 X10*3/UL (ref 0–0.1)
BASOPHILS NFR BLD AUTO: 0.3 %
BODY TEMPERATURE: 37 DEGREES CELSIUS
BODY TEMPERATURE: ABNORMAL
BODY TEMPERATURE: ABNORMAL
BUN SERPL-MCNC: 79 MG/DL (ref 6–23)
BUN SERPL-MCNC: 86 MG/DL (ref 6–23)
CA-I BLDA-SCNC: 1.19 MMOL/L (ref 1.1–1.33)
CA-I BLDA-SCNC: 1.2 MMOL/L (ref 1.1–1.33)
CA-I BLDA-SCNC: 1.22 MMOL/L (ref 1.1–1.33)
CALCIUM SERPL-MCNC: 8.3 MG/DL (ref 8.6–10.6)
CALCIUM SERPL-MCNC: 8.3 MG/DL (ref 8.6–10.6)
CHLORIDE BLDA-SCNC: 112 MMOL/L (ref 98–107)
CHLORIDE BLDA-SCNC: 113 MMOL/L (ref 98–107)
CHLORIDE BLDA-SCNC: 113 MMOL/L (ref 98–107)
CHLORIDE SERPL-SCNC: 111 MMOL/L (ref 98–107)
CHLORIDE SERPL-SCNC: 112 MMOL/L (ref 98–107)
CO2 SERPL-SCNC: 25 MMOL/L (ref 21–32)
CO2 SERPL-SCNC: 26 MMOL/L (ref 21–32)
CREAT SERPL-MCNC: 2.95 MG/DL (ref 0.5–1.3)
CREAT SERPL-MCNC: 3.13 MG/DL (ref 0.5–1.3)
EOSINOPHIL # BLD AUTO: 0.38 X10*3/UL (ref 0–0.4)
EOSINOPHIL NFR BLD AUTO: 5.9 %
ERYTHROCYTE [DISTWIDTH] IN BLOOD BY AUTOMATED COUNT: 14.6 % (ref 11.5–14.5)
GFR SERPL CREATININE-BSD FRML MDRD: 19 ML/MIN/1.73M*2
GFR SERPL CREATININE-BSD FRML MDRD: 20 ML/MIN/1.73M*2
GLUCOSE BLD MANUAL STRIP-MCNC: 178 MG/DL (ref 74–99)
GLUCOSE BLD MANUAL STRIP-MCNC: 196 MG/DL (ref 74–99)
GLUCOSE BLD MANUAL STRIP-MCNC: 203 MG/DL (ref 74–99)
GLUCOSE BLD MANUAL STRIP-MCNC: 214 MG/DL (ref 74–99)
GLUCOSE BLD MANUAL STRIP-MCNC: 218 MG/DL (ref 74–99)
GLUCOSE BLDA-MCNC: 231 MG/DL (ref 74–99)
GLUCOSE BLDA-MCNC: 247 MG/DL (ref 74–99)
GLUCOSE BLDA-MCNC: 265 MG/DL (ref 74–99)
GLUCOSE SERPL-MCNC: 191 MG/DL (ref 74–99)
GLUCOSE SERPL-MCNC: 213 MG/DL (ref 74–99)
HCO3 BLDA-SCNC: 26 MMOL/L (ref 22–26)
HCO3 BLDA-SCNC: 27.3 MMOL/L (ref 22–26)
HCO3 BLDA-SCNC: 27.8 MMOL/L (ref 22–26)
HCT VFR BLD AUTO: 31.1 % (ref 41–52)
HCT VFR BLD EST: 31 % (ref 41–52)
HCT VFR BLD EST: 31 % (ref 41–52)
HCT VFR BLD EST: 33 % (ref 41–52)
HGB BLD-MCNC: 10.3 G/DL (ref 13.5–17.5)
HGB BLDA-MCNC: 10.2 G/DL (ref 13.5–17.5)
HGB BLDA-MCNC: 10.3 G/DL (ref 13.5–17.5)
HGB BLDA-MCNC: 10.9 G/DL (ref 13.5–17.5)
IMM GRANULOCYTES # BLD AUTO: 0.02 X10*3/UL (ref 0–0.5)
IMM GRANULOCYTES NFR BLD AUTO: 0.3 % (ref 0–0.9)
INHALED O2 CONCENTRATION: 21 %
INHALED O2 CONCENTRATION: 28 %
INHALED O2 CONCENTRATION: 52 %
LACTATE BLDA-SCNC: 0.4 MMOL/L (ref 0.4–2)
LACTATE BLDA-SCNC: 0.4 MMOL/L (ref 0.4–2)
LACTATE BLDA-SCNC: 0.6 MMOL/L (ref 0.4–2)
LYMPHOCYTES # BLD AUTO: 1.33 X10*3/UL (ref 0.8–3)
LYMPHOCYTES NFR BLD AUTO: 20.8 %
MCH RBC QN AUTO: 30.6 PG (ref 26–34)
MCHC RBC AUTO-ENTMCNC: 33.1 G/DL (ref 32–36)
MCV RBC AUTO: 92 FL (ref 80–100)
MONOCYTES # BLD AUTO: 0.75 X10*3/UL (ref 0.05–0.8)
MONOCYTES NFR BLD AUTO: 11.7 %
NEUTROPHILS # BLD AUTO: 3.9 X10*3/UL (ref 1.6–5.5)
NEUTROPHILS NFR BLD AUTO: 61 %
NRBC BLD-RTO: 0 /100 WBCS (ref 0–0)
OSMOLALITY SERPL: 336 MOSM/KG (ref 280–300)
OXYHGB MFR BLDA: 95.2 % (ref 94–98)
OXYHGB MFR BLDA: 96.2 % (ref 94–98)
OXYHGB MFR BLDA: 97.9 % (ref 94–98)
PCO2 BLDA: 40 MM HG (ref 38–42)
PCO2 BLDA: 42 MM HG (ref 38–42)
PCO2 BLDA: 43 MM HG (ref 38–42)
PH BLDA: 7.4 PH (ref 7.38–7.42)
PH BLDA: 7.41 PH (ref 7.38–7.42)
PH BLDA: 7.45 PH (ref 7.38–7.42)
PHOSPHATE SERPL-MCNC: 3.8 MG/DL (ref 2.5–4.9)
PHOSPHATE SERPL-MCNC: 3.8 MG/DL (ref 2.5–4.9)
PLATELET # BLD AUTO: 156 X10*3/UL (ref 150–450)
PO2 BLDA: 118 MM HG (ref 85–95)
PO2 BLDA: 79 MM HG (ref 85–95)
PO2 BLDA: 90 MM HG (ref 85–95)
POTASSIUM BLDA-SCNC: 4.6 MMOL/L (ref 3.5–5.3)
POTASSIUM BLDA-SCNC: 4.6 MMOL/L (ref 3.5–5.3)
POTASSIUM BLDA-SCNC: 4.8 MMOL/L (ref 3.5–5.3)
POTASSIUM SERPL-SCNC: 4.4 MMOL/L (ref 3.5–5.3)
POTASSIUM SERPL-SCNC: 4.5 MMOL/L (ref 3.5–5.3)
RBC # BLD AUTO: 3.37 X10*6/UL (ref 4.5–5.9)
SAO2 % BLDA: 100 % (ref 94–100)
SAO2 % BLDA: 97 % (ref 94–100)
SAO2 % BLDA: 98 % (ref 94–100)
SODIUM BLDA-SCNC: 141 MMOL/L (ref 136–145)
SODIUM BLDA-SCNC: 143 MMOL/L (ref 136–145)
SODIUM BLDA-SCNC: 144 MMOL/L (ref 136–145)
SODIUM SERPL-SCNC: 147 MMOL/L (ref 136–145)
SODIUM SERPL-SCNC: 147 MMOL/L (ref 136–145)
WBC # BLD AUTO: 6.4 X10*3/UL (ref 4.4–11.3)

## 2023-12-20 PROCEDURE — 2500000002 HC RX 250 W HCPCS SELF ADMINISTERED DRUGS (ALT 637 FOR MEDICARE OP, ALT 636 FOR OP/ED)

## 2023-12-20 PROCEDURE — 2500000001 HC RX 250 WO HCPCS SELF ADMINISTERED DRUGS (ALT 637 FOR MEDICARE OP): Performed by: STUDENT IN AN ORGANIZED HEALTH CARE EDUCATION/TRAINING PROGRAM

## 2023-12-20 PROCEDURE — 2500000004 HC RX 250 GENERAL PHARMACY W/ HCPCS (ALT 636 FOR OP/ED)

## 2023-12-20 PROCEDURE — 97110 THERAPEUTIC EXERCISES: CPT | Mod: GP

## 2023-12-20 PROCEDURE — 96372 THER/PROPH/DIAG INJ SC/IM: CPT | Performed by: STUDENT IN AN ORGANIZED HEALTH CARE EDUCATION/TRAINING PROGRAM

## 2023-12-20 PROCEDURE — 2500000004 HC RX 250 GENERAL PHARMACY W/ HCPCS (ALT 636 FOR OP/ED): Performed by: STUDENT IN AN ORGANIZED HEALTH CARE EDUCATION/TRAINING PROGRAM

## 2023-12-20 PROCEDURE — 80069 RENAL FUNCTION PANEL: CPT

## 2023-12-20 PROCEDURE — 2500000001 HC RX 250 WO HCPCS SELF ADMINISTERED DRUGS (ALT 637 FOR MEDICARE OP)

## 2023-12-20 PROCEDURE — 84132 ASSAY OF SERUM POTASSIUM: CPT

## 2023-12-20 PROCEDURE — 70450 CT HEAD/BRAIN W/O DYE: CPT | Performed by: RADIOLOGY

## 2023-12-20 PROCEDURE — 70450 CT HEAD/BRAIN W/O DYE: CPT

## 2023-12-20 PROCEDURE — 84132 ASSAY OF SERUM POTASSIUM: CPT | Performed by: STUDENT IN AN ORGANIZED HEALTH CARE EDUCATION/TRAINING PROGRAM

## 2023-12-20 PROCEDURE — 82947 ASSAY GLUCOSE BLOOD QUANT: CPT

## 2023-12-20 PROCEDURE — 36415 COLL VENOUS BLD VENIPUNCTURE: CPT

## 2023-12-20 PROCEDURE — 85025 COMPLETE CBC W/AUTO DIFF WBC: CPT

## 2023-12-20 PROCEDURE — 71045 X-RAY EXAM CHEST 1 VIEW: CPT | Performed by: RADIOLOGY

## 2023-12-20 PROCEDURE — 83930 ASSAY OF BLOOD OSMOLALITY: CPT

## 2023-12-20 PROCEDURE — 71045 X-RAY EXAM CHEST 1 VIEW: CPT

## 2023-12-20 PROCEDURE — 99233 SBSQ HOSP IP/OBS HIGH 50: CPT | Performed by: STUDENT IN AN ORGANIZED HEALTH CARE EDUCATION/TRAINING PROGRAM

## 2023-12-20 PROCEDURE — 31720 CLEARANCE OF AIRWAYS: CPT

## 2023-12-20 PROCEDURE — 2060000001 HC INTERMEDIATE ICU ROOM DAILY

## 2023-12-20 PROCEDURE — C9113 INJ PANTOPRAZOLE SODIUM, VIA: HCPCS | Performed by: STUDENT IN AN ORGANIZED HEALTH CARE EDUCATION/TRAINING PROGRAM

## 2023-12-20 RX ADMIN — INSULIN HUMAN 2 UNITS: 100 INJECTION, SOLUTION PARENTERAL at 13:48

## 2023-12-20 RX ADMIN — INSULIN GLARGINE 10 UNITS: 100 INJECTION, SOLUTION SUBCUTANEOUS at 20:49

## 2023-12-20 RX ADMIN — HEPARIN SODIUM 5000 UNITS: 5000 INJECTION INTRAVENOUS; SUBCUTANEOUS at 23:59

## 2023-12-20 RX ADMIN — AMLODIPINE BESYLATE 10 MG: 10 TABLET ORAL at 08:33

## 2023-12-20 RX ADMIN — HEPARIN SODIUM 5000 UNITS: 5000 INJECTION INTRAVENOUS; SUBCUTANEOUS at 08:34

## 2023-12-20 RX ADMIN — BISACODYL 10 MG: 10 SUPPOSITORY RECTAL at 08:33

## 2023-12-20 RX ADMIN — HEPARIN SODIUM 5000 UNITS: 5000 INJECTION INTRAVENOUS; SUBCUTANEOUS at 17:00

## 2023-12-20 RX ADMIN — LEVETIRACETAM 500 MG: 5 INJECTION INTRAVENOUS at 04:48

## 2023-12-20 RX ADMIN — ATORVASTATIN CALCIUM 20 MG: 20 TABLET, FILM COATED ORAL at 08:33

## 2023-12-20 RX ADMIN — CEFTRIAXONE SODIUM 1 G: 1 INJECTION, SOLUTION INTRAVENOUS at 05:50

## 2023-12-20 RX ADMIN — LEVETIRACETAM 500 MG: 5 INJECTION INTRAVENOUS at 17:00

## 2023-12-20 RX ADMIN — HEPARIN SODIUM 5000 UNITS: 5000 INJECTION INTRAVENOUS; SUBCUTANEOUS at 02:07

## 2023-12-20 RX ADMIN — LEVOTHYROXINE SODIUM 25 MCG: 25 TABLET ORAL at 05:50

## 2023-12-20 RX ADMIN — THIAMINE HCL TAB 100 MG 100 MG: 100 TAB at 08:33

## 2023-12-20 RX ADMIN — POLYETHYLENE GLYCOL 3350 17 G: 17 POWDER, FOR SOLUTION ORAL at 08:33

## 2023-12-20 RX ADMIN — INSULIN HUMAN 2 UNITS: 100 INJECTION, SOLUTION PARENTERAL at 10:13

## 2023-12-20 RX ADMIN — PANTOPRAZOLE SODIUM 40 MG: 40 INJECTION, POWDER, FOR SOLUTION INTRAVENOUS at 20:49

## 2023-12-20 RX ADMIN — Medication 4000 UNITS: at 08:33

## 2023-12-20 RX ADMIN — INSULIN HUMAN 1 UNITS: 100 INJECTION, SOLUTION PARENTERAL at 17:02

## 2023-12-20 RX ADMIN — SODIUM CHLORIDE 50 ML/HR: 4.5 INJECTION, SOLUTION INTRAVENOUS at 17:00

## 2023-12-20 RX ADMIN — PANTOPRAZOLE SODIUM 40 MG: 40 INJECTION, POWDER, FOR SOLUTION INTRAVENOUS at 08:33

## 2023-12-20 ASSESSMENT — COGNITIVE AND FUNCTIONAL STATUS - GENERAL
MOVING TO AND FROM BED TO CHAIR: TOTAL
TURNING FROM BACK TO SIDE WHILE IN FLAT BAD: TOTAL
STANDING UP FROM CHAIR USING ARMS: TOTAL
WALKING IN HOSPITAL ROOM: TOTAL
MOVING FROM LYING ON BACK TO SITTING ON SIDE OF FLAT BED WITH BEDRAILS: TOTAL
CLIMB 3 TO 5 STEPS WITH RAILING: TOTAL
MOBILITY SCORE: 6

## 2023-12-20 ASSESSMENT — PAIN - FUNCTIONAL ASSESSMENT: PAIN_FUNCTIONAL_ASSESSMENT: UNABLE TO SELF-REPORT

## 2023-12-20 NOTE — PROGRESS NOTES
"Haile Ayers is a 83 y.o. male on day 28 of admission presenting with Subdural hematoma (CMS/HCC).    Subjective   Patient was seen and examined.  Unresponsive to sternal rub.  Objective   Constitutional: Elderly frail  male, ill-appearing, breathing through his mouth.  Skin/Hair: dry skin.  HEENT: Eyes closed, Dobbhoff tube in place  Cardiovascular: Rapid heart rate  Respiratory: On nasal cannula  Abdomen: Distended  Psych : Unable to assess      Last Recorded Vitals  Blood pressure 141/73, pulse 93, temperature 37.1 °C (98.8 °F), temperature source Temporal, resp. rate (!) 29, height 1.727 m (5' 7.99\"), weight 69 kg (152 lb 1.9 oz), SpO2 93 %.  Intake/Output last 3 Shifts:  I/O last 3 completed shifts:  In: 4246.3 (61.5 mL/kg) [I.V.:1796.3 (26 mL/kg); NG/GT:2100; IV Piggyback:350]  Out: 1765 (25.6 mL/kg) [Urine:1765 (0.7 mL/kg/hr)]  Weight: 69 kg     Relevant Results  Results from last 7 days   Lab Units 12/20/23  0859 12/20/23  0525 12/20/23  0002 12/19/23  2342 12/19/23  1640 12/19/23  1123 12/19/23  0843 12/18/23  1954 12/18/23  1941 12/18/23  1214 12/18/23  0834 12/18/23  0607   POCT GLUCOSE mg/dL 203* 196*  --  183* 130* 98  --    < >  --   --    < >  --    GLUCOSE mg/dL  --   --  191*  --   --   --  81  --  145* 221*  --  257*    < > = values in this interval not displayed.     Lab Results   Component Value Date     (H) 12/20/2023     (H) 12/19/2023     (H) 12/18/2023     (H) 12/18/2023     (H) 12/18/2023     (H) 12/18/2023     (H) 12/17/2023     (H) 12/17/2023     (H) 12/17/2023     (H) 12/17/2023       Assessment/Plan   Principal Problem:    Subdural hematoma (CMS/HCC)  Active Problems:    HTN (hypertension)    Diabetes mellitus, type 2 (CMS/HCC)    CVA (cerebral vascular accident) (CMS/HCC)    Haile Ayers is a 83 y.o. patient with PMHx of HTN , DM type 2, hx of CVA, hx of anemia,  CLL (unknown CLL-IPI score; being followed by  " [facility] at [facility]) on Ibrutinib who presented to the hospital on 11/22 with AMS 2/2 UTI also found to have L large chronic SDH with midline shift. He underwent L crani for SDH evacuation on 11/22 and re-evacuated on 11/30, extubated on 12/1.  Had worsening neurological exam on 12/4 and transferred to NSU. CTA on 12/5 incidentally showed diffuse sclerotic lesions throughout axial and appendicular skeleton + ascities.  Tested positive for COVID on 12/8.  Patient with continued worsening mental status, endocrinology team consulted for elevated TSH as part of worsening encephalopathy, workup showing worsening lung disease, hypernatremia and LIN, patient was initially planned for PEG on 12/18, currently transferred to the NSU for worsening respiratory status          Hypothyroidism:  -------------------  -12/16  TSH 10.4, Ft4: 0.85, Anti-TPO: neg     Patient likely has hypothyroidism vs non-thyroidal illness.  However unlikely that his mild hypothyroidism is the etiology of his worsening mental status.  Given current worsening clinical status, would recommend:     -Continue with levothyroxine 25 mcg daily while holding tube feeds to 1 hour before and 1 hour after  -Repeat TFTs on 12/22/2022     Type 2 diabetes:  --------------------  -Not enough data in chart, no A1c?  -At home on metformin 500 mg 1 tablet orally twice daily per med rec upon admission.  Patient hyperglycemic, showing some insulin resistance  -Tube feeds Isosource 1.5 were initiated with a goal rate of 60 mL/h.  Patient will reach his goal later today (total carbs over 24 hours would be approximately 250)    -Would hold on rechecking A1c given recent blood transfusion  -Continue glargine to 10 units every 24 hours  -Continue sliding scale regular insulin to #1 every 6 hours  [1 units for every 50 above 150] every 6 hours, while NPO or on tube feeds given severe LIN  -Please add 2 units of regular insulin every 6 hours  -Accucheck q6 ,while NPO or on  tube feeds  -Hypoglycemia protocol        Moderate hypernatremia:  -------------------------------  -Likely isovolume depletion especially given his rising BUN and creatinine.  With that being said patient is at risk of both nephrogenic and central diabetes insipidus given his LIN and his recent subdural hematoma status postevacuation x 2.    -His urine is moderately concentrated indicating possible partial DI  -Would aim for goal of sodium: 145 in the next 24 hours, avoid overcorrection, especially in the setting of recent intracranial procedure and subdural hematoma  -Sodium trending down.  Total intake in the past 24 hours was over 4 L, patient has made 1200 mL of urine.  His free water deficit correcting to a sodium of 145 would be 0.5 L.  Given insensible losses would recommend to keep fluid intake as is   -- continue 1/2 NS at a rate of 50 mL/h  --continue  every 4 hours  --RFP, serum osmolality and urine osmolality every 8 hours        Plan communicated to primary team      Case discussed with Dr. Sommers who agrees with the management plan.

## 2023-12-20 NOTE — PROGRESS NOTES
Physical Therapy    Physical Therapy Treatment    Patient Name: Haile Ayers  MRN: [id number]  Today's Date: 12/20/2023  Time Calculation  Start Time: 1319  Stop Time: 1342  Time Calculation (min): 23 min       Assessment/Plan   PT Assessment  End of Session Communication: Bedside nurse  End of Session Patient Position: Bed, 3 rail up, Alarm off, caregiver present  PT Plan  Inpatient/Swing Bed or Outpatient: Inpatient  PT Plan  Treatment/Interventions: Bed mobility, Balance training, Neuromuscular re-education, Strengthening, Endurance training, Range of motion, Therapeutic exercise, Therapeutic activity, Home exercise program, Positioning, Postural re-education  PT Plan: Skilled PT  PT Frequency: 3 times per week  PT Discharge Recommendations: Moderate intensity level of continued care  Equipment Recommended upon Discharge:  (TBD)  PT Recommended Transfer Status:  (unable)  PT - OK to Discharge: Yes      General Visit Information:   PT  Visit  PT Received On: 12/20/23  Response to Previous Treatment: Patient unable to report, no changes reported from family or staff  Prior to Session Communication: Bedside nurse  Patient Position Received: Bed, 3 rail up, Alarm off, not on at start of session  Family/Caregiver Present: No  Caregiver Feedback: n/a     Subjective   Precautions:  Precautions  Hearing/Visual Limitations: [place]  Medical Precautions: Fall precautions  Precautions Comment: BP goal normotension  Vital Signs:  Vital Signs  Heart Rate: 91 (97 max, 92 end)  SpO2: 91 % (99% end)  BP: 140/71 (134/68 (86))  MAP (mmHg): 90  BP Method: Automatic    Objective   Pain:  Pain Assessment  Pain Assessment: Unable to self-report (Grimaces with RUE shoulder PROM. Otherwise no signs of pain.)  Cognition:  Cognition  Overall Cognitive Status: Impaired (no attempts to verbalize)  Arousal/Alertness:  (eyes closed throughout session. Fixed gaze with passive opening.)  Following Commands:  (Does not follow  commands.)  Lines/Tubes/Drains:  Midline 11/28/23 Single lumen Right Brachial vein (Active)   Number of days: 22       Urethral Catheter 16 Fr. (Active)   Number of days: 4       NG/OG/Feeding Tube Other (Comment) Right nostril 12 Fr. (Active)   Number of days: 9       PT Treatments:  Therapeutic Exercise  Therapeutic Exercise Performed: Yes  Therapeutic Exercise Activity 1: PROM, bilaterally, 10x each: Shoulder internal/external rotation, shoulder flexion, elbow flexion/extension, forearm pronation/supination, hip flexion, hip internal/external rotation, knee flexion/extension, ankle plantar/dorsiflexion  Therapeutic Activity  Therapeutic Activity Performed: Yes  Therapeutic Activity 1: 15 min dependent chair position, VSS. No change in alertness with upright positioning.     Bed Mobility  Bed Mobility: No  Ambulation/Gait Training  Ambulation/Gait Training Performed: No  Transfers  Transfer: No             Outcome Measures:  [facility] Basic Mobility  Turning from your back to your side while in a flat bed without using bedrails: Total  Moving from lying on your back to sitting on the side of a flat bed without using bedrails: Total  Moving to and from bed to chair (including a wheelchair): Total  Standing up from a chair using your arms (e.g. wheelchair or bedside chair): Total  To walk in hospital room: Total  Climbing 3-5 steps with railing: Total  Basic Mobility - Total Score: 6           FSS-ICU  Ambulation: Unable to attempt due to weakness  Rolling: Total assistance (performs 25% or requires another person)  Sitting: Total assistance (performs 25% or requires another person)  Transfer Sit-to-Stand: Total assistance (performs 25% or requires another person)  Transfer Supine-to-Sit: Total assistance (performs 25% or requires another person)  Total Score: 4  ICU Mobility Screen  Early Mobility/Exercise Safety Screen: Proceed with mobilization - No exclusion criteria met             Education Documentation  No  documentation found.  Education Comments  No comments found.          OP EDUCATION:       Encounter Problems       Encounter Problems (Active)       Balance       Patient will stand with UE support of LRD and </= MOD A x1 at least 3 min to improve balance required for self-care tasks.  (Not met)       Start:  11/22/23    Expected End:  12/06/23    Resolved:  12/07/23    Updated to: Patient will sit at edge of bed at least 15 min and perform dynamic reaching activities with </= MOD A x1 and no excessive sway or loss of balance.    Update reason: change in pt condition          Patient will sit at edge of bed at least 15 min and perform dynamic reaching activities with </= MOD A x1 and no excessive sway or loss of balance. (Not met)       Start:  12/07/23    Expected End:  12/20/23    Resolved:  12/15/23    Updated to: static sit at edge of bed at least 15 min with UE support and CGA no LOB    Update reason: duplicate goal             Patient will sit at edge of bed at least 15 min and perform dynamic reaching activities with </= MOD A x1 and no excessive sway or loss of balance. (Not met)       Start:  12/07/23    Expected End:  12/29/23    Resolved:  12/15/23    Updated to: sit at edge of bed at least 15 min and perform dynamic reaching activities with </= MOD A x1 and no excessive sway or loss of balance.    Update reason: time for update             static sit at edge of bed at least 15 min with UE support and CGA no LOB (Not Progressing)       Start:  12/15/23    Expected End:  12/29/23                sit at edge of bed at least 15 min and perform dynamic reaching activities with </= MOD A x1 and no excessive sway or loss of balance. (Not Progressing)       Start:  12/15/23    Expected End:  12/29/23                   Mobility       Patient will ambulate at least 20 ft. with </= MOD A x1 and LRD to improve tolerance of household distances.  (Not met)       Start:  11/22/23    Expected End:  12/20/23    Resolved:   12/07/23    Updated to: Patient will ambulate at least 5 ft. with </= MAX A x2 and LRD to improve tolerance of household distances.    Update reason: change in pt condition          Patient will perform bed mobility with </= MOD A x1 to reduce risk of developing decubitus ulcers.  (Not met)       Start:  11/22/23    Expected End:  12/29/23    Resolved:  12/15/23    Updated to: roll with min/mod A using rail; sidely to/from sit (HOB elevated 50 degrees, use of rail) with min A    Update reason: time for update         Patient will perform home exercise program as prescribed with cues as needed.   (Not met)       Start:  11/22/23    Expected End:  12/20/23    Resolved:  12/07/23    Updated to: BLE >/= 3+/5    Update reason: change in pt condition          Patient will ambulate at least 5 ft. with </= MAX A x2 and LRD to improve tolerance of household distances. (Not met)       Start:  12/07/23    Expected End:  12/20/23    Resolved:  12/15/23    Updated to: pt will follow >80% of simple 1 step commands without additional cues    Update reason: not progressing             BLE >/= 3+/5 (Not met)       Start:  12/07/23    Expected End:  12/29/23    Resolved:  12/15/23    Updated to: bilateral hip/knee flexion and quads >3    Update reason: time for update             Patient will ambulate at least 5 ft. with </= MAX A x2 and LRD to improve tolerance of household distances. (Not met)       Start:  12/07/23    Expected End:  12/20/23    Resolved:  12/15/23    Updated to: pt will follow >80% of simple 1 step commands without additional cueing    Update reason: time for update             pt will follow >80% of simple 1 step commands without additional cues (Not Progressing)       Start:  12/15/23    Expected End:  12/29/23                roll with min/mod A using rail; sidely to/from sit (HOB elevated 50 degrees, use of rail) with min A (Not Progressing)       Start:  12/15/23    Expected End:  12/29/23                 bilateral hip/knee flexion and quads >3 (Not Progressing)       Start:  12/15/23    Expected End:  12/29/23                   Pain - Adult          Transfers       Patient will perform sit to stand and stand to sit transfers with </= MAX A x1 and LRD to increase functional strength.  (Not met)       Start:  11/22/23    Expected End:  12/06/23    Resolved:  12/07/23    Updated to: Patient will perform sit to stand and stand to sit transfers with </= MAX A x2 and LRD to increase functional strength.    Update reason: change in pt condition          Patient will perform sit to stand and stand to sit transfers with </= MAX A x2 and LRD to increase functional strength. (Not Progressing)       Start:  12/07/23    Expected End:  12/29/23 12/20/23 at 3:30 PM   Lamar Alvarez, PT   Rehab Office: [phone]

## 2023-12-20 NOTE — CARE PLAN
The patient's goals for the shift include  stable neuro exam     The clinical goals for the shift include Pt will maintain map > 65 during the shift        Problem: Pain  Goal: My pain/discomfort is manageable  Outcome: Progressing     Problem: Safety  Goal: Patient will be injury free during hospitalization  Outcome: Progressing  Goal: I will remain free of falls  Outcome: Progressing     Problem: Daily Care  Goal: Daily care needs are met  Outcome: Progressing     Problem: Psychosocial Needs  Goal: Demonstrates ability to cope with hospitalization/illness  Outcome: Progressing  Goal: Collaborate with me, my family, and caregiver to identify my specific goals  Outcome: Progressing     Problem: Discharge Barriers  Goal: My discharge needs are met  Outcome: Progressing     Problem: General Stroke  Goal: Demonstrate improvement in neurological exam throughout the shift  Outcome: Progressing  Goal: Maintain BP within ordered limits throughout shift  Outcome: Progressing  Goal: Participate in treatment (ie., meds, therapy) throughout shift  Outcome: Progressing  Goal: No symptoms of aspiration throughout shift  Outcome: Progressing  Goal: No symptoms of hemorrhage throughout shift  Outcome: Progressing  Goal: Tolerate enteral feeding throughout shift  Outcome: Progressing  Goal: Decreased nausea/vomiting throughout shift  Outcome: Progressing  Goal: Controlled blood glucose throughout shift  Outcome: Progressing  Goal: Out of bed three times today  Outcome: Progressing     Problem: Skin  Goal: Decreased wound size/increased tissue granulation at next dressing change  Outcome: Progressing  Goal: Participates in plan/prevention/treatment measures  Outcome: Progressing  Goal: Prevent/manage excess moisture  Outcome: Progressing  Goal: Prevent/minimize sheer/friction injuries  Outcome: Progressing  Goal: Promote/optimize nutrition  Outcome: Progressing  Goal: Promote skin healing  Outcome: Progressing     Problem: Safety -  Medical Restraint  Goal: Remains free of injury from restraints (Restraint for Interference with Medical Device)  Outcome: Progressing  Goal: Free from restraint(s) (Restraint for Interference with Medical Device)  Outcome: Progressing     Problem: Nutrition  Goal: Nutrition support goals are met within 48 hrs  Outcome: Progressing  Goal: Lab values WNL  Outcome: Progressing  Goal: Electrolytes WNL  Outcome: Progressing  Goal: Promote healing  Outcome: Progressing  Goal: Maintain stable weight  Outcome: Progressing  Goal: Reduce weight from edema/fluid  Outcome: Progressing     Problem: Pain - Adult  Goal: Verbalizes/displays adequate comfort level or baseline comfort level  Outcome: Progressing     Problem: Safety - Adult  Goal: Free from fall injury  Outcome: Progressing     Problem: Discharge Planning  Goal: Discharge to home or other facility with appropriate resources  Outcome: Progressing     Problem: Chronic Conditions and Co-morbidities  Goal: Patient's chronic conditions and co-morbidity symptoms are monitored and maintained or improved  Outcome: Progressing     Problem: Fall/Injury  Goal: Not fall by end of shift  Outcome: Progressing  Goal: Be free from injury by end of the shift  Outcome: Progressing  Goal: Verbalize understanding of personal risk factors for fall in the hospital  Outcome: Progressing     Problem: Diabetes  Goal: Increase stability of blood glucose readings by end of shift  Outcome: Progressing  Goal: Maintain electrolyte levels within acceptable range throughout shift  Outcome: Progressing  Goal: Maintain glucose levels >70mg/dl to <250mg/dl throughout shift  Outcome: Progressing  Goal: No changes in neurological exam by end of shift  Outcome: Progressing

## 2023-12-20 NOTE — PROGRESS NOTES
"Haile Ayers is a 83 y.o. male on day 28 of admission presenting with Subdural hematoma (CMS/HCC).    Subjective   Worsened neuro exam overnight    Objective     Physical Exam  ECNS  RUE flaccid  RLE w/d (int spont per RN)  LUE spont/localizing  LLE w/d (int spont per RN)  On 2L NC      Last Recorded Vitals  Blood pressure 144/65, pulse 94, temperature 37.1 °C (98.8 °F), temperature source Temporal, resp. rate (!) 31, height 1.727 m (5' 7.99\"), weight 69 kg (152 lb 1.9 oz), SpO2 92 %.  Intake/Output last 3 Shifts:  I/O last 3 completed shifts:  In: 4246.3 (61.5 mL/kg) [I.V.:1796.3 (26 mL/kg); NG/GT:2100; IV Piggyback:350]  Out: 1765 (25.6 mL/kg) [Urine:1765 (0.7 mL/kg/hr)]  Weight: 69 kg     Relevant Results           This patient currently has cardiac telemetry ordered; if you would like to modify or discontinue the telemetry order, click here to go to the orders activity to modify/discontinue the order.                 Assessment/Plan   Principal Problem:    Subdural hematoma (CMS/HCC)  Active Problems:    HTN (hypertension)    Diabetes mellitus, type 2 (CMS/HCC)    CVA (cerebral vascular accident) (CMS/HCC)    Pt is a 84 yo M w/ h/o CLL, anemia, DM, p/w AMS, found to have UTI, CTH w/ L large chronic SDH     11/22 s/p L crani for SDH evac, CTH POC  11/24 drain dc'd  11/26 EEG neg, dc'd, CXR R lung patchy opacities  11/28 s/p midline  11/29 lethargic, not FC, CTH incr L SDH w 8mm MLS, s/p1g keppra load, CXR stable opacities, rCTH stable, s/p R side wd, s/p keppra load, rrCTH stable L SDH  11/30 s/p redo R crani for SDH evac, CTH good evac, decr MLS  12/1 s/p extubation  12/3 CTH evolving brood products, stable MLS  12/4 CTH stable, s/p angio small L MMA w/ no distal branches, not embolized  12/5 DVT USx4 RLE chronic changes no DVT, CTH stable, CT PE neg for PE, diffuse sclerotic lesions c/f mets, BL pleural effusions, s/p 60 lasix  12/6 s/p SDD dc'd, EEG neg, dc'd, s/p 1mg Bumex, TTE EF 65-70%  12/13 hgb 6.8, s/p " 1U pRBC  12/14 CBC hgb 6.1, s/p 2U pRBC  12/15 sutures dc'd  12/17 patient transferred to NSU due to respiratory failure, stabilized on biPAP, CTH improved L SDH.   12/20 worsened neuro exam, CTH stable     PLAN:    DNR/DNI  RITCHIE  Continue biPAP as needed  Goals of care discussion with family   Supportive onc consulted appreciate recs  Med onc recs - CT CAP when LIN resolved  BIPAP as needed  GI recs-will re engage re PEG after respiratory status stabilized and GOC done  ID recs  Chronic ignacio  Continue CTX (12/23)  Heme recs-cont to hold ibrutinib   SLP recs  PTOT-SNF  SCD, SQH             Fidel Smith MD

## 2023-12-20 NOTE — PROGRESS NOTES
Patient is discussed during interdisciplinary round today.   Team members Present : Interdisciplinary team   Plan per medical team : Pt is not medically ready to discharge currently.   Planned disposition : SNF  Discharge destination : TBD    Medical team consulted pt's wife regarding pt's code, and wife decided to DNR/DNI. Palliative care consult to be ordered and ongoing GOC. SW will continue to follow and assist with discharge plan.     HUEY Quinteros, LSW

## 2023-12-21 LAB
ALBUMIN SERPL BCP-MCNC: 2.8 G/DL (ref 3.4–5)
ALBUMIN SERPL BCP-MCNC: 3 G/DL (ref 3.4–5)
ANION GAP SERPL CALC-SCNC: 14 MMOL/L (ref 10–20)
ANION GAP SERPL CALC-SCNC: 15 MMOL/L (ref 10–20)
BASOPHILS # BLD AUTO: 0.01 X10*3/UL (ref 0–0.1)
BASOPHILS NFR BLD AUTO: 0.2 %
BUN SERPL-MCNC: 83 MG/DL (ref 6–23)
BUN SERPL-MCNC: 85 MG/DL (ref 6–23)
CALCIUM SERPL-MCNC: 8 MG/DL (ref 8.6–10.6)
CALCIUM SERPL-MCNC: 8.2 MG/DL (ref 8.6–10.6)
CHLORIDE SERPL-SCNC: 111 MMOL/L (ref 98–107)
CHLORIDE SERPL-SCNC: 113 MMOL/L (ref 98–107)
CO2 SERPL-SCNC: 25 MMOL/L (ref 21–32)
CO2 SERPL-SCNC: 25 MMOL/L (ref 21–32)
CREAT SERPL-MCNC: 3.23 MG/DL (ref 0.5–1.3)
CREAT SERPL-MCNC: 3.26 MG/DL (ref 0.5–1.3)
EOSINOPHIL # BLD AUTO: 0.29 X10*3/UL (ref 0–0.4)
EOSINOPHIL NFR BLD AUTO: 4.6 %
ERYTHROCYTE [DISTWIDTH] IN BLOOD BY AUTOMATED COUNT: 14.3 % (ref 11.5–14.5)
GFR SERPL CREATININE-BSD FRML MDRD: 18 ML/MIN/1.73M*2
GFR SERPL CREATININE-BSD FRML MDRD: 18 ML/MIN/1.73M*2
GLUCOSE BLD MANUAL STRIP-MCNC: 215 MG/DL (ref 74–99)
GLUCOSE BLD MANUAL STRIP-MCNC: 230 MG/DL (ref 74–99)
GLUCOSE BLD MANUAL STRIP-MCNC: 239 MG/DL (ref 74–99)
GLUCOSE BLD MANUAL STRIP-MCNC: 239 MG/DL (ref 74–99)
GLUCOSE BLD MANUAL STRIP-MCNC: 263 MG/DL (ref 74–99)
GLUCOSE SERPL-MCNC: 235 MG/DL (ref 74–99)
GLUCOSE SERPL-MCNC: 255 MG/DL (ref 74–99)
HCT VFR BLD AUTO: 29.8 % (ref 41–52)
HGB BLD-MCNC: 10 G/DL (ref 13.5–17.5)
IMM GRANULOCYTES # BLD AUTO: 0.02 X10*3/UL (ref 0–0.5)
IMM GRANULOCYTES NFR BLD AUTO: 0.3 % (ref 0–0.9)
LYMPHOCYTES # BLD AUTO: 1.38 X10*3/UL (ref 0.8–3)
LYMPHOCYTES NFR BLD AUTO: 21.8 %
MCH RBC QN AUTO: 29.7 PG (ref 26–34)
MCHC RBC AUTO-ENTMCNC: 33.6 G/DL (ref 32–36)
MCV RBC AUTO: 88 FL (ref 80–100)
MONOCYTES # BLD AUTO: 0.77 X10*3/UL (ref 0.05–0.8)
MONOCYTES NFR BLD AUTO: 12.1 %
NEUTROPHILS # BLD AUTO: 3.87 X10*3/UL (ref 1.6–5.5)
NEUTROPHILS NFR BLD AUTO: 61 %
NRBC BLD-RTO: 0 /100 WBCS (ref 0–0)
OSMOLALITY SERPL: 347 MOSM/KG (ref 280–300)
OSMOLALITY UR: 419 MOSM/KG (ref 200–1200)
PHOSPHATE SERPL-MCNC: 3.9 MG/DL (ref 2.5–4.9)
PHOSPHATE SERPL-MCNC: 3.9 MG/DL (ref 2.5–4.9)
PLATELET # BLD AUTO: 148 X10*3/UL (ref 150–450)
POTASSIUM SERPL-SCNC: 4.7 MMOL/L (ref 3.5–5.3)
POTASSIUM SERPL-SCNC: 5.1 MMOL/L (ref 3.5–5.3)
RBC # BLD AUTO: 3.37 X10*6/UL (ref 4.5–5.9)
SODIUM SERPL-SCNC: 145 MMOL/L (ref 136–145)
SODIUM SERPL-SCNC: 148 MMOL/L (ref 136–145)
WBC # BLD AUTO: 6.3 X10*3/UL (ref 4.4–11.3)

## 2023-12-21 PROCEDURE — 2500000004 HC RX 250 GENERAL PHARMACY W/ HCPCS (ALT 636 FOR OP/ED): Performed by: STUDENT IN AN ORGANIZED HEALTH CARE EDUCATION/TRAINING PROGRAM

## 2023-12-21 PROCEDURE — 2060000001 HC INTERMEDIATE ICU ROOM DAILY

## 2023-12-21 PROCEDURE — 82947 ASSAY GLUCOSE BLOOD QUANT: CPT

## 2023-12-21 PROCEDURE — 2500000004 HC RX 250 GENERAL PHARMACY W/ HCPCS (ALT 636 FOR OP/ED)

## 2023-12-21 PROCEDURE — 36415 COLL VENOUS BLD VENIPUNCTURE: CPT

## 2023-12-21 PROCEDURE — 99222 1ST HOSP IP/OBS MODERATE 55: CPT

## 2023-12-21 PROCEDURE — 2500000001 HC RX 250 WO HCPCS SELF ADMINISTERED DRUGS (ALT 637 FOR MEDICARE OP): Performed by: STUDENT IN AN ORGANIZED HEALTH CARE EDUCATION/TRAINING PROGRAM

## 2023-12-21 PROCEDURE — 96372 THER/PROPH/DIAG INJ SC/IM: CPT | Performed by: STUDENT IN AN ORGANIZED HEALTH CARE EDUCATION/TRAINING PROGRAM

## 2023-12-21 PROCEDURE — 2500000001 HC RX 250 WO HCPCS SELF ADMINISTERED DRUGS (ALT 637 FOR MEDICARE OP)

## 2023-12-21 PROCEDURE — C9113 INJ PANTOPRAZOLE SODIUM, VIA: HCPCS | Performed by: STUDENT IN AN ORGANIZED HEALTH CARE EDUCATION/TRAINING PROGRAM

## 2023-12-21 PROCEDURE — 83930 ASSAY OF BLOOD OSMOLALITY: CPT

## 2023-12-21 PROCEDURE — 99233 SBSQ HOSP IP/OBS HIGH 50: CPT

## 2023-12-21 PROCEDURE — 80069 RENAL FUNCTION PANEL: CPT

## 2023-12-21 PROCEDURE — 83935 ASSAY OF URINE OSMOLALITY: CPT

## 2023-12-21 PROCEDURE — 99233 SBSQ HOSP IP/OBS HIGH 50: CPT | Performed by: STUDENT IN AN ORGANIZED HEALTH CARE EDUCATION/TRAINING PROGRAM

## 2023-12-21 PROCEDURE — 37799 UNLISTED PX VASCULAR SURGERY: CPT

## 2023-12-21 PROCEDURE — 85025 COMPLETE CBC W/AUTO DIFF WBC: CPT

## 2023-12-21 PROCEDURE — 99418 PROLNG IP/OBS E/M EA 15 MIN: CPT

## 2023-12-21 RX ORDER — METFORMIN HYDROCHLORIDE 500 MG/1
500 TABLET ORAL
COMMUNITY
Start: 2023-05-11

## 2023-12-21 RX ORDER — DEXTROSE 50 % IN WATER (D50W) INTRAVENOUS SYRINGE
25
Status: DISCONTINUED | OUTPATIENT
Start: 2023-12-21 | End: 2023-12-29

## 2023-12-21 RX ORDER — LISINOPRIL 10 MG/1
10 TABLET ORAL DAILY
COMMUNITY

## 2023-12-21 RX ORDER — IBRUTINIB 140 MG/1
140 CAPSULE ORAL DAILY
COMMUNITY
Start: 2021-11-16

## 2023-12-21 RX ORDER — CIPROFLOXACIN 250 MG/1
250 TABLET, FILM COATED ORAL 2 TIMES DAILY
COMMUNITY
Start: 2023-05-30

## 2023-12-21 RX ORDER — TAMSULOSIN HYDROCHLORIDE 0.4 MG/1
0.4 CAPSULE ORAL DAILY
COMMUNITY
Start: 2023-05-17

## 2023-12-21 RX ORDER — CHOLECALCIFEROL (VITAMIN D3) 125 MCG
1 CAPSULE ORAL DAILY
COMMUNITY
Start: 2021-07-13

## 2023-12-21 RX ORDER — DEXTROSE MONOHYDRATE 100 MG/ML
0.3 INJECTION, SOLUTION INTRAVENOUS ONCE AS NEEDED
Status: DISCONTINUED | OUTPATIENT
Start: 2023-12-21 | End: 2023-12-29

## 2023-12-21 RX ORDER — BETAMETHASONE DIPROPIONATE 0.5 MG/G
CREAM TOPICAL 2 TIMES DAILY
COMMUNITY
Start: 2022-02-15

## 2023-12-21 RX ORDER — EMPAGLIFLOZIN 10 MG/1
10 TABLET, FILM COATED ORAL DAILY
COMMUNITY
Start: 2023-05-11

## 2023-12-21 RX ADMIN — PANTOPRAZOLE SODIUM 40 MG: 40 INJECTION, POWDER, FOR SOLUTION INTRAVENOUS at 20:23

## 2023-12-21 RX ADMIN — PANTOPRAZOLE SODIUM 40 MG: 40 INJECTION, POWDER, FOR SOLUTION INTRAVENOUS at 08:22

## 2023-12-21 RX ADMIN — INSULIN GLARGINE 10 UNITS: 100 INJECTION, SOLUTION SUBCUTANEOUS at 20:23

## 2023-12-21 RX ADMIN — LEVETIRACETAM 500 MG: 5 INJECTION INTRAVENOUS at 04:51

## 2023-12-21 RX ADMIN — INSULIN HUMAN 2 UNITS: 100 INJECTION, SOLUTION PARENTERAL at 12:18

## 2023-12-21 RX ADMIN — ATORVASTATIN CALCIUM 20 MG: 20 TABLET, FILM COATED ORAL at 08:22

## 2023-12-21 RX ADMIN — INSULIN HUMAN 2 UNITS: 100 INJECTION, SOLUTION PARENTERAL at 08:31

## 2023-12-21 RX ADMIN — Medication 4000 UNITS: at 08:23

## 2023-12-21 RX ADMIN — LEVOTHYROXINE SODIUM 25 MCG: 25 TABLET ORAL at 05:54

## 2023-12-21 RX ADMIN — CEFTRIAXONE SODIUM 1 G: 1 INJECTION, SOLUTION INTRAVENOUS at 05:54

## 2023-12-21 RX ADMIN — THIAMINE HCL TAB 100 MG 100 MG: 100 TAB at 08:22

## 2023-12-21 RX ADMIN — AMLODIPINE BESYLATE 10 MG: 10 TABLET ORAL at 08:22

## 2023-12-21 RX ADMIN — LEVETIRACETAM 500 MG: 5 INJECTION INTRAVENOUS at 17:15

## 2023-12-21 RX ADMIN — SODIUM CHLORIDE 50 ML/HR: 4.5 INJECTION, SOLUTION INTRAVENOUS at 08:46

## 2023-12-21 RX ADMIN — HEPARIN SODIUM 5000 UNITS: 5000 INJECTION INTRAVENOUS; SUBCUTANEOUS at 08:22

## 2023-12-21 RX ADMIN — HEPARIN SODIUM 5000 UNITS: 5000 INJECTION INTRAVENOUS; SUBCUTANEOUS at 17:15

## 2023-12-21 RX ADMIN — POLYETHYLENE GLYCOL 3350 17 G: 17 POWDER, FOR SOLUTION ORAL at 08:22

## 2023-12-21 ASSESSMENT — PAIN - FUNCTIONAL ASSESSMENT
PAIN_FUNCTIONAL_ASSESSMENT: CPOT (CRITICAL CARE PAIN OBSERVATION TOOL)

## 2023-12-21 NOTE — CARE PLAN
Problem: Pain  Goal: My pain/discomfort is manageable  Outcome: Progressing     Problem: Safety  Goal: Patient will be injury free during hospitalization  Outcome: Progressing  Goal: I will remain free of falls  Outcome: Progressing     Problem: Daily Care  Goal: Daily care needs are met  Outcome: Progressing     Problem: Psychosocial Needs  Goal: Demonstrates ability to cope with hospitalization/illness  Outcome: Progressing  Goal: Collaborate with me, my family, and caregiver to identify my specific goals  Outcome: Progressing     Problem: Discharge Barriers  Goal: My discharge needs are met  Outcome: Progressing     Problem: General Stroke  Goal: Demonstrate improvement in neurological exam throughout the shift  Outcome: Progressing  Goal: Maintain BP within ordered limits throughout shift  Outcome: Progressing  Goal: Participate in treatment (ie., meds, therapy) throughout shift  Outcome: Progressing  Goal: No symptoms of aspiration throughout shift  Outcome: Progressing  Goal: No symptoms of hemorrhage throughout shift  Outcome: Progressing  Goal: Tolerate enteral feeding throughout shift  Outcome: Progressing  Goal: Decreased nausea/vomiting throughout shift  Outcome: Progressing  Goal: Controlled blood glucose throughout shift  Outcome: Progressing  Goal: Out of bed three times today  Outcome: Progressing     Problem: Skin  Goal: Decreased wound size/increased tissue granulation at next dressing change  Outcome: Progressing  Goal: Participates in plan/prevention/treatment measures  Outcome: Progressing  Goal: Prevent/manage excess moisture  Outcome: Progressing  Goal: Prevent/minimize sheer/friction injuries  Outcome: Progressing  Goal: Promote/optimize nutrition  Outcome: Progressing  Goal: Promote skin healing  Outcome: Progressing     Problem: Safety - Medical Restraint  Goal: Remains free of injury from restraints (Restraint for Interference with Medical Device)  Outcome: Progressing  Goal: Free from  restraint(s) (Restraint for Interference with Medical Device)  Outcome: Progressing     Problem: Nutrition  Goal: Nutrition support goals are met within 48 hrs  Outcome: Progressing  Goal: Lab values WNL  Outcome: Progressing  Goal: Electrolytes WNL  Outcome: Progressing  Goal: Promote healing  Outcome: Progressing  Goal: Maintain stable weight  Outcome: Progressing  Goal: Reduce weight from edema/fluid  Outcome: Progressing     Problem: Pain - Adult  Goal: Verbalizes/displays adequate comfort level or baseline comfort level  Outcome: Progressing     Problem: Safety - Adult  Goal: Free from fall injury  Outcome: Progressing     Problem: Discharge Planning  Goal: Discharge to home or other facility with appropriate resources  Outcome: Progressing     Problem: Chronic Conditions and Co-morbidities  Goal: Patient's chronic conditions and co-morbidity symptoms are monitored and maintained or improved  Outcome: Progressing     Problem: Fall/Injury  Goal: Not fall by end of shift  Outcome: Progressing  Goal: Be free from injury by end of the shift  Outcome: Progressing  Goal: Verbalize understanding of personal risk factors for fall in the hospital  Outcome: Progressing     Problem: Diabetes  Goal: Increase stability of blood glucose readings by end of shift  Outcome: Progressing  Goal: Maintain electrolyte levels within acceptable range throughout shift  Outcome: Progressing  Goal: Maintain glucose levels >70mg/dl to <250mg/dl throughout shift  Outcome: Progressing  Goal: No changes in neurological exam by end of shift  Outcome: Progressing

## 2023-12-21 NOTE — PROGRESS NOTES
"Haile Ayers is a 83 y.o. male on day 29 of admission presenting with Subdural hematoma (CMS/HCC).    Subjective   Patient was seen and examined.  Obtunded  Objective   Constitutional: Elderly frail  male, ill-appearing, breathing through his mouth.  Skin/Hair: dry skin.  HEENT: Eyes closed, Dobbhoff tube in place  Cardiovascular: Rapid heart rate  Respiratory: On nasal cannula  Abdomen: Distended  Psych : Unable to assess      Last Recorded Vitals  Blood pressure 144/71, pulse 97, temperature 36.8 °C (98.2 °F), temperature source Temporal, resp. rate (!) 34, height 1.727 m (5' 7.99\"), weight 70 kg (154 lb 5.2 oz), SpO2 96 %.  Intake/Output last 3 Shifts:  I/O last 3 completed shifts:  In: 6430 (91.9 mL/kg) [I.V.:1800 (25.7 mL/kg); NG/GT:4230; IV Piggyback:400]  Out: 1865 (26.6 mL/kg) [Urine:1865 (0.7 mL/kg/hr)]  Weight: 70 kg     Relevant Results  Results from last 7 days   Lab Units 12/21/23  1223 12/21/23  1158 12/21/23  0831 12/21/23  0515 12/21/23  0004 12/20/23  2342 12/20/23  1548 12/20/23  1350 12/20/23  0525 12/20/23  0002 12/19/23  1123 12/19/23  0843   POCT GLUCOSE mg/dL  --  230* 239* 215*  --  214* 178*  --    < >  --    < >  --    GLUCOSE mg/dL 255*  --   --   --  235*  --   --  213*  --  191*  --  81    < > = values in this interval not displayed.     Lab Results   Component Value Date     12/21/2023     (H) 12/21/2023     (H) 12/20/2023     (H) 12/20/2023     (H) 12/19/2023     (H) 12/18/2023     (H) 12/18/2023     (H) 12/18/2023     (H) 12/18/2023     (H) 12/17/2023     (H) 12/17/2023       Assessment/Plan   Principal Problem:    Subdural hematoma (CMS/HCC)  Active Problems:    HTN (hypertension)    Diabetes mellitus, type 2 (CMS/HCC)    CVA (cerebral vascular accident) (CMS/HCC)    Haile Ayers is a 83 y.o. 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 He underwent L crani for SDH evacuation on 11/22 and re-evacuated on 11/30, extubated on 12/1.  Had worsening neurological exam on 12/4 and transferred to [facility]U. CTA on 12/5 incidentally showed diffuse sclerotic lesions throughout axial and appendicular skeleton + ascities.  Tested positive for COVID on 12/8.  Patient with continued worsening mental status, endocrinology team consulted for elevated TSH as part of worsening encephalopathy, workup showing worsening lung disease, hypernatremia and LIN, patient was initially planned for PEG on 12/18. Was transferred to NSU for worsening respiratory status          Hypothyroidism:  -------------------  -12/16  TSH 10.4, Ft4: 0.85, Anti-TPO: neg     Patient likely has hypothyroidism vs non-thyroidal illness.  However unlikely that his mild hypothyroidism is the etiology of his worsening mental status.  Given current worsening clinical status, would recommend:     -Continue with levothyroxine 25 mcg daily while holding tube feeds to 1 hour before and 1 hour after  -Repeat TFTs on 12/22/2022     Type 2 diabetes:  --------------------  -Not enough data in chart, no A1c?  -At home on metformin 500 mg 1 tablet orally twice daily per med rec upon admission.  -Patient hyperglycemic, showing some insulin resistance  -Tube feeds Isosource 1.5 at goal 60 mL/h.  (total carbs over 24 hours would be approximately 250)    -Would hold on rechecking A1c given recent blood transfusion  -Continue glargine to 10 units every 24 hours  -Continue sliding scale regular insulin to #1 every 6 hours  [1 units for every 50 above 150] every 6 hoursgiven severe LIN  -Increase regular insulin to 4 units every 6 hours  -Accucheck q6 ,while NPO or on tube feeds  -Hypoglycemia protocol        Moderate  hypernatremia:  -------------------------------  -Likely isovolume depletion especially given his rising BUN and creatinine.  With that being said patient is at risk of both nephrogenic and central diabetes insipidus given his LIN and his recent subdural hematoma status postevacuation x 2.    -His urine is moderately concentrated indicating possible partial DI  -Would aim for goal of sodium: 145 in the next 24 hours, avoid overcorrection, especially in the setting of recent intracranial procedure and subdural hematoma  -Sodium trending down.  Total intake in the past 24 hours was over 4 L, patient has made 1300 mL of urine.    --Discontinue 1/2 NS   --continue  every 4 hours  --RFP, serum osmolality and urine osmolality every 12 hours        Plan communicated to primary team      Case discussed with Dr. Sommers who agrees with the management plan.

## 2023-12-21 NOTE — PROGRESS NOTES
SUPPORTIVE AND PALLIATIVE ONCOLOGY INPATIENT FOLLOW-UP      SERVICE DATE: 12/21/23     SUBJECTIVE:  Interval Events:  Met with wife this evening; clarified goals and nature of DNR/DNI. Wife wishes to keep DNR/DNI status and is willing to continue current treatment plan as is and if needed, willing to try any additional airway support (bipap, non-invasive mask ventilation, etc) up until point of arrest unless interventions seem to be causing patient discomfort or to become agitated.  Discussed philosophy of hospice; wife asking appropriately questions and is appropriately emotional.  Not ready for hospice consult but would like me to come back tomorrow evening and bring written material from [facility] to discuss further.  Undecided on possible PEG placement until after discussion of hospice tomorrow.      Pain Assessment:  Pt not responsive to auditory, tactile, or pain stimuli.     Opioid Use  Past 24 h prn opioid use:  0  Total 24h OME use:  0    Symptom Assessment:  Unable to obtain secondary to unresponsiveness    Information obtained from: interview of family and discussion with primary team  ______________________________________________________________________        OBJECTIVE:    Lab Results   Component Value Date    WBC 6.3 12/21/2023    HGB 10.0 (L) 12/21/2023    HCT 29.8 (L) 12/21/2023    MCV 88 12/21/2023     (L) 12/21/2023      Lab Results   Component Value Date    GLUCOSE 255 (H) 12/21/2023    CALCIUM 8.2 (L) 12/21/2023     12/21/2023    K 5.1 12/21/2023    CO2 25 12/21/2023     (H) 12/21/2023    BUN 83 (H) 12/21/2023    CREATININE 3.23 (H) 12/21/2023     Lab Results   Component Value Date    ALT 5 (L) 12/09/2023    AST 7 (L) 12/09/2023    ALKPHOS 50 12/09/2023    BILITOT 0.5 12/09/2023     Estimated Creatinine Clearance: 16.5 mL/min (A) (by C-G formula based on SCr of 3.23 mg/dL (H)).     Scheduled medications  amLODIPine, 10 mg, nasogastric tube, Daily  atorvastatin, 20 mg, oral,  Daily  bisacodyl, 10 mg, rectal, Daily  cefTRIAXone, 1 g, intravenous, q24h  cholecalciferol, 4,000 Units, oral, Daily  heparin (porcine), 5,000 Units, subcutaneous, q8h  insulin glargine, 10 Units, subcutaneous, Nightly  [START ON 12/22/2023] insulin regular, 4 Units, subcutaneous, q6h  levETIRAcetam, 500 mg, intravenous, q12h  levothyroxine, 25 mcg, oral, Daily  pantoprazole, 40 mg, intravenous, BID  polyethylene glycol, 17 g, oral, Daily  thiamine, 100 mg, nasogastric tube, Daily      Continuous medications  oxygen, , Last Rate: 2 L/min (12/12/23 1713)      PRN medications  PRN medications: acetaminophen, albuterol, calcium gluconate, calcium gluconate, dextrose 10 % in water (D10W), dextrose, glucagon, hydrALAZINE, labetaloL, magnesium sulfate, magnesium sulfate, naloxone, ondansetron **OR** ondansetron, oxygen, potassium chloride CR **OR** potassium chloride, potassium chloride CR **OR** potassium chloride, potassium chloride, potassium chloride   }     PHYSICAL EXAMINATION:    Vital Signs:   Vital signs reviewed  Visit Vitals  /68   Pulse 99   Temp 37 °C (98.6 °F) (Temporal)   Resp (!) 35        Critical-Care Pain Observation Score:  [0]        Physical Exam  Constitutional:       Appearance: He is ill-appearing.      Comments: Unresponsive to auditory, tactile, and pain stimuli   HENT:      Head:      Comments: Surgical incision on scalp KOKO, D&I, no erythema or exudate     Mouth/Throat:      Mouth: Mucous membranes are moist.   Cardiovascular:      Rate and Rhythm: Normal rate and regular rhythm.   Pulmonary:      Breath sounds: Rhonchi present.      Comments: On supplemental O2 via NC, tachypneic, moist cough (no sputum observed)  Abdominal:      General: Abdomen is flat.      Palpations: Abdomen is soft.      Comments: Enteral feeding tube in right nare   Skin:     General: Skin is warm and dry.   Neurological:      Comments: Unresponsive; minimal spontaneous movement of RUE         ASSESSMENT/PLAN:  Haile Ayers is a 83 y.o. patient with PMHx of HTN , DM type 2, hx of CVA, hx of anemia,  CLL (unknown CLL-IPI score; being followed by Dr. 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 Supportive oncology consulted for goals of care.       Nausea:  At risk for nausea without vomiting related to  artificial nutrition, brain pathology    Home regimen:  none  well-controlled  Continue Ondansetron 4 mg IVP q 8 hrs prn     Constipation  At risk for constipation related to opioids, currently not constipated  Usual bowel pattern: every day  Home regimen: none  LBM 12/19/23  Miralax 17 g daily via enteral tube prn  Senna 1-2 tabs via enteral tube bid prn  Continue Bisacodyl suppository per rectum daily     Altered nutrition related to  altered mental status , enteral feeding  Nutrition following  Home regimen:  none  Feedings per nutrition     Medical Decision Making/Goals of Care/Advance Care Planning:  Patient's current clinical condition, including diagnosis, prognosis, and management plan, and goals of care were discussed with wife at bedside  Life limiting disease:  Left SDH with midline shift, Craniotomy x 2 and associated complications, + covid, worsening LIN, and worsening AMS  Family: Supportive wife Chanelle, daughter Sue who lives in [place]  Performance status: Major  limitations due to  SDH  Joys/meaning/strength:  Per wife, pt finds great eduardo in [place], family, and working with his hands  Understanding of health: Demonstrates good prognostic understanding of disease process, understands plan for obtaining further discussion with neurosurgery regarding prognosis; wife states that she prioritizes his quality of life and does want him to suffer but remains hopeful of a recovery. Open to changing code status to DNR/DNI once she obtains prognostication from neuro. She stated that he isn't  living a proper quality life being bed bound, not being able to eat or communicate.    Information: Requests open, straightforward communication  Medical update:awaiting  further information regarding prognosis from neuro  Prognosis:poor based upon functional status  Goals:  quality of life and treatment to address acute issues if risk not greater than benefit  Worries and fears now and future: death and not recovering    Minimum acceptable outcome/QOL:  wife wishes for pt to be able to communicate, engage in therapy to get OOB, and to be able to eat and enjoy life  Code status discussion:  DNR/DNI (ok to provide additional respiratory support including bipap, non-invasive mask ventilation UNLESS pt appears agitated or uncomfortable from intervention); will consider possible PEG placement following further discussion of possible hospice on 12/22/23     Advance Directives  Existence of Advance Directives:No  Decision maker: Surrogate decision maker is wife Chanelle [phone], 2) daughter Caroline [phone]  Code Status: DNR/DNI     Introduction to Supportive and Palliative Oncology:  Spoke with pt's wife at bedside  Introduced the role and philosophy of Supportive and Palliative oncology in the evaluation and management of symptoms during cancer treatment  Palliative care was introduced as a service for patients with serious illness to help with symptoms, assist with goals of care conversations, navigate complex decision making, improve quality of life for patients, and provide support both patients and families.  Patient seemed to appreciate the extra layer of support.      Supportive Interventions: declined at this time     Disposition:  Please start the process of having prior authorization with meds to beds deliver medications to patient prior to discharge via [facility] pharmacy. Prescriptions will need to be sent 48-72 hours prior to discharge so that a prior authorization can be completed.      Discharge date:  unknown pending  ongoing goals of care discussions  Will assess if patient needs an appointment with Outpatient Supportive Oncology as appropriate      Signature and billing:  Thank you for allowing us to participate in the care of this patient. Recommendations will be communicated back to the consulting service by way of shared electronic medical record or face-to-face.    Medical complexity was high level due to due to complexity of problems, extensive data review, and high risk of management/treatment.    I spent 90 minutes in the care of this patient which included chart review, interviewing patient/family, discussion with primary team, coordination of care, and documentation.    Data:   Diagnostic tests and information reviewed for today's visit:  Conversation with primary team, Conversation with pt's wife, Most recent labs and imaging results, Medications       Some elements copied from my consult note on 12/18/23, the elements have been updated and all reflect current decision making from today, 12/21/23       Plan of Care discussed with: Provider and Family/Significant Other: wife     Thank you for asking Supportive and Palliative Oncology to assist with care of this patient.  We will continue to follow  Please contact us for additional questions or concerns.      SIGNATURE: RICKY Blancas-CNP   PAGER/CONTACT:  Contact information:  Supportive and Palliative Oncology  Monday-Friday 8 AM-5 PM  Epic Secure chat or pager [phone].  After hours and weekends:  pager [phone]

## 2023-12-21 NOTE — PROGRESS NOTES
Spiritual Care Visit      tried to visit, but the patient was sleeping and no family was present. Will attempt to follow-up later.    Rev. Efren Garcia, Supportive Oncology

## 2023-12-22 LAB
ALBUMIN SERPL BCP-MCNC: 2.9 G/DL (ref 3.4–5)
ALBUMIN SERPL BCP-MCNC: 2.9 G/DL (ref 3.4–5)
ALBUMIN SERPL BCP-MCNC: 3 G/DL (ref 3.4–5)
ANION GAP SERPL CALC-SCNC: 14 MMOL/L (ref 10–20)
ANION GAP SERPL CALC-SCNC: 16 MMOL/L (ref 10–20)
ANION GAP SERPL CALC-SCNC: 16 MMOL/L (ref 10–20)
BUN SERPL-MCNC: 100 MG/DL (ref 6–23)
BUN SERPL-MCNC: 92 MG/DL (ref 6–23)
BUN SERPL-MCNC: 96 MG/DL (ref 6–23)
CALCIUM SERPL-MCNC: 8.4 MG/DL (ref 8.6–10.6)
CHLORIDE SERPL-SCNC: 114 MMOL/L (ref 98–107)
CHLORIDE SERPL-SCNC: 114 MMOL/L (ref 98–107)
CHLORIDE SERPL-SCNC: 115 MMOL/L (ref 98–107)
CHLORIDE UR-SCNC: 54 MMOL/L
CHLORIDE/CREATININE (MMOL/G) IN URINE: 106 MMOL/G CREAT (ref 23–275)
CO2 SERPL-SCNC: 24 MMOL/L (ref 21–32)
CO2 SERPL-SCNC: 25 MMOL/L (ref 21–32)
CO2 SERPL-SCNC: 25 MMOL/L (ref 21–32)
CREAT SERPL-MCNC: 3.42 MG/DL (ref 0.5–1.3)
CREAT SERPL-MCNC: 3.52 MG/DL (ref 0.5–1.3)
CREAT SERPL-MCNC: 3.7 MG/DL (ref 0.5–1.3)
CREAT UR-MCNC: 51 MG/DL (ref 20–370)
GFR SERPL CREATININE-BSD FRML MDRD: 15 ML/MIN/1.73M*2
GFR SERPL CREATININE-BSD FRML MDRD: 16 ML/MIN/1.73M*2
GFR SERPL CREATININE-BSD FRML MDRD: 17 ML/MIN/1.73M*2
GLUCOSE BLD MANUAL STRIP-MCNC: 210 MG/DL (ref 74–99)
GLUCOSE BLD MANUAL STRIP-MCNC: 214 MG/DL (ref 74–99)
GLUCOSE BLD MANUAL STRIP-MCNC: 242 MG/DL (ref 74–99)
GLUCOSE BLD MANUAL STRIP-MCNC: 243 MG/DL (ref 74–99)
GLUCOSE BLD MANUAL STRIP-MCNC: 245 MG/DL (ref 74–99)
GLUCOSE BLD MANUAL STRIP-MCNC: 266 MG/DL (ref 74–99)
GLUCOSE SERPL-MCNC: 228 MG/DL (ref 74–99)
GLUCOSE SERPL-MCNC: 248 MG/DL (ref 74–99)
GLUCOSE SERPL-MCNC: 270 MG/DL (ref 74–99)
OSMOLALITY SERPL: 343 MOSM/KG (ref 280–300)
OSMOLALITY UR: 426 MOSM/KG (ref 200–1200)
OSMOLALITY UR: 430 MOSM/KG (ref 200–1200)
PHOSPHATE SERPL-MCNC: 3.9 MG/DL (ref 2.5–4.9)
PHOSPHATE SERPL-MCNC: 4.2 MG/DL (ref 2.5–4.9)
PHOSPHATE SERPL-MCNC: 4.3 MG/DL (ref 2.5–4.9)
POTASSIUM SERPL-SCNC: 5.2 MMOL/L (ref 3.5–5.3)
POTASSIUM SERPL-SCNC: 5.5 MMOL/L (ref 3.5–5.3)
POTASSIUM SERPL-SCNC: 5.7 MMOL/L (ref 3.5–5.3)
POTASSIUM UR-SCNC: 27 MMOL/L
POTASSIUM/CREAT UR-RTO: 53 MMOL/G CREAT
SODIUM SERPL-SCNC: 148 MMOL/L (ref 136–145)
SODIUM SERPL-SCNC: 148 MMOL/L (ref 136–145)
SODIUM SERPL-SCNC: 150 MMOL/L (ref 136–145)
SODIUM UR-SCNC: 59 MMOL/L
SODIUM/CREAT UR-RTO: 116 MMOL/G CREAT
T4 FREE SERPL-MCNC: 0.79 NG/DL (ref 0.78–1.48)
TSH SERPL-ACNC: 11.03 MIU/L (ref 0.44–3.98)

## 2023-12-22 PROCEDURE — 2500000004 HC RX 250 GENERAL PHARMACY W/ HCPCS (ALT 636 FOR OP/ED): Performed by: STUDENT IN AN ORGANIZED HEALTH CARE EDUCATION/TRAINING PROGRAM

## 2023-12-22 PROCEDURE — 99233 SBSQ HOSP IP/OBS HIGH 50: CPT | Performed by: STUDENT IN AN ORGANIZED HEALTH CARE EDUCATION/TRAINING PROGRAM

## 2023-12-22 PROCEDURE — 99232 SBSQ HOSP IP/OBS MODERATE 35: CPT

## 2023-12-22 PROCEDURE — 96372 THER/PROPH/DIAG INJ SC/IM: CPT | Performed by: STUDENT IN AN ORGANIZED HEALTH CARE EDUCATION/TRAINING PROGRAM

## 2023-12-22 PROCEDURE — 80069 RENAL FUNCTION PANEL: CPT | Performed by: STUDENT IN AN ORGANIZED HEALTH CARE EDUCATION/TRAINING PROGRAM

## 2023-12-22 PROCEDURE — 2500000001 HC RX 250 WO HCPCS SELF ADMINISTERED DRUGS (ALT 637 FOR MEDICARE OP)

## 2023-12-22 PROCEDURE — 37799 UNLISTED PX VASCULAR SURGERY: CPT

## 2023-12-22 PROCEDURE — 84439 ASSAY OF FREE THYROXINE: CPT

## 2023-12-22 PROCEDURE — 2500000001 HC RX 250 WO HCPCS SELF ADMINISTERED DRUGS (ALT 637 FOR MEDICARE OP): Performed by: STUDENT IN AN ORGANIZED HEALTH CARE EDUCATION/TRAINING PROGRAM

## 2023-12-22 PROCEDURE — 83935 ASSAY OF URINE OSMOLALITY: CPT

## 2023-12-22 PROCEDURE — 83930 ASSAY OF BLOOD OSMOLALITY: CPT

## 2023-12-22 PROCEDURE — 84443 ASSAY THYROID STIM HORMONE: CPT

## 2023-12-22 PROCEDURE — 84100 ASSAY OF PHOSPHORUS: CPT

## 2023-12-22 PROCEDURE — 2500000004 HC RX 250 GENERAL PHARMACY W/ HCPCS (ALT 636 FOR OP/ED)

## 2023-12-22 PROCEDURE — 82947 ASSAY GLUCOSE BLOOD QUANT: CPT

## 2023-12-22 PROCEDURE — 2500000002 HC RX 250 W HCPCS SELF ADMINISTERED DRUGS (ALT 637 FOR MEDICARE OP, ALT 636 FOR OP/ED)

## 2023-12-22 PROCEDURE — 2500000002 HC RX 250 W HCPCS SELF ADMINISTERED DRUGS (ALT 637 FOR MEDICARE OP, ALT 636 FOR OP/ED): Performed by: STUDENT IN AN ORGANIZED HEALTH CARE EDUCATION/TRAINING PROGRAM

## 2023-12-22 PROCEDURE — 1200000002 HC GENERAL ROOM WITH TELEMETRY DAILY

## 2023-12-22 PROCEDURE — C9113 INJ PANTOPRAZOLE SODIUM, VIA: HCPCS | Performed by: STUDENT IN AN ORGANIZED HEALTH CARE EDUCATION/TRAINING PROGRAM

## 2023-12-22 PROCEDURE — 82436 ASSAY OF URINE CHLORIDE: CPT

## 2023-12-22 RX ORDER — SODIUM CHLORIDE 450 MG/100ML
50 INJECTION, SOLUTION INTRAVENOUS CONTINUOUS
Status: DISCONTINUED | OUTPATIENT
Start: 2023-12-22 | End: 2023-12-25

## 2023-12-22 RX ADMIN — HEPARIN SODIUM 5000 UNITS: 5000 INJECTION INTRAVENOUS; SUBCUTANEOUS at 08:30

## 2023-12-22 RX ADMIN — CEFTRIAXONE SODIUM 1 G: 1 INJECTION, SOLUTION INTRAVENOUS at 06:28

## 2023-12-22 RX ADMIN — PANTOPRAZOLE SODIUM 40 MG: 40 INJECTION, POWDER, FOR SOLUTION INTRAVENOUS at 21:08

## 2023-12-22 RX ADMIN — POLYETHYLENE GLYCOL 3350 17 G: 17 POWDER, FOR SOLUTION ORAL at 09:46

## 2023-12-22 RX ADMIN — ATORVASTATIN CALCIUM 20 MG: 20 TABLET, FILM COATED ORAL at 09:05

## 2023-12-22 RX ADMIN — HEPARIN SODIUM 5000 UNITS: 5000 INJECTION INTRAVENOUS; SUBCUTANEOUS at 17:08

## 2023-12-22 RX ADMIN — INSULIN HUMAN 4 UNITS: 100 INJECTION, SOLUTION PARENTERAL at 17:08

## 2023-12-22 RX ADMIN — INSULIN HUMAN 2 UNITS: 100 INJECTION, SOLUTION PARENTERAL at 09:05

## 2023-12-22 RX ADMIN — SODIUM CHLORIDE 50 ML/HR: 4.5 INJECTION, SOLUTION INTRAVENOUS at 23:55

## 2023-12-22 RX ADMIN — INSULIN HUMAN 2 UNITS: 100 INJECTION, SOLUTION PARENTERAL at 12:17

## 2023-12-22 RX ADMIN — LEVETIRACETAM 500 MG: 5 INJECTION INTRAVENOUS at 04:20

## 2023-12-22 RX ADMIN — Medication 4000 UNITS: at 09:06

## 2023-12-22 RX ADMIN — INSULIN HUMAN 2 UNITS: 100 INJECTION, SOLUTION PARENTERAL at 17:09

## 2023-12-22 RX ADMIN — HEPARIN SODIUM 5000 UNITS: 5000 INJECTION INTRAVENOUS; SUBCUTANEOUS at 00:26

## 2023-12-22 RX ADMIN — INSULIN HUMAN 4 UNITS: 100 INJECTION, SOLUTION PARENTERAL at 00:24

## 2023-12-22 RX ADMIN — LEVETIRACETAM 500 MG: 5 INJECTION INTRAVENOUS at 17:08

## 2023-12-22 RX ADMIN — AMLODIPINE BESYLATE 10 MG: 10 TABLET ORAL at 09:06

## 2023-12-22 RX ADMIN — INSULIN HUMAN 4 UNITS: 100 INJECTION, SOLUTION PARENTERAL at 06:28

## 2023-12-22 RX ADMIN — INSULIN HUMAN 4 UNITS: 100 INJECTION, SOLUTION PARENTERAL at 12:18

## 2023-12-22 RX ADMIN — INSULIN GLARGINE 10 UNITS: 100 INJECTION, SOLUTION SUBCUTANEOUS at 21:08

## 2023-12-22 RX ADMIN — PANTOPRAZOLE SODIUM 40 MG: 40 INJECTION, POWDER, FOR SOLUTION INTRAVENOUS at 09:46

## 2023-12-22 RX ADMIN — INSULIN HUMAN 2 UNITS: 100 INJECTION, SOLUTION PARENTERAL at 23:56

## 2023-12-22 RX ADMIN — THIAMINE HCL TAB 100 MG 100 MG: 100 TAB at 09:46

## 2023-12-22 RX ADMIN — LEVOTHYROXINE SODIUM 25 MCG: 25 TABLET ORAL at 06:28

## 2023-12-22 ASSESSMENT — PAIN - FUNCTIONAL ASSESSMENT
PAIN_FUNCTIONAL_ASSESSMENT: CPOT (CRITICAL CARE PAIN OBSERVATION TOOL)

## 2023-12-22 NOTE — PROGRESS NOTES
"Haile Ayers is a 83 y.o. 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 Patient has had a prolonged hospital stay which has been c/b bacteremia, LIN, anemia, hypovolemia, respiratory failure 2/2 pneumonia, COVID, hypernatremia. Medicine consulted for LIN and reduced pulmonary function.     Subjective   Pt was in bed, propped up   on pillows. Cannot obtain history, given altered mental status.        Objective     Physical Exam  Vitals reviewed.   Constitutional:       General: He is not in acute distress.     Appearance: He is ill-appearing. He is not diaphoretic.   HENT:      Head:      Comments: Surgical wound  Eyes:      Comments: Unable to assess   Cardiovascular:      Rate and Rhythm: Regular rhythm. Tachycardia present.      Heart sounds: Normal heart sounds.   Pulmonary:      Effort: Pulmonary effort is normal. No respiratory distress.      Breath sounds: Rales present.   Abdominal:      Palpations: Abdomen is soft.   Genitourinary:     Comments: In situ Mckee  Musculoskeletal:      Right lower leg: Edema present.      Left lower leg: Edema present.   Skin:     General: Skin is warm.   Neurological:      Comments: Obtunded, unable to access       Last Recorded Vitals  Blood pressure 150/64, pulse 103, temperature 36.6 °C (97.9 °F), temperature source Temporal, resp. rate (!) 32, height 1.727 m (5' 7.99\"), weight 70 kg (154 lb 5.2 oz), SpO2 97 %.  Intake/Output last 3 Shifts:  I/O last 3 completed shifts:  In: 5260 (75.1 mL/kg) [I.V.:750 (10.7 mL/kg); NG/GT:4060; IV Piggyback:450]  Out: 1665 (23.8 mL/kg) [Urine:1665 (0.7 mL/kg/hr)]  Weight: 70 kg     Relevant Results  Scheduled medications  amLODIPine, 10 mg, nasogastric tube, Daily  atorvastatin, 20 mg, oral, Daily  bisacodyl, 10 mg, rectal, Daily  cefTRIAXone, 1 g, intravenous, " q24h  cholecalciferol, 4,000 Units, oral, Daily  heparin (porcine), 5,000 Units, subcutaneous, q8h  insulin glargine, 10 Units, subcutaneous, Nightly  insulin regular, 0-5 Units, subcutaneous, q6h  insulin regular, 4 Units, subcutaneous, q6h  levETIRAcetam, 500 mg, intravenous, q12h  levothyroxine, 25 mcg, oral, Daily  pantoprazole, 40 mg, intravenous, BID  polyethylene glycol, 17 g, oral, Daily  thiamine, 100 mg, nasogastric tube, Daily      Continuous medications  oxygen, , Last Rate: 2 L/min (12/12/23 1713)      PRN medications  PRN medications: acetaminophen, albuterol, calcium gluconate, calcium gluconate, dextrose 10 % in water (D10W), dextrose, glucagon, hydrALAZINE, labetaloL, magnesium sulfate, magnesium sulfate, naloxone, ondansetron **OR** ondansetron, oxygen, potassium chloride CR **OR** potassium chloride, potassium chloride CR **OR** potassium chloride, potassium chloride, potassium chloride    Results for orders placed or performed during the hospital encounter of 11/21/23 (from the past 24 hour(s))   POCT GLUCOSE   Result Value Ref Range    POCT Glucose 239 (H) 74 - 99 mg/dL   POCT GLUCOSE   Result Value Ref Range    POCT Glucose 263 (H) 74 - 99 mg/dL   Osmolality, urine   Result Value Ref Range    Osmolality, Urine Random 419 200 - 1,200 mOsm/kg   Osmolality   Result Value Ref Range    Osmolality, Serum 347 (H) 280 - 300 mOsm/kg   POCT GLUCOSE   Result Value Ref Range    POCT Glucose 266 (H) 74 - 99 mg/dL   TSH   Result Value Ref Range    Thyroid Stimulating Hormone 11.03 (H) 0.44 - 3.98 mIU/L   T4, free   Result Value Ref Range    Thyroxine, Free 0.79 0.78 - 1.48 ng/dL   Renal function panel   Result Value Ref Range    Glucose 270 (H) 74 - 99 mg/dL    Sodium 148 (H) 136 - 145 mmol/L    Potassium 5.2 3.5 - 5.3 mmol/L    Chloride 114 (H) 98 - 107 mmol/L    Bicarbonate 25 21 - 32 mmol/L    Anion Gap 14 10 - 20 mmol/L    Urea Nitrogen 96 (HH) 6 - 23 mg/dL    Creatinine 3.42 (H) 0.50 - 1.30 mg/dL     eGFR 17 (L) >60 mL/min/1.73m*2    Calcium 8.4 (L) 8.6 - 10.6 mg/dL    Phosphorus 4.2 2.5 - 4.9 mg/dL    Albumin 3.0 (L) 3.4 - 5.0 g/dL   POCT GLUCOSE   Result Value Ref Range    POCT Glucose 242 (H) 74 - 99 mg/dL   POCT GLUCOSE   Result Value Ref Range    POCT Glucose 243 (H) 74 - 99 mg/dL   Renal function panel   Result Value Ref Range    Glucose 248 (H) 74 - 99 mg/dL    Sodium 148 (H) 136 - 145 mmol/L    Potassium 5.5 (H) 3.5 - 5.3 mmol/L    Chloride 114 (H) 98 - 107 mmol/L    Bicarbonate 24 21 - 32 mmol/L    Anion Gap 16 10 - 20 mmol/L    Urea Nitrogen 92 (HH) 6 - 23 mg/dL    Creatinine 3.70 (H) 0.50 - 1.30 mg/dL    eGFR 15 (L) >60 mL/min/1.73m*2    Calcium 8.4 (L) 8.6 - 10.6 mg/dL    Phosphorus 3.9 2.5 - 4.9 mg/dL    Albumin 2.9 (L) 3.4 - 5.0 g/dL   Osmolality   Result Value Ref Range    Osmolality, Serum 343 (H) 280 - 300 mOsm/kg   Osmolality, urine   Result Value Ref Range    Osmolality, Urine Random 430 200 - 1,200 mOsm/kg   Urine electrolytes   Result Value Ref Range    Sodium, Urine Random 59 mmol/L    Sodium/Creatinine Ratio 116 Not established. mmol/g Creat    Potassium, Urine Random 27 mmol/L    Potassium/Creatinine Ratio 53 Not established mmol/g Creat    Chloride, Urine Random 54 mmol/L    Chloride/Creatinine Ratio 106 23 - 275 mmol/g creat    Creatinine, Urine Random 51.0 20.0 - 370.0 mg/dL   POCT GLUCOSE   Result Value Ref Range    POCT Glucose 210 (H) 74 - 99 mg/dL     XR chest 1 view  Result Date: 12/20/2023  1.  Interval increase in diffuse multifocal pulmonary opacities, most prominent in the right mid/lower lung zone, concerning for worsening pulmonary edema, or multifocal pneumonia. Correlate clinically. 2. Blunting of bilateral costophrenic angles may be secondary to pulmonary opacities versus small bilateral pleural effusions, right-greater-than-left. Correlate with physical exam and patient's volume status.       ECG 12 Lead  Result Date: 12/20/2023  Normal sinus rhythm Anterior infarct ,  age undetermined Abnormal ECG When compared with ECG of 30-NOV-2023 14:29, Anterior infarct is now Present     CT head wo IV contrast  Result Date: 12/20/2023  FINDINGS: There are postoperative changes from prior left craniotomy at the vertex of the skull for subdural hemorrhage drainage. There is slightly hyperattenuating subdural fluid collection along the left cerebral convexity, most pronounced along the posterior aspect of the left temporal and parietal lobes measuring 12 mm in maximum thickness with stable associated mass effect. Just below the craniotomy site, there is a stable 6 mm in maximum thickness extra-axial, likely subdural, fluid collection with rim of hyperattenuation consistent with evolving acute blood products. Mass effect on the adjacent brain parenchyma is also unchanged. There is stable narrowing of the left lateral ventricle and 3 mm rightward midline shift at the level of the septum pellucidum.   Gray-white differentiation is maintained throughout. There are regions of hypoattenuation within the bilateral cerebral hemispheric white matter which are probably sequela of chronic small vessel ischemic changes.   There is mucosal thickening located within the right maxillary sinus layering fluid within the right sphenoid sinus, otherwise the visualized paranasal sinuses and mastoid air cells are essentially clear.        No significant interval change in intracranial findings compared to CT 12/18/2023. Stable postoperative changes from left craniotomy with stable residual subdural hemorrhage with associated mass effect along the left cerebral convexity and mild midline shift.        IR angiogram  Result Date: 12/11/2023  FINDINGS: LEFT COMMON CAROTID ARTERY INJECTION: DSA runs were obtained over the head in [place], PA and lateral views. The left external carotid artery and its major branches opacify well and are unremarkable. A small caliber left middle meningeal artery is visualized which is  truncated distal to foramen spinosum likely related to postoperative changes from craniotomy. The distal cervical and intracranial left internal carotid artery opacify well and appear unremarkable.  Flash filling of the left posterior cerebral artery from the left posterior communicating artery is seen on this injection. The left internal carotid artery bifurcates normally into widely patent and unremarkable left anterior cerebral and left middle cerebral arteries. Due to reflux of contrast into a bovine arch anatomy during contrast injection there is flash filling of the right internal carotid artery, right middle cerebral artery, and right anterior cerebral artery which appear unremarkable. No aneurysm, early draining vein, or stenosis is seen.   RIGHT COMMON FEMORAL ARTERY INJECTION: DSA run over the right hip was obtained in ROMERO view. The right common femoral artery, femoral bifurcation, and distal vasculature opacify well appear unremarkable. There is no evidence of pseudoaneurysm, stenosis, dissection, or other type of vascular injury.     Poor distal filling of the left middle meningeal artery. No embolization was performed.        XR abdomen 1 view  Result Date: 12/10/2023  1.  Enteric tube tip overlying expected location of 2nd portion of duodenum. 2. Nonobstructive bowel gas pattern.        US renal complete  Result Date: 12/7/2023  1. Mild right pelvic fullness with otherwise unremarkable renal ultrasound. 2. Incidental note of moderate volume abdominal ascites.        EEG  IMPRESSION Impression This continuous video-EEG indicates a severe diffuse encephalopathy. No epileptiform discharges are seen. There are no significative changes compared with yesterday's record..      Transthoracic Echo (TTE) Complete  Result Date: 12/6/2023  CONCLUSIONS:  1. Left ventricular systolic function is normal with a 65-70% estimated ejection fraction.  2. Poorly visualized anatomical structures due to suboptimal image  quality.  3. Dynamic LV function with color acceleration within the LV cavity but no significant intracavitary or LVOT gradients at rest. Note that there is some JULIO C of the MV leaflets vs chordae, though not well seen. ( note that the color acceleration begins within the LV cavity, not just in the LVOT).  4. Spectral Doppler shows an impaired relaxation pattern of left ventricular diastolic filling.  5. A subtopimal agitated saline contast study was performed and some bubles did reach the left side, though the timing of appearance of the bubbles is unclear. May represent a PFO/intracardiac shunt vs intrapulmonary shunt.  6. Moderately elevated right ventricular systolic pressure.  7. No prior echocardiogram available for comparison.      Vascular US lower extremity venous duplex bilateral  Result Date: 12/5/2023  CONCLUSIONS: Right Lower Venous: There are chronic changes visualized in the distal Femoral, soleal and popliteal veins. Left Lower Venous: No evidence of acute deep vein thrombus visualized in the left lower extremity.     Vascular US upper extremity venous duplex bilateral  Result Date: 12/5/2023  CONCLUSIONS: Right Upper Venous: No evidence of acute deep vein thrombus visualized in the right upper extremity. Cannot rule out thrombus of non-visualized basilic vein. Left Upper Venous: No evidence of acute deep vein thrombus visualized in the left upper extremity. There are chronic changes visualized in the mid cephalic and distal cephalic veins.      Assessment/Plan   Principal Problem:    Subdural hematoma (CMS/HCC)  Active Problems:    HTN (hypertension)    Diabetes mellitus, type 2 (CMS/HCC)    CVA (cerebral vascular accident) (CMS/HCC)    Haile Ayers is a 83 y.o. 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 Patient has had a  prolonged hospital stay which has been c/b bacteremia, LIN, anemia, hypovolemia, respiratory failure 2/2 pneumonia, COVID, hypernatremia. Medicine consulted for LIN and reduced pulmonary function.     Recommendations:  - FENa- 2.9%, LIN likely due to intrinsic renal injury. No active interventions recommended at this time. Follow daily RFP, Mg  - Avoid nephrotoxic drugs  - Renally dose medications  - ID and endocrine following  - Pt saturating well (97% SpO2) on 2L NC, Continue BiPAP PRN     Medicine will continue to follow.     Pt seen and discussed with Dr. Darden.    Gina Le MD  PGY-1  Anesthesiology

## 2023-12-22 NOTE — PROGRESS NOTES
"Haile Ayers is a 83 y.o. male on day 30 of admission presenting with Subdural hematoma (CMS/HCC).    Subjective   Worsened neuro exam overnight    Objective     Physical Exam  ECNS  RUE flaccid  RLE w/d (int spont per RN)  LUE spont/localizing  LLE w/d (int spont per RN)  On 2L NC      Last Recorded Vitals  Blood pressure 163/77, pulse 97, temperature 36.8 °C (98.2 °F), resp. rate (!) 31, height 1.727 m (5' 7.99\"), weight 70 kg (154 lb 5.2 oz), SpO2 94 %.  Intake/Output last 3 Shifts:  I/O last 3 completed shifts:  In: 5520 (78.9 mL/kg) [I.V.:1200 (17.1 mL/kg); NG/GT:3920; IV Piggyback:400]  Out: 1870 (26.7 mL/kg) [Urine:1870 (0.7 mL/kg/hr)]  Weight: 70 kg     Relevant Results           This patient currently has cardiac telemetry ordered; if you would like to modify or discontinue the telemetry order, click here to go to the orders activity to modify/discontinue the order.                 Assessment/Plan   Principal Problem:    Subdural hematoma (CMS/HCC)  Active Problems:    HTN (hypertension)    Diabetes mellitus, type 2 (CMS/HCC)    CVA (cerebral vascular accident) (CMS/HCC)    Pt is a 82 yo M w/ h/o CLL, anemia, DM, p/w AMS, found to have UTI, CTH w/ L large chronic SDH     11/22 s/p L crani for SDH evac, CTH POC  11/24 drain dc'd  11/26 EEG neg, dc'd, CXR R lung patchy opacities  11/28 s/p midline  11/29 lethargic, not FC, CTH incr L SDH w 8mm MLS, s/p1g keppra load, CXR stable opacities, rCTH stable, s/p R side wd, s/p keppra load, rrCTH stable L SDH  11/30 s/p redo R crani for SDH evac, CTH good evac, decr MLS  12/1 s/p extubation  12/3 CTH evolving brood products, stable MLS  12/4 CTH stable, s/p angio small L MMA w/ no distal branches, not embolized  12/5 DVT USx4 RLE chronic changes no DVT, CTH stable, CT PE neg for PE, diffuse sclerotic lesions c/f mets, BL pleural effusions, s/p 60 lasix  12/6 s/p SDD dc'd, EEG neg, dc'd, s/p 1mg Bumex, TTE EF 65-70%  12/13 hgb 6.8, s/p 1U pRBC  12/14 CBC hgb 6.1, s/p " 2U pRBC  12/15 sutures dc'd  12/17 patient transferred to NSU due to respiratory failure, stabilized on biPAP, CTH improved L SDH.   12/20 worsened neuro exam, CTH stable     PLAN:    DNR/DNI  RITCHIE-likely downgrade  Continue biPAP as needed  Goals of care discussion with family - held long conversation at wife at bedside yesterday who expressed that she understands that his prognosis is not good, and wants to transition to comfort care soon; will follow up today  Medicine recs re: LIN  Supportive onc consulted appreciate recs  Med onc recs - CT CAP when LNI resolved  BIPAP as needed  GI recs-will re engage re PEG after respiratory status stabilized and GOC done  ID recs  Chronic ignacio  Continue CTX (12/23)  Heme recs-cont to hold ibrutinib   SLP recs  PTOT-SNF  SCD, SQH             Lupillo Vazquez MD

## 2023-12-22 NOTE — PROGRESS NOTES
"Haile Ayers is a 83 y.o. male on day 30 of admission presenting with Subdural hematoma (CMS/HCC).    Subjective   Patient was seen and examined.  Obtunded  Objective   Constitutional: Elderly frail  male, ill-appearing, breathing through his mouth.  Skin/Hair: dry skin.  HEENT: Eyes closed, Dobbhoff tube in place  Cardiovascular: Rapid heart rate  Respiratory: On nasal cannula  Abdomen: Distended  Psych : Unable to assess      Last Recorded Vitals  Blood pressure 145/65, pulse 103, temperature 36.5 °C (97.7 °F), temperature source Temporal, resp. rate (!) 28, height 1.727 m (5' 7.99\"), weight 70 kg (154 lb 5.2 oz), SpO2 97 %.  Intake/Output last 3 Shifts:  I/O last 3 completed shifts:  In: 5260 (75.1 mL/kg) [I.V.:750 (10.7 mL/kg); NG/GT:4060; IV Piggyback:450]  Out: 1665 (23.8 mL/kg) [Urine:1665 (0.7 mL/kg/hr)]  Weight: 70 kg     Relevant Results  Results from last 7 days   Lab Units 12/22/23  0901 12/22/23  0627 12/22/23  0422 12/22/23  0009 12/21/23  1951 12/21/23  1548 12/21/23  1223 12/21/23  0515 12/21/23  0004 12/20/23  1548 12/20/23  1350 12/20/23  0525 12/20/23  0002   POCT GLUCOSE mg/dL 243* 242*  --  266* 263* 239*  --    < >  --    < >  --    < >  --    GLUCOSE mg/dL  --   --  270*  --   --   --  255*  --  235*  --  213*  --  191*    < > = values in this interval not displayed.     Lab Results   Component Value Date     (H) 12/22/2023     12/21/2023     (H) 12/21/2023     (H) 12/20/2023     (H) 12/20/2023     (H) 12/19/2023     (H) 12/18/2023     (H) 12/18/2023     (H) 12/18/2023     (H) 12/18/2023       Assessment/Plan   Principal Problem:    Subdural hematoma (CMS/HCC)  Active Problems:    HTN (hypertension)    Diabetes mellitus, type 2 (CMS/HCC)    CVA (cerebral vascular accident) (CMS/HCC)    Haile Ayers is a 83 y.o. patient with PMHx of HTN , DM type 2, hx of CVA, hx of anemia,  CLL (unknown CLL-IPI score; being followed by  " [facility] at [facility]) on Ibrutinib who presented to the hospital on 11/22 with AMS 2/2 UTI also found to have L large chronic SDH with midline shift. He underwent L crani for SDH evacuation on 11/22 and re-evacuated on 11/30, extubated on 12/1.  Had worsening neurological exam on 12/4 and transferred to NSU. CTA on 12/5 incidentally showed diffuse sclerotic lesions throughout axial and appendicular skeleton + ascities.  Tested positive for COVID on 12/8.  Patient with continued worsening mental status, endocrinology team consulted for elevated TSH as part of worsening encephalopathy, workup showing worsening lung disease, hypernatremia and LIN, patient was initially planned for PEG on 12/18. Was transferred to NSU for worsening respiratory status          Hypothyroidism:  -------------------  TFTs on 12/16 showed TSH 10.4, Ft4: 0.85, Anti-TPO: neg   Patient likely has hypothyroidism vs non-thyroidal illness.  However unlikely that his mild hypothyroidism is the etiology of his worsening mental status.     Given current worsening clinical status. He was started on LT4 at dose of 25 mcg. Repeat TFT on 12/22: TSH 11 and FT4 of 0.79    -Increase levothyroxine to 50 mcg daily while holding tube feeds to 1 hour before and 1 hour after  -Repeat TFT with TSH and FT4 on 12/29     Type 2 diabetes:  --------------------  -Not enough data in chart, no A1c?  -At home on metformin 500 mg 1 tablet orally twice daily per med rec upon admission.  -Patient hyperglycemic, showing some insulin resistance  -Tube feeds Isosource 1.5 at goal 60 mL/h.  (total carbs over 24 hours would be approximately 250)    -Would hold on rechecking A1c given recent blood transfusion  -Continue glargine to 10 units every 24 hours  -Continue sliding scale regular insulin to #1 every 6 hours  [1 units for every 50 above 150] every 6 hours given severe LIN  -Increase regular insulin to 5 units every 6 hours  -Accucheck q6 ,while NPO or on tube  feeds  -Hypoglycemia protocol        Moderate hypernatremia:  -------------------------------  -Likely isovolume depletion especially given his rising BUN and creatinine.  With that being said patient is at risk of both nephrogenic and central diabetes insipidus given his LIN and his recent subdural hematoma status postevacuation x 2.    -His urine is moderately concentrated indicating possible partial DI  -Sodium trended up this am to 148.  Total intake in the past 24 hours was ~3 L, patient has made 1L urine. He's net postive 2L   --Continue FWF to 300 every 4 hours  --Restart 1/2 NS at 50 ml/hr   --RFP, serum osmolality and urine osmolality every 12 hours     Plan communicated to primary team      Case discussed with Dr. Sommers who agrees with the management plan.

## 2023-12-22 NOTE — PROGRESS NOTES
Spiritual Care Visit      tried to visit with the patient's spouse, however she was not present in the room.     Rev. Efren Garcia, Supportive Oncology

## 2023-12-22 NOTE — PROGRESS NOTES
SW contacted NP at supportive and palliative oncology since hospice consult order is placed, and this NP has discussed with pt's wife yesterday. Per NP's note, pt's wife is not ready for a hospice consult and wife asked NP to bring hospice information this evening. After meeting this evening, wife is likely to decide to have hospice consult. Hospice consult order is to be canceled and it will be re-placed once NP discuss with wife tonight. SW will continue to follow up with pt's dc plan.     HUEY Quinteros, LSW

## 2023-12-23 LAB
ALBUMIN SERPL BCP-MCNC: 2.8 G/DL (ref 3.4–5)
ANION GAP SERPL CALC-SCNC: 15 MMOL/L (ref 10–20)
BUN SERPL-MCNC: 102 MG/DL (ref 6–23)
CALCIUM SERPL-MCNC: 8.4 MG/DL (ref 8.6–10.6)
CHLORIDE SERPL-SCNC: 116 MMOL/L (ref 98–107)
CO2 SERPL-SCNC: 26 MMOL/L (ref 21–32)
CREAT SERPL-MCNC: 3.76 MG/DL (ref 0.5–1.3)
GFR SERPL CREATININE-BSD FRML MDRD: 15 ML/MIN/1.73M*2
GLUCOSE BLD MANUAL STRIP-MCNC: 183 MG/DL (ref 74–99)
GLUCOSE BLD MANUAL STRIP-MCNC: 207 MG/DL (ref 74–99)
GLUCOSE BLD MANUAL STRIP-MCNC: 216 MG/DL (ref 74–99)
GLUCOSE BLD MANUAL STRIP-MCNC: 244 MG/DL (ref 74–99)
GLUCOSE SERPL-MCNC: 277 MG/DL (ref 74–99)
OSMOLALITY SERPL: 355 MOSM/KG (ref 280–300)
OSMOLALITY SERPL: 368 MOSM/KG (ref 280–300)
OSMOLALITY UR: 435 MOSM/KG (ref 200–1200)
PHOSPHATE SERPL-MCNC: 4.4 MG/DL (ref 2.5–4.9)
POTASSIUM SERPL-SCNC: 5.8 MMOL/L (ref 3.5–5.3)
SODIUM SERPL-SCNC: 151 MMOL/L (ref 136–145)

## 2023-12-23 PROCEDURE — 2500000001 HC RX 250 WO HCPCS SELF ADMINISTERED DRUGS (ALT 637 FOR MEDICARE OP): Performed by: STUDENT IN AN ORGANIZED HEALTH CARE EDUCATION/TRAINING PROGRAM

## 2023-12-23 PROCEDURE — 2500000004 HC RX 250 GENERAL PHARMACY W/ HCPCS (ALT 636 FOR OP/ED): Performed by: STUDENT IN AN ORGANIZED HEALTH CARE EDUCATION/TRAINING PROGRAM

## 2023-12-23 PROCEDURE — 2500000002 HC RX 250 W HCPCS SELF ADMINISTERED DRUGS (ALT 637 FOR MEDICARE OP, ALT 636 FOR OP/ED)

## 2023-12-23 PROCEDURE — 99232 SBSQ HOSP IP/OBS MODERATE 35: CPT | Performed by: STUDENT IN AN ORGANIZED HEALTH CARE EDUCATION/TRAINING PROGRAM

## 2023-12-23 PROCEDURE — 2500000002 HC RX 250 W HCPCS SELF ADMINISTERED DRUGS (ALT 637 FOR MEDICARE OP, ALT 636 FOR OP/ED): Performed by: STUDENT IN AN ORGANIZED HEALTH CARE EDUCATION/TRAINING PROGRAM

## 2023-12-23 PROCEDURE — C9113 INJ PANTOPRAZOLE SODIUM, VIA: HCPCS | Performed by: STUDENT IN AN ORGANIZED HEALTH CARE EDUCATION/TRAINING PROGRAM

## 2023-12-23 PROCEDURE — 96372 THER/PROPH/DIAG INJ SC/IM: CPT | Performed by: STUDENT IN AN ORGANIZED HEALTH CARE EDUCATION/TRAINING PROGRAM

## 2023-12-23 PROCEDURE — 2500000004 HC RX 250 GENERAL PHARMACY W/ HCPCS (ALT 636 FOR OP/ED)

## 2023-12-23 PROCEDURE — 1200000002 HC GENERAL ROOM WITH TELEMETRY DAILY

## 2023-12-23 PROCEDURE — 80069 RENAL FUNCTION PANEL: CPT

## 2023-12-23 PROCEDURE — 82947 ASSAY GLUCOSE BLOOD QUANT: CPT

## 2023-12-23 PROCEDURE — 83935 ASSAY OF URINE OSMOLALITY: CPT

## 2023-12-23 PROCEDURE — 83930 ASSAY OF BLOOD OSMOLALITY: CPT

## 2023-12-23 PROCEDURE — 99233 SBSQ HOSP IP/OBS HIGH 50: CPT | Performed by: INTERNAL MEDICINE

## 2023-12-23 RX ADMIN — HEPARIN SODIUM 5000 UNITS: 5000 INJECTION INTRAVENOUS; SUBCUTANEOUS at 17:42

## 2023-12-23 RX ADMIN — PANTOPRAZOLE SODIUM 40 MG: 40 INJECTION, POWDER, FOR SOLUTION INTRAVENOUS at 09:14

## 2023-12-23 RX ADMIN — AMLODIPINE BESYLATE 10 MG: 10 TABLET ORAL at 09:14

## 2023-12-23 RX ADMIN — THIAMINE HCL TAB 100 MG 100 MG: 100 TAB at 09:14

## 2023-12-23 RX ADMIN — INSULIN GLARGINE 10 UNITS: 100 INJECTION, SOLUTION SUBCUTANEOUS at 21:46

## 2023-12-23 RX ADMIN — Medication 4000 UNITS: at 09:14

## 2023-12-23 RX ADMIN — HEPARIN SODIUM 5000 UNITS: 5000 INJECTION INTRAVENOUS; SUBCUTANEOUS at 09:14

## 2023-12-23 RX ADMIN — HEPARIN SODIUM 5000 UNITS: 5000 INJECTION INTRAVENOUS; SUBCUTANEOUS at 01:04

## 2023-12-23 RX ADMIN — INSULIN HUMAN 2 UNITS: 100 INJECTION, SOLUTION PARENTERAL at 20:00

## 2023-12-23 RX ADMIN — PANTOPRAZOLE SODIUM 40 MG: 40 INJECTION, POWDER, FOR SOLUTION INTRAVENOUS at 21:26

## 2023-12-23 RX ADMIN — SODIUM CHLORIDE 50 ML/HR: 4.5 INJECTION, SOLUTION INTRAVENOUS at 05:54

## 2023-12-23 RX ADMIN — INSULIN HUMAN 2 UNITS: 100 INJECTION, SOLUTION PARENTERAL at 06:43

## 2023-12-23 RX ADMIN — INSULIN HUMAN 2 UNITS: 100 INJECTION, SOLUTION PARENTERAL at 13:06

## 2023-12-23 RX ADMIN — INSULIN HUMAN 2 UNITS: 100 INJECTION, SOLUTION PARENTERAL at 17:42

## 2023-12-23 RX ADMIN — ATORVASTATIN CALCIUM 20 MG: 20 TABLET, FILM COATED ORAL at 09:14

## 2023-12-23 RX ADMIN — LEVETIRACETAM 500 MG: 5 INJECTION INTRAVENOUS at 17:42

## 2023-12-23 RX ADMIN — LEVETIRACETAM 500 MG: 5 INJECTION INTRAVENOUS at 05:54

## 2023-12-23 ASSESSMENT — PAIN SCALES - PAIN ASSESSMENT IN ADVANCED DEMENTIA (PAINAD)
BODYLANGUAGE: RELAXED
BREATHING: NORMAL
CONSOLABILITY: NO NEED TO CONSOLE
BREATHING: NORMAL
CONSOLABILITY: NO NEED TO CONSOLE
TOTALSCORE: 0
FACIALEXPRESSION: SMILING OR INEXPRESSIVE
TOTALSCORE: 0
FACIALEXPRESSION: SMILING OR INEXPRESSIVE
CONSOLABILITY: NO NEED TO CONSOLE
BREATHING: NORMAL
BODYLANGUAGE: RELAXED
TOTALSCORE: 0
BODYLANGUAGE: RELAXED
FACIALEXPRESSION: SMILING OR INEXPRESSIVE

## 2023-12-23 ASSESSMENT — PAIN SCALES - WONG BAKER: WONGBAKER_NUMERICALRESPONSE: NO HURT

## 2023-12-23 ASSESSMENT — PAIN - FUNCTIONAL ASSESSMENT: PAIN_FUNCTIONAL_ASSESSMENT: CPOT (CRITICAL CARE PAIN OBSERVATION TOOL)

## 2023-12-23 ASSESSMENT — PAIN SCALES - GENERAL: PAINLEVEL_OUTOF10: 0 - NO PAIN

## 2023-12-23 NOTE — CARE PLAN
The patient's goals for the shift include      The clinical goals for the shift include patient will remain hds this shift    Over the shift, the patient was confused, pulling at [place]. Patient remains with left hand mitt.

## 2023-12-23 NOTE — PROGRESS NOTES
"Haile Ayers is a 83 y.o. male on day 31 of admission presenting with Subdural hematoma (CMS/HCC).    Subjective   Was being clean this am upon evaluation. Pending discussion to pursue comfort care possibly.        Objective   Last Recorded Vitals  Blood pressure 138/69, pulse 95, temperature 36.6 °C (97.9 °F), temperature source Temporal, resp. rate 18, height 1.727 m (5' 7.99\"), weight 70 kg (154 lb 5.2 oz), SpO2 99 %.  Intake/Output last 3 Shifts:  I/O last 3 completed shifts:  In: 3579.2 (51.1 mL/kg) [I.V.:119.2 (1.7 mL/kg); NG/GT:3210; IV Piggyback:250]  Out: 1155 (16.5 mL/kg) [Urine:1155 (0.5 mL/kg/hr)]  Weight: 70 kg     Relevant Results              Scheduled medications  amLODIPine, 10 mg, nasogastric tube, Daily  atorvastatin, 20 mg, oral, Daily  bisacodyl, 10 mg, rectal, Daily  cefTRIAXone, 1 g, intravenous, q24h  cholecalciferol, 4,000 Units, oral, Daily  heparin (porcine), 5,000 Units, subcutaneous, q8h  insulin glargine, 10 Units, subcutaneous, Nightly  insulin regular, 0-5 Units, subcutaneous, q6h  insulin regular, 5 Units, subcutaneous, q6h  levETIRAcetam, 500 mg, intravenous, q12h  levothyroxine, 25 mcg, oral, Daily  pantoprazole, 40 mg, intravenous, BID  polyethylene glycol, 17 g, oral, Daily  thiamine, 100 mg, nasogastric tube, Daily      Continuous medications  oxygen, , Last Rate: 2 L/min (12/12/23 4483)  sodium chloride, 50 mL/hr, Last Rate: 50 mL/hr (12/23/23 8894)      PRN medications  PRN medications: acetaminophen, albuterol, calcium gluconate, calcium gluconate, dextrose 10 % in water (D10W), dextrose, glucagon, hydrALAZINE, labetaloL, magnesium sulfate, magnesium sulfate, naloxone, ondansetron **OR** ondansetron, oxygen, potassium chloride CR **OR** potassium chloride, potassium chloride CR **OR** potassium chloride, potassium chloride, potassium chloride    Results for orders placed or performed during the hospital encounter of 11/21/23 (from the past 24 hour(s))   POCT GLUCOSE   Result " Value Ref Range    POCT Glucose 210 (H) 74 - 99 mg/dL   POCT GLUCOSE   Result Value Ref Range    POCT Glucose 214 (H) 74 - 99 mg/dL   Renal function panel   Result Value Ref Range    Glucose 228 (H) 74 - 99 mg/dL    Sodium 150 (H) 136 - 145 mmol/L    Potassium 5.7 (H) 3.5 - 5.3 mmol/L    Chloride 115 (H) 98 - 107 mmol/L    Bicarbonate 25 21 - 32 mmol/L    Anion Gap 16 10 - 20 mmol/L    Urea Nitrogen 100 (HH) 6 - 23 mg/dL    Creatinine 3.52 (H) 0.50 - 1.30 mg/dL    eGFR 16 (L) >60 mL/min/1.73m*2    Calcium 8.4 (L) 8.6 - 10.6 mg/dL    Phosphorus 4.3 2.5 - 4.9 mg/dL    Albumin 2.9 (L) 3.4 - 5.0 g/dL   Osmolality   Result Value Ref Range    Osmolality, Serum 368 (H) 280 - 300 mOsm/kg   Osmolality, urine   Result Value Ref Range    Osmolality, Urine Random 426 200 - 1,200 mOsm/kg   POCT GLUCOSE   Result Value Ref Range    POCT Glucose 245 (H) 74 - 99 mg/dL   Osmolality, urine   Result Value Ref Range    Osmolality, Urine Random 435 200 - 1,200 mOsm/kg   Renal function panel   Result Value Ref Range    Glucose 277 (H) 74 - 99 mg/dL    Sodium 151 (H) 136 - 145 mmol/L    Potassium 5.8 (H) 3.5 - 5.3 mmol/L    Chloride 116 (H) 98 - 107 mmol/L    Bicarbonate 26 21 - 32 mmol/L    Anion Gap 15 10 - 20 mmol/L    Urea Nitrogen 102 (HH) 6 - 23 mg/dL    Creatinine 3.76 (H) 0.50 - 1.30 mg/dL    eGFR 15 (L) >60 mL/min/1.73m*2    Calcium 8.4 (L) 8.6 - 10.6 mg/dL    Phosphorus 4.4 2.5 - 4.9 mg/dL    Albumin 2.8 (L) 3.4 - 5.0 g/dL   Osmolality   Result Value Ref Range    Osmolality, Serum 355 (H) 280 - 300 mOsm/kg   POCT GLUCOSE   Result Value Ref Range    POCT Glucose 244 (H) 74 - 99 mg/dL                  Assessment/Plan   Principal Problem:    Subdural hematoma (CMS/HCC)  Active Problems:    HTN (hypertension)    Diabetes mellitus, type 2 (CMS/HCC)    CVA (cerebral vascular accident) (CMS/HCC)    Haile Ayers is a 83 y.o. male on day 30 of admission with PMH of CLL( on Ibrutinib), type 2 DM, HTN, dementia, indwelling Mckee (c/b  multiple CAUTI), BPH presented with lt  Subdural hematoma (CMS/HCC)  (s/p craniotomy with evacuation on 11/22). Patient has had a prolonged hospital stay which has been c/b bacteremia, LIN, anemia, hypovolemia, respiratory failure 2/2 pneumonia, COVID, hypernatremia. Medicine consulted for LIN and reduced pulmonary function. FENa- 2.9%, LIN likely due to intrinsic renal injury. Cr continues to worsen. Primary team to discuss comfort care today so will await results of meeting.     Recommendations:  - Follow up GOC discussion  - No active interventions recommended at this time. Follow daily RFP, Mg  - Avoid nephrotoxic drugs  - Renally dose medications  - ID and endocrine following  - Pt saturating well (97% SpO2) on 2L NC, Continue BiPAP PRN      Medicine will continue to follow.     Discussed with Dr. Darden who agreed with the plan.           Rose Barajas MD  DACR

## 2023-12-23 NOTE — PROGRESS NOTES
"Haile Ayers is a 83 y.o. male on day 31 of admission presenting with Subdural hematoma (CMS/HCC).    Subjective   Patient remains obtunded.   I have reviewed histories, allergies and medications have been reviewed and there are no changes       Objective   Review of Systems  Physical Exam  Vitals:    12/23/23 1100   BP: 132/72   Pulse: 102   Resp: 18   Temp: 36.4 °C (97.5 °F)   SpO2: 99%        Last Recorded Vitals  Blood pressure 132/72, pulse 102, temperature 36.4 °C (97.5 °F), temperature source Temporal, resp. rate 18, height 1.727 m (5' 7.99\"), weight 70 kg (154 lb 5.2 oz), SpO2 99 %.  Intake/Output last 3 Shifts:  I/O last 3 completed shifts:  In: 3579.2 (51.1 mL/kg) [I.V.:119.2 (1.7 mL/kg); NG/GT:3210; IV Piggyback:250]  Out: 1155 (16.5 mL/kg) [Urine:1155 (0.5 mL/kg/hr)]  Weight: 70 kg     Relevant Results  Results from last 7 days   Lab Units 12/23/23  1212 12/23/23  0611 12/23/23  0549 12/22/23  2349 12/22/23  2103 12/22/23  1707 12/22/23  1158 12/22/23  0911 12/22/23  0627 12/22/23  0422 12/21/23  1548 12/21/23  1223   POCT GLUCOSE mg/dL 216* 244*  --  245*  --  214* 210*  --    < >  --    < >  --    GLUCOSE mg/dL  --   --  277*  --  228*  --   --  248*  --  270*  --  255*    < > = values in this interval not displayed.             Assessment/Plan   Principal Problem:    Subdural hematoma (CMS/HCC)  Active Problems:    HTN (hypertension)    Diabetes mellitus, type 2 (CMS/HCC)    CVA (cerebral vascular accident) (CMS/HCC)    Haile Ayers is a 83 y.o. patient with PMHx of HTN , DM type 2, hx of CVA, hx of anemia,  CLL (unknown CLL-IPI score; being followed by Dr. Claros at [facility]) on Ibrutinib who presented to the hospital on 11/22 with AMS 2/2 UTI also found to have L large chronic SDH with midline shift. He underwent L crani for SDH evacuation on 11/22 and re-evacuated on 11/30, extubated on 12/1.  Had worsening neurological exam on 12/4 and transferred to NSU. CTA on 12/5 incidentally showed " diffuse sclerotic lesions throughout axial and appendicular skeleton + ascities.  Tested positive for COVID on 12/8.  Patient with continued worsening mental status, endocrinology team consulted for elevated TSH as part of worsening encephalopathy, workup showing worsening lung disease, hypernatremia and LIN, patient was initially planned for PEG on 12/18. Was transferred to NSU for worsening respiratory status           Hypothyroidism:  -------------------  TFTs on 12/16 showed TSH 10.4, Ft4: 0.85, Anti-TPO: neg   Patient likely has hypothyroidism vs non-thyroidal illness.  However unlikely that his mild hypothyroidism is the etiology of his worsening mental status.      Given current worsening clinical status. was started on LT4 at dose of 25 mcg. Repeat TFT on 12/22: TSH 11 and FT4 of 0.79  -Increase levothyroxine to 50 mcg daily while holding tube feeds to 1 hour before and 1 hour after  -Repeat TFT with TSH and FT4 on 12/29     Type 2 diabetes:  --------------------  -Not enough data in chart, no A1c?  -At home on metformin 500 mg 1 tablet orally twice daily per med rec upon admission.  -Patient hyperglycemic, showing some insulin resistance  -Tube feeds Isosource 1.5 at 60 mL/h.  (total carbs over 24 hours would be approximately 250)  -Continue glargine to 10 units every 24 hours  -Increase sliding scale to regular insulin to #2 every 6 hours  [2 units for every 50 above 150] every 6 hours   -Increase regular insulin to 8 units every 6 hours  -Accucheck q6 ,while NPO or on tube feeds  -Hypoglycemia protocol       Severe Hypernatremia:  -------------------------------  - Patient is at risk of nephrogenic and central diabetes insipidus given his LIN and his recent subdural hematoma status postevacuation x 2.    - Moderately concentrated urine - possible partial DI  - I/O: +1114.2  --Increase FWF to 400 cc every 4 hours  --Resume 1/2 NS at 50 ml/hr   --RFP, serum osmolality and urine osmolality every 24 hours      Plan communicated to primary team      Case discussed with Dr. Sommers who agrees with the management plan.    Eloy Vasquez MD

## 2023-12-23 NOTE — PROGRESS NOTES
"Haile Ayers is a 83 y.o. male on day 31 of admission presenting with Subdural hematoma (CMS/HCC).    Subjective   Worsened neuro exam overnight    Objective     Physical Exam  ECNS  RUE flaccid  RLE w/d (int spont per RN)  LUE spont/localizing  LLE w/d (int spont per RN)  On 2L NC      Last Recorded Vitals  Blood pressure 127/75, pulse 101, temperature 37.1 °C (98.8 °F), temperature source Temporal, resp. rate 25, height 1.727 m (5' 7.99\"), weight 70 kg (154 lb 5.2 oz), SpO2 98 %.  Intake/Output last 3 Shifts:  I/O last 3 completed shifts:  In: 4620 (66 mL/kg) [I.V.:60 (0.9 mL/kg); NG/GT:4210; IV Piggyback:350]  Out: 1525 (21.8 mL/kg) [Urine:1525 (0.6 mL/kg/hr)]  Weight: 70 kg     Relevant Results                             Assessment/Plan   Principal Problem:    Subdural hematoma (CMS/HCC)  Active Problems:    HTN (hypertension)    Diabetes mellitus, type 2 (CMS/HCC)    CVA (cerebral vascular accident) (CMS/HCC)    Pt is a 84 yo M w/ h/o CLL, anemia, DM, p/w AMS, found to have UTI, CTH w/ L large chronic SDH     11/22 s/p L crani for SDH evac, CTH POC  11/24 drain dc'd  11/26 EEG neg, dc'd, CXR R lung patchy opacities  11/28 s/p midline  11/29 lethargic, not FC, CTH incr L SDH w 8mm MLS, s/p1g keppra load, CXR stable opacities, rCTH stable, s/p R side wd, s/p keppra load, rrCTH stable L SDH  11/30 s/p redo R crani for SDH evac, CTH good evac, decr MLS  12/1 s/p extubation  12/3 CTH evolving brood products, stable MLS  12/4 CTH stable, s/p angio small L MMA w/ no distal branches, not embolized  12/5 DVT USx4 RLE chronic changes no DVT, CTH stable, CT PE neg for PE, diffuse sclerotic lesions c/f mets, BL pleural effusions, s/p 60 lasix  12/6 s/p SDD dc'd, EEG neg, dc'd, s/p 1mg Bumex, TTE EF 65-70%  12/13 hgb 6.8, s/p 1U pRBC  12/14 CBC hgb 6.1, s/p 2U pRBC  12/15 sutures dc'd  12/17 patient transferred to NSU due to respiratory failure, stabilized on biPAP, CTH improved L SDH.   12/20 worsened neuro exam, CTH " stable     PLAN:    DNR/DNI  RITCHIE-likely downgrade  Continue biPAP as needed  Goals of care discussion with family - held long conversation at wife at bedside yesterday who expressed that she understands that his prognosis is not good, and wants to transition to comfort care soon; will follow up today  Medicine recs re: LIN  Supportive onc consulted appreciate recs  Med onc recs - CT CAP when LIN resolved  BIPAP as needed  GI recs-will re engage re PEG after respiratory status stabilized and GOC done  ID recs  Chronic ignacio  Continue CTX (12/23)  Heme recs-cont to hold ibrutinib   SLP recs  PTOT-SNF  SCD, SQH         Saima Abad MD

## 2023-12-24 ENCOUNTER — APPOINTMENT (OUTPATIENT)
Dept: RADIOLOGY | Facility: HOSPITAL | Age: 83
DRG: 025 | End: 2023-12-24
Payer: MEDICARE

## 2023-12-24 LAB
ALBUMIN SERPL BCP-MCNC: 2.9 G/DL (ref 3.4–5)
ANION GAP BLDA CALCULATED.4IONS-SCNC: 7 MMO/L (ref 10–25)
ANION GAP SERPL CALC-SCNC: 14 MMOL/L (ref 10–20)
BASE EXCESS BLDA CALC-SCNC: 0.9 MMOL/L (ref -2–3)
BODY TEMPERATURE: ABNORMAL
BUN SERPL-MCNC: 103 MG/DL (ref 6–23)
CA-I BLDA-SCNC: 1.24 MMOL/L (ref 1.1–1.33)
CALCIUM SERPL-MCNC: 8.7 MG/DL (ref 8.6–10.6)
CHLORIDE BLDA-SCNC: 117 MMOL/L (ref 98–107)
CHLORIDE SERPL-SCNC: 116 MMOL/L (ref 98–107)
CO2 SERPL-SCNC: 25 MMOL/L (ref 21–32)
CREAT SERPL-MCNC: 3.81 MG/DL (ref 0.5–1.3)
GFR SERPL CREATININE-BSD FRML MDRD: 15 ML/MIN/1.73M*2
GLUCOSE BLD MANUAL STRIP-MCNC: 163 MG/DL (ref 74–99)
GLUCOSE BLD MANUAL STRIP-MCNC: 176 MG/DL (ref 74–99)
GLUCOSE BLD MANUAL STRIP-MCNC: 181 MG/DL (ref 74–99)
GLUCOSE BLD MANUAL STRIP-MCNC: 183 MG/DL (ref 74–99)
GLUCOSE BLD MANUAL STRIP-MCNC: 189 MG/DL (ref 74–99)
GLUCOSE BLD MANUAL STRIP-MCNC: 200 MG/DL (ref 74–99)
GLUCOSE BLDA-MCNC: 184 MG/DL (ref 74–99)
GLUCOSE SERPL-MCNC: 201 MG/DL (ref 74–99)
HCO3 BLDA-SCNC: 27.7 MMOL/L (ref 22–26)
HCT VFR BLD EST: 27 % (ref 41–52)
HGB BLDA-MCNC: 8.9 G/DL (ref 13.5–17.5)
INHALED O2 CONCENTRATION: 50 %
LACTATE BLDA-SCNC: 1.1 MMOL/L (ref 0.4–2)
OSMOLALITY SERPL: 360 MOSM/KG (ref 280–300)
OSMOLALITY UR: 419 MOSM/KG (ref 200–1200)
OXYHGB MFR BLDA: 97.3 % (ref 94–98)
PCO2 BLDA: 55 MM HG (ref 38–42)
PH BLDA: 7.31 PH (ref 7.38–7.42)
PHOSPHATE SERPL-MCNC: 4 MG/DL (ref 2.5–4.9)
PO2 BLDA: 102 MM HG (ref 85–95)
POTASSIUM BLDA-SCNC: 6.6 MMOL/L (ref 3.5–5.3)
POTASSIUM SERPL-SCNC: 6 MMOL/L (ref 3.5–5.3)
SAO2 % BLDA: 99 % (ref 94–100)
SODIUM BLDA-SCNC: 145 MMOL/L (ref 136–145)
SODIUM SERPL-SCNC: 149 MMOL/L (ref 136–145)

## 2023-12-24 PROCEDURE — C9113 INJ PANTOPRAZOLE SODIUM, VIA: HCPCS | Performed by: STUDENT IN AN ORGANIZED HEALTH CARE EDUCATION/TRAINING PROGRAM

## 2023-12-24 PROCEDURE — 1200000002 HC GENERAL ROOM WITH TELEMETRY DAILY

## 2023-12-24 PROCEDURE — 2500000001 HC RX 250 WO HCPCS SELF ADMINISTERED DRUGS (ALT 637 FOR MEDICARE OP): Performed by: STUDENT IN AN ORGANIZED HEALTH CARE EDUCATION/TRAINING PROGRAM

## 2023-12-24 PROCEDURE — 71045 X-RAY EXAM CHEST 1 VIEW: CPT | Performed by: RADIOLOGY

## 2023-12-24 PROCEDURE — 2500000004 HC RX 250 GENERAL PHARMACY W/ HCPCS (ALT 636 FOR OP/ED): Performed by: STUDENT IN AN ORGANIZED HEALTH CARE EDUCATION/TRAINING PROGRAM

## 2023-12-24 PROCEDURE — 82947 ASSAY GLUCOSE BLOOD QUANT: CPT

## 2023-12-24 PROCEDURE — 74018 RADEX ABDOMEN 1 VIEW: CPT | Performed by: RADIOLOGY

## 2023-12-24 PROCEDURE — 99233 SBSQ HOSP IP/OBS HIGH 50: CPT | Performed by: INTERNAL MEDICINE

## 2023-12-24 PROCEDURE — 2500000001 HC RX 250 WO HCPCS SELF ADMINISTERED DRUGS (ALT 637 FOR MEDICARE OP)

## 2023-12-24 PROCEDURE — 84132 ASSAY OF SERUM POTASSIUM: CPT | Performed by: STUDENT IN AN ORGANIZED HEALTH CARE EDUCATION/TRAINING PROGRAM

## 2023-12-24 PROCEDURE — 2500000004 HC RX 250 GENERAL PHARMACY W/ HCPCS (ALT 636 FOR OP/ED)

## 2023-12-24 PROCEDURE — 31720 CLEARANCE OF AIRWAYS: CPT

## 2023-12-24 PROCEDURE — 83930 ASSAY OF BLOOD OSMOLALITY: CPT

## 2023-12-24 PROCEDURE — 80069 RENAL FUNCTION PANEL: CPT

## 2023-12-24 PROCEDURE — 74018 RADEX ABDOMEN 1 VIEW: CPT

## 2023-12-24 PROCEDURE — 96372 THER/PROPH/DIAG INJ SC/IM: CPT | Performed by: STUDENT IN AN ORGANIZED HEALTH CARE EDUCATION/TRAINING PROGRAM

## 2023-12-24 PROCEDURE — 99231 SBSQ HOSP IP/OBS SF/LOW 25: CPT | Performed by: STUDENT IN AN ORGANIZED HEALTH CARE EDUCATION/TRAINING PROGRAM

## 2023-12-24 PROCEDURE — 83935 ASSAY OF URINE OSMOLALITY: CPT

## 2023-12-24 PROCEDURE — 71045 X-RAY EXAM CHEST 1 VIEW: CPT

## 2023-12-24 RX ORDER — LEVOTHYROXINE SODIUM 50 UG/1
50 TABLET ORAL DAILY
Status: DISCONTINUED | OUTPATIENT
Start: 2023-12-25 | End: 2023-12-29

## 2023-12-24 RX ADMIN — INSULIN HUMAN 2 UNITS: 100 INJECTION, SOLUTION PARENTERAL at 00:26

## 2023-12-24 RX ADMIN — SODIUM CHLORIDE 50 ML/HR: 4.5 INJECTION, SOLUTION INTRAVENOUS at 04:58

## 2023-12-24 RX ADMIN — POLYETHYLENE GLYCOL 3350 17 G: 17 POWDER, FOR SOLUTION ORAL at 08:16

## 2023-12-24 RX ADMIN — PANTOPRAZOLE SODIUM 40 MG: 40 INJECTION, POWDER, FOR SOLUTION INTRAVENOUS at 20:05

## 2023-12-24 RX ADMIN — INSULIN HUMAN 2 UNITS: 100 INJECTION, SOLUTION PARENTERAL at 06:18

## 2023-12-24 RX ADMIN — THIAMINE HCL TAB 100 MG 100 MG: 100 TAB at 08:16

## 2023-12-24 RX ADMIN — INSULIN GLARGINE 10 UNITS: 100 INJECTION, SOLUTION SUBCUTANEOUS at 20:05

## 2023-12-24 RX ADMIN — INSULIN HUMAN 2 UNITS: 100 INJECTION, SOLUTION PARENTERAL at 18:16

## 2023-12-24 RX ADMIN — AMLODIPINE BESYLATE 10 MG: 10 TABLET ORAL at 08:16

## 2023-12-24 RX ADMIN — HEPARIN SODIUM 5000 UNITS: 5000 INJECTION INTRAVENOUS; SUBCUTANEOUS at 17:48

## 2023-12-24 RX ADMIN — ATORVASTATIN CALCIUM 20 MG: 20 TABLET, FILM COATED ORAL at 08:16

## 2023-12-24 RX ADMIN — HEPARIN SODIUM 5000 UNITS: 5000 INJECTION INTRAVENOUS; SUBCUTANEOUS at 08:16

## 2023-12-24 RX ADMIN — LEVOTHYROXINE SODIUM 25 MCG: 25 TABLET ORAL at 05:51

## 2023-12-24 RX ADMIN — INSULIN HUMAN 2 UNITS: 100 INJECTION, SOLUTION PARENTERAL at 12:19

## 2023-12-24 RX ADMIN — PANTOPRAZOLE SODIUM 40 MG: 40 INJECTION, POWDER, FOR SOLUTION INTRAVENOUS at 08:16

## 2023-12-24 RX ADMIN — HEPARIN SODIUM 5000 UNITS: 5000 INJECTION INTRAVENOUS; SUBCUTANEOUS at 00:24

## 2023-12-24 RX ADMIN — Medication 4000 UNITS: at 08:16

## 2023-12-24 RX ADMIN — LEVETIRACETAM 500 MG: 5 INJECTION INTRAVENOUS at 17:48

## 2023-12-24 RX ADMIN — LEVETIRACETAM 500 MG: 5 INJECTION INTRAVENOUS at 05:50

## 2023-12-24 ASSESSMENT — COGNITIVE AND FUNCTIONAL STATUS - GENERAL
MOVING FROM LYING ON BACK TO SITTING ON SIDE OF FLAT BED WITH BEDRAILS: A LOT
CLIMB 3 TO 5 STEPS WITH RAILING: A LOT
MOBILITY SCORE: 12
DRESSING REGULAR UPPER BODY CLOTHING: TOTAL
HELP NEEDED FOR BATHING: TOTAL
DAILY ACTIVITIY SCORE: 6
PERSONAL GROOMING: TOTAL
MOVING TO AND FROM BED TO CHAIR: TOTAL
MOVING TO AND FROM BED TO CHAIR: A LOT
STANDING UP FROM CHAIR USING ARMS: A LOT
CLIMB 3 TO 5 STEPS WITH RAILING: A LOT
TURNING FROM BACK TO SIDE WHILE IN FLAT BAD: A LOT
MOVING FROM LYING ON BACK TO SITTING ON SIDE OF FLAT BED WITH BEDRAILS: TOTAL
MOVING FROM LYING ON BACK TO SITTING ON SIDE OF FLAT BED WITH BEDRAILS: A LOT
STANDING UP FROM CHAIR USING ARMS: TOTAL
TOILETING: TOTAL
EATING MEALS: TOTAL
MOBILITY SCORE: 6
WALKING IN HOSPITAL ROOM: A LOT
WALKING IN HOSPITAL ROOM: TOTAL
CLIMB 3 TO 5 STEPS WITH RAILING: TOTAL
MOBILITY SCORE: 12
TURNING FROM BACK TO SIDE WHILE IN FLAT BAD: A LOT
MOVING TO AND FROM BED TO CHAIR: A LOT
PERSONAL GROOMING: TOTAL
STANDING UP FROM CHAIR USING ARMS: A LOT
HELP NEEDED FOR BATHING: TOTAL
DRESSING REGULAR UPPER BODY CLOTHING: TOTAL
DRESSING REGULAR LOWER BODY CLOTHING: TOTAL
DRESSING REGULAR LOWER BODY CLOTHING: TOTAL
DAILY ACTIVITIY SCORE: 6
WALKING IN HOSPITAL ROOM: A LOT
TURNING FROM BACK TO SIDE WHILE IN FLAT BAD: TOTAL
TOILETING: TOTAL
EATING MEALS: TOTAL

## 2023-12-24 ASSESSMENT — PAIN SCALES - GENERAL
PAINLEVEL_OUTOF10: 0 - NO PAIN
PAINLEVEL_OUTOF10: 0 - NO PAIN

## 2023-12-24 ASSESSMENT — PAIN SCALES - WONG BAKER
WONGBAKER_NUMERICALRESPONSE: NO HURT

## 2023-12-24 NOTE — PROGRESS NOTES
"Haile Ayers is a 83 y.o. male on day 32 of admission presenting with Subdural hematoma (CMS/HCC).    Subjective   NAEO    Objective     Physical Exam  ECNS  RUE flaccid  RLE w/d  LUE spont/localizing  LLE w/d  On 2L NC      Last Recorded Vitals  Blood pressure 149/62, pulse 100, temperature 36.2 °C (97.2 °F), resp. rate (!) 30, height 1.727 m (5' 7.99\"), weight 70 kg (154 lb 5.2 oz), SpO2 98 %.  Intake/Output last 3 Shifts:  I/O last 3 completed shifts:  In: 2709.2 (38.7 mL/kg) [I.V.:119.2 (1.7 mL/kg); NG/GT:2490; IV Piggyback:100]  Out: 1360 (19.4 mL/kg) [Urine:1360 (0.5 mL/kg/hr)]  Weight: 70 kg     Relevant Results                             Assessment/Plan   Principal Problem:    Subdural hematoma (CMS/HCC)  Active Problems:    HTN (hypertension)    Diabetes mellitus, type 2 (CMS/HCC)    CVA (cerebral vascular accident) (CMS/HCC)    Pt is a 84 yo M w/ h/o CLL, anemia, DM, p/w AMS, found to have UTI, CTH w/ L large chronic SDH     11/22 s/p L crani for SDH evac, CTH POC  11/24 drain dc'd  11/26 EEG neg, dc'd, CXR R lung patchy opacities  11/28 s/p midline  11/29 lethargic, not FC, CTH incr L SDH w 8mm MLS, s/p1g keppra load, CXR stable opacities, rCTH stable, s/p R side wd, s/p keppra load, rrCTH stable L SDH  11/30 s/p redo R crani for SDH evac, CTH good evac, decr MLS  12/1 s/p extubation  12/3 CTH evolving brood products, stable MLS  12/4 CTH stable, s/p angio small L MMA w/ no distal branches, not embolized  12/5 DVT USx4 RLE chronic changes no DVT, CTH stable, CT PE neg for PE, diffuse sclerotic lesions c/f mets, BL pleural effusions, s/p 60 lasix  12/6 s/p SDD dc'd, EEG neg, dc'd, s/p 1mg Bumex, TTE EF 65-70%  12/13 hgb 6.8, s/p 1U pRBC  12/14 CBC hgb 6.1, s/p 2U pRBC  12/15 sutures dc'd  12/17 patient transferred to NSU due to respiratory failure, stabilized on biPAP, CTH improved L SDH.   12/20 worsened neuro exam, CTH stable     PLAN:    DNR/DNI  Tele  Continue biPAP as needed  Goals of care " discussion with family   Medicine recs re: LIN  Endocrine recs for hypernatremia  Supportive onc consulted appreciate recs  Med onc recs - CT CAP when LIN resolved  BIPAP as needed  GI recs-will re engage re PEG GOC done  ID recs  Chronic ignacio  Heme recs-cont to hold ibrutinib   SLP recs  PTOT-SNF  SCD, SQH         Saima Abad MD

## 2023-12-24 NOTE — PROGRESS NOTES
"Haile Ayers is a 83 y.o. male on day 32 of admission presenting with Subdural hematoma (CMS/HCC).    Subjective   Pending [facility] discussion with family.       Objective   Last Recorded Vitals  Blood pressure 128/57, pulse 92, temperature 37.2 °C (99 °F), resp. rate 19, height 1.727 m (5' 7.99\"), weight 70 kg (154 lb 5.2 oz), SpO2 93 %.  Intake/Output last 3 Shifts:  I/O last 3 completed shifts:  In: 3517.5 (50.3 mL/kg) [I.V.:1297.5 (18.5 mL/kg); NG/GT:2220]  Out: 1700 (24.3 mL/kg) [Urine:1700 (0.7 mL/kg/hr)]  Weight: 70 kg     Relevant Results              Scheduled medications  amLODIPine, 10 mg, nasogastric tube, Daily  atorvastatin, 20 mg, oral, Daily  bisacodyl, 10 mg, rectal, Daily  cholecalciferol, 4,000 Units, oral, Daily  heparin (porcine), 5,000 Units, subcutaneous, q8h  insulin glargine, 10 Units, subcutaneous, Nightly  insulin regular, 0-10 Units, subcutaneous, q6h  insulin regular, 8 Units, subcutaneous, q6h  levETIRAcetam, 500 mg, intravenous, q12h  [START ON 12/25/2023] levothyroxine, 50 mcg, nasogastric tube, Daily  pantoprazole, 40 mg, intravenous, BID  polyethylene glycol, 17 g, oral, Daily  thiamine, 100 mg, nasogastric tube, Daily      Continuous medications  oxygen, , Last Rate: 2 L/min (12/12/23 1713)  sodium chloride, 50 mL/hr, Last Rate: 50 mL/hr (12/24/23 6459)      PRN medications  PRN medications: acetaminophen, albuterol, calcium gluconate, calcium gluconate, dextrose 10 % in water (D10W), dextrose, glucagon, hydrALAZINE, labetaloL, magnesium sulfate, magnesium sulfate, naloxone, ondansetron **OR** ondansetron, oxygen, potassium chloride CR **OR** potassium chloride, potassium chloride CR **OR** potassium chloride, potassium chloride, potassium chloride    Results for orders placed or performed during the hospital encounter of 11/21/23 (from the past 24 hour(s))   POCT GLUCOSE   Result Value Ref Range    POCT Glucose 207 (H) 74 - 99 mg/dL   POCT GLUCOSE   Result Value Ref Range    POCT " Glucose 183 (H) 74 - 99 mg/dL   POCT GLUCOSE   Result Value Ref Range    POCT Glucose 176 (H) 74 - 99 mg/dL   Osmolality, urine   Result Value Ref Range    Osmolality, Urine Random 419 200 - 1,200 mOsm/kg   POCT GLUCOSE   Result Value Ref Range    POCT Glucose 200 (H) 74 - 99 mg/dL   POCT GLUCOSE   Result Value Ref Range    POCT Glucose 183 (H) 74 - 99 mg/dL   Osmolality   Result Value Ref Range    Osmolality, Serum 360 (H) 280 - 300 mOsm/kg   Renal function panel   Result Value Ref Range    Glucose 201 (H) 74 - 99 mg/dL    Sodium 149 (H) 136 - 145 mmol/L    Potassium 6.0 (H) 3.5 - 5.3 mmol/L    Chloride 116 (H) 98 - 107 mmol/L    Bicarbonate 25 21 - 32 mmol/L    Anion Gap 14 10 - 20 mmol/L    Urea Nitrogen 103 (HH) 6 - 23 mg/dL    Creatinine 3.81 (H) 0.50 - 1.30 mg/dL    eGFR 15 (L) >60 mL/min/1.73m*2    Calcium 8.7 8.6 - 10.6 mg/dL    Phosphorus 4.0 2.5 - 4.9 mg/dL    Albumin 2.9 (L) 3.4 - 5.0 g/dL   POCT GLUCOSE   Result Value Ref Range    POCT Glucose 181 (H) 74 - 99 mg/dL                  Assessment/Plan   Principal Problem:    Subdural hematoma (CMS/HCC)  Active Problems:    HTN (hypertension)    Diabetes mellitus, type 2 (CMS/HCC)    CVA (cerebral vascular accident) (CMS/HCC)    Haile Ayers is a 83 y.o. 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 Patient has had a prolonged hospital stay which has been c/b bacteremia, LIN, anemia, hypovolemia, respiratory failure 2/2 pneumonia, COVID, hypernatremia. Medicine consulted for LIN and reduced pulmonary function. FENa- 2.9%, LIN likely due to intrinsic renal injury. Cr continues to worsen. Primary team to discuss comfort care today so will await results of meeting.     Recommendations:  - Follow up GOC discussion  - No active interventions recommended at this time. Follow daily RFP, Mg  -  Avoid nephrotoxic drugs  - Renally dose medications  - ID and endocrine following  - Pt saturating well (94% SpO2) on 2L NC, Continue BiPAP PRN     Medicine will continue to follow.     Discussed with Dr. Darden who agreed with the plan.           Rose Barajas MD  DACR

## 2023-12-24 NOTE — PROGRESS NOTES
"Haile Ayers is a 83 y.o. male on day 32 of admission presenting with Subdural hematoma (CMS/HCC).    Subjective   Pt. Is seen and examined this AM.   He remains obtunded.   I have reviewed histories, allergies and medications have been reviewed and there are no changes       Objective   Review of Systems  Physical Exam  Vitals:    12/24/23 1122   BP: 128/57   Pulse: 92   Resp: 19   Temp: 37.2 °C (99 °F)   SpO2: 93%   Constitutional: Elderly frail  male, ill-appearing, breathing through his mouth.  Skin/Hair: dry skin.  HEENT: Eyes closed, Dobbhoff tube in place  Cardiovascular: Rapid heart rate  Respiratory: On nasal cannula  Abdomen: Distended  Psych : Unable to assess       Last Recorded Vitals  Blood pressure 128/57, pulse 92, temperature 37.2 °C (99 °F), resp. rate 19, height 1.727 m (5' 7.99\"), weight 70 kg (154 lb 5.2 oz), SpO2 93 %.  Intake/Output last 3 Shifts:  I/O last 3 completed shifts:  In: 3517.5 (50.3 mL/kg) [I.V.:1297.5 (18.5 mL/kg); NG/GT:2220]  Out: 1700 (24.3 mL/kg) [Urine:1700 (0.7 mL/kg/hr)]  Weight: 70 kg     Relevant Results  Results from last 7 days   Lab Units 12/24/23  1123 12/24/23  0612 12/24/23  0602 12/24/23  0241 12/24/23  0016 12/23/23  2128 12/23/23  0611 12/23/23  0549 12/22/23  2349 12/22/23  2103 12/22/23  1158 12/22/23  0911 12/22/23  0627 12/22/23  0422   POCT GLUCOSE mg/dL 181*  --  183* 200* 176* 183*   < >  --    < >  --    < >  --    < >  --    GLUCOSE mg/dL  --  201*  --   --   --   --   --  277*  --  228*  --  248*  --  270*    < > = values in this interval not displayed.          Assessment/Plan   Principal Problem:    Subdural hematoma (CMS/HCC)  Active Problems:    HTN (hypertension)    Diabetes mellitus, type 2 (CMS/HCC)    CVA (cerebral vascular accident) (CMS/HCC)  Haile Ayers is a 83 y.o. patient with PMHx of HTN , DM type 2, hx of CVA, hx of anemia,  CLL (unknown CLL-IPI score; being followed by Dr. Claros at [facility]) on Ibrutinib who presented " to the hospital on 11/22 with AMS 2/2 UTI also found to have L large chronic SDH with midline shift. He underwent L crani for SDH evacuation on 11/22 and re-evacuated on 11/30, extubated on 12/1.  Had worsening neurological exam on 12/4 and transferred to NSU. CTA on 12/5 incidentally showed diffuse sclerotic lesions throughout axial and appendicular skeleton + ascities.  Tested positive for COVID on 12/8.  Patient with continued worsening mental status, endocrinology team consulted for elevated TSH as part of worsening encephalopathy, workup showing worsening lung disease, hypernatremia and LIN, patient was initially planned for PEG on 12/18. Was transferred to NSU for worsening respiratory status     Hypothyroidism:  -------------------  TFTs on 12/16 showed TSH 10.4, Ft4: 0.85, Anti-TPO: neg   Patient likely has hypothyroidism vs non-thyroidal illness.  However unlikely that his mild hypothyroidism is the etiology of his worsening mental status.      Given current worsening clinical status. was started on LT4 at dose of 25 mcg. Repeat TFT on 12/22: TSH 11 and FT4 of 0.79  -Resume levothyroxine to 50 mcg daily while holding tube feeds to 1 hour before and 1 hour after.  -Repeat TFT with TSH and FT4 on 12/31     Type 2 diabetes:  --------------------  -Not enough data in chart, no A1c?  -At home on metformin 500 mg 1 tablet orally twice daily per med rec upon admission.  -Patient hyperglycemic, showing some insulin resistance  -Tube feeds Isosource 1.5 at 65 mL/hr- Goal   -Continue glargine 10 units every 24 hours  -Continue sliding scale to regular insulin to #2 every 6 hours  [2 units for every 50 above 150] every 6 hours   -Resume Regular insulin 8 units every 6 hours  -Accucheck q6 ,while NPO or on tube feeds  -Hypoglycemia protocol        Severe Hypernatremia:  -------------------------------  - Patient is at risk of nephrogenic and central diabetes insipidus given his LIN and his recent subdural hematoma  status postevacuation x 2.    - Moderately concentrated urine - possible partial DI  - I/O: -240 so far  --Decrease FWF to 300 cc every 4 hours   --Resume 1/2 NS at 50 ml/hr   --RFP, serum osmolality and urine osmolality every 24 hours     Plan communicated to primary team   Case discussed with Dr. Sommers who agrees with the management plan.     Eloy Vasquez MD  I spent 60 minutes in the professional and overall care of this patient.      Eloy Vasquez MD

## 2023-12-24 NOTE — CARE PLAN
Problem: Pain  Goal: My pain/discomfort is manageable  Outcome: Progressing     Problem: Safety  Goal: Patient will be injury free during hospitalization  Outcome: Progressing     Problem: Daily Care  Goal: Daily care needs are met  Outcome: Progressing   The patient's goals for the shift include      The clinical goals for the shift include patient will remain hds this shift    Over the shift, the patient did make progress toward the following goals.

## 2023-12-25 LAB
ALBUMIN SERPL BCP-MCNC: 2.7 G/DL (ref 3.4–5)
ALBUMIN SERPL BCP-MCNC: 2.8 G/DL (ref 3.4–5)
ANION GAP SERPL CALC-SCNC: 14 MMOL/L (ref 10–20)
ANION GAP SERPL CALC-SCNC: 15 MMOL/L (ref 10–20)
BUN SERPL-MCNC: 107 MG/DL (ref 6–23)
BUN SERPL-MCNC: 110 MG/DL (ref 6–23)
CALCIUM SERPL-MCNC: 8.1 MG/DL (ref 8.6–10.6)
CALCIUM SERPL-MCNC: 8.5 MG/DL (ref 8.6–10.6)
CHLORIDE SERPL-SCNC: 110 MMOL/L (ref 98–107)
CHLORIDE SERPL-SCNC: 114 MMOL/L (ref 98–107)
CO2 SERPL-SCNC: 23 MMOL/L (ref 21–32)
CO2 SERPL-SCNC: 24 MMOL/L (ref 21–32)
CREAT SERPL-MCNC: 3.67 MG/DL (ref 0.5–1.3)
CREAT SERPL-MCNC: 3.74 MG/DL (ref 0.5–1.3)
GFR SERPL CREATININE-BSD FRML MDRD: 15 ML/MIN/1.73M*2
GFR SERPL CREATININE-BSD FRML MDRD: 16 ML/MIN/1.73M*2
GLUCOSE BLD MANUAL STRIP-MCNC: 111 MG/DL (ref 74–99)
GLUCOSE BLD MANUAL STRIP-MCNC: 122 MG/DL (ref 74–99)
GLUCOSE BLD MANUAL STRIP-MCNC: 130 MG/DL (ref 74–99)
GLUCOSE BLD MANUAL STRIP-MCNC: 147 MG/DL (ref 74–99)
GLUCOSE BLD MANUAL STRIP-MCNC: 148 MG/DL (ref 74–99)
GLUCOSE BLD MANUAL STRIP-MCNC: 180 MG/DL (ref 74–99)
GLUCOSE BLD MANUAL STRIP-MCNC: 188 MG/DL (ref 74–99)
GLUCOSE BLD MANUAL STRIP-MCNC: 98 MG/DL (ref 74–99)
GLUCOSE SERPL-MCNC: 164 MG/DL (ref 74–99)
GLUCOSE SERPL-MCNC: 199 MG/DL (ref 74–99)
OSMOLALITY SERPL: 356 MOSM/KG (ref 280–300)
OSMOLALITY UR: 414 MOSM/KG (ref 200–1200)
PHOSPHATE SERPL-MCNC: 3.4 MG/DL (ref 2.5–4.9)
PHOSPHATE SERPL-MCNC: 3.6 MG/DL (ref 2.5–4.9)
POTASSIUM SERPL-SCNC: 5.4 MMOL/L (ref 3.5–5.3)
POTASSIUM SERPL-SCNC: 6.4 MMOL/L (ref 3.5–5.3)
SODIUM SERPL-SCNC: 143 MMOL/L (ref 136–145)
SODIUM SERPL-SCNC: 146 MMOL/L (ref 136–145)

## 2023-12-25 PROCEDURE — 2500000001 HC RX 250 WO HCPCS SELF ADMINISTERED DRUGS (ALT 637 FOR MEDICARE OP): Performed by: STUDENT IN AN ORGANIZED HEALTH CARE EDUCATION/TRAINING PROGRAM

## 2023-12-25 PROCEDURE — 2500000004 HC RX 250 GENERAL PHARMACY W/ HCPCS (ALT 636 FOR OP/ED): Performed by: STUDENT IN AN ORGANIZED HEALTH CARE EDUCATION/TRAINING PROGRAM

## 2023-12-25 PROCEDURE — 31720 CLEARANCE OF AIRWAYS: CPT

## 2023-12-25 PROCEDURE — 99233 SBSQ HOSP IP/OBS HIGH 50: CPT | Performed by: INTERNAL MEDICINE

## 2023-12-25 PROCEDURE — 2500000002 HC RX 250 W HCPCS SELF ADMINISTERED DRUGS (ALT 637 FOR MEDICARE OP, ALT 636 FOR OP/ED): Performed by: STUDENT IN AN ORGANIZED HEALTH CARE EDUCATION/TRAINING PROGRAM

## 2023-12-25 PROCEDURE — 80069 RENAL FUNCTION PANEL: CPT

## 2023-12-25 PROCEDURE — 83930 ASSAY OF BLOOD OSMOLALITY: CPT

## 2023-12-25 PROCEDURE — 2500000005 HC RX 250 GENERAL PHARMACY W/O HCPCS: Performed by: STUDENT IN AN ORGANIZED HEALTH CARE EDUCATION/TRAINING PROGRAM

## 2023-12-25 PROCEDURE — 96372 THER/PROPH/DIAG INJ SC/IM: CPT | Performed by: STUDENT IN AN ORGANIZED HEALTH CARE EDUCATION/TRAINING PROGRAM

## 2023-12-25 PROCEDURE — C9113 INJ PANTOPRAZOLE SODIUM, VIA: HCPCS | Performed by: STUDENT IN AN ORGANIZED HEALTH CARE EDUCATION/TRAINING PROGRAM

## 2023-12-25 PROCEDURE — 82947 ASSAY GLUCOSE BLOOD QUANT: CPT

## 2023-12-25 PROCEDURE — 2500000001 HC RX 250 WO HCPCS SELF ADMINISTERED DRUGS (ALT 637 FOR MEDICARE OP)

## 2023-12-25 PROCEDURE — 83935 ASSAY OF URINE OSMOLALITY: CPT

## 2023-12-25 PROCEDURE — 1200000002 HC GENERAL ROOM WITH TELEMETRY DAILY

## 2023-12-25 PROCEDURE — 80069 RENAL FUNCTION PANEL: CPT | Performed by: STUDENT IN AN ORGANIZED HEALTH CARE EDUCATION/TRAINING PROGRAM

## 2023-12-25 PROCEDURE — 99231 SBSQ HOSP IP/OBS SF/LOW 25: CPT | Performed by: STUDENT IN AN ORGANIZED HEALTH CARE EDUCATION/TRAINING PROGRAM

## 2023-12-25 RX ORDER — DEXTROSE MONOHYDRATE 100 MG/ML
50 INJECTION, SOLUTION INTRAVENOUS CONTINUOUS
Status: DISPENSED | OUTPATIENT
Start: 2023-12-25 | End: 2023-12-25

## 2023-12-25 RX ORDER — DEXTROSE 50 % IN WATER (D50W) INTRAVENOUS SYRINGE
25 ONCE
Status: COMPLETED | OUTPATIENT
Start: 2023-12-25 | End: 2023-12-25

## 2023-12-25 RX ADMIN — DEXTROSE MONOHYDRATE 25 G: 25 INJECTION, SOLUTION INTRAVENOUS at 02:49

## 2023-12-25 RX ADMIN — LEVETIRACETAM 500 MG: 5 INJECTION INTRAVENOUS at 05:14

## 2023-12-25 RX ADMIN — LEVETIRACETAM 500 MG: 5 INJECTION INTRAVENOUS at 16:33

## 2023-12-25 RX ADMIN — INSULIN HUMAN 2 UNITS: 100 INJECTION, SOLUTION PARENTERAL at 18:22

## 2023-12-25 RX ADMIN — Medication 4000 UNITS: at 08:46

## 2023-12-25 RX ADMIN — LEVOTHYROXINE SODIUM 50 MCG: 50 TABLET ORAL at 05:19

## 2023-12-25 RX ADMIN — PANTOPRAZOLE SODIUM 40 MG: 40 INJECTION, POWDER, FOR SOLUTION INTRAVENOUS at 08:46

## 2023-12-25 RX ADMIN — INSULIN GLARGINE 10 UNITS: 100 INJECTION, SOLUTION SUBCUTANEOUS at 21:25

## 2023-12-25 RX ADMIN — HEPARIN SODIUM 5000 UNITS: 5000 INJECTION INTRAVENOUS; SUBCUTANEOUS at 16:33

## 2023-12-25 RX ADMIN — HEPARIN SODIUM 5000 UNITS: 5000 INJECTION INTRAVENOUS; SUBCUTANEOUS at 00:10

## 2023-12-25 RX ADMIN — HEPARIN SODIUM 5000 UNITS: 5000 INJECTION INTRAVENOUS; SUBCUTANEOUS at 08:46

## 2023-12-25 RX ADMIN — AMLODIPINE BESYLATE 10 MG: 10 TABLET ORAL at 08:45

## 2023-12-25 RX ADMIN — SODIUM ZIRCONIUM CYCLOSILICATE 10 G: 10 POWDER, FOR SUSPENSION ORAL at 18:22

## 2023-12-25 RX ADMIN — INSULIN HUMAN 10 UNITS: 100 INJECTION, SOLUTION PARENTERAL at 02:39

## 2023-12-25 RX ADMIN — ATORVASTATIN CALCIUM 20 MG: 20 TABLET, FILM COATED ORAL at 08:45

## 2023-12-25 RX ADMIN — SODIUM ZIRCONIUM CYCLOSILICATE 10 G: 10 POWDER, FOR SUSPENSION ORAL at 02:47

## 2023-12-25 RX ADMIN — INSULIN HUMAN 2 UNITS: 100 INJECTION, SOLUTION PARENTERAL at 12:48

## 2023-12-25 RX ADMIN — Medication 1 APPLICATION: at 02:47

## 2023-12-25 RX ADMIN — THIAMINE HCL TAB 100 MG 100 MG: 100 TAB at 08:46

## 2023-12-25 RX ADMIN — SODIUM ZIRCONIUM CYCLOSILICATE 10 G: 10 POWDER, FOR SUSPENSION ORAL at 10:15

## 2023-12-25 RX ADMIN — PANTOPRAZOLE SODIUM 40 MG: 40 INJECTION, POWDER, FOR SOLUTION INTRAVENOUS at 21:32

## 2023-12-25 ASSESSMENT — PAIN SCALES - WONG BAKER
WONGBAKER_NUMERICALRESPONSE: NO HURT
WONGBAKER_NUMERICALRESPONSE: NO HURT

## 2023-12-25 ASSESSMENT — PAIN SCALES - PAIN ASSESSMENT IN ADVANCED DEMENTIA (PAINAD)
BODYLANGUAGE: RELAXED
TOTALSCORE: 0
TOTALSCORE: 1
FACIALEXPRESSION: SMILING OR INEXPRESSIVE
FACIALEXPRESSION: SMILING OR INEXPRESSIVE
BREATHING: NORMAL
CONSOLABILITY: NO NEED TO CONSOLE
CONSOLABILITY: NO NEED TO CONSOLE
TOTALSCORE: REPOSITIONED
BREATHING: OCCASIONAL LABORED BREATHING, SHORT PERIOD OF HYPERVENTILATION
BODYLANGUAGE: RELAXED

## 2023-12-25 ASSESSMENT — COGNITIVE AND FUNCTIONAL STATUS - GENERAL
DRESSING REGULAR LOWER BODY CLOTHING: TOTAL
DRESSING REGULAR UPPER BODY CLOTHING: TOTAL
MOVING FROM LYING ON BACK TO SITTING ON SIDE OF FLAT BED WITH BEDRAILS: TOTAL
TOILETING: TOTAL
STANDING UP FROM CHAIR USING ARMS: TOTAL
MOBILITY SCORE: 6
DAILY ACTIVITIY SCORE: 6
EATING MEALS: TOTAL
MOVING TO AND FROM BED TO CHAIR: TOTAL
HELP NEEDED FOR BATHING: TOTAL
TURNING FROM BACK TO SIDE WHILE IN FLAT BAD: TOTAL
WALKING IN HOSPITAL ROOM: TOTAL
PERSONAL GROOMING: TOTAL
CLIMB 3 TO 5 STEPS WITH RAILING: TOTAL

## 2023-12-25 ASSESSMENT — PAIN SCALES - GENERAL
PAINLEVEL_OUTOF10: 0 - NO PAIN

## 2023-12-25 NOTE — PROGRESS NOTES
"Haile Ayers is a 83 y.o. male on day 33 of admission presenting with Subdural hematoma (CMS/HCC).    Subjective   NAEO    Objective     Physical Exam  ECNS  RUE flaccid  RLE w/d  LUE spont/localizing  LLE w/d  On 2L NC      Last Recorded Vitals  Blood pressure 128/57, pulse 92, temperature 37.7 °C (99.9 °F), resp. rate 19, height 1.727 m (5' 7.99\"), weight 70 kg (154 lb 5.2 oz), SpO2 95 %.  Intake/Output last 3 Shifts:  I/O last 3 completed shifts:  In: 4138.3 (59.1 mL/kg) [I.V.:1238.3 (17.7 mL/kg); NG/GT:2900]  Out: 1500 (21.4 mL/kg) [Urine:1500 (0.6 mL/kg/hr)]  Weight: 70 kg     Relevant Results                             Assessment/Plan   Principal Problem:    Subdural hematoma (CMS/HCC)  Active Problems:    HTN (hypertension)    Diabetes mellitus, type 2 (CMS/HCC)    CVA (cerebral vascular accident) (CMS/HCC)    Pt is a 84 yo M w/ h/o CLL, anemia, DM, p/w AMS, found to have UTI, CTH w/ L large chronic SDH     11/22 s/p L crani for SDH evac, CTH POC  11/24 drain dc'd  11/26 EEG neg, dc'd, CXR R lung patchy opacities  11/28 s/p midline  11/29 lethargic, not FC, CTH incr L SDH w 8mm MLS, s/p1g keppra load, CXR stable opacities, rCTH stable, s/p R side wd, s/p keppra load, rrCTH stable L SDH  11/30 s/p redo R crani for SDH evac, CTH good evac, decr MLS  12/1 s/p extubation  12/3 CTH evolving brood products, stable MLS  12/4 CTH stable, s/p angio small L MMA w/ no distal branches, not embolized  12/5 DVT USx4 RLE chronic changes no DVT, CTH stable, CT PE neg for PE, diffuse sclerotic lesions c/f mets, BL pleural effusions, s/p 60 lasix  12/6 s/p SDD dc'd, EEG neg, dc'd, s/p 1mg Bumex, TTE EF 65-70%  12/13 hgb 6.8, s/p 1U pRBC  12/14 CBC hgb 6.1, s/p 2U pRBC  12/15 sutures dc'd  12/17 patient transferred to NSU due to respiratory failure, stabilized on biPAP, CTH improved L SDH.   12/20 worsened neuro exam, CTH stable  12/24 tachypneic, suctioned with improvement, CXR stable     PLAN:    DNR/DNI  Tele  Continue " biPAP as needed  Goals of care discussion with family   Medicine recs re: LIN  Endocrine recs for hypernatremia  Supportive onc consulted appreciate recs  Med onc recs - CT CAP when LIN resolved  GI recs-will re engage re PEG GOC done  ID recs  Chronic ignacio  Heme recs-cont to hold ibrutinib   SLP recs  PTOT-SNF  SCD, SQH         Lupillo Vazquez MD

## 2023-12-25 NOTE — PROGRESS NOTES
"Haile Ayers is a 83 y.o. male on day 33 of admission presenting with Subdural hematoma (CMS/HCC).    Subjective   I have reviewed histories, allergies and medications have been reviewed and there are no changes  Remains obtunded.   Objective   Review of Systems  Physical Exam  Vitals:    12/25/23 0700   BP: 135/58   Pulse: 94   Resp: 18   Temp: 36.4 °C (97.5 °F)   SpO2: 99%    Constitutional:       General: He is not in acute distress.     Appearance: Ill appearing  HENT:      Head:   Cardiovascular:      Rate and Rhythm: Regular rhythm.      Heart sounds: Normal heart sounds.   Pulmonary:      Effort: Pulmonary effort is normal. No respiratory distress.      Breath sounds: Rales  Abdominal:      Palpations: Abdomen is soft.   Genitourinary:     Comments: In situ Mckee  Skin:     General: Skin is warm.   Neurological:      Comments: Obtunded    Last Recorded Vitals  Blood pressure 135/58, pulse 94, temperature 36.4 °C (97.5 °F), temperature source Temporal, resp. rate 18, height 1.727 m (5' 7.99\"), weight 70 kg (154 lb 5.2 oz), SpO2 99 %.  Intake/Output last 3 Shifts:  I/O last 3 completed shifts:  In: 3238.3 (46.3 mL/kg) [I.V.:1238.3 (17.7 mL/kg); NG/GT:2000]  Out: 800 (11.4 mL/kg) [Urine:800 (0.3 mL/kg/hr)]  Weight: 70 kg     Relevant Results  Results from last 7 days   Lab Units 12/25/23  0614 12/25/23  0516 12/25/23  0354 12/25/23  0249 12/25/23  0104 12/25/23  0001 12/24/23  1123 12/24/23  0612 12/23/23  0611 12/23/23  0549 12/22/23  2349 12/22/23  2103 12/22/23  1158 12/22/23  0911   POCT GLUCOSE mg/dL 98 111* 130* 122*  --  148*   < >  --    < >  --    < >  --    < >  --    GLUCOSE mg/dL  --   --   --   --  164*  --   --  201*  --  277*  --  228*  --  248*    < > = values in this interval not displayed.     Assessment/Plan   Principal Problem:    Subdural hematoma (CMS/HCC)  Active Problems:    HTN (hypertension)    Diabetes mellitus, type 2 (CMS/HCC)    CVA (cerebral vascular accident) (CMS/HCC)  Haile" Andria is a 83 y.o. patient with PMHx of HTN , DM type 2, hx of CVA, hx of anemia,  CLL (unknown CLL-IPI score; being followed by Dr. Claros at [facility]) on Ibrutinib who presented to the hospital on 11/22 with AMS 2/2 UTI also found to have L large chronic SDH with midline shift. He underwent L crani for SDH evacuation on 11/22 and re-evacuated on 11/30, extubated on 12/1.  Had worsening neurological exam on 12/4 and transferred to NSU. CTA on 12/5 incidentally showed diffuse sclerotic lesions throughout axial and appendicular skeleton + ascities.  Tested positive for COVID on 12/8.  Patient with continued worsening mental status, endocrinology team consulted for elevated TSH as part of worsening encephalopathy, workup showing worsening lung disease, hypernatremia and LIN, patient was initially planned for PEG on 12/18. Was transferred to NSU for worsening respiratory status    Hypothyroidism:  -------------------  TFTs on 12/16 showed TSH 10.4, Ft4: 0.85, Anti-TPO: neg   Patient likely has hypothyroidism vs non-thyroidal illness.  However unlikely that his mild hypothyroidism is the etiology of his worsening mental status.    Given worsening clinical status. was started on LT4 at dose of 25 mcg. Repeat TFT on 12/22: TSH 11 and FT4 of 0.79  -Increase levothyroxine to 50 mcg daily while holding tube feeds to 1 hour before and 1 hour after --- Repeat TFT with TSH and FT4 on 12/29     Type 2 diabetes:  --------------------  -Not enough data in chart  -At home on metformin 500 mg 1 tablet orally twice daily per med rec upon admission.  -Patient hyperglycemic, showing some insulin resistance  Endocrine Recommendations (12/25/23)  -Tube feeds Isosource 1.5 at 65 mL/h at Goal  -Continue glargine to 10 units every 24 hours  -Resume sliding scale regular insulin #2 every 6 hours  [2 units for every 50 above 150]   -Resume regular insulin 8 units every 6 hours  -Accucheck q6 ,while NPO or on tube feeds  -Hypoglycemia  protocol    Hypernatremia - Resolving  -------------------------------  Endocrine Recommendations (12/25/23)  - Patient is at risk of nephrogenic and central diabetes insipidus recent subdural hematoma status postevacuation x 2.    --Na down to 146 from 149 >12/24/23: I/O: +800  --Resume  cc every 4 hours  --D/C IV of 1/2 NS at 50 ml/hr   --RFP every 24 hours     Plan communicated to primary team.   Endocrine will follow - Endo Pager [phone].    Case discussed with Dr. Sommers who agrees with the management plan.          I spent 60  minutes in the professional and overall care of this patient.      Eloy Vasquez MD

## 2023-12-25 NOTE — PROGRESS NOTES
"Haile Ayers is a 83 y.o. male on day 33 of admission presenting with Subdural hematoma (CMS/HCC).    Subjective   Seen and examined, obtunded. No acute events overnight.       Objective     Physical Exam  Constitutional:       Appearance: He is ill-appearing.      Comments: Nonverbal, rouses to painful stimuli   HENT:      Nose:      Comments: Dobhoff in place  Cardiovascular:      Rate and Rhythm: Normal rate.   Pulmonary:      Comments: Tachypneic, audible secretions in airway  Musculoskeletal:      Comments: BUE edema, elevated on pillows         Last Recorded Vitals  Blood pressure 135/58, pulse 94, temperature 36.4 °C (97.5 °F), temperature source Temporal, resp. rate 18, height 1.727 m (5' 7.99\"), weight 70 kg (154 lb 5.2 oz), SpO2 99 %.  Intake/Output last 3 Shifts:  I/O last 3 completed shifts:  In: 3238.3 (46.3 mL/kg) [I.V.:1238.3 (17.7 mL/kg); NG/GT:2000]  Out: 800 (11.4 mL/kg) [Urine:800 (0.3 mL/kg/hr)]  Weight: 70 kg     Results from last 7 days   Lab Units 12/25/23  0104 12/24/23  0612 12/23/23  0549 12/21/23  1223 12/21/23  0004 12/20/23  1350 12/20/23  0002 12/19/23  0843 12/19/23  0038   WBC AUTO x10*3/uL  --   --   --   --  6.3  --  6.4  --  7.3   HEMOGLOBIN g/dL  --   --   --   --  10.0*  --  10.3*  --  10.4*   PLATELETS AUTO x10*3/uL  --   --   --   --  148*  --  156  --  155   SODIUM mmol/L 146* 149* 151*   < > 148*   < > 147*   < >  --    POTASSIUM mmol/L 6.4* 6.0* 5.8*   < > 4.7   < > 4.4   < >  --    CHLORIDE mmol/L 114* 116* 116*   < > 113*   < > 112*   < >  --    CO2 mmol/L 24 25 26   < > 25   < > 26   < >  --    BUN mg/dL 110* 103* 102*   < > 85*   < > 79*   < >  --    CREATININE mg/dL 3.74* 3.81* 3.76*   < > 3.26*   < > 2.95*   < >  --     < > = values in this interval not displayed.       Assessment/Plan   Principal Problem:    Subdural hematoma (CMS/HCC)  Active Problems:    HTN (hypertension)    Diabetes mellitus, type 2 (CMS/HCC)    CVA (cerebral vascular accident) (CMS/HCC)    Haile" Andria is a 83 y.o. 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 Patient has had a prolonged hospital stay which has been c/b bacteremia, LIN, anemia, hypovolemia, respiratory failure 2/2 pneumonia, COVID, hypernatremia. Medicine consulted for LIN and reduced pulmonary function. FENa- 2.9%, LIN likely due to intrinsic renal injury. Cr stable with good UOP. Primary team to discuss comfort care, will await results of meeting.     Recommendations:  - Follow up GOC discussion  - Continue lokelma as ordered, check RFP tomorrow. If persistently hyperkalemic, consider d/cing heparin subQ as this can contribute to hyperkalemia  - Avoid nephrotoxic drugs  - Renally dose medications  - ID and endocrine following  - Pt saturating well (94% SpO2) on 2L NC, Continue BiPAP PRN     Medicine will continue to follow.     Discussed with Dr. Darden who agreed with the plan.    Tim Suárez MD

## 2023-12-26 ENCOUNTER — APPOINTMENT (OUTPATIENT)
Dept: RADIOLOGY | Facility: HOSPITAL | Age: 83
DRG: 025 | End: 2023-12-26
Payer: MEDICARE

## 2023-12-26 LAB
ALBUMIN SERPL BCP-MCNC: 2.8 G/DL (ref 3.4–5)
ANION GAP SERPL CALC-SCNC: 13 MMOL/L (ref 10–20)
BUN SERPL-MCNC: 106 MG/DL (ref 6–23)
CALCIUM SERPL-MCNC: 8.4 MG/DL (ref 8.6–10.6)
CHLORIDE SERPL-SCNC: 108 MMOL/L (ref 98–107)
CO2 SERPL-SCNC: 24 MMOL/L (ref 21–32)
CREAT SERPL-MCNC: 3.61 MG/DL (ref 0.5–1.3)
ERYTHROCYTE [DISTWIDTH] IN BLOOD BY AUTOMATED COUNT: 13.8 % (ref 11.5–14.5)
GFR SERPL CREATININE-BSD FRML MDRD: 16 ML/MIN/1.73M*2
GLUCOSE BLD MANUAL STRIP-MCNC: 143 MG/DL (ref 74–99)
GLUCOSE BLD MANUAL STRIP-MCNC: 146 MG/DL (ref 74–99)
GLUCOSE BLD MANUAL STRIP-MCNC: 152 MG/DL (ref 74–99)
GLUCOSE SERPL-MCNC: 160 MG/DL (ref 74–99)
HCT VFR BLD AUTO: 26.7 % (ref 41–52)
HGB BLD-MCNC: 8.2 G/DL (ref 13.5–17.5)
MCH RBC QN AUTO: 29.5 PG (ref 26–34)
MCHC RBC AUTO-ENTMCNC: 30.7 G/DL (ref 32–36)
MCV RBC AUTO: 96 FL (ref 80–100)
NRBC BLD-RTO: 0 /100 WBCS (ref 0–0)
OSMOLALITY SERPL: 333 MOSM/KG (ref 280–300)
OSMOLALITY UR: 377 MOSM/KG (ref 200–1200)
PHOSPHATE SERPL-MCNC: 4.1 MG/DL (ref 2.5–4.9)
PLATELET # BLD AUTO: 203 X10*3/UL (ref 150–450)
POTASSIUM SERPL-SCNC: 5.2 MMOL/L (ref 3.5–5.3)
RBC # BLD AUTO: 2.78 X10*6/UL (ref 4.5–5.9)
SODIUM SERPL-SCNC: 140 MMOL/L (ref 136–145)
WBC # BLD AUTO: 7.5 X10*3/UL (ref 4.4–11.3)

## 2023-12-26 PROCEDURE — 85027 COMPLETE CBC AUTOMATED: CPT

## 2023-12-26 PROCEDURE — 2500000004 HC RX 250 GENERAL PHARMACY W/ HCPCS (ALT 636 FOR OP/ED): Performed by: STUDENT IN AN ORGANIZED HEALTH CARE EDUCATION/TRAINING PROGRAM

## 2023-12-26 PROCEDURE — 2500000004 HC RX 250 GENERAL PHARMACY W/ HCPCS (ALT 636 FOR OP/ED): Performed by: NURSE PRACTITIONER

## 2023-12-26 PROCEDURE — 2500000001 HC RX 250 WO HCPCS SELF ADMINISTERED DRUGS (ALT 637 FOR MEDICARE OP): Performed by: STUDENT IN AN ORGANIZED HEALTH CARE EDUCATION/TRAINING PROGRAM

## 2023-12-26 PROCEDURE — 80069 RENAL FUNCTION PANEL: CPT

## 2023-12-26 PROCEDURE — 1200000002 HC GENERAL ROOM WITH TELEMETRY DAILY

## 2023-12-26 PROCEDURE — 96372 THER/PROPH/DIAG INJ SC/IM: CPT | Performed by: STUDENT IN AN ORGANIZED HEALTH CARE EDUCATION/TRAINING PROGRAM

## 2023-12-26 PROCEDURE — 2500000001 HC RX 250 WO HCPCS SELF ADMINISTERED DRUGS (ALT 637 FOR MEDICARE OP)

## 2023-12-26 PROCEDURE — 99232 SBSQ HOSP IP/OBS MODERATE 35: CPT

## 2023-12-26 PROCEDURE — 83930 ASSAY OF BLOOD OSMOLALITY: CPT

## 2023-12-26 PROCEDURE — 82947 ASSAY GLUCOSE BLOOD QUANT: CPT

## 2023-12-26 PROCEDURE — 71045 X-RAY EXAM CHEST 1 VIEW: CPT

## 2023-12-26 PROCEDURE — 71045 X-RAY EXAM CHEST 1 VIEW: CPT | Performed by: RADIOLOGY

## 2023-12-26 PROCEDURE — 83935 ASSAY OF URINE OSMOLALITY: CPT

## 2023-12-26 PROCEDURE — C9113 INJ PANTOPRAZOLE SODIUM, VIA: HCPCS | Performed by: STUDENT IN AN ORGANIZED HEALTH CARE EDUCATION/TRAINING PROGRAM

## 2023-12-26 RX ORDER — FUROSEMIDE 10 MG/ML
40 INJECTION INTRAMUSCULAR; INTRAVENOUS ONCE
Status: COMPLETED | OUTPATIENT
Start: 2023-12-26 | End: 2023-12-26

## 2023-12-26 RX ADMIN — Medication: at 00:00

## 2023-12-26 RX ADMIN — SODIUM ZIRCONIUM CYCLOSILICATE 10 G: 10 POWDER, FOR SUSPENSION ORAL at 16:13

## 2023-12-26 RX ADMIN — HEPARIN SODIUM 5000 UNITS: 5000 INJECTION INTRAVENOUS; SUBCUTANEOUS at 16:13

## 2023-12-26 RX ADMIN — LEVETIRACETAM 500 MG: 5 INJECTION INTRAVENOUS at 05:03

## 2023-12-26 RX ADMIN — LEVETIRACETAM 500 MG: 5 INJECTION INTRAVENOUS at 16:13

## 2023-12-26 RX ADMIN — BISACODYL 10 MG: 10 SUPPOSITORY RECTAL at 09:26

## 2023-12-26 RX ADMIN — HEPARIN SODIUM 5000 UNITS: 5000 INJECTION INTRAVENOUS; SUBCUTANEOUS at 00:09

## 2023-12-26 RX ADMIN — FUROSEMIDE 40 MG: 10 INJECTION, SOLUTION INTRAVENOUS at 14:04

## 2023-12-26 RX ADMIN — HEPARIN SODIUM 5000 UNITS: 5000 INJECTION INTRAVENOUS; SUBCUTANEOUS at 09:25

## 2023-12-26 RX ADMIN — INSULIN HUMAN 2 UNITS: 100 INJECTION, SOLUTION PARENTERAL at 12:19

## 2023-12-26 RX ADMIN — LEVOTHYROXINE SODIUM 50 MCG: 50 TABLET ORAL at 06:38

## 2023-12-26 RX ADMIN — PANTOPRAZOLE SODIUM 40 MG: 40 INJECTION, POWDER, FOR SOLUTION INTRAVENOUS at 09:25

## 2023-12-26 RX ADMIN — ATORVASTATIN CALCIUM 20 MG: 20 TABLET, FILM COATED ORAL at 09:26

## 2023-12-26 RX ADMIN — SODIUM ZIRCONIUM CYCLOSILICATE 10 G: 10 POWDER, FOR SUSPENSION ORAL at 02:02

## 2023-12-26 RX ADMIN — INSULIN GLARGINE 10 UNITS: 100 INJECTION, SOLUTION SUBCUTANEOUS at 21:01

## 2023-12-26 RX ADMIN — POLYETHYLENE GLYCOL 3350 17 G: 17 POWDER, FOR SOLUTION ORAL at 09:25

## 2023-12-26 RX ADMIN — THIAMINE HCL TAB 100 MG 100 MG: 100 TAB at 09:26

## 2023-12-26 RX ADMIN — AMLODIPINE BESYLATE 10 MG: 10 TABLET ORAL at 09:26

## 2023-12-26 RX ADMIN — SODIUM ZIRCONIUM CYCLOSILICATE 10 G: 10 POWDER, FOR SUSPENSION ORAL at 09:32

## 2023-12-26 RX ADMIN — Medication 4000 UNITS: at 09:26

## 2023-12-26 RX ADMIN — PANTOPRAZOLE SODIUM 40 MG: 40 INJECTION, POWDER, FOR SOLUTION INTRAVENOUS at 21:00

## 2023-12-26 ASSESSMENT — PAIN SCALES - GENERAL
PAINLEVEL_OUTOF10: 0 - NO PAIN
PAINLEVEL_OUTOF10: 0 - NO PAIN

## 2023-12-26 ASSESSMENT — COGNITIVE AND FUNCTIONAL STATUS - GENERAL
DRESSING REGULAR LOWER BODY CLOTHING: TOTAL
STANDING UP FROM CHAIR USING ARMS: TOTAL
MOBILITY SCORE: 6
DAILY ACTIVITIY SCORE: 6
MOVING FROM LYING ON BACK TO SITTING ON SIDE OF FLAT BED WITH BEDRAILS: TOTAL
WALKING IN HOSPITAL ROOM: TOTAL
MOVING TO AND FROM BED TO CHAIR: TOTAL
CLIMB 3 TO 5 STEPS WITH RAILING: TOTAL
HELP NEEDED FOR BATHING: TOTAL
EATING MEALS: TOTAL
TOILETING: TOTAL
TURNING FROM BACK TO SIDE WHILE IN FLAT BAD: TOTAL
DRESSING REGULAR UPPER BODY CLOTHING: TOTAL
PERSONAL GROOMING: TOTAL

## 2023-12-26 NOTE — PROGRESS NOTES
12/26/23        Transitional Care Coordination Progress Note:   Patient discussed during interdisciplinary rounds.   Team members present: RN TCC BRUNO PEDRAZA   Plan per Medical/Surgical team: Continue biPAP as needed  Goals of care discussion with family  Medicine recs re: LIN and hyperK  Endocrine recs for hypernatremia  Supportive onc consulted appreciate recs  Med onc recs - CT CAP when LIN resolved  GI recs-will re engage re PEG GOC done  ID recs  Chronic ignacio  Heme recs-cont to hold ibrutinib   SLP recs  PTOT-SNF  Discharge disposition: none   Status-Inpatient   Payer- Payor: [employer] MEDICARE / Plan: AET MEDICARE VALUE PLAN / Product Type: *No Product type* /    Potential Barriers: None   ADOD: 12/28/2023         Carlos Last RN [facility] [phone]

## 2023-12-26 NOTE — SIGNIFICANT EVENT
RAPID RESPONSE RN NOTE -RADAR 7    VS: 37, 88, 22, 134/76, 91%.    Upon arrival patient sitting up in bed, unable to bring up secretions.  Oral secretions suctioned with yankauer -SpO2 increased to 99%.    Discussed VS and POC with RN Geo- no acute concerns from Nursing at this time.   Bedside RN to contact Rapid Response Team with any further concerns or patient deterioration.

## 2023-12-26 NOTE — SIGNIFICANT EVENT
RADAR   12/26/23 1612   Onset Documentation   Rapid Response Initiated By Radar auto page   Pager Time [phone]   Arrival Time 1630   Event End Time 1655   Primary Reason for Call Radar auto page     RADAR page auto generated from VS -see documented VS. Upon arrival pt remains tachypneic- RR 24-26- appears comfortable. Spoke to charge nurse and pt has remained intermittently tachypneic.Pulse ox 98% on 8L NC HF. Pt ok to remain on the floor for continued monitoring-bedside nurse updated and will call with any concerns.

## 2023-12-26 NOTE — PROGRESS NOTES
"Haile Ayers is a 83 y.o. male on day 34 of admission presenting with Subdural hematoma (CMS/HCC).    Subjective   NAEO    Objective     Physical Exam  ECNS  RUE flaccid  RLE w/d  LUE spont/localizing  LLE w/d  On 2L NC      Last Recorded Vitals  Blood pressure 123/60, pulse 87, temperature 37 °C (98.6 °F), resp. rate (!) 28, height 1.727 m (5' 7.99\"), weight 70 kg (154 lb 5.2 oz), SpO2 100 %.  Intake/Output last 3 Shifts:  I/O last 3 completed shifts:  In: 5052.5 (72.2 mL/kg) [I.V.:1462.5 (20.9 mL/kg); NG/GT:3090; IV Piggyback:500]  Out: 700 (10 mL/kg) [Urine:700 (0.3 mL/kg/hr)]  Weight: 70 kg     Relevant Results                             Assessment/Plan   Principal Problem:    Subdural hematoma (CMS/HCC)  Active Problems:    HTN (hypertension)    Diabetes mellitus, type 2 (CMS/HCC)    CVA (cerebral vascular accident) (CMS/HCC)    Pt is a 84 yo M w/ h/o CLL, anemia, DM, p/w AMS, found to have UTI, CTH w/ L large chronic SDH     11/22 s/p L crani for SDH evac, CTH POC  11/24 drain dc'd  11/26 EEG neg, dc'd, CXR R lung patchy opacities  11/28 s/p midline  11/29 lethargic, not FC, CTH incr L SDH w 8mm MLS, s/p1g keppra load, CXR stable opacities, rCTH stable, s/p R side wd, s/p keppra load, rrCTH stable L SDH  11/30 s/p redo R crani for SDH evac, CTH good evac, decr MLS  12/1 s/p extubation  12/3 CTH evolving brood products, stable MLS  12/4 CTH stable, s/p angio small L MMA w/ no distal branches, not embolized  12/5 DVT USx4 RLE chronic changes no DVT, CTH stable, CT PE neg for PE, diffuse sclerotic lesions c/f mets, BL pleural effusions, s/p 60 lasix  12/6 s/p SDD dc'd, EEG neg, dc'd, s/p 1mg Bumex, TTE EF 65-70%  12/13 hgb 6.8, s/p 1U pRBC  12/14 CBC hgb 6.1, s/p 2U pRBC  12/15 sutures dc'd  12/17 patient transferred to NSU due to respiratory failure, stabilized on biPAP, CTH improved L SDH.   12/20 worsened neuro exam, CTH stable  12/24 tachypneic, suctioned with improvement, CXR stable   "   PLAN:    DNR/DNI  Tele  Continue biPAP as needed  Goals of care discussion with family  Medicine recs re: LIN and hyperK  Endocrine recs for hypernatremia  Supportive onc consulted appreciate recs  Med onc recs - CT CAP when LIN resolved  GI recs-will re engage re PEG GOC done  ID recs  Chronic ignacio  Heme recs-cont to hold ibrutinib   SLP recs  PTOT-SNF  SCD, SQH         Yenifer Bernal MD

## 2023-12-26 NOTE — CONSULTS
"Nutrition Follow Up Assessment:   Nutrition Assessment    Reason for Assessment: Tube feeding recommendations (recommendation for renal tube feeding)    Patient is a 83 y.o. male on day 34 of admission with PMH of CLL, type 2 DM, HTN, dementia, indwelling Mckee, BPH presented with  Subdural hematoma  (s/p craniotomy with evacuation on 11/22). Patient has had a prolonged hospital stay which has been c/b bacteremia, LIN, anemia, hypovolemia, respiratory failure 2/2 pneumonia, COVID, hypernatremia.     Code status: DNR and No Intubation; Noted discussions with palliative care.     Nutrition History:  Food and Nutrient History: Isosource 1.5 continues at 60ml/hr x22 hours with fluid flushes of 400ml q4h (2400ml). Received consult for renal tube feeding recomendation secondary to worsening renal function.    Anthropometrics:  Height: 172.7 cm (5' 7.99\")   Weight: 70 kg (154 lb 5.2 oz)   BMI (Calculated): 23.47  IBW/kg (Dietitian Calculated): 70 kg  Percent of IBW: 100 %       Weight History:   Weight         12/8/2023  0000 12/10/2023  0400 12/18/2023  0000 12/20/2023  0000 12/21/2023  0000    Weight: 76.3 kg (168 lb 3.4 oz) 68 kg (149 lb 14.6 oz) 63.5 kg (139 lb 15.9 oz) 69 kg (152 lb 1.9 oz) 70 kg (154 lb 5.2 oz)         Limited weight records for the past 2 weeks.  Last weight 12/21      Nutrition Significant Labs:  CBC Trend:   Results from last 7 days   Lab Units 12/26/23  0819 12/21/23  0004 12/20/23  0002   WBC AUTO x10*3/uL 7.5 6.3 6.4   RBC AUTO x10*6/uL 2.78* 3.37* 3.37*   HEMOGLOBIN g/dL 8.2* 10.0* 10.3*   HEMATOCRIT % 26.7* 29.8* 31.1*   MCV fL 96 88 92   PLATELETS AUTO x10*3/uL 203 148* 156    , BG POCT trend:   Results from last 7 days   Lab Units 12/26/23  1143 12/26/23  0606 12/25/23  2124 12/25/23  1817 12/25/23  1217   POCT GLUCOSE mg/dL 152* 146* 147* 180* 188*    , Renal Lab Trend:   Results from last 7 days   Lab Units 12/26/23  0819 12/25/23  1452 12/25/23  0104 12/24/23  0612   POTASSIUM mmol/L 5.2 " 5.4* 6.4* 6.0*   PHOSPHORUS mg/dL 4.1 3.4 3.6 4.0   SODIUM mmol/L 140 143 146* 149*   EGFR mL/min/1.73m*2 16* 16* 15* 15*   BUN mg/dL 106* 107* 110* 103*   CREATININE mg/dL 3.61* 3.67* 3.74* 3.81*        Nutrition Specific Medications:  Duclolax, vit D, lantus, SSI, Humulin, synthroid, miralax, lokelma, thiamine.     I/O:   Last BM Date: 12/26/23; Stool Appearance: Loose (12/25/23 0701)    I/O +12l over last 7 days.     Dietary Orders (From admission, onward)       Start     Ordered    12/23/23 1756  Enteral feeding with NPO Isosource 1.5; 60; 400; Water; Every 4 hours  Diet effective now        Comments: Increase TF regimen by 10cc/hr every 6-8 hours as tolerated until goal of 60cc/hr.  Hold TF for1 hour pre and post synthroid administration   Question Answer Comment   Tube feeding formula: Isosource 1.5    Tube feeding continuous rate (mL/hr): 60    Tube feeding flush (mL): 400    Flush type: Water    Flush frequency: Every 4 hours        12/23/23 1755                     Estimated Needs:   Total Energy Estimated Needs (kCal): 1800 kCal  Method for Estimating Needs: 25kcal/kg ABW  Total Protein Estimated Needs (g): 80 g  Method for Estimating Needs: 1.1gm/kg IBW  Total Fluid Estimated Needs (mL):  (per team)          Nutrition Diagnosis   Malnutrition Diagnosis  Patient has Malnutrition Diagnosis: No    Nutrition Diagnosis  Patient has Nutrition Diagnosis: Yes  Diagnosis Status (1): Resolved  Nutrition Diagnosis 1: Increased nutrient needs  Related to (1): increased metabolic demands  As Evidenced by (1): s/p redo crani  Additional Assessment Information (1): patient remains with increased needs due to ongoing complicated hospital course.  Additional Nutrition Diagnosis: Diagnosis 2  Diagnosis Status (2): Resolved  Nutrition Diagnosis 2: Inadequate enteral nutrition infusion  Related to (2): NPO status  As Evidenced by (2): patient no longer NPO, but intake not thoroughly documented, TF currently only running @ 20  mL/hour.       Nutrition Interventions/Recommendations         Nutrition Prescription:  Individualized Nutrition Prescription Provided for : Change tube feeding to Nepro at 45ml/hr x22 hours to ounnost0006 calories, 80 gm pro, 160 gm carb and 727ml free water.        Nutrition Recommendations:    Change tube feeding as above.  Nepro is lower in carb (160gm vs 232gm) than Isosource 1.5. Recommend reviewing insulin needs on lower carb formula with Endocrinology before making change.   Free water flushes per team. Consider decreasing gradually with improved sodium level. Current flushes and free water from tube feeding providing 3400ml (1.7ml/kcal or 64ml/kg) per day.   Obtain current wt and weigh pt 1-2x per week.   Eval for PEG placement if/when appropriate. Pt has had feeding tube in place for 22 days.  Discontinue Thiamine.     Nutrition Education:   Not appropriate       Nutrition Monitoring and Evaluation   Food/Nutrient Related History Monitoring  Monitoring and Evaluation Plan: Energy intake, Enteral and parenteral nutrition intake  Criteria: tolerate tube feeding    Body Composition/Growth/Weight History  Monitoring and Evaluation Plan: Weight  Criteria: maintain stable weight    Biochemical Data, Medical Tests and Procedures  Monitoring and Evaluation Plan: Electrolyte/renal panel, Glucose/endocrine profile  Criteria: labs WNL      Time Spent/Follow-up Reminder:   Time Spent (min): 60 minutes  Last Date of Nutrition Visit: 12/26/23  Nutrition Follow-Up Needed?: Dietitian to reassess per policy

## 2023-12-26 NOTE — PROGRESS NOTES
"Haile Ayers is a 83 y.o. 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 Patient has had a prolonged hospital stay which has been c/b bacteremia, LIN, anemia, hypovolemia, respiratory failure 2/2 pneumonia, COVID, hypernatremia. Medicine consulted for LIN and reduced pulmonary function.     Subjective   Pt was in bed, propped up on pillows. Cannot obtain history, given altered mental status. No acute events overnight    Objective     Physical Exam  Vitals reviewed.   Constitutional:       General: He is not in acute distress.     Appearance: He is ill-appearing. He is not diaphoretic.   HENT:      Head:      Comments: Surgical wound  Eyes:      Comments: Unable to assess   Cardiovascular:      Rate and Rhythm: Normal rate and regular rhythm.      Heart sounds: Normal heart sounds.   Pulmonary:      Effort: No respiratory distress.      Breath sounds: Rales present.      Comments: tachypnea  Abdominal:      Palpations: Abdomen is soft.      Comments: Dobhoff in situ  TF @60ml/hr   Genitourinary:     Comments: In situ Mckee  Musculoskeletal:      Right lower leg: Edema present.      Left lower leg: Edema present.   Skin:     General: Skin is warm.   Neurological:      Comments: Obtunded, unable to access       Last Recorded Vitals  Blood pressure 134/64, pulse 98, temperature 37.1 °C (98.8 °F), temperature source Temporal, resp. rate 26, height 1.727 m (5' 7.99\"), weight 70 kg (154 lb 5.2 oz), SpO2 91 %.  Intake/Output last 3 Shifts:  I/O last 3 completed shifts:  In: 5452.5 (77.9 mL/kg) [I.V.:1462.5 (20.9 mL/kg); NG/GT:3490; IV Piggyback:500]  Out: 1700 (24.3 mL/kg) [Urine:1700 (0.7 mL/kg/hr)]  Weight: 70 kg     Relevant Results  Scheduled medications  amLODIPine, 10 mg, nasogastric tube, Daily  atorvastatin, 20 mg, oral, Daily  bisacodyl, 10 mg, " rectal, Daily  cholecalciferol, 4,000 Units, oral, Daily  heparin (porcine), 5,000 Units, subcutaneous, q8h  insulin glargine, 10 Units, subcutaneous, Nightly  insulin regular, 0-10 Units, subcutaneous, q6h  insulin regular, 8 Units, subcutaneous, q6h  levETIRAcetam, 500 mg, intravenous, q12h  levothyroxine, 50 mcg, nasogastric tube, Daily  pantoprazole, 40 mg, intravenous, BID  polyethylene glycol, 17 g, oral, Daily  sodium zirconium cyclosilicate, 10 g, oral, q8h  thiamine, 100 mg, nasogastric tube, Daily      Continuous medications  oxygen, , Last Rate: 2 L/min (12/12/23 1713)      PRN medications  PRN medications: acetaminophen, albuterol, dextrose 10 % in water (D10W), dextrose, glucagon, hydrALAZINE, labetaloL, naloxone, ondansetron **OR** ondansetron, oxygen    Results for orders placed or performed during the hospital encounter of 11/21/23 (from the past 24 hour(s))   Renal function panel   Result Value Ref Range    Glucose 199 (H) 74 - 99 mg/dL    Sodium 143 136 - 145 mmol/L    Potassium 5.4 (H) 3.5 - 5.3 mmol/L    Chloride 110 (H) 98 - 107 mmol/L    Bicarbonate 23 21 - 32 mmol/L    Anion Gap 15 10 - 20 mmol/L    Urea Nitrogen 107 (HH) 6 - 23 mg/dL    Creatinine 3.67 (H) 0.50 - 1.30 mg/dL    eGFR 16 (L) >60 mL/min/1.73m*2    Calcium 8.5 (L) 8.6 - 10.6 mg/dL    Phosphorus 3.4 2.5 - 4.9 mg/dL    Albumin 2.8 (L) 3.4 - 5.0 g/dL   POCT GLUCOSE   Result Value Ref Range    POCT Glucose 180 (H) 74 - 99 mg/dL   POCT GLUCOSE   Result Value Ref Range    POCT Glucose 147 (H) 74 - 99 mg/dL   POCT GLUCOSE   Result Value Ref Range    POCT Glucose 146 (H) 74 - 99 mg/dL   Osmolality, urine   Result Value Ref Range    Osmolality, Urine Random 377 200 - 1,200 mOsm/kg   Osmolality   Result Value Ref Range    Osmolality, Serum 333 (H) 280 - 300 mOsm/kg   CBC   Result Value Ref Range    WBC 7.5 4.4 - 11.3 x10*3/uL    nRBC 0.0 0.0 - 0.0 /100 WBCs    RBC 2.78 (L) 4.50 - 5.90 x10*6/uL    Hemoglobin 8.2 (L) 13.5 - 17.5 g/dL     Hematocrit 26.7 (L) 41.0 - 52.0 %    MCV 96 80 - 100 fL    MCH 29.5 26.0 - 34.0 pg    MCHC 30.7 (L) 32.0 - 36.0 g/dL    RDW 13.8 11.5 - 14.5 %    Platelets 203 150 - 450 x10*3/uL   Renal function panel   Result Value Ref Range    Glucose 160 (H) 74 - 99 mg/dL    Sodium 140 136 - 145 mmol/L    Potassium 5.2 3.5 - 5.3 mmol/L    Chloride 108 (H) 98 - 107 mmol/L    Bicarbonate 24 21 - 32 mmol/L    Anion Gap 13 10 - 20 mmol/L    Urea Nitrogen 106 (HH) 6 - 23 mg/dL    Creatinine 3.61 (H) 0.50 - 1.30 mg/dL    eGFR 16 (L) >60 mL/min/1.73m*2    Calcium 8.4 (L) 8.6 - 10.6 mg/dL    Phosphorus 4.1 2.5 - 4.9 mg/dL    Albumin 2.8 (L) 3.4 - 5.0 g/dL   POCT GLUCOSE   Result Value Ref Range    POCT Glucose 152 (H) 74 - 99 mg/dL     XR chest 1 view  Result Date: 12/26/2023  FINDINGS: AP radiograph of the chest was provided.   Enteric tube seen coursing below the level diaphragm with tip out of the field of view.   CARDIOMEDIASTINAL SILHOUETTE: Cardiomediastinal silhouette is stable in size and configuration. Aortic knob calcifications present.   LUNGS: Blunting of the costophrenic angles with hazy right-greater-than-left mid to lower lung zone opacities, similar to prior. No pneumothorax.   ABDOMEN: No remarkable upper abdominal findings.   BONES: No acute osseous changes.       1.  Similar appearance of multifocal airspace opacities favored to represent pulmonary edema and/or multifocal infectious process. 2. Persistent right-greater-than-left pleural effusions with adjacent atelectasis. 3.   Medical devices as described above.       XR abdomen 1 view  Result Date: 12/25/2023  FINDINGS: AP views of the chest and abdomen were provided.   Enteric tube seen coursing below the level diaphragm with tip overlying the expected location of the 2nd portion of the duodenum   CARDIOMEDIASTINAL SILHOUETTE: Cardiomediastinal silhouette is stable in size and configuration. Aortic knob calcifications present.   LUNGS: Blunting of the costophrenic  angles. Hazy right-greater-than-left mid to lower lung zone opacities, slightly increased from prior. No pneumothorax.   ABDOMEN: Nonobstructive bowel gas pattern. Contrast material is seen opacifying scattered loops of bowel. Limited evaluation of pneumoperitoneum on semi-erect imaging, however there is no evidence of gross free air.   BONES: No acute osseous changes.       1.  Slight interval increase in diffuse multifocal airspace opacities, right-greater-than-left in favored to represent pulmonary edema and/or multifocal infectious process. 2. Persistent right-greater-than-left pleural effusions with adjacent atelectasis. 3.  Nonobstructive bowel gas pattern. 4.   Medical devices as described above.       ECG 12 Lead  Result Date: 12/20/2023  Normal sinus rhythm Anterior infarct , age undetermined Abnormal ECG When compared with ECG of 30-NOV-2023 14:29, Anterior infarct is now Present     CT head wo IV contrast  Result Date: 12/20/2023  FINDINGS: There are postoperative changes from prior left craniotomy at the vertex of the skull for subdural hemorrhage drainage. There is slightly hyperattenuating subdural fluid collection along the left cerebral convexity, most pronounced along the posterior aspect of the left temporal and parietal lobes measuring 12 mm in maximum thickness with stable associated mass effect. Just below the craniotomy site, there is a stable 6 mm in maximum thickness extra-axial, likely subdural, fluid collection with rim of hyperattenuation consistent with evolving acute blood products. Mass effect on the adjacent brain parenchyma is also unchanged. There is stable narrowing of the left lateral ventricle and 3 mm rightward midline shift at the level of the septum pellucidum.   Gray-white differentiation is maintained throughout. There are regions of hypoattenuation within the bilateral cerebral hemispheric white matter which are probably sequela of chronic small vessel ischemic changes.    There is mucosal thickening located within the right maxillary sinus layering fluid within the right sphenoid sinus, otherwise the visualized paranasal sinuses and mastoid air cells are essentially clear.        No significant interval change in intracranial findings compared to CT 12/18/2023. Stable postoperative changes from left craniotomy with stable residual subdural hemorrhage with associated mass effect along the left cerebral convexity and mild midline shift.        IR angiogram  Result Date: 12/11/2023  FINDINGS: LEFT COMMON CAROTID ARTERY INJECTION: DSA runs were obtained over the head in ALEC, PA and lateral views. The left external carotid artery and its major branches opacify well and are unremarkable. A small caliber left middle meningeal artery is visualized which is truncated distal to foramen spinosum likely related to postoperative changes from craniotomy. The distal cervical and intracranial left internal carotid artery opacify well and appear unremarkable.  Flash filling of the left posterior cerebral artery from the left posterior communicating artery is seen on this injection. The left internal carotid artery bifurcates normally into widely patent and unremarkable left anterior cerebral and left middle cerebral arteries. Due to reflux of contrast into a bovine arch anatomy during contrast injection there is flash filling of the right internal carotid artery, right middle cerebral artery, and right anterior cerebral artery which appear unremarkable. No aneurysm, early draining vein, or stenosis is seen.   RIGHT COMMON FEMORAL ARTERY INJECTION: DSA run over the right hip was obtained in ROMERO view. The right common femoral artery, femoral bifurcation, and distal vasculature opacify well appear unremarkable. There is no evidence of pseudoaneurysm, stenosis, dissection, or other type of vascular injury.     Poor distal filling of the left middle meningeal artery. No embolization was performed.         US renal complete  Result Date: 12/7/2023  1. Mild right pelvic fullness with otherwise unremarkable renal ultrasound. 2. Incidental note of moderate volume abdominal ascites.        EEG  IMPRESSION Impression This continuous video-EEG indicates a severe diffuse encephalopathy. No epileptiform discharges are seen. There are no significative changes compared with yesterday's record..      Transthoracic Echo (TTE) Complete  Result Date: 12/6/2023  CONCLUSIONS:  1. Left ventricular systolic function is normal with a 65-70% estimated ejection fraction.  2. Poorly visualized anatomical structures due to suboptimal image quality.  3. Dynamic LV function with color acceleration within the LV cavity but no significant intracavitary or LVOT gradients at rest. Note that there is some JULIO C of the MV leaflets vs chordae, though not well seen. ( note that the color acceleration begins within the LV cavity, not just in the LVOT).  4. Spectral Doppler shows an impaired relaxation pattern of left ventricular diastolic filling.  5. A subtopimal agitated saline contast study was performed and some bubles did reach the left side, though the timing of appearance of the bubbles is unclear. May represent a PFO/intracardiac shunt vs intrapulmonary shunt.  6. Moderately elevated right ventricular systolic pressure.  7. No prior echocardiogram available for comparison.      Vascular US lower extremity venous duplex bilateral  Result Date: 12/5/2023  CONCLUSIONS: Right Lower Venous: There are chronic changes visualized in the distal Femoral, soleal and popliteal veins. Left Lower Venous: No evidence of acute deep vein thrombus visualized in the left lower extremity.     Vascular US upper extremity venous duplex bilateral  Result Date: 12/5/2023  CONCLUSIONS: Right Upper Venous: No evidence of acute deep vein thrombus visualized in the right upper extremity. Cannot rule out thrombus of non-visualized basilic vein. Left Upper Venous: No  evidence of acute deep vein thrombus visualized in the left upper extremity. There are chronic changes visualized in the mid cephalic and distal cephalic veins.      Assessment/Plan   Principal Problem:    Subdural hematoma (CMS/HCC)  Active Problems:    HTN (hypertension)    Diabetes mellitus, type 2 (CMS/HCC)    CVA (cerebral vascular accident) (CMS/HCC)    Haile Ayers is a 83 y.o. male on day 34 of admission with PMH of CLL(s/p Ibrutinib), type 2 DM, HTN, dementia, indwelling Mckee (c/b multiple CAUTI), BPH presented with lt  Subdural hematoma (CMS/HCC)  (s/p craniotomy with evacuation on 11/22). Patient has had a prolonged hospital stay which has been c/b bacteremia, LIN, anemia, hypovolemia, respiratory failure 2/2 pneumonia, COVID, hypernatremia. Medicine consulted for LIN and reduced pulmonary function.  FENa- 2.9%, LIN likely due to intrinsic renal injury. Cr stable with good UOP. Primary team to discuss comfort care, will await results of meeting.     Recommendations:  - Follow up GOC discussion  - Improving hyperkalemia. Continue lokelma as needed. Monitor daily RFP, Mg. If persistently hyperkalemic, consider d/cing heparin subQ as this can contribute to hyperkalemia  - May benefit from Furosemide 40mg for fluid overload. Benefits of diuresis outweigh risks of renal injury  - Avoid nephrotoxic drugs  - Renally dose medications  - Consider renal sparing TF diet.  - ID and endocrine following  - Pt saturating >95% SpO2 on 8L NC, BiPAP PRN     Medicine will continue to follow.     Pt seen and discussed with Dr. Montes.    Gina Le MD  PGY-1  Anesthesiology

## 2023-12-27 ENCOUNTER — APPOINTMENT (OUTPATIENT)
Dept: CARDIOLOGY | Facility: HOSPITAL | Age: 83
DRG: 025 | End: 2023-12-27
Payer: MEDICARE

## 2023-12-27 LAB
ALBUMIN SERPL BCP-MCNC: 2.7 G/DL (ref 3.4–5)
ANION GAP SERPL CALC-SCNC: 16 MMOL/L (ref 10–20)
BUN SERPL-MCNC: 118 MG/DL (ref 6–23)
CALCIUM SERPL-MCNC: 8.6 MG/DL (ref 8.6–10.6)
CHLORIDE SERPL-SCNC: 111 MMOL/L (ref 98–107)
CO2 SERPL-SCNC: 24 MMOL/L (ref 21–32)
CREAT SERPL-MCNC: 3.72 MG/DL (ref 0.5–1.3)
ERYTHROCYTE [DISTWIDTH] IN BLOOD BY AUTOMATED COUNT: 14.1 % (ref 11.5–14.5)
EST. AVERAGE GLUCOSE BLD GHB EST-MCNC: 154 MG/DL
GFR SERPL CREATININE-BSD FRML MDRD: 15 ML/MIN/1.73M*2
GLUCOSE BLD MANUAL STRIP-MCNC: 131 MG/DL (ref 74–99)
GLUCOSE BLD MANUAL STRIP-MCNC: 166 MG/DL (ref 74–99)
GLUCOSE BLD MANUAL STRIP-MCNC: 240 MG/DL (ref 74–99)
GLUCOSE BLD MANUAL STRIP-MCNC: 69 MG/DL (ref 74–99)
GLUCOSE SERPL-MCNC: 92 MG/DL (ref 74–99)
HBA1C MFR BLD: 7 %
HCT VFR BLD AUTO: 25.3 % (ref 41–52)
HGB BLD-MCNC: 7.8 G/DL (ref 13.5–17.5)
MAGNESIUM SERPL-MCNC: 2.44 MG/DL (ref 1.6–2.4)
MCH RBC QN AUTO: 29.1 PG (ref 26–34)
MCHC RBC AUTO-ENTMCNC: 30.8 G/DL (ref 32–36)
MCV RBC AUTO: 94 FL (ref 80–100)
NRBC BLD-RTO: 0 /100 WBCS (ref 0–0)
PHOSPHATE SERPL-MCNC: 4.6 MG/DL (ref 2.5–4.9)
PLATELET # BLD AUTO: 202 X10*3/UL (ref 150–450)
POTASSIUM SERPL-SCNC: 4.6 MMOL/L (ref 3.5–5.3)
RBC # BLD AUTO: 2.68 X10*6/UL (ref 4.5–5.9)
SODIUM SERPL-SCNC: 146 MMOL/L (ref 136–145)
WBC # BLD AUTO: 6.9 X10*3/UL (ref 4.4–11.3)

## 2023-12-27 PROCEDURE — 2500000001 HC RX 250 WO HCPCS SELF ADMINISTERED DRUGS (ALT 637 FOR MEDICARE OP): Performed by: STUDENT IN AN ORGANIZED HEALTH CARE EDUCATION/TRAINING PROGRAM

## 2023-12-27 PROCEDURE — 93005 ELECTROCARDIOGRAM TRACING: CPT

## 2023-12-27 PROCEDURE — 2500000004 HC RX 250 GENERAL PHARMACY W/ HCPCS (ALT 636 FOR OP/ED): Performed by: NURSE PRACTITIONER

## 2023-12-27 PROCEDURE — 96372 THER/PROPH/DIAG INJ SC/IM: CPT | Performed by: STUDENT IN AN ORGANIZED HEALTH CARE EDUCATION/TRAINING PROGRAM

## 2023-12-27 PROCEDURE — 99232 SBSQ HOSP IP/OBS MODERATE 35: CPT | Performed by: INTERNAL MEDICINE

## 2023-12-27 PROCEDURE — C9113 INJ PANTOPRAZOLE SODIUM, VIA: HCPCS | Performed by: STUDENT IN AN ORGANIZED HEALTH CARE EDUCATION/TRAINING PROGRAM

## 2023-12-27 PROCEDURE — 83036 HEMOGLOBIN GLYCOSYLATED A1C: CPT

## 2023-12-27 PROCEDURE — 85027 COMPLETE CBC AUTOMATED: CPT | Performed by: NURSE PRACTITIONER

## 2023-12-27 PROCEDURE — 93010 ELECTROCARDIOGRAM REPORT: CPT | Performed by: INTERNAL MEDICINE

## 2023-12-27 PROCEDURE — 99232 SBSQ HOSP IP/OBS MODERATE 35: CPT

## 2023-12-27 PROCEDURE — 2500000004 HC RX 250 GENERAL PHARMACY W/ HCPCS (ALT 636 FOR OP/ED): Performed by: STUDENT IN AN ORGANIZED HEALTH CARE EDUCATION/TRAINING PROGRAM

## 2023-12-27 PROCEDURE — 82947 ASSAY GLUCOSE BLOOD QUANT: CPT

## 2023-12-27 PROCEDURE — 84100 ASSAY OF PHOSPHORUS: CPT | Performed by: NURSE PRACTITIONER

## 2023-12-27 PROCEDURE — 1200000002 HC GENERAL ROOM WITH TELEMETRY DAILY

## 2023-12-27 PROCEDURE — 31720 CLEARANCE OF AIRWAYS: CPT

## 2023-12-27 PROCEDURE — 83735 ASSAY OF MAGNESIUM: CPT | Performed by: NURSE PRACTITIONER

## 2023-12-27 RX ORDER — FUROSEMIDE 10 MG/ML
40 INJECTION INTRAMUSCULAR; INTRAVENOUS ONCE
Status: COMPLETED | OUTPATIENT
Start: 2023-12-27 | End: 2023-12-27

## 2023-12-27 RX ADMIN — HEPARIN SODIUM 5000 UNITS: 5000 INJECTION INTRAVENOUS; SUBCUTANEOUS at 08:59

## 2023-12-27 RX ADMIN — INSULIN HUMAN 6 UNITS: 100 INJECTION, SOLUTION PARENTERAL at 01:15

## 2023-12-27 RX ADMIN — ATORVASTATIN CALCIUM 20 MG: 20 TABLET, FILM COATED ORAL at 08:59

## 2023-12-27 RX ADMIN — INSULIN GLARGINE 10 UNITS: 100 INJECTION, SOLUTION SUBCUTANEOUS at 21:10

## 2023-12-27 RX ADMIN — FUROSEMIDE 40 MG: 10 INJECTION, SOLUTION INTRAVENOUS at 15:24

## 2023-12-27 RX ADMIN — PANTOPRAZOLE SODIUM 40 MG: 40 INJECTION, POWDER, FOR SOLUTION INTRAVENOUS at 21:11

## 2023-12-27 RX ADMIN — PANTOPRAZOLE SODIUM 40 MG: 40 INJECTION, POWDER, FOR SOLUTION INTRAVENOUS at 08:59

## 2023-12-27 RX ADMIN — AMLODIPINE BESYLATE 10 MG: 10 TABLET ORAL at 08:59

## 2023-12-27 RX ADMIN — HEPARIN SODIUM 5000 UNITS: 5000 INJECTION INTRAVENOUS; SUBCUTANEOUS at 00:52

## 2023-12-27 RX ADMIN — HEPARIN SODIUM 5000 UNITS: 5000 INJECTION INTRAVENOUS; SUBCUTANEOUS at 15:42

## 2023-12-27 RX ADMIN — LEVOTHYROXINE SODIUM 50 MCG: 50 TABLET ORAL at 05:18

## 2023-12-27 RX ADMIN — THIAMINE HCL TAB 100 MG 100 MG: 100 TAB at 08:59

## 2023-12-27 RX ADMIN — LEVETIRACETAM 500 MG: 5 INJECTION INTRAVENOUS at 17:06

## 2023-12-27 RX ADMIN — LEVETIRACETAM 500 MG: 5 INJECTION INTRAVENOUS at 05:18

## 2023-12-27 RX ADMIN — Medication 4000 UNITS: at 08:59

## 2023-12-27 RX ADMIN — INSULIN HUMAN 5 UNITS: 100 INJECTION, SOLUTION PARENTERAL at 17:05

## 2023-12-27 ASSESSMENT — PAIN - FUNCTIONAL ASSESSMENT
PAIN_FUNCTIONAL_ASSESSMENT: 0-10
PAIN_FUNCTIONAL_ASSESSMENT: PAINAD (PAIN ASSESSMENT IN ADVANCED DEMENTIA SCALE)

## 2023-12-27 ASSESSMENT — PAIN SCALES - PAIN ASSESSMENT IN ADVANCED DEMENTIA (PAINAD)
TOTALSCORE: REPOSITIONED
BODYLANGUAGE: RELAXED
FACIALEXPRESSION: SMILING OR INEXPRESSIVE
CONSOLABILITY: NO NEED TO CONSOLE
TOTALSCORE: 2
BREATHING: NOISY LABORED BREATHING, LONG PERIODS OF HYPERVENTILATION, CHEYNE-STOKES RESPIRATIONS

## 2023-12-27 ASSESSMENT — PAIN SCALES - GENERAL
PAINLEVEL_OUTOF10: 0 - NO PAIN
PAINLEVEL_OUTOF10: 0 - NO PAIN
PAINLEVEL_OUTOF10: 2

## 2023-12-27 ASSESSMENT — PAIN SCALES - WONG BAKER: WONGBAKER_NUMERICALRESPONSE: NO HURT

## 2023-12-27 NOTE — PROGRESS NOTES
"Haile Ayers is a 83 y.o. male on day 35 of admission presenting with Subdural hematoma (CMS/HCC).    Subjective   Pt. Is seen and examined this AM.   I have reviewed histories, allergies and medications have been reviewed and there are no changes     Objective   Review of Systems  Physical Exam  Vitals:    12/27/23 0821   BP: 141/60   Pulse: 96   Resp: 22   Temp: 36.7 °C (98.1 °F)   SpO2: 99%       Last Recorded Vitals  Blood pressure 141/60, pulse 96, temperature 36.7 °C (98.1 °F), resp. rate 22, height 1.727 m (5' 7.99\"), weight 70 kg (154 lb 5.2 oz), SpO2 99 %.  Intake/Output last 3 Shifts:  I/O last 3 completed shifts:  In: 1600 (22.9 mL/kg) [NG/GT:1600]  Out: 2200 (31.4 mL/kg) [Urine:2200 (0.9 mL/kg/hr)]  Weight: 70 kg     Relevant Results  Results from last 7 days   Lab Units 12/27/23  0544 12/27/23  0112 12/26/23  1657 12/26/23  1143 12/26/23  0819 12/26/23  0606 12/25/23  1817 12/25/23  1452 12/25/23  0249 12/25/23  0104 12/24/23  1123 12/24/23  0612 12/23/23  0611 12/23/23  0549   POCT GLUCOSE mg/dL 166* 240* 143* 152*  --  146*   < >  --    < >  --    < >  --    < >  --    GLUCOSE mg/dL  --   --   --   --  160*  --   --  199*  --  164*  --  201*  --  277*    < > = values in this interval not displayed.       Assessment/Plan   Principal Problem:    Subdural hematoma (CMS/HCC)  Active Problems:    HTN (hypertension)    Diabetes mellitus, type 2 (CMS/HCC)    CVA (cerebral vascular accident) (CMS/HCC)  Haile Ayers is a 83 y.o. patient with PMHx of HTN , DM type 2, hx of CVA, hx of anemia,  CLL (unknown CLL-IPI score; being followed by Dr. Claros at [facility]) on Ibrutinib who presented to the hospital on 11/22 with AMS 2/2 UTI also found to have L large chronic SDH with midline shift. He underwent L crani for SDH evacuation on 11/22 and re-evacuated on 11/30, extubated on 12/1.  Had worsening neurological exam on 12/4 and transferred to NSU. CTA on 12/5 incidentally showed diffuse sclerotic lesions " throughout axial and appendicular skeleton + ascities.  Tested positive for COVID on 12/8.  Patient with continued worsening mental status, endocrinology team consulted for elevated TSH as part of worsening encephalopathy, workup showing worsening lung disease, hypernatremia and LIN, patient was initially planned for PEG on 12/18. Was transferred to NSU for worsening respiratory status     Hypothyroidism:  -------------------  TFTs on 12/16 showed TSH 10.4, Ft4: 0.85, Anti-TPO: neg   Patient likely has hypothyroidism vs non-thyroidal illness.  However unlikely that his mild hypothyroidism is the etiology of his worsening mental status.    Given worsening clinical status. was started on LT4 at dose of 25 mcg. Repeat TFT on 12/22: TSH 11 and FT4 of 0.79  -Increase levothyroxine to 50 mcg daily while holding tube feeds to 1 hour before and 1 hour after --- Repeat TFT with TSH and FT4 on 12/29     Type 2 diabetes:  --------------------  -Not enough data in chart  -At home on metformin 500 mg 1 tablet orally twice daily per med rec upon admission.  -Patient hyperglycemic, showing some insulin resistance    Endocrine Recommendations (12/27/23)  For Tube feeds Isosource 1.5 at 65 mL/h at Goal  Continue Glargine of 10 units every 24 hours  Decrease regular insulin to 5 units every 6 hours  Adjust to Customized Scale of 1 unit for every 50 more than 200.   Accucheck q6 ,while NPO or on tube feeds  Hypoglycemia protocol       For NPO - If comfort care - no insulin  For comfort care - same about TF regimen;  Tube feeds Isosource 1.5 at 65 mL/h at Goal  Continue Glargine of 10 units every 24 hours  Decrease regular insulin to 5 units every 6 hours  Adjust to Customized Scale of 1 unit for every 50 more than 200.       Hypernatremia  -------------------------------  Endocrine Recommendations (12/27/23)  - Patient is at risk of nephrogenic and central diabetes insipidus recent subdural hematoma status postevacuation x 2.    - Na  146  --Resume  cc every 4 hours  --D/C IV of 1/2 NS at 50 ml/hr   --RFP every 24 hours     Plan communicated to primary team.   Endocrine will follow - Endo Pager [phone].   Case discussed with Dr. Sommers who agrees with the management plan.    I spent 60  minutes in the professional and overall care of this patient.       I spent 60 minutes in the professional and overall care of this patient.      Eloy Vasquez MD

## 2023-12-27 NOTE — PROGRESS NOTES
"Haile Ayers is a 83 y.o. male on day 35 of admission presenting with Subdural hematoma (CMS/HCC).    Subjective   NAEO    Objective     Physical Exam  ECNS  RUE flaccid  RLE w/d  LUE spont/localizing  LLE w/d    Last Recorded Vitals  Blood pressure 133/60, pulse 93, temperature 36.8 °C (98.2 °F), resp. rate (!) 27, height 1.727 m (5' 7.99\"), weight 70 kg (154 lb 5.2 oz), SpO2 100 %.  Intake/Output last 3 Shifts:  I/O last 3 completed shifts:  In: 5852.5 (83.6 mL/kg) [I.V.:1462.5 (20.9 mL/kg); NG/GT:3890; IV Piggyback:500]  Out: 2200 (31.4 mL/kg) [Urine:2200 (0.9 mL/kg/hr)]  Weight: 70 kg     Relevant Results                             Assessment/Plan   Principal Problem:    Subdural hematoma (CMS/HCC)  Active Problems:    HTN (hypertension)    Diabetes mellitus, type 2 (CMS/HCC)    CVA (cerebral vascular accident) (CMS/HCC)    Pt is a 84 yo M w/ h/o CLL, anemia, DM, p/w AMS, found to have UTI, CTH w/ L large chronic SDH     11/22 s/p L crani for SDH evac, CTH POC  11/24 drain dc'd  11/26 EEG neg, dc'd, CXR R lung patchy opacities  11/28 s/p midline  11/29 lethargic, not FC, CTH incr L SDH w 8mm MLS, s/p1g keppra load, CXR stable opacities, rCTH stable, s/p R side wd, s/p keppra load, rrCTH stable L SDH  11/30 s/p redo R crani for SDH evac, CTH good evac, decr MLS  12/1 s/p extubation  12/3 CTH evolving brood products, stable MLS  12/4 CTH stable, s/p angio small L MMA w/ no distal branches, not embolized  12/5 DVT USx4 RLE chronic changes no DVT, CTH stable, CT PE neg for PE, diffuse sclerotic lesions c/f mets, BL pleural effusions, s/p 60 lasix  12/6 s/p SDD dc'd, EEG neg, dc'd, s/p 1mg Bumex, TTE EF 65-70%  12/13 hgb 6.8, s/p 1U pRBC  12/14 CBC hgb 6.1, s/p 2U pRBC  12/15 sutures dc'd  12/17 patient transferred to NSU due to respiratory failure, stabilized on biPAP, CTH improved L SDH.   12/20 worsened neuro exam, CTH stable  12/24 tachypneic, suctioned with improvement, CXR stable  12/26 s/p 40 lasix   "   PLAN:    DNR/DNI  Tele  Continue biPAP as needed  Goals of care discussion with family  Medicine recs re: LIN and hyperK  Endocrine recs for hypernatremia  Supportive onc consulted appreciate recs  Med onc recs - CT CAP when LIN resolved  GI recs-will re engage re PEG GOC done  ID recs  Chronic ignacio  Heme recs-cont to hold ibrutinib   SLP recs  PTOT-SNF  SCD, SQH         Michelle Chong MD

## 2023-12-27 NOTE — PROGRESS NOTES
Physical Therapy                 Therapy Communication Note    Patient Name: Haile Ayers  MRN: [id number]  Today's Date: 12/27/2023     Discipline: Physical Therapy    Missed Visit Reason: Missed Visit Reason:  (Pt sleeping in bed, non-rousable, does not respond tosignificant tactile stim (sternal rub, nail bed pressure). RN aware)    Missed Time: Attempt 9384    Comment:

## 2023-12-27 NOTE — PROGRESS NOTES
Occupational Therapy                 Therapy Communication Note    Patient Name: Haile Ayers  MRN: [id number]  Today's Date: 12/27/2023     Discipline: Occupational Therapy    Missed Visit Reason: Missed Visit Reason:  (Pt not arousable, 0% command collowing, eyes closed, and no response to noxious stimuli or change in positions. RN aware)    Missed Time: 1428    Isabela Hoff, OT

## 2023-12-27 NOTE — CARE PLAN
The patient's goals for the shift include  suctioning secretions.    The clinical goals for the shift include Patient will remain HDS throughout the shift    Over the shift, the patient did not make progress toward the following goals. Barriers to progression include aphasia and lethargy. Recommendations to address these barriers include utilizing family; utilizing the CPOT method of assessing pain.    Problem: Pain  Goal: My pain/discomfort is manageable  Outcome: Not Progressing     Problem: Psychosocial Needs  Goal: Demonstrates ability to cope with hospitalization/illness  Outcome: Not Progressing

## 2023-12-27 NOTE — PROGRESS NOTES
"Haile Ayers is a 83 y.o. 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 Patient has had a prolonged hospital stay which has been c/b bacteremia, LIN, anemia, hypovolemia, respiratory failure 2/2 pneumonia, COVID, hypernatremia. Medicine consulted for LIN and reduced pulmonary function.     Subjective   Pt had a radar paged rapid for tachypnea. HDS.     This morning, pt was in bed, propped up on pillows. Cannot obtain history, given altered mental status.    Objective     Physical Exam  Vitals reviewed.   Constitutional:       General: He is not in acute distress.     Appearance: He is ill-appearing. He is not diaphoretic.   HENT:      Head:      Comments: Surgical wound  Eyes:      Comments: Unable to assess   Cardiovascular:      Rate and Rhythm: Normal rate and regular rhythm.      Heart sounds: Normal heart sounds.   Pulmonary:      Effort: No respiratory distress.      Breath sounds: Rales present.      Comments: tachypnea  Abdominal:      Palpations: Abdomen is soft.      Comments: Dobhoff in situ  TF @60ml/hr   Genitourinary:     Comments: In situ Mckee  Musculoskeletal:      Right lower leg: Edema present.      Left lower leg: Edema present.   Skin:     General: Skin is warm.   Neurological:      Comments: Obtunded, unable to access       Last Recorded Vitals  Blood pressure 133/60, pulse 95, temperature 36 °C (96.8 °F), resp. rate 25, height 1.727 m (5' 7.99\"), weight 70 kg (154 lb 5.2 oz), SpO2 96 %.  Intake/Output last 3 Shifts:  I/O last 3 completed shifts:  In: 1600 (22.9 mL/kg) [NG/GT:1600]  Out: 2200 (31.4 mL/kg) [Urine:2200 (0.9 mL/kg/hr)]  Weight: 70 kg     Relevant Results  Scheduled medications  amLODIPine, 10 mg, nasogastric tube, Daily  atorvastatin, 20 mg, oral, Daily  bisacodyl, 10 mg, rectal, Daily  cholecalciferol, 4,000 " Units, oral, Daily  heparin (porcine), 5,000 Units, subcutaneous, q8h  insulin glargine, 10 Units, subcutaneous, Nightly  insulin regular, 0-10 Units, subcutaneous, q6h  insulin regular, 8 Units, subcutaneous, q6h  levETIRAcetam, 500 mg, intravenous, q12h  levothyroxine, 50 mcg, nasogastric tube, Daily  pantoprazole, 40 mg, intravenous, BID  polyethylene glycol, 17 g, oral, Daily  thiamine, 100 mg, nasogastric tube, Daily      Continuous medications  oxygen, , Last Rate: 2 L/min (12/12/23 1713)      PRN medications  PRN medications: acetaminophen, albuterol, dextrose 10 % in water (D10W), dextrose, glucagon, hydrALAZINE, labetaloL, naloxone, ondansetron **OR** ondansetron, oxygen    Results for orders placed or performed during the hospital encounter of 11/21/23 (from the past 24 hour(s))   POCT GLUCOSE   Result Value Ref Range    POCT Glucose 143 (H) 74 - 99 mg/dL   POCT GLUCOSE   Result Value Ref Range    POCT Glucose 240 (H) 74 - 99 mg/dL   POCT GLUCOSE   Result Value Ref Range    POCT Glucose 166 (H) 74 - 99 mg/dL   Renal Function Panel   Result Value Ref Range    Glucose 92 74 - 99 mg/dL    Sodium 146 (H) 136 - 145 mmol/L    Potassium 4.6 3.5 - 5.3 mmol/L    Chloride 111 (H) 98 - 107 mmol/L    Bicarbonate 24 21 - 32 mmol/L    Anion Gap 16 10 - 20 mmol/L    Urea Nitrogen 118 (HH) 6 - 23 mg/dL    Creatinine 3.72 (H) 0.50 - 1.30 mg/dL    eGFR 15 (L) >60 mL/min/1.73m*2    Calcium 8.6 8.6 - 10.6 mg/dL    Phosphorus 4.6 2.5 - 4.9 mg/dL    Albumin 2.7 (L) 3.4 - 5.0 g/dL   Magnesium   Result Value Ref Range    Magnesium 2.44 (H) 1.60 - 2.40 mg/dL   CBC   Result Value Ref Range    WBC 6.9 4.4 - 11.3 x10*3/uL    nRBC 0.0 0.0 - 0.0 /100 WBCs    RBC 2.68 (L) 4.50 - 5.90 x10*6/uL    Hemoglobin 7.8 (L) 13.5 - 17.5 g/dL    Hematocrit 25.3 (L) 41.0 - 52.0 %    MCV 94 80 - 100 fL    MCH 29.1 26.0 - 34.0 pg    MCHC 30.8 (L) 32.0 - 36.0 g/dL    RDW 14.1 11.5 - 14.5 %    Platelets 202 150 - 450 x10*3/uL   Hemoglobin A1c   Result  Value Ref Range    Hemoglobin A1C 7.0 (H) see below %    Estimated Average Glucose 154 Not Established mg/dL   POCT GLUCOSE   Result Value Ref Range    POCT Glucose 69 (L) 74 - 99 mg/dL     XR chest 1 view  Result Date: 12/26/2023  FINDINGS: AP radiograph of the chest was provided.   Enteric tube seen coursing below the level diaphragm with tip out of the field of view.   CARDIOMEDIASTINAL SILHOUETTE: Cardiomediastinal silhouette is stable in size and configuration. Aortic knob calcifications present.   LUNGS: Blunting of the costophrenic angles with hazy right-greater-than-left mid to lower lung zone opacities, similar to prior. No pneumothorax.   ABDOMEN: No remarkable upper abdominal findings.   BONES: No acute osseous changes.       1.  Similar appearance of multifocal airspace opacities favored to represent pulmonary edema and/or multifocal infectious process. 2. Persistent right-greater-than-left pleural effusions with adjacent atelectasis. 3.   Medical devices as described above.       XR abdomen 1 view  Result Date: 12/25/2023  FINDINGS: AP views of the chest and abdomen were provided.   Enteric tube seen coursing below the level diaphragm with tip overlying the expected location of the 2nd portion of the duodenum   CARDIOMEDIASTINAL SILHOUETTE: Cardiomediastinal silhouette is stable in size and configuration. Aortic knob calcifications present.   LUNGS: Blunting of the costophrenic angles. Hazy right-greater-than-left mid to lower lung zone opacities, slightly increased from prior. No pneumothorax.   ABDOMEN: Nonobstructive bowel gas pattern. Contrast material is seen opacifying scattered loops of bowel. Limited evaluation of pneumoperitoneum on semi-erect imaging, however there is no evidence of gross free air.   BONES: No acute osseous changes.       1.  Slight interval increase in diffuse multifocal airspace opacities, right-greater-than-left in favored to represent pulmonary edema and/or multifocal  infectious process. 2. Persistent right-greater-than-left pleural effusions with adjacent atelectasis. 3.  Nonobstructive bowel gas pattern. 4.   Medical devices as described above.       ECG 12 Lead  Result Date: 12/20/2023  Normal sinus rhythm Anterior infarct , age undetermined Abnormal ECG When compared with ECG of 30-NOV-2023 14:29, Anterior infarct is now Present     CT head wo IV contrast  Result Date: 12/20/2023  FINDINGS: There are postoperative changes from prior left craniotomy at the vertex of the skull for subdural hemorrhage drainage. There is slightly hyperattenuating subdural fluid collection along the left cerebral convexity, most pronounced along the posterior aspect of the left temporal and parietal lobes measuring 12 mm in maximum thickness with stable associated mass effect. Just below the craniotomy site, there is a stable 6 mm in maximum thickness extra-axial, likely subdural, fluid collection with rim of hyperattenuation consistent with evolving acute blood products. Mass effect on the adjacent brain parenchyma is also unchanged. There is stable narrowing of the left lateral ventricle and 3 mm rightward midline shift at the level of the septum pellucidum.   Gray-white differentiation is maintained throughout. There are regions of hypoattenuation within the bilateral cerebral hemispheric white matter which are probably sequela of chronic small vessel ischemic changes.   There is mucosal thickening located within the right maxillary sinus layering fluid within the right sphenoid sinus, otherwise the visualized paranasal sinuses and mastoid air cells are essentially clear.        No significant interval change in intracranial findings compared to CT 12/18/2023. Stable postoperative changes from left craniotomy with stable residual subdural hemorrhage with associated mass effect along the left cerebral convexity and mild midline shift.        IR angiogram  Result Date: 12/11/2023  FINDINGS: LEFT  COMMON CAROTID ARTERY INJECTION: DSA runs were obtained over the head in [place], PA and lateral views. The left external carotid artery and its major branches opacify well and are unremarkable. A small caliber left middle meningeal artery is visualized which is truncated distal to foramen spinosum likely related to postoperative changes from craniotomy. The distal cervical and intracranial left internal carotid artery opacify well and appear unremarkable.  Flash filling of the left posterior cerebral artery from the left posterior communicating artery is seen on this injection. The left internal carotid artery bifurcates normally into widely patent and unremarkable left anterior cerebral and left middle cerebral arteries. Due to reflux of contrast into a bovine arch anatomy during contrast injection there is flash filling of the right internal carotid artery, right middle cerebral artery, and right anterior cerebral artery which appear unremarkable. No aneurysm, early draining vein, or stenosis is seen.   RIGHT COMMON FEMORAL ARTERY INJECTION: DSA run over the right hip was obtained in ROMERO view. The right common femoral artery, femoral bifurcation, and distal vasculature opacify well appear unremarkable. There is no evidence of pseudoaneurysm, stenosis, dissection, or other type of vascular injury.     Poor distal filling of the left middle meningeal artery. No embolization was performed.        US renal complete  Result Date: 12/7/2023  1. Mild right pelvic fullness with otherwise unremarkable renal ultrasound. 2. Incidental note of moderate volume abdominal ascites.        EEG  IMPRESSION Impression This continuous video-EEG indicates a severe diffuse encephalopathy. No epileptiform discharges are seen. There are no significative changes compared with yesterday's record..      Transthoracic Echo (TTE) Complete  Result Date: 12/6/2023  CONCLUSIONS:  1. Left ventricular systolic function is normal with a 65-70%  estimated ejection fraction.  2. Poorly visualized anatomical structures due to suboptimal image quality.  3. Dynamic LV function with color acceleration within the LV cavity but no significant intracavitary or LVOT gradients at rest. Note that there is some JULIO C of the MV leaflets vs chordae, though not well seen. ( note that the color acceleration begins within the LV cavity, not just in the LVOT).  4. Spectral Doppler shows an impaired relaxation pattern of left ventricular diastolic filling.  5. A subtopimal agitated saline contast study was performed and some bubles did reach the left side, though the timing of appearance of the bubbles is unclear. May represent a PFO/intracardiac shunt vs intrapulmonary shunt.  6. Moderately elevated right ventricular systolic pressure.  7. No prior echocardiogram available for comparison.      Vascular US lower extremity venous duplex bilateral  Result Date: 12/5/2023  CONCLUSIONS: Right Lower Venous: There are chronic changes visualized in the distal Femoral, soleal and popliteal veins. Left Lower Venous: No evidence of acute deep vein thrombus visualized in the left lower extremity.     Vascular US upper extremity venous duplex bilateral  Result Date: 12/5/2023  CONCLUSIONS: Right Upper Venous: No evidence of acute deep vein thrombus visualized in the right upper extremity. Cannot rule out thrombus of non-visualized basilic vein. Left Upper Venous: No evidence of acute deep vein thrombus visualized in the left upper extremity. There are chronic changes visualized in the mid cephalic and distal cephalic veins.      Assessment/Plan   Principal Problem:    Subdural hematoma (CMS/HCC)  Active Problems:    HTN (hypertension)    Diabetes mellitus, type 2 (CMS/HCC)    CVA (cerebral vascular accident) (CMS/HCC)    Haile Ayers is a 83 y.o. male on day 34 of admission with PMH of CLL(s/p Ibrutinib), type 2 DM, HTN, dementia, indwelling Mckee (c/b multiple CAUTI), BPH presented with  lt  Subdural hematoma (CMS/HCC)  (s/p craniotomy with evacuation on 11/22). Patient has had a prolonged hospital stay which has been c/b bacteremia, LIN, anemia, hypovolemia, respiratory failure 2/2 pneumonia, COVID, hypernatremia. Medicine consulted for LIN and reduced pulmonary function.  FENa- 2.9%, LIN likely due to intrinsic renal injury. Cr stable with good UOP. Primary team to discuss comfort care, will await results of meeting.     Recommendations:  - Follow up GOC discussion  - Potassium wnl after lokelma. Monitor daily RFP, Mg.   - Pt appears to be fluid oveloaded. Creat stable after furosemide yesterday. May benefit from Furosemide 40mg bolus today. Benefits of diuresis outweigh risks of renal injury.   - Avoid nephrotoxic drugs  - Renally dose medications  - Consider renal sparing TF diet. TF were discontinued yesterday due to failure to flush Dobhoff. Restarted today at 60ml/hr  - Endocrine following  - Pt saturating >95% SpO2 on 6L NC, tolerating O2 weaning well with regular suctioning for respiratory secretions. BiPAP PRN.      Pt seen and discussed with Dr. Montes.    Gina Le MD  PGY-1  Anesthesiology

## 2023-12-27 NOTE — CONSULTS
Wound Care Consult     Visit Date: 12/27/2023      Patient Name: Haile Ayers         MRN: [id number]           YOB: 1940     Reason for Consult: Sacrum and bilateral ischium pressure injuries      Wound History: 83 y.o. 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 Patient has had a prolonged hospital stay which has been c/b bacteremia, LIN, anemia, hypovolemia, respiratory failure 2/2 pneumonia, COVID, hypernatremia. Medicine consulted for LIN and reduced pulmonary function.      Wound Assessment:    Wound 12/19/23 Pressure Injury Sacrum Mid (Active)   Wound Image   12/27/23 1336   Site Assessment Eschar;Fragile;Excoriated;Painful;Pink;Red 12/27/23 1336   Pearl-Wound Assessment Fragile;Moist ;Red 12/27/23 1336   Non-staged Wound Description Partial thickness 12/27/23 1336   Pressure Injury Stage U 12/27/23 1336   Shape irregular 12/27/23 1336   Wound Length (cm) 9 cm 12/27/23 1336   Wound Width (cm) 5 cm 12/27/23 1336   Wound Surface Area (cm^2) 45 cm^2 12/27/23 1336   Wound Depth (cm) 0.2 cm 12/27/23 1336   Wound Volume (cm^3) 9 cm^3 12/27/23 1336   State of Healing Non-healing 12/27/23 1336   Margins Poorly defined 12/27/23 1336   Drainage Description Serosanguineous 12/27/23 1336   Drainage Amount Scant 12/27/23 1336   Dressing Xeroform;Silicone border dressing 12/27/23 1336   Dressing Changed Changed 12/25/23 0845   Dressing Status Dry;Clean 12/26/23 0400       Wound 12/27/23 Pressure Injury Ischium (Active)   Site Assessment Purple;Red;Maceration;Denuded 12/27/23 1336   Pearl-Wound Assessment Moist ;Macerated;Pink;Red 12/27/23 1336   Non-staged Wound Description Partial thickness 12/27/23 1336   Pressure Injury Stage DTPI 12/27/23 1336   Shape round 12/27/23 1336   State of Healing Non-healing 12/27/23 1336   Margins  Well-defined edges 12/27/23 1336   Drainage Description Serosanguineous 12/27/23 1336   Drainage Amount Scant 12/27/23 1336   Dressing Xeroform;Silicone border dressing 12/27/23 1336   Dressing Changed New 12/27/23 1336   Dressing Status Clean;Dry 12/27/23 1336            Wound Team Plan:   Recommendations for Sacrum and bilateral ischium: Daily   Cleanse with normal saline and gently pat dry   Apply xeroform over the wounds  Cover with mepilex foam dressing     Todd Broussard RN-BC, CWON  12/27/2023  3:20 PM

## 2023-12-28 LAB
ALBUMIN SERPL BCP-MCNC: 2.7 G/DL (ref 3.4–5)
ANION GAP SERPL CALC-SCNC: 18 MMOL/L (ref 10–20)
BUN SERPL-MCNC: 122 MG/DL (ref 6–23)
CALCIUM SERPL-MCNC: 8 MG/DL (ref 8.6–10.6)
CHLORIDE SERPL-SCNC: 109 MMOL/L (ref 98–107)
CO2 SERPL-SCNC: 23 MMOL/L (ref 21–32)
CREAT SERPL-MCNC: 3.68 MG/DL (ref 0.5–1.3)
ERYTHROCYTE [DISTWIDTH] IN BLOOD BY AUTOMATED COUNT: 14.3 % (ref 11.5–14.5)
GFR SERPL CREATININE-BSD FRML MDRD: 16 ML/MIN/1.73M*2
GLUCOSE BLD MANUAL STRIP-MCNC: 165 MG/DL (ref 74–99)
GLUCOSE BLD MANUAL STRIP-MCNC: 167 MG/DL (ref 74–99)
GLUCOSE BLD MANUAL STRIP-MCNC: 170 MG/DL (ref 74–99)
GLUCOSE BLD MANUAL STRIP-MCNC: 184 MG/DL (ref 74–99)
GLUCOSE BLD MANUAL STRIP-MCNC: 189 MG/DL (ref 74–99)
GLUCOSE BLD MANUAL STRIP-MCNC: 246 MG/DL (ref 74–99)
GLUCOSE SERPL-MCNC: 231 MG/DL (ref 74–99)
HCT VFR BLD AUTO: 25.7 % (ref 41–52)
HGB BLD-MCNC: 8.2 G/DL (ref 13.5–17.5)
MAGNESIUM SERPL-MCNC: 2.5 MG/DL (ref 1.6–2.4)
MCH RBC QN AUTO: 30.5 PG (ref 26–34)
MCHC RBC AUTO-ENTMCNC: 31.9 G/DL (ref 32–36)
MCV RBC AUTO: 96 FL (ref 80–100)
NRBC BLD-RTO: 0 /100 WBCS (ref 0–0)
PHOSPHATE SERPL-MCNC: 5.1 MG/DL (ref 2.5–4.9)
PLATELET # BLD AUTO: 221 X10*3/UL (ref 150–450)
POTASSIUM SERPL-SCNC: 5 MMOL/L (ref 3.5–5.3)
RBC # BLD AUTO: 2.69 X10*6/UL (ref 4.5–5.9)
SODIUM SERPL-SCNC: 145 MMOL/L (ref 136–145)
WBC # BLD AUTO: 6.8 X10*3/UL (ref 4.4–11.3)

## 2023-12-28 PROCEDURE — 2500000004 HC RX 250 GENERAL PHARMACY W/ HCPCS (ALT 636 FOR OP/ED): Performed by: STUDENT IN AN ORGANIZED HEALTH CARE EDUCATION/TRAINING PROGRAM

## 2023-12-28 PROCEDURE — 2500000002 HC RX 250 W HCPCS SELF ADMINISTERED DRUGS (ALT 637 FOR MEDICARE OP, ALT 636 FOR OP/ED): Performed by: NURSE PRACTITIONER

## 2023-12-28 PROCEDURE — 82947 ASSAY GLUCOSE BLOOD QUANT: CPT

## 2023-12-28 PROCEDURE — 2500000001 HC RX 250 WO HCPCS SELF ADMINISTERED DRUGS (ALT 637 FOR MEDICARE OP)

## 2023-12-28 PROCEDURE — 96372 THER/PROPH/DIAG INJ SC/IM: CPT | Performed by: STUDENT IN AN ORGANIZED HEALTH CARE EDUCATION/TRAINING PROGRAM

## 2023-12-28 PROCEDURE — 99232 SBSQ HOSP IP/OBS MODERATE 35: CPT

## 2023-12-28 PROCEDURE — C9113 INJ PANTOPRAZOLE SODIUM, VIA: HCPCS | Performed by: STUDENT IN AN ORGANIZED HEALTH CARE EDUCATION/TRAINING PROGRAM

## 2023-12-28 PROCEDURE — 2500000001 HC RX 250 WO HCPCS SELF ADMINISTERED DRUGS (ALT 637 FOR MEDICARE OP): Performed by: STUDENT IN AN ORGANIZED HEALTH CARE EDUCATION/TRAINING PROGRAM

## 2023-12-28 PROCEDURE — 31720 CLEARANCE OF AIRWAYS: CPT

## 2023-12-28 PROCEDURE — 85027 COMPLETE CBC AUTOMATED: CPT

## 2023-12-28 PROCEDURE — 97530 THERAPEUTIC ACTIVITIES: CPT | Mod: GP

## 2023-12-28 PROCEDURE — 97112 NEUROMUSCULAR REEDUCATION: CPT | Mod: GP

## 2023-12-28 PROCEDURE — 80069 RENAL FUNCTION PANEL: CPT

## 2023-12-28 PROCEDURE — 83735 ASSAY OF MAGNESIUM: CPT | Performed by: NURSE PRACTITIONER

## 2023-12-28 PROCEDURE — 1200000002 HC GENERAL ROOM WITH TELEMETRY DAILY

## 2023-12-28 RX ADMIN — LEVETIRACETAM 500 MG: 5 INJECTION INTRAVENOUS at 17:34

## 2023-12-28 RX ADMIN — ATORVASTATIN CALCIUM 20 MG: 20 TABLET, FILM COATED ORAL at 08:44

## 2023-12-28 RX ADMIN — BISACODYL 10 MG: 10 SUPPOSITORY RECTAL at 21:10

## 2023-12-28 RX ADMIN — AMLODIPINE BESYLATE 10 MG: 10 TABLET ORAL at 08:44

## 2023-12-28 RX ADMIN — PANTOPRAZOLE SODIUM 40 MG: 40 INJECTION, POWDER, FOR SOLUTION INTRAVENOUS at 21:10

## 2023-12-28 RX ADMIN — HEPARIN SODIUM 5000 UNITS: 5000 INJECTION INTRAVENOUS; SUBCUTANEOUS at 00:43

## 2023-12-28 RX ADMIN — POLYETHYLENE GLYCOL 3350 17 G: 17 POWDER, FOR SOLUTION ORAL at 08:44

## 2023-12-28 RX ADMIN — Medication: at 06:54

## 2023-12-28 RX ADMIN — INSULIN HUMAN 5 UNITS: 100 INJECTION, SOLUTION PARENTERAL at 06:41

## 2023-12-28 RX ADMIN — LEVOTHYROXINE SODIUM 50 MCG: 50 TABLET ORAL at 06:36

## 2023-12-28 RX ADMIN — INSULIN HUMAN 5 UNITS: 100 INJECTION, SOLUTION PARENTERAL at 12:46

## 2023-12-28 RX ADMIN — PANTOPRAZOLE SODIUM 40 MG: 40 INJECTION, POWDER, FOR SOLUTION INTRAVENOUS at 08:44

## 2023-12-28 RX ADMIN — INSULIN HUMAN 1 UNITS: 100 INJECTION, SOLUTION PARENTERAL at 17:34

## 2023-12-28 RX ADMIN — INSULIN HUMAN 2 UNITS: 100 INJECTION, SOLUTION PARENTERAL at 08:45

## 2023-12-28 RX ADMIN — HEPARIN SODIUM 5000 UNITS: 5000 INJECTION INTRAVENOUS; SUBCUTANEOUS at 08:44

## 2023-12-28 RX ADMIN — LEVETIRACETAM 500 MG: 5 INJECTION INTRAVENOUS at 06:37

## 2023-12-28 RX ADMIN — Medication 4000 UNITS: at 08:44

## 2023-12-28 RX ADMIN — INSULIN GLARGINE 10 UNITS: 100 INJECTION, SOLUTION SUBCUTANEOUS at 21:10

## 2023-12-28 RX ADMIN — INSULIN HUMAN 5 UNITS: 100 INJECTION, SOLUTION PARENTERAL at 17:35

## 2023-12-28 RX ADMIN — INSULIN HUMAN 1 UNITS: 100 INJECTION, SOLUTION PARENTERAL at 12:42

## 2023-12-28 RX ADMIN — THIAMINE HCL TAB 100 MG 100 MG: 100 TAB at 08:44

## 2023-12-28 RX ADMIN — HEPARIN SODIUM 5000 UNITS: 5000 INJECTION INTRAVENOUS; SUBCUTANEOUS at 17:34

## 2023-12-28 ASSESSMENT — PAIN SCALES - PAIN ASSESSMENT IN ADVANCED DEMENTIA (PAINAD)
FACIALEXPRESSION: SMILING OR INEXPRESSIVE
TOTALSCORE: 1
FACIALEXPRESSION: SMILING OR INEXPRESSIVE
CONSOLABILITY: NO NEED TO CONSOLE
BODYLANGUAGE: RELAXED
TOTALSCORE: 1
BODYLANGUAGE: RELAXED
FACIALEXPRESSION: SMILING OR INEXPRESSIVE
FACIALEXPRESSION: SMILING OR INEXPRESSIVE
TOTALSCORE: 2
BREATHING: OCCASIONAL LABORED BREATHING, SHORT PERIOD OF HYPERVENTILATION
BODYLANGUAGE: RELAXED
BREATHING: NOISY LABORED BREATHING, LONG PERIODS OF HYPERVENTILATION, CHEYNE-STOKES RESPIRATIONS
CONSOLABILITY: NO NEED TO CONSOLE
BODYLANGUAGE: RELAXED
TOTALSCORE: 1
BREATHING: OCCASIONAL LABORED BREATHING, SHORT PERIOD OF HYPERVENTILATION
CONSOLABILITY: NO NEED TO CONSOLE
BREATHING: OCCASIONAL LABORED BREATHING, SHORT PERIOD OF HYPERVENTILATION
CONSOLABILITY: NO NEED TO CONSOLE

## 2023-12-28 ASSESSMENT — COGNITIVE AND FUNCTIONAL STATUS - GENERAL
EATING MEALS: TOTAL
DAILY ACTIVITIY SCORE: 6
HELP NEEDED FOR BATHING: TOTAL
WALKING IN HOSPITAL ROOM: TOTAL
MOVING TO AND FROM BED TO CHAIR: TOTAL
CLIMB 3 TO 5 STEPS WITH RAILING: TOTAL
TOILETING: TOTAL
PERSONAL GROOMING: TOTAL
CLIMB 3 TO 5 STEPS WITH RAILING: TOTAL
PERSONAL GROOMING: TOTAL
MOBILITY SCORE: 6
MOBILITY SCORE: 6
DRESSING REGULAR UPPER BODY CLOTHING: TOTAL
DRESSING REGULAR UPPER BODY CLOTHING: TOTAL
MOVING TO AND FROM BED TO CHAIR: TOTAL
MOVING TO AND FROM BED TO CHAIR: TOTAL
TURNING FROM BACK TO SIDE WHILE IN FLAT BAD: TOTAL
CLIMB 3 TO 5 STEPS WITH RAILING: TOTAL
DRESSING REGULAR LOWER BODY CLOTHING: TOTAL
DAILY ACTIVITIY SCORE: 6
STANDING UP FROM CHAIR USING ARMS: TOTAL
TOILETING: TOTAL
MOVING FROM LYING ON BACK TO SITTING ON SIDE OF FLAT BED WITH BEDRAILS: TOTAL
TURNING FROM BACK TO SIDE WHILE IN FLAT BAD: TOTAL
WALKING IN HOSPITAL ROOM: TOTAL
TURNING FROM BACK TO SIDE WHILE IN FLAT BAD: TOTAL
HELP NEEDED FOR BATHING: TOTAL
MOBILITY SCORE: 6
MOVING FROM LYING ON BACK TO SITTING ON SIDE OF FLAT BED WITH BEDRAILS: TOTAL
EATING MEALS: TOTAL
MOVING FROM LYING ON BACK TO SITTING ON SIDE OF FLAT BED WITH BEDRAILS: TOTAL
WALKING IN HOSPITAL ROOM: TOTAL
STANDING UP FROM CHAIR USING ARMS: TOTAL
STANDING UP FROM CHAIR USING ARMS: TOTAL
DRESSING REGULAR LOWER BODY CLOTHING: TOTAL

## 2023-12-28 ASSESSMENT — PAIN SCALES - WONG BAKER: WONGBAKER_NUMERICALRESPONSE: NO HURT

## 2023-12-28 ASSESSMENT — PAIN - FUNCTIONAL ASSESSMENT: PAIN_FUNCTIONAL_ASSESSMENT: WONG-BAKER FACES

## 2023-12-28 NOTE — SIGNIFICANT EVENT
RADAR   12/28/23 0728   Onset Documentation   Rapid Response Initiated By Radar auto page   Pager Time [phone]   Arrival Time 0800   Event End Time 0820   Primary Reason for Call Radar auto page     RADAR page auto generated from VS -see documented VS. Upon arrival pt appears comfortable RR 22. Began to cough- moist cough and unable to clear secretions on own- suctioned mouth and back of throat. Mouth care done. Inside of mouth bloody? Pt had required NT suctioning. Spoke with nurse and no addtl concerns at the moment. Primary team had a long discussion with family this week on progress/prognosis. Pt ok to remain on the floor for continued monitoring- and will call with any concerns.

## 2023-12-28 NOTE — CARE PLAN
The patient's goals for the shift include      The clinical goals for the shift include pt will remain HDS throughout the shift    Over the shift, the patient did not make progress toward the following goals. Barriers to progression include . Recommendations to address these barriers include .

## 2023-12-28 NOTE — PROGRESS NOTES
Physical Therapy    Physical Therapy Treatment    Patient Name: Haile Ayers  MRN: [id number]  Today's Date: 12/28/2023  Time Calculation  Start Time: 1050  Stop Time: 1129  Time Calculation (min): 39 min       Assessment/Plan   PT Assessment  Evaluation/Treatment Tolerance: Patient tolerated treatment well  Medical Staff Made Aware: Yes  End of Session Communication: Bedside nurse  End of Session Patient Position: Bed, 4 rail up, Alarm on  PT Plan  Inpatient/Swing Bed or Outpatient: Inpatient  PT Plan  Treatment/Interventions: Bed mobility, Balance training, Neuromuscular re-education, Strengthening, Endurance training, Range of motion, Therapeutic exercise, Therapeutic activity, Home exercise program, Positioning, Postural re-education  PT Plan: Skilled PT  PT Frequency: 3 times per week  PT Discharge Recommendations: Moderate intensity level of continued care  Equipment Recommended upon Discharge:  (TBD)  PT Recommended Transfer Status:  (unable)  PT - OK to Discharge: Yes      General Visit Information:   PT  Visit  PT Received On: 12/28/23  Response to Previous Treatment: Patient unable to report, no changes reported from family or staff  General  Family/Caregiver Present: No  Prior to Session Communication: Bedside nurse, Physician (Discussed with physician and NP regarding medical appropriateness to attempt mobilizing pt to EOB.  Confirmed appropriateness.)  Patient Position Received: Bed, 4 rail up, Alarm on (L side Mitt)  General Comment: Pt awake and alert, moving RUE and RLE spontaneously.  Pt non-verbal, but responds to name by meeting gaze.  Not able to follow commands today, but was cooperative with mobility, and was able to sit EOB for 20 minutes.  Was able to participate in sitting balance.  Appeared to tolerate well.    Subjective   Precautions:     Vital Signs:  Vital Signs  Heart Rate: 91  SpO2: 96 %  BP: 135/68    Objective   Pain:  Pain Assessment  Pain Assessment: Schwarz-Baker FACES  Schwarz-Baker  FACES Pain Rating: No hurt  Cognition:  Cognition  Overall Cognitive Status: Impaired, Unable to assess (Eyes opening, and moving L side volitionally, but non-verbal and not following commands)  Orientation Level: Unable to assess (responds to name)  Postural Control:  Postural Control  Postural Control: Impaired    Treatments:  Therapeutic Exercise  Therapeutic Exercise Activity 1: Sitting EOB: BLE LAQ x10, PROM, pt able to move LLE but did not do so to command    Balance/Neuromuscular Re-Education  Balance/Neuromuscular Re-Education Activity 1: Sitting EOB 20 minutes.  Pt initially Dep for static sitting, but able to progress to variable Min/Mod A for static sitting.  Able to sit for multiple periods of 10-15 seconds with very close SBA.  Pt with noted trunk activation in response to losing balance posteriorly, but not enough to self correct from falling.  Variable Mod/Max A for dynamic activities (Weight shifts L<>R 3x10, A<>P 2x10 Dep to shift.  Variable Mod/Max A to maintain balance)    Bed Mobility 1  Bed Mobility 1: Scooting (boost to HOB)  Level of Assistance 1: Dependent, Maximum verbal cues, Maximum tactile cues  Bed Mobility 2  Bed Mobility  2: Rolling right  Level of Assistance 2: Dependent, Maximum verbal cues, Maximum tactile cues  Bed Mobility 3  Bed Mobility 3: Rolling left  Level of Assistance 3: Dependent, Maximum verbal cues, Maximum tactile cues  Bed Mobility 4  Bed Mobility 4: Supine to sitting, Sitting to supine  Level of Assistance 4: Dependent, Maximum verbal cues, Maximum tactile cues       Transfers  Transfer: No    Outcome Measures:    [facility] Basic Mobility  Turning from your back to your side while in a flat bed without using bedrails: Total  Moving from lying on your back to sitting on the side of a flat bed without using bedrails: Total  Moving to and from bed to chair (including a wheelchair): Total  Standing up from a chair using your arms (e.g. wheelchair or bedside chair): Total  To  walk in hospital room: Total  Climbing 3-5 steps with railing: Total  Basic Mobility - Total Score: 6    Education Documentation  Mobility Training, taught by Cassius Bone, PT at 12/28/2023  1:17 PM.  Learner: Patient  Readiness: Nonacceptance  Method: Explanation  Response: No Evidence of Learning, Needs Reinforcement  Comment: (non-verbal, difficult to assess)    Education Comments  No comments found.        OP EDUCATION:       Encounter Problems       Encounter Problems (Active)       Balance       Patient will stand with UE support of LRD and </= MOD A x1 at least 3 min to improve balance required for self-care tasks.  (Not met)       Start:  11/22/23    Expected End:  12/06/23    Resolved:  12/07/23    Updated to: Patient will sit at edge of bed at least 15 min and perform dynamic reaching activities with </= MOD A x1 and no excessive sway or loss of balance.    Update reason: change in pt condition          Patient will sit at edge of bed at least 15 min and perform dynamic reaching activities with </= MOD A x1 and no excessive sway or loss of balance. (Not met)       Start:  12/07/23    Expected End:  12/20/23    Resolved:  12/15/23    Updated to: static sit at edge of bed at least 15 min with UE support and CGA no LOB    Update reason: duplicate goal             Patient will sit at edge of bed at least 15 min and perform dynamic reaching activities with </= MOD A x1 and no excessive sway or loss of balance. (Not met)       Start:  12/07/23    Expected End:  12/29/23    Resolved:  12/15/23    Updated to: sit at edge of bed at least 15 min and perform dynamic reaching activities with </= MOD A x1 and no excessive sway or loss of balance.    Update reason: time for update             static sit at edge of bed at least 15 min with UE support and CGA no LOB (Progressing)       Start:  12/15/23    Expected End:  12/29/23                sit at edge of bed at least 15 min and perform dynamic reaching activities  with </= MOD A x1 and no excessive sway or loss of balance. (Not Progressing)       Start:  12/15/23    Expected End:  12/29/23                   Mobility       Patient will ambulate at least 20 ft. with </= MOD A x1 and LRD to improve tolerance of household distances.  (Not met)       Start:  11/22/23    Expected End:  12/20/23    Resolved:  12/07/23    Updated to: Patient will ambulate at least 5 ft. with </= MAX A x2 and LRD to improve tolerance of household distances.    Update reason: change in pt condition          Patient will perform bed mobility with </= MOD A x1 to reduce risk of developing decubitus ulcers.  (Not met)       Start:  11/22/23    Expected End:  12/29/23    Resolved:  12/15/23    Updated to: roll with min/mod A using rail; sidely to/from sit (HOB elevated 50 degrees, use of rail) with min A    Update reason: time for update         Patient will perform home exercise program as prescribed with cues as needed.   (Not met)       Start:  11/22/23    Expected End:  12/20/23    Resolved:  12/07/23    Updated to: BLE >/= 3+/5    Update reason: change in pt condition          Patient will ambulate at least 5 ft. with </= MAX A x2 and LRD to improve tolerance of household distances. (Not met)       Start:  12/07/23    Expected End:  12/20/23    Resolved:  12/15/23    Updated to: pt will follow >80% of simple 1 step commands without additional cues    Update reason: not progressing             BLE >/= 3+/5 (Not met)       Start:  12/07/23    Expected End:  12/29/23    Resolved:  12/15/23    Updated to: bilateral hip/knee flexion and quads >3    Update reason: time for update             Patient will ambulate at least 5 ft. with </= MAX A x2 and LRD to improve tolerance of household distances. (Not met)       Start:  12/07/23    Expected End:  12/20/23    Resolved:  12/15/23    Updated to: pt will follow >80% of simple 1 step commands without additional cueing    Update reason: time for update              pt will follow >80% of simple 1 step commands without additional cues (Not Progressing)       Start:  12/15/23    Expected End:  12/29/23                roll with min/mod A using rail; sidely to/from sit (HOB elevated 50 degrees, use of rail) with min A (Not Progressing)       Start:  12/15/23    Expected End:  12/29/23                bilateral hip/knee flexion and quads >3 (Not Progressing)       Start:  12/15/23    Expected End:  12/29/23                   Pain - Adult          Transfers       Patient will perform sit to stand and stand to sit transfers with </= MAX A x1 and LRD to increase functional strength.  (Not met)       Start:  11/22/23    Expected End:  12/06/23    Resolved:  12/07/23    Updated to: Patient will perform sit to stand and stand to sit transfers with </= MAX A x2 and LRD to increase functional strength.    Update reason: change in pt condition          Patient will perform sit to stand and stand to sit transfers with </= MAX A x2 and LRD to increase functional strength. (Not Progressing)       Start:  12/07/23    Expected End:  12/29/23

## 2023-12-28 NOTE — PROGRESS NOTES
"Haile Ayers is a 83 y.o. 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 Patient has had a prolonged hospital stay which has been c/b bacteremia, LIN, anemia, hypovolemia, respiratory failure 2/2 pneumonia, COVID, hypernatremia. Medicine consulted for LIN and reduced pulmonary function.     Subjective   Pt was in bed, propped up on pillows. Continues to be obtunded.     Objective     Physical Exam  Vitals reviewed.   Constitutional:       General: He is not in acute distress.     Appearance: He is ill-appearing. He is not diaphoretic.   HENT:      Head:      Comments: Surgical wound  Eyes:      Comments: Unable to assess   Cardiovascular:      Rate and Rhythm: Normal rate and regular rhythm.      Heart sounds: Normal heart sounds.   Pulmonary:      Effort: No respiratory distress.      Breath sounds: Rales present.      Comments: tachypnea  Abdominal:      Palpations: Abdomen is soft.      Comments: Dobhoff in situ  TF @60ml/hr   Genitourinary:     Comments: In situ Mckee  Musculoskeletal:      Right lower leg: Edema present.      Left lower leg: Edema present.   Skin:     General: Skin is warm.   Neurological:      Comments: Obtunded, unable to access       Last Recorded Vitals  Blood pressure 139/69, pulse 94, temperature 37.1 °C (98.8 °F), resp. rate 18, height 1.727 m (5' 7.99\"), weight 70 kg (154 lb 5.2 oz), SpO2 99 %.  Intake/Output last 3 Shifts:  I/O last 3 completed shifts:  In: 1200.8 (17.2 mL/kg) [I.V.:0.8 (0 mL/kg); NG/GT:1200]  Out: 2000 (28.6 mL/kg) [Urine:2000 (0.8 mL/kg/hr)]  Weight: 70 kg     Relevant Results  Scheduled medications  amLODIPine, 10 mg, nasogastric tube, Daily  atorvastatin, 20 mg, oral, Daily  bisacodyl, 10 mg, rectal, Daily  cholecalciferol, 4,000 Units, oral, Daily  heparin (porcine), 5,000 Units, subcutaneous, " q8h  insulin glargine, 10 Units, subcutaneous, Nightly  insulin regular, 0-5 Units, subcutaneous, TID with meals  insulin regular, 5 Units, subcutaneous, q6h  levETIRAcetam, 500 mg, intravenous, q12h  levothyroxine, 50 mcg, nasogastric tube, Daily  pantoprazole, 40 mg, intravenous, BID  polyethylene glycol, 17 g, oral, Daily  thiamine, 100 mg, nasogastric tube, Daily      Continuous medications  oxygen, , Last Rate: 2 L/min (12/12/23 1713)      PRN medications  PRN medications: acetaminophen, albuterol, dextrose 10 % in water (D10W), dextrose, glucagon, hydrALAZINE, labetaloL, naloxone, ondansetron **OR** ondansetron, oxygen    Results for orders placed or performed during the hospital encounter of 11/21/23 (from the past 24 hour(s))   POCT GLUCOSE   Result Value Ref Range    POCT Glucose 131 (H) 74 - 99 mg/dL   POCT GLUCOSE   Result Value Ref Range    POCT Glucose 170 (H) 74 - 99 mg/dL   POCT GLUCOSE   Result Value Ref Range    POCT Glucose 246 (H) 74 - 99 mg/dL   Magnesium   Result Value Ref Range    Magnesium 2.50 (H) 1.60 - 2.40 mg/dL   CBC   Result Value Ref Range    WBC 6.8 4.4 - 11.3 x10*3/uL    nRBC 0.0 0.0 - 0.0 /100 WBCs    RBC 2.69 (L) 4.50 - 5.90 x10*6/uL    Hemoglobin 8.2 (L) 13.5 - 17.5 g/dL    Hematocrit 25.7 (L) 41.0 - 52.0 %    MCV 96 80 - 100 fL    MCH 30.5 26.0 - 34.0 pg    MCHC 31.9 (L) 32.0 - 36.0 g/dL    RDW 14.3 11.5 - 14.5 %    Platelets 221 150 - 450 x10*3/uL   Renal Function Panel   Result Value Ref Range    Glucose 231 (H) 74 - 99 mg/dL    Sodium 145 136 - 145 mmol/L    Potassium 5.0 3.5 - 5.3 mmol/L    Chloride 109 (H) 98 - 107 mmol/L    Bicarbonate 23 21 - 32 mmol/L    Anion Gap 18 10 - 20 mmol/L    Urea Nitrogen 122 (HH) 6 - 23 mg/dL    Creatinine 3.68 (H) 0.50 - 1.30 mg/dL    eGFR 16 (L) >60 mL/min/1.73m*2    Calcium 8.0 (L) 8.6 - 10.6 mg/dL    Phosphorus 5.1 (H) 2.5 - 4.9 mg/dL    Albumin 2.7 (L) 3.4 - 5.0 g/dL   POCT GLUCOSE   Result Value Ref Range    POCT Glucose 184 (H) 74 - 99  mg/dL   POCT GLUCOSE   Result Value Ref Range    POCT Glucose 189 (H) 74 - 99 mg/dL     Electrocardiogram, 12-lead PRN ACS symptoms  Result Date: 12/28/2023  Sinus rhythm with marked sinus arrhythmia Possible Anterolateral infarct , age undetermined Abnormal ECG    XR chest 1 view  Result Date: 12/26/2023  FINDINGS: AP radiograph of the chest was provided.   Enteric tube seen coursing below the level diaphragm with tip out of the field of view.   CARDIOMEDIASTINAL SILHOUETTE: Cardiomediastinal silhouette is stable in size and configuration. Aortic knob calcifications present.   LUNGS: Blunting of the costophrenic angles with hazy right-greater-than-left mid to lower lung zone opacities, similar to prior. No pneumothorax.   ABDOMEN: No remarkable upper abdominal findings.   BONES: No acute osseous changes.       1.  Similar appearance of multifocal airspace opacities favored to represent pulmonary edema and/or multifocal infectious process. 2. Persistent right-greater-than-left pleural effusions with adjacent atelectasis. 3.   Medical devices as described above.       XR abdomen 1 view  Result Date: 12/25/2023  FINDINGS: AP views of the chest and abdomen were provided.   Enteric tube seen coursing below the level diaphragm with tip overlying the expected location of the 2nd portion of the duodenum   CARDIOMEDIASTINAL SILHOUETTE: Cardiomediastinal silhouette is stable in size and configuration. Aortic knob calcifications present.   LUNGS: Blunting of the costophrenic angles. Hazy right-greater-than-left mid to lower lung zone opacities, slightly increased from prior. No pneumothorax.   ABDOMEN: Nonobstructive bowel gas pattern. Contrast material is seen opacifying scattered loops of bowel. Limited evaluation of pneumoperitoneum on semi-erect imaging, however there is no evidence of gross free air.   BONES: No acute osseous changes.       1.  Slight interval increase in diffuse multifocal airspace opacities,  right-greater-than-left in favored to represent pulmonary edema and/or multifocal infectious process. 2. Persistent right-greater-than-left pleural effusions with adjacent atelectasis. 3.  Nonobstructive bowel gas pattern. 4.   Medical devices as described above.       ECG 12 Lead  Result Date: 12/20/2023  Normal sinus rhythm Anterior infarct , age undetermined Abnormal ECG When compared with ECG of 30-NOV-2023 14:29, Anterior infarct is now Present     CT head wo IV contrast  Result Date: 12/20/2023  FINDINGS: There are postoperative changes from prior left craniotomy at the vertex of the skull for subdural hemorrhage drainage. There is slightly hyperattenuating subdural fluid collection along the left cerebral convexity, most pronounced along the posterior aspect of the left temporal and parietal lobes measuring 12 mm in maximum thickness with stable associated mass effect. Just below the craniotomy site, there is a stable 6 mm in maximum thickness extra-axial, likely subdural, fluid collection with rim of hyperattenuation consistent with evolving acute blood products. Mass effect on the adjacent brain parenchyma is also unchanged. There is stable narrowing of the left lateral ventricle and 3 mm rightward midline shift at the level of the septum pellucidum.   Gray-white differentiation is maintained throughout. There are regions of hypoattenuation within the bilateral cerebral hemispheric white matter which are probably sequela of chronic small vessel ischemic changes.   There is mucosal thickening located within the right maxillary sinus layering fluid within the right sphenoid sinus, otherwise the visualized paranasal sinuses and mastoid air cells are essentially clear.        No significant interval change in intracranial findings compared to CT 12/18/2023. Stable postoperative changes from left craniotomy with stable residual subdural hemorrhage with associated mass effect along the left cerebral convexity and  mild midline shift.        IR angiogram  Result Date: 12/11/2023  FINDINGS: LEFT COMMON CAROTID ARTERY INJECTION: DSA runs were obtained over the head in [place], PA and lateral views. The left external carotid artery and its major branches opacify well and are unremarkable. A small caliber left middle meningeal artery is visualized which is truncated distal to foramen spinosum likely related to postoperative changes from craniotomy. The distal cervical and intracranial left internal carotid artery opacify well and appear unremarkable.  Flash filling of the left posterior cerebral artery from the left posterior communicating artery is seen on this injection. The left internal carotid artery bifurcates normally into widely patent and unremarkable left anterior cerebral and left middle cerebral arteries. Due to reflux of contrast into a bovine arch anatomy during contrast injection there is flash filling of the right internal carotid artery, right middle cerebral artery, and right anterior cerebral artery which appear unremarkable. No aneurysm, early draining vein, or stenosis is seen.   RIGHT COMMON FEMORAL ARTERY INJECTION: DSA run over the right hip was obtained in ROMERO view. The right common femoral artery, femoral bifurcation, and distal vasculature opacify well appear unremarkable. There is no evidence of pseudoaneurysm, stenosis, dissection, or other type of vascular injury.     Poor distal filling of the left middle meningeal artery. No embolization was performed.        US renal complete  Result Date: 12/7/2023  1. Mild right pelvic fullness with otherwise unremarkable renal ultrasound. 2. Incidental note of moderate volume abdominal ascites.        EEG  IMPRESSION Impression This continuous video-EEG indicates a severe diffuse encephalopathy. No epileptiform discharges are seen. There are no significative changes compared with yesterday's record..      Transthoracic Echo (TTE) Complete  Result Date:  12/6/2023  CONCLUSIONS:  1. Left ventricular systolic function is normal with a 65-70% estimated ejection fraction.  2. Poorly visualized anatomical structures due to suboptimal image quality.  3. Dynamic LV function with color acceleration within the LV cavity but no significant intracavitary or LVOT gradients at rest. Note that there is some JULIO C of the MV leaflets vs chordae, though not well seen. ( note that the color acceleration begins within the LV cavity, not just in the LVOT).  4. Spectral Doppler shows an impaired relaxation pattern of left ventricular diastolic filling.  5. A subtopimal agitated saline contast study was performed and some bubles did reach the left side, though the timing of appearance of the bubbles is unclear. May represent a PFO/intracardiac shunt vs intrapulmonary shunt.  6. Moderately elevated right ventricular systolic pressure.  7. No prior echocardiogram available for comparison.      Vascular US lower extremity venous duplex bilateral  Result Date: 12/5/2023  CONCLUSIONS: Right Lower Venous: There are chronic changes visualized in the distal Femoral, soleal and popliteal veins. Left Lower Venous: No evidence of acute deep vein thrombus visualized in the left lower extremity.     Vascular US upper extremity venous duplex bilateral  Result Date: 12/5/2023  CONCLUSIONS: Right Upper Venous: No evidence of acute deep vein thrombus visualized in the right upper extremity. Cannot rule out thrombus of non-visualized basilic vein. Left Upper Venous: No evidence of acute deep vein thrombus visualized in the left upper extremity. There are chronic changes visualized in the mid cephalic and distal cephalic veins.      Assessment/Plan   Principal Problem:    Subdural hematoma (CMS/HCC)  Active Problems:    HTN (hypertension)    Diabetes mellitus, type 2 (CMS/HCC)    CVA (cerebral vascular accident) (CMS/HCC)    Haile Ayers is a 83 y.o. male on day 36 of admission with PMH of CLL( on  Ibrutinib), type 2 DM, HTN, dementia, indwelling Mckee (c/b multiple CAUTI), BPH presented with lt  Subdural hematoma (CMS/HCC)  (s/p craniotomy with evacuation on 11/22). Patient has had a prolonged hospital stay which has been c/b bacteremia, LIN, anemia, hypovolemia, respiratory failure 2/2 pneumonia, COVID, hypernatremia. Medicine consulted for LIN and reduced pulmonary function. FENa- 2.9%, LIN likely due to intrinsic renal injury. Cr stable with good UOP. Primary team to discuss comfort care, will await results of meeting.     Recommendations:  - Follow up GOC discussion  - Potassium wnl after lokelma. Monitor daily RFP, Mg.   - Pt appears to be fluid oveloaded. Creat stable after furosemide yesterday. May consider decreasing free water flushes   - Avoid nephrotoxic drugs  - Renally dose medications  - Consider renal sparing TF diet. TF were discontinued yesterday due to failure to flush Dobhoff. Restarted today at 60ml/hr  - Endocrine following  - Pt saturating >95% SpO2 on 6L NC, tolerating O2 weaning well with regular suctioning for respiratory secretions. BiPAP PRN.   - Continue pulmonary toilet. Can consider XRC to reassess fluid status     Pt seen and discussed with Dr. Montes.    Gina Le MD  PGY-1  Anesthesiology

## 2023-12-28 NOTE — PROGRESS NOTES
12/28/23        Transitional Care Coordination Progress Note:   Patient discussed during interdisciplinary rounds.   Team members present: RN HELEN CARR MD   Plan per Medical/Surgical team: Tele  Continue biPAP as needed  Goals of care discussion with family  Medicine recs re: LIN and hyperK  Endocrine recs for hypernatremia  Supportive onc consulted appreciate recs  Med onc recs - CT CAP when LIN resolved  GI recs-will re engage re PEG GOC done  ID recs  Chronic ignacio  Heme recs-cont to hold ibrutinib   SLP recs  PTOT-SNF  SCD, SQH  Discharge disposition: SNF   Status-Inpatient   Payer- Payor: [employer] MEDICARE / Plan: [employer] MEDICARE VALUE PLAN / Product Type: *No Product type* /    Potential Barriers: None   ADOD: 12/30/2023   Carlos Last RN [facility] [phone]

## 2023-12-28 NOTE — PROGRESS NOTES
"Haile Ayers is a 83 y.o. male on day 36 of admission presenting with Subdural hematoma (CMS/HCC).    Subjective   NAEO. Overnight resident had discussion with wife and daughter. The wife is very reluctant to go comfort care and is holding onto hope, understandably.     Objective     Physical Exam  ECNS  RUE flaccid  RLE w/d  LUE spont/localizing  LLE w/d    Last Recorded Vitals  Blood pressure 137/68, pulse 94, temperature 36.6 °C (97.9 °F), resp. rate 25, height 1.727 m (5' 7.99\"), weight 70 kg (154 lb 5.2 oz), SpO2 98 %.  Intake/Output last 3 Shifts:  I/O last 3 completed shifts:  In: 1200.8 (17.2 mL/kg) [I.V.:0.8 (0 mL/kg); NG/GT:1200]  Out: 2000 (28.6 mL/kg) [Urine:2000 (0.8 mL/kg/hr)]  Weight: 70 kg     Relevant Results                             Assessment/Plan   Principal Problem:    Subdural hematoma (CMS/HCC)  Active Problems:    HTN (hypertension)    Diabetes mellitus, type 2 (CMS/HCC)    CVA (cerebral vascular accident) (CMS/HCC)    Pt is a 84 yo M w/ h/o CLL, anemia, DM, p/w AMS, found to have UTI, CTH w/ L large chronic SDH     11/22 s/p L crani for SDH evac, CTH POC  11/24 drain dc'd  11/26 EEG neg, dc'd, CXR R lung patchy opacities  11/28 s/p midline  11/29 lethargic, not FC, CTH incr L SDH w 8mm MLS, s/p1g keppra load, CXR stable opacities, rCTH stable, s/p R side wd, s/p keppra load, rrCTH stable L SDH  11/30 s/p redo R crani for SDH evac, CTH good evac, decr MLS  12/1 s/p extubation  12/3 CTH evolving brood products, stable MLS  12/4 CTH stable, s/p angio small L MMA w/ no distal branches, not embolized  12/5 DVT USx4 RLE chronic changes no DVT, CTH stable, CT PE neg for PE, diffuse sclerotic lesions c/f mets, BL pleural effusions, s/p 60 lasix  12/6 s/p SDD dc'd, EEG neg, dc'd, s/p 1mg Bumex, TTE EF 65-70%  12/13 hgb 6.8, s/p 1U pRBC  12/14 CBC hgb 6.1, s/p 2U pRBC  12/15 sutures dc'd  12/17 patient transferred to NSU due to respiratory failure, stabilized on biPAP, CTH improved L SDH.   12/20 " worsened neuro exam, CTH stable  12/24 tachypneic, suctioned with improvement, CXR stable  12/26 s/p 40 lasix     PLAN:    DNR/DNI  Tele  Continue biPAP as needed  Goals of care discussion with family  Medicine recs re: LIN and hyperK  Endocrine recs for hypernatremia  Supportive onc consulted appreciate recs  Med onc recs - CT CAP when LIN resolved  GI recs-will re engage re PEG GOC done  ID recs  Chronic ignacio  Heme recs-cont to hold ibrutinib   SLP recs  PTOT-SNF  SCD, SQH         Yenifer Bernal MD

## 2023-12-29 VITALS
DIASTOLIC BLOOD PRESSURE: 60 MMHG | SYSTOLIC BLOOD PRESSURE: 121 MMHG | RESPIRATION RATE: 22 BRPM | WEIGHT: 154.32 LBS | OXYGEN SATURATION: 92 % | HEIGHT: 68 IN | HEART RATE: 95 BPM | TEMPERATURE: 99.3 F | BODY MASS INDEX: 23.39 KG/M2

## 2023-12-29 LAB — GLUCOSE BLD MANUAL STRIP-MCNC: 162 MG/DL (ref 74–99)

## 2023-12-29 PROCEDURE — 2500000004 HC RX 250 GENERAL PHARMACY W/ HCPCS (ALT 636 FOR OP/ED): Performed by: STUDENT IN AN ORGANIZED HEALTH CARE EDUCATION/TRAINING PROGRAM

## 2023-12-29 PROCEDURE — 96372 THER/PROPH/DIAG INJ SC/IM: CPT | Performed by: STUDENT IN AN ORGANIZED HEALTH CARE EDUCATION/TRAINING PROGRAM

## 2023-12-29 PROCEDURE — 2500000004 HC RX 250 GENERAL PHARMACY W/ HCPCS (ALT 636 FOR OP/ED)

## 2023-12-29 PROCEDURE — 82947 ASSAY GLUCOSE BLOOD QUANT: CPT

## 2023-12-29 RX ORDER — LORAZEPAM 2 MG/ML
0.5 INJECTION INTRAMUSCULAR EVERY 4 HOURS PRN
Status: DISCONTINUED | OUTPATIENT
Start: 2023-12-29 | End: 2023-12-29

## 2023-12-29 RX ORDER — ALBUTEROL SULFATE 0.83 MG/ML
2.5 SOLUTION RESPIRATORY (INHALATION) EVERY 4 HOURS PRN
Status: DISCONTINUED | OUTPATIENT
Start: 2023-12-29 | End: 2023-12-30 | Stop reason: HOSPADM

## 2023-12-29 RX ORDER — LORAZEPAM 0.5 MG/1
0.5 TABLET ORAL EVERY 4 HOURS PRN
Status: DISCONTINUED | OUTPATIENT
Start: 2023-12-29 | End: 2023-12-30 | Stop reason: HOSPADM

## 2023-12-29 RX ORDER — HYDROMORPHONE HYDROCHLORIDE 1 MG/ML
0.2 INJECTION, SOLUTION INTRAMUSCULAR; INTRAVENOUS; SUBCUTANEOUS
Status: DISCONTINUED | OUTPATIENT
Start: 2023-12-29 | End: 2023-12-30 | Stop reason: HOSPADM

## 2023-12-29 RX ORDER — HALOPERIDOL 5 MG/ML
1 INJECTION INTRAMUSCULAR EVERY 4 HOURS PRN
Status: DISCONTINUED | OUTPATIENT
Start: 2023-12-29 | End: 2023-12-30 | Stop reason: HOSPADM

## 2023-12-29 RX ORDER — FENTANYL CITRATE-0.9 % NACL/PF 1100MCG/55
PATIENT CONTROLLED ANALGESIA SYRINGE INJECTION CONTINUOUS
Status: DISCONTINUED | OUTPATIENT
Start: 2023-12-29 | End: 2023-12-30 | Stop reason: HOSPADM

## 2023-12-29 RX ORDER — HYDROMORPHONE HYDROCHLORIDE 1 MG/ML
0.2 INJECTION, SOLUTION INTRAMUSCULAR; INTRAVENOUS; SUBCUTANEOUS EVERY 4 HOURS
Status: DISCONTINUED | OUTPATIENT
Start: 2023-12-29 | End: 2023-12-30 | Stop reason: HOSPADM

## 2023-12-29 RX ORDER — HYDROMORPHONE HYDROCHLORIDE 1 MG/ML
0.4 INJECTION, SOLUTION INTRAMUSCULAR; INTRAVENOUS; SUBCUTANEOUS
Status: DISCONTINUED | OUTPATIENT
Start: 2023-12-29 | End: 2023-12-30 | Stop reason: HOSPADM

## 2023-12-29 RX ORDER — ACETAMINOPHEN 160 MG/5ML
650 SOLUTION ORAL EVERY 6 HOURS PRN
Status: DISCONTINUED | OUTPATIENT
Start: 2023-12-29 | End: 2023-12-30 | Stop reason: HOSPADM

## 2023-12-29 RX ORDER — GLYCOPYRROLATE 0.2 MG/ML
0.2 INJECTION INTRAMUSCULAR; INTRAVENOUS EVERY 4 HOURS PRN
Status: DISCONTINUED | OUTPATIENT
Start: 2023-12-29 | End: 2023-12-30 | Stop reason: HOSPADM

## 2023-12-29 RX ADMIN — INSULIN HUMAN 5 UNITS: 100 INJECTION, SOLUTION PARENTERAL at 00:26

## 2023-12-29 RX ADMIN — HYDROMORPHONE HYDROCHLORIDE 0.2 MG: 1 INJECTION, SOLUTION INTRAMUSCULAR; INTRAVENOUS; SUBCUTANEOUS at 11:42

## 2023-12-29 RX ADMIN — LEVETIRACETAM 500 MG: 5 INJECTION INTRAVENOUS at 16:55

## 2023-12-29 RX ADMIN — GLYCOPYRROLATE 0.2 MG: 0.2 INJECTION, SOLUTION INTRAMUSCULAR; INTRAVENOUS at 05:27

## 2023-12-29 RX ADMIN — HYDROMORPHONE HYDROCHLORIDE 0.2 MG: 1 INJECTION, SOLUTION INTRAMUSCULAR; INTRAVENOUS; SUBCUTANEOUS at 08:15

## 2023-12-29 RX ADMIN — LEVETIRACETAM 500 MG: 5 INJECTION INTRAVENOUS at 04:42

## 2023-12-29 RX ADMIN — FENTANYL CITRATE: 50 INJECTION, SOLUTION INTRAMUSCULAR; INTRAVENOUS at 03:00

## 2023-12-29 RX ADMIN — HEPARIN SODIUM 5000 UNITS: 5000 INJECTION INTRAVENOUS; SUBCUTANEOUS at 00:26

## 2023-12-29 RX ADMIN — HYDROMORPHONE HYDROCHLORIDE 0.2 MG: 1 INJECTION, SOLUTION INTRAMUSCULAR; INTRAVENOUS; SUBCUTANEOUS at 04:11

## 2023-12-29 ASSESSMENT — PAIN SCALES - GENERAL: PAINLEVEL_OUTOF10: 2

## 2023-12-29 ASSESSMENT — PAIN - FUNCTIONAL ASSESSMENT
PAIN_FUNCTIONAL_ASSESSMENT: 0-10
PAIN_FUNCTIONAL_ASSESSMENT: WONG-BAKER FACES

## 2023-12-29 ASSESSMENT — PAIN SCALES - WONG BAKER: WONGBAKER_NUMERICALRESPONSE: NO HURT

## 2023-12-29 NOTE — PROGRESS NOTES
SW called pt's wife to consult and offer support for hospice service, and she didn't answer.   SW called pt's daughter, Sue, and Sue stated pt's wife is not ready for hospice, and wife does not want to discuss or even hear the term of hospice. Daughter is doubtful if pt's wife agrees with transition to hospice. Daughter said pt's wife is extremely emotional and not able to think logically since she is too much overwhelmed with this situation. It took weeks for daughter to convince pt's wife for comfort care. Daughter is agreeable with hospice but she was not sure that pt's wife will agree either.   SW suggested to provide information of hospice service and their support program to daughter, but daughter mentioned that if pt's wife find daughter has information before her, she will get mad. SW agreed to discuss together in person with pt's wife and daughter. Daughter denied any further issues or questions on social work at the moment of assessment. SW and PCN will continue to follow and assist with discharge plan.       [UPDATE] 5:00pm, SW signed out pt to weekend SW.     Arianna Richard, MARISELAA, LSW

## 2023-12-29 NOTE — PROGRESS NOTES
"Haile Ayers is a 83 y.o. male on day 37 of admission presenting with Subdural hematoma (CMS/HCC).    Subjective   Pt. Remains obtunded.   Hospice Care  I have reviewed histories, allergies and medications have been reviewed and there are no changes       Objective   Review of Systems  Physical Exam  Vitals:    12/29/23 0000   BP: 121/60   Pulse: 95   Resp: 22   Temp:    SpO2: 92%    Constitutional:       Appearance: Ill appearing  HENT:      Head:   Cardiovascular:      Rate and Rhythm: Regular rhythm.      Heart sounds: Normal heart sounds.   Pulmonary:      Breath sounds: Rales  Abdominal:      Palpations: Abdomen is soft.   Genitourinary:     Comments: In situ Mckee  Skin:     General: Skin is warm.   Neurological:      Comments: Obtunded    Last Recorded Vitals  Blood pressure 121/60, pulse 95, temperature 37.4 °C (99.3 °F), resp. rate 22, height 1.727 m (5' 7.99\"), weight 70 kg (154 lb 5.2 oz), SpO2 92 %.  Intake/Output last 3 Shifts:  I/O last 3 completed shifts:  In: 1600.8 (22.9 mL/kg) [I.V.:0.8 (0 mL/kg); NG/GT:1600]  Out: 2550 (36.4 mL/kg) [Urine:2550 (1 mL/kg/hr)]  Weight: 70 kg     Relevant Results  Results from last 7 days   Lab Units 12/29/23  0026 12/28/23 2006 12/28/23  1733 12/28/23  1509 12/28/23  1120 12/28/23  0956 12/27/23  1212 12/27/23  1031 12/26/23  1143 12/26/23  0819 12/25/23  1817 12/25/23  1452 12/25/23  0249 12/25/23  0104   POCT GLUCOSE mg/dL 162* 165* 167* 189* 184*  --    < >  --    < >  --    < >  --    < >  --    GLUCOSE mg/dL  --   --   --   --   --  231*  --  92  --  160*  --  199*  --  164*    < > = values in this interval not displayed.       Assessment/Plan   Principal Problem:    Subdural hematoma (CMS/HCC)  Active Problems:    HTN (hypertension)    Diabetes mellitus, type 2 (CMS/HCC)    CVA (cerebral vascular accident) (CMS/HCC)  Haile Ayers is a 83 y.o. patient with PMHx of HTN , DM type 2, hx of CVA, hx of anemia,  CLL (unknown CLL-IPI score; being followed by  " Peleg at [facility]) on Ibrutinib who presented to the hospital on 11/22 with AMS 2/2 UTI also found to have L large chronic SDH with midline shift. He underwent L crani for SDH evacuation on 11/22 and re-evacuated on 11/30, extubated on 12/1.  Had worsening neurological exam on 12/4 and transferred to NSU. CTA on 12/5 incidentally showed diffuse sclerotic lesions throughout axial and appendicular skeleton + ascities.  Tested positive for COVID on 12/8.  Patient with continued worsening mental status, endocrinology team consulted for elevated TSH as part of worsening encephalopathy, workup showing worsening lung disease, hypernatremia and LIN, patient was initially planned for PEG on 12/18. Was transferred to NSU for worsening respiratory status     Course Throughout Hospitalization:   Hypothyroidism:  -------------------  TFTs on 12/16 showed TSH 10.4, Ft4: 0.85, Anti-TPO: neg   Patient likely has hypothyroidism vs non-thyroidal illness.  However unlikely that his mild hypothyroidism is the etiology of his worsening mental status.    Given worsening clinical status. was started on LT4 at dose of 25 mcg. Repeat TFT on 12/22: TSH 11 and FT4 of 0.79  -Increase levothyroxine to 50 mcg daily while holding tube feeds to 1 hour before and 1 hour after --- Repeat TFT with TSH and FT4 planned for 12/29/23 - CANCELLED BY PRIMARY TEAM - HOSPICE     PLEASE RESUME LEVOTHYROXINE of 50 mcg daily in the event pt. Is receiving medications despite being under HOSPICE care.     Type 2 diabetes:  --------------------  -Not enough data in chart  -At home on metformin 500 mg 1 tablet orally twice daily per med rec upon admission.  Previous Recommendations:   #For Tube feeds Isosource 1.5 at 65 mL/h at Goal  Continue Glargine of 10 units every 24 hours  Regular insulin to 5 units every 6 hours  Adjust to Customized Scale of 1 unit for every 50 more than 200.     #For Comfort care - with  Isosource 1.5 at 65 mL/h at Goal  Continue  Glargine of 10 units every 24 hours  Regular insulin of 5 units every 6 hours  Adjust to Customized Scale of 1 unit for every 50 more than 200     Hypernatremia   -------------------------------  - Risk of nephrogenic and central diabetes insipidus recent subdural hematoma status postevacuation x 2.    Endocrine Previous Recommendations per daily deficit:  --Resume  cc every 4 hours  --D/C IV of 1/2 NS at 50 ml/hr   --RFP every 24 hours      HOSPICE CARE (12/29/23)  Per Primary Team - Possible Pleasure Feeds - No Insulin        Thank You for the courtesy of the Consult.  Endocrine will sign off.   Plan communicated to primary team.   Case discussed with Dr. Luna who agrees with the management plan.       I spent 60minutes in the professional and overall care of this patient.      Eloy Vasquez MD

## 2023-12-29 NOTE — SIGNIFICANT EVENT
CODE STATUS DISCUSSION    This morning, I had a goals of care/code status discussion with the patient's spouse and daughter. After I updated them with his current neurological examination and his overall prognosis, we discussed what he would have wanted in this situation. I explained to them that the likelihood of Mr. Ayers making a meaningful recovery and living a functionally independent life is slim to none, given his multiple comorbidities. We discussed comfort care. Both the daughter and spouse agree that the patient would have wanted to be more comfortable than being in the situation when the prognosis is poor. After answering further questions, they agreed to transition to comfort care and hospice. Per our discussion, I will change the patient's CODE STATUS to comfort care, will initiate comfort care orders, and place a hospice consult.    Saima Abad MD

## 2023-12-29 NOTE — CARE PLAN
Problem: Pain  Goal: My pain/discomfort is manageable  Outcome: Not Progressing     Problem: Safety  Goal: Patient will be injury free during hospitalization  Outcome: Progressing  Goal: I will remain free of falls  Outcome: Progressing     Problem: General Stroke  Goal: Demonstrate improvement in neurological exam throughout the shift  Outcome: Not Progressing  Goal: Participate in treatment (ie., meds, therapy) throughout shift  Outcome: Not Progressing     Problem: Skin  Goal: Decreased wound size/increased tissue granulation at next dressing change  Outcome: Not Progressing   The patient's goals for the shift include      The clinical goals for the shift include pt will remain HDS throughout  the shift    Patient transitioned to DNR Comfort care overnight. Family at bedside. Plan is to transition to Hospice care in the morning.    Problem: Pain  Goal: My pain/discomfort is manageable  Outcome: Not Progressing     Problem: General Stroke  Goal: Demonstrate improvement in neurological exam throughout the shift  Outcome: Not Progressing  Goal: Participate in treatment (ie., meds, therapy) throughout shift  Outcome: Not Progressing     Problem: Skin  Goal: Decreased wound size/increased tissue granulation at next dressing change  Outcome: Not Progressing

## 2023-12-29 NOTE — PROGRESS NOTES
Physical Therapy                 Therapy Communication Note    Patient Name: Haile Ayers  MRN: [id number]  Today's Date: 12/29/2023     Discipline: Physical Therapy    Missed Visit Reason: Missed Visit Reason: Cancel (Pt has been made comfort care, and PT orders were discontinued by team.)    Missed Time: Cancel    Comment:

## 2023-12-29 NOTE — PROGRESS NOTES
"Haile Ayers is a 83 y.o. male on day 37 of admission presenting with Subdural hematoma (CMS/HCC).    Subjective   NAEO. Patient was transitioned to comfort care measures this AM.    Objective     Physical Exam  Resting comfortably    Last Recorded Vitals  Blood pressure 121/60, pulse 95, temperature 37.4 °C (99.3 °F), resp. rate 22, height 1.727 m (5' 7.99\"), weight 70 kg (154 lb 5.2 oz), SpO2 92 %.  Intake/Output last 3 Shifts:  I/O last 3 completed shifts:  In: 2400.8 (34.3 mL/kg) [I.V.:0.8 (0 mL/kg); NG/GT:2400]  Out: 2450 (35 mL/kg) [Urine:2450 (1 mL/kg/hr)]  Weight: 70 kg     Relevant Results                             Assessment/Plan   Principal Problem:    Subdural hematoma (CMS/HCC)  Active Problems:    HTN (hypertension)    Diabetes mellitus, type 2 (CMS/HCC)    CVA (cerebral vascular accident) (CMS/HCC)    Pt is a 84 yo M w/ h/o CLL, anemia, DM, p/w AMS, found to have UTI, CTH w/ L large chronic SDH     11/22 s/p L crani for SDH evac, CTH POC  11/24 drain dc'd  11/26 EEG neg, dc'd, CXR R lung patchy opacities  11/28 s/p midline  11/29 lethargic, not FC, CTH incr L SDH w 8mm MLS, s/p1g keppra load, CXR stable opacities, rCTH stable, s/p R side wd, s/p keppra load, rrCTH stable L SDH  11/30 s/p redo R crani for SDH evac, CTH good evac, decr MLS  12/1 s/p extubation  12/3 CTH evolving brood products, stable MLS  12/4 CTH stable, s/p angio small L MMA w/ no distal branches, not embolized  12/5 DVT USx4 RLE chronic changes no DVT, CTH stable, CT PE neg for PE, diffuse sclerotic lesions c/f mets, BL pleural effusions, s/p 60 lasix  12/6 s/p SDD dc'd, EEG neg, dc'd, s/p 1mg Bumex, TTE EF 65-70%  12/13 hgb 6.8, s/p 1U pRBC  12/14 CBC hgb 6.1, s/p 2U pRBC  12/15 sutures dc'd  12/17 patient transferred to NSU due to respiratory failure, stabilized on biPAP, CTH improved L SDH.   12/20 worsened neuro exam, CTH stable  12/24 tachypneic, suctioned with improvement, CXR stable  12/26 s/p 40 lasix     PLAN:    Comfort " care measures   Hospice consult in AM (consult order is placed)  SW consult        Saima Abad MD

## 2023-12-29 NOTE — SIGNIFICANT EVENT
Discussed with wife and daughter, and Pablo the  regarding transition to hospice. Explained that now that patient is comfort care status we would like to transition him hospice, which is a place to live the rest of his life. Explained that the hospice nurse would help assess patient to deem whether or not he was stable for transfer, and if so help arrange a location for him to go to. Family was agreeable, and team will make hospice referral.    Additionally, family was upset that patient looks uncomfortable with secretions and that dobhoff was still in place. Order placed to remove dobhoff and RN informed to remove and also give robinol as needed.

## 2023-12-29 NOTE — PROGRESS NOTES
Social Work Discharge Planning note:    SW and Dr. Phillips met with Pt's wife and daughter, Keith, to offer support and to further discuss hospice. After discussing hospice services and providers, family was agreeable and gave [facility] and provider preference. A referral has been initiated to [facility] and they will contact family to set up an appointment to meet. SW will continue to follow for emotional/incidental support to family.     Kilo Miner, MSW, LSW

## 2023-12-30 LAB
ATRIAL RATE: 91 BPM
P AXIS: 34 DEGREES
P OFFSET: 187 MS
P ONSET: 137 MS
PR INTERVAL: 150 MS
Q ONSET: 212 MS
QRS COUNT: 15 BEATS
QRS DURATION: 86 MS
QT INTERVAL: 358 MS
QTC CALCULATION(BAZETT): 440 MS
QTC FREDERICIA: 411 MS
R AXIS: -21 DEGREES
T AXIS: 41 DEGREES
T OFFSET: 391 MS
VENTRICULAR RATE: 91 BPM

## 2023-12-30 NOTE — SIGNIFICANT EVENT
Called to see patient for unresponsiveness. On exam the patient did not respond to verbal or physical stimuli. Absent heart and breath sounds for one minute, no response to noxious stimuli. Absent peripheral pulses. Patient pronounced dead at 07:40PM. Dr. Tran notified. Next of kin/family at bedside.

## 2023-12-30 NOTE — DISCHARGE SUMMARY
Discharge Diagnosis  Subdural hematoma (CMS/HCC)    Test Results Pending At Discharge  Pending Labs       No current pending labs.            Hospital Course  83 y.o. male with a history of CLL, anemia, and DM presented 11/22/23 with altered mental status.  CTH showed a large chronic SDH.  Patient was taken for a Left craniotomy for SDH evacuation.     Post-operative course as follows:  11/24: Surgical drain discontinued  11/26: EEG remained negative and discontinued  11/28: Midline placed for access  11/29: Patient became more lethargic, CTH showed increased Left SDH with 8 mm MLS.  Patient loaded with Keppra.  Repeat CTH stable.    11/30: Patient returned to OR for a right cranitomy for SDH evacuation due to worsening exam.    12/01: Patient extubated  12/03: CTH stable  12/04: rCTH stable.   12/05: Patient began vomiting, exam worsened.  CTH stable.  CT PE showed diffuse sclerotic lesions concerning for mets; bilateral pleural effusions; Negative for PE; U/S x 4 negative for DVT.   Patient diuresed with improvement.  12/06: SD Drain discontinued.  EEG negative and discontinued.  TTE with EF 65-70%;  12/07: Sputum culture: NG; Blood cultures: NG; UA negative.  12/08: Covid Positive.  Surgical endoscopy consulted for PEG and planning for 12/18/23 due to Covid status.  12/13: Patient Hgb 6.8 and was transfused 1 unit PRBC.    12/14: Hgb 6.1 and patient transfused 2 units PRBC.  Hematology consulted.  Repeat CBC 11.2.  B12, LDH, haptoglobin and reticulocytes all unremarkable.  Hgb 6.1 thought to likely be artifact.  12/15 sutures dc'd  12/17 patient transferred to NSU due to respiratory failure, stabilized on BIPAP, CTH improved Left SDH.   12/19 made DNR arrest, Do not intubate  12/20 worsened neuro exam, CTH stable  12/21 Medicine consulted for LIN and decreased pulmonary function.  LIN likely secondary to volume depletion, UTI and intrinsic renal injury  12/23 creatinine continues to increase. Goals of Care  discussion started   tachypneic, suctioned with improvement, CXR stable   s/p 40 IV lasix, tube feeds changed to Renal, Nepho formula   Lasix 40mg IV x1 per Medicine recs   After Goals of Care Discussion with spouse and daughter was decided on DNR-Comfort Care.  Hospice referral made.     patient .    Pertinent Physical Exam At Time of Discharge  Physical Exam  Resting comfortably    Home Medications     Medication List      STOP taking these medications     acyclovir 400 mg tablet; Commonly known as: Zovirax   amLODIPine 5 mg tablet; Commonly known as: Norvasc   clotrimazole-betamethasone cream; Commonly known as: Lotrisone   Imbruvica 140 mg capsule; Generic drug: ibrutinib   Lipitor 20 mg tablet; Generic drug: atorvastatin   lisinopril 10 mg tablet   metFORMIN 500 mg tablet; Commonly known as: Glucophage   Vitamin D3 50 MCG ( UT) tablet; Generic drug: cholecalciferol       Outpatient Follow-Up  No future appointments.    Tracy Phillips MD

## 2023-12-30 NOTE — NURSING NOTE
Care assumed for this patient for night shift. Handoff report completed and upon entry to room with off-going RN, family informed staff that patient appeared to have passed. No breath sounds and no palpable pulses. Physician called to bedside to pronounce. Patient pronounced dead at 1940. Patient's daughter and spouse at bedside. Mortician called to bedside, discussed  arraignments. After family visited, patient bathed and placed in [facility] shroud, ignacio and RUE midline removed. PCA pump medications wasted with charge RN. Transported to [facility] at 2240.       23 at 10:45 PM - Sara Fortune RN

## 2024-01-03 NOTE — SIGNIFICANT EVENT
Death Within 24 Hours of Soft Wrist Restraint Utilization    Death within 24 hours of soft wrist restraint logged at 1/3/2024 at 11:00 AM.    Kenzie Monterroso RN  Date: 1/3/2024  Time: 11:00 AM

## 2024-01-04 LAB
ATRIAL RATE: 89 BPM
ATRIAL RATE: 91 BPM
P AXIS: 63 DEGREES
P AXIS: 65 DEGREES
P OFFSET: 190 MS
P OFFSET: 193 MS
P ONSET: 145 MS
P ONSET: 145 MS
PR INTERVAL: 148 MS
PR INTERVAL: 148 MS
Q ONSET: 219 MS
Q ONSET: 219 MS
QRS COUNT: 14 BEATS
QRS COUNT: 15 BEATS
QRS DURATION: 74 MS
QRS DURATION: 78 MS
QT INTERVAL: 368 MS
QT INTERVAL: 368 MS
QTC CALCULATION(BAZETT): 447 MS
QTC CALCULATION(BAZETT): 452 MS
QTC FREDERICIA: 419 MS
QTC FREDERICIA: 423 MS
R AXIS: 31 DEGREES
R AXIS: 32 DEGREES
T AXIS: 40 DEGREES
T AXIS: 42 DEGREES
T OFFSET: 403 MS
T OFFSET: 403 MS
VENTRICULAR RATE: 89 BPM
VENTRICULAR RATE: 91 BPM

## (undated) DEVICE — MARKER, SKIN, RULER AND LABEL PACK, CUSTOM

## (undated) DEVICE — CATHETER TRAY, SURESTEP, 16FR, URINE METER W/STATLOCK

## (undated) DEVICE — TRAY, FOLEY, URINE METER, 16FR, SILICONE, W/STATLOCK

## (undated) DEVICE — SUTURE, SILK, 2-0, 18 IN, BLACK

## (undated) DEVICE — TAPE, SILK, DURAPORE, 3 IN X 10 YD, LF

## (undated) DEVICE — BAG, PLASTIC, 10 X 17 IN

## (undated) DEVICE — SEALANT, HEMOSTATIC, FLOSEAL, 10 ML

## (undated) DEVICE — BAG, DRAINAGE, BILE, W/T-TUBE ADAPTER, W/LATEX BELTS, SMALL, 9 OZ

## (undated) DEVICE — TRAY, MINOR, SINGLE BASIN, STERILE

## (undated) DEVICE — MANIFOLD, 4 PORT NEPTUNE STANDARD

## (undated) DEVICE — DRESSING, GAUZE, PETROLATUM, PATCH, XEROFORM, 1 X 8 IN, STERILE

## (undated) DEVICE — DRAPE PACK, CRANIOTOMY, CUSTOM, UHC

## (undated) DEVICE — Device

## (undated) DEVICE — DRESSING, MEPILEX, BORDER, HEEL, 8.7 X 9.1 IN

## (undated) DEVICE — COVER, TABLE, UHC

## (undated) DEVICE — CATHETER, RED RUBBER 12F

## (undated) DEVICE — APPLICATOR, PREP, CHLORAPREP, W/ORANGE TINT, 10.5ML

## (undated) DEVICE — GOWN, SURGICAL, SMARTGOWN, XLARGE, STERILE

## (undated) DEVICE — COVER, CART, 45 X 27 X 48 IN, CLEAR

## (undated) DEVICE — REST, HEAD, BAGEL, 9 IN

## (undated) DEVICE — DRESSING, MEPILEX, BORDER, SACRUM, 8.7 X 9.8 IN

## (undated) DEVICE — DRAPE, MICROSCOPE, FOR ZEISS 65MM, VARI-LENS II, 52 X 150

## (undated) DEVICE — RETRACTOR, HOOK, FISH, NEURO

## (undated) DEVICE — CONTAINER, SPECIMEN, 120 ML, STERILE